# Patient Record
Sex: MALE | Race: WHITE | Employment: FULL TIME | URBAN - METROPOLITAN AREA
[De-identification: names, ages, dates, MRNs, and addresses within clinical notes are randomized per-mention and may not be internally consistent; named-entity substitution may affect disease eponyms.]

---

## 2017-09-20 ENCOUNTER — OFFICE VISIT (OUTPATIENT)
Dept: FAMILY MEDICINE | Facility: CLINIC | Age: 62
End: 2017-09-20
Payer: COMMERCIAL

## 2017-09-20 VITALS
HEIGHT: 70 IN | HEART RATE: 72 BPM | DIASTOLIC BLOOD PRESSURE: 94 MMHG | TEMPERATURE: 98 F | SYSTOLIC BLOOD PRESSURE: 148 MMHG | WEIGHT: 216.8 LBS | OXYGEN SATURATION: 96 % | BODY MASS INDEX: 31.04 KG/M2

## 2017-09-20 DIAGNOSIS — Z13.220 SCREENING FOR LIPID DISORDERS: ICD-10-CM

## 2017-09-20 DIAGNOSIS — I10 BENIGN ESSENTIAL HYPERTENSION: ICD-10-CM

## 2017-09-20 DIAGNOSIS — E78.5 HYPERLIPIDEMIA LDL GOAL <130: ICD-10-CM

## 2017-09-20 DIAGNOSIS — Z12.11 SCREENING FOR COLON CANCER: ICD-10-CM

## 2017-09-20 DIAGNOSIS — G43.009 MIGRAINE WITHOUT AURA AND WITHOUT STATUS MIGRAINOSUS, NOT INTRACTABLE: ICD-10-CM

## 2017-09-20 DIAGNOSIS — Z00.01 ENCOUNTER FOR ROUTINE ADULT MEDICAL EXAM WITH ABNORMAL FINDINGS: Primary | ICD-10-CM

## 2017-09-20 DIAGNOSIS — R36.1 BLOOD IN SEMEN: ICD-10-CM

## 2017-09-20 PROCEDURE — 82043 UR ALBUMIN QUANTITATIVE: CPT | Performed by: NURSE PRACTITIONER

## 2017-09-20 PROCEDURE — 80061 LIPID PANEL: CPT | Performed by: NURSE PRACTITIONER

## 2017-09-20 PROCEDURE — 99386 PREV VISIT NEW AGE 40-64: CPT | Performed by: NURSE PRACTITIONER

## 2017-09-20 PROCEDURE — 36415 COLL VENOUS BLD VENIPUNCTURE: CPT | Performed by: NURSE PRACTITIONER

## 2017-09-20 PROCEDURE — 80048 BASIC METABOLIC PNL TOTAL CA: CPT | Performed by: NURSE PRACTITIONER

## 2017-09-20 PROCEDURE — 99213 OFFICE O/P EST LOW 20 MIN: CPT | Mod: 25 | Performed by: NURSE PRACTITIONER

## 2017-09-20 RX ORDER — LISINOPRIL 10 MG/1
10 TABLET ORAL DAILY
Qty: 30 TABLET | Refills: 1 | Status: SHIPPED | OUTPATIENT
Start: 2017-09-20 | End: 2018-04-27

## 2017-09-20 RX ORDER — ATENOLOL 50 MG/1
50 TABLET ORAL DAILY
Qty: 90 TABLET | Refills: 3 | Status: SHIPPED | OUTPATIENT
Start: 2017-09-20 | End: 2018-09-18

## 2017-09-20 RX ORDER — ATORVASTATIN CALCIUM 10 MG/1
20 TABLET, FILM COATED ORAL DAILY
Qty: 180 TABLET | Refills: 3 | Status: SHIPPED | OUTPATIENT
Start: 2017-09-20 | End: 2017-10-03

## 2017-09-20 RX ORDER — HYDROCHLOROTHIAZIDE 25 MG/1
25 TABLET ORAL DAILY
Qty: 90 TABLET | Refills: 1 | Status: SHIPPED | OUTPATIENT
Start: 2017-09-20 | End: 2018-03-28

## 2017-09-20 RX ORDER — RIZATRIPTAN BENZOATE 5 MG/1
5 TABLET ORAL
Qty: 30 TABLET | Refills: 3 | Status: SHIPPED | OUTPATIENT
Start: 2017-09-20 | End: 2017-09-26

## 2017-09-20 NOTE — LETTER
September 25, 2017      Karan Derrek  [address]   [place] [address]        Dear ,    We are writing to inform you of your test results.    Your microalbumin, which is a measure of kidney health, was elevated.  This suggests that blood pressure control has not been optimal.  The blood kidney function and electrolytes are normal.  The cholesterol looks elevated, but this is because you were not fasting.  The LDL, which is the part we decide on meds for, is actually quite good.  Next year, or sooner if you wish, let's get a fasting cholesterol which will allow us to calculate a risk score and may prompt us to consider medication despite your good LDL. Enclosed is a copy of these results.  If you have any further questions or problems, please contact our office at [phone].    Resulted Orders   LIPID REFLEX TO DIRECT LDL PANEL   Result Value Ref Range    Cholesterol 202 (H) <200 mg/dL      Comment:      Desirable:       <200 mg/dl    Triglycerides 384 (H) <150 mg/dL      Comment:      Borderline high:  150-199 mg/dl  High:             200-499 mg/dl  Very high:       >499 mg/dl      HDL Cholesterol 40 >39 mg/dL    LDL Cholesterol Calculated 85 <100 mg/dL      Comment:      Desirable:       <100 mg/dl    Non HDL Cholesterol 162 (H) <130 mg/dL      Comment:      Above Desirable:  130-159 mg/dl  Borderline high:  160-189 mg/dl  High:             190-219 mg/dl  Very high:       >219 mg/dl     Basic metabolic panel   Result Value Ref Range    Sodium 137 133 - 144 mmol/L    Potassium 4.0 3.4 - 5.3 mmol/L    Chloride 100 94 - 109 mmol/L    Carbon Dioxide 30 20 - 32 mmol/L    Anion Gap 7 3 - 14 mmol/L    Glucose 98 70 - 99 mg/dL    Urea Nitrogen 20 7 - 30 mg/dL    Creatinine 1.21 0.66 - 1.25 mg/dL    GFR Estimate 61 >60 mL/min/1.7m2      Comment:      Non  GFR Calc    GFR Estimate If Black 74 >60 mL/min/1.7m2      Comment:       GFR Calc    Calcium 9.8 8.5 - 10.1  mg/dL   Albumin Random Urine Quantitative with Creat Ratio   Result Value Ref Range    Creatinine Urine 38 mg/dL    Albumin Urine mg/L 12 mg/L    Albumin Urine mg/g Cr 30.50 (H) 0 - 17 mg/g Cr       If you have any questions or concerns, please call the clinic at the number listed above.       Sincerely,        JEFFERSON Cook CNP

## 2017-09-20 NOTE — PATIENT INSTRUCTIONS
Preventive Health Recommendations  Male Ages 50   64    Yearly exam:             See your health care provider every year in order to  o   Review health changes.   o   Discuss preventive care.    o   Review your medicines if your doctor has prescribed any.     Have a cholesterol test every 5 years, or more frequently if you are at risk for high cholesterol/heart disease.     Have a diabetes test (fasting glucose) every three years. If you are at risk for diabetes, you should have this test more often.     Have a colonoscopy at age 50, or have a yearly FIT test (stool test). These exams will check for colon cancer.      Talk with your health care provider about whether or not a prostate cancer screening test (PSA) is right for you.    You should be tested each year for STDs (sexually transmitted diseases), if you re at risk.     Shots: Get a flu shot each year. Get a tetanus shot every 10 years.     Nutrition:    Eat at least 5 servings of fruits and vegetables daily.     Eat whole-grain bread, whole-wheat pasta and brown rice instead of white grains and rice.     Talk to your provider about Calcium and Vitamin D.     Lifestyle    Exercise for at least 150 minutes a week (30 minutes a day, 5 days a week). This will help you control your weight and prevent disease.     Limit alcohol to one drink per day.     No smoking.     Wear sunscreen to prevent skin cancer.     See your dentist every six months for an exam and cleaning.     See your eye doctor every 1 to 2 years.    Preventive Health Recommendations  Male Ages 50   64    Yearly exam:             See your health care provider every year in order to  o   Review health changes.   o   Discuss preventive care.    o   Review your medicines if your doctor has prescribed any.     Have a cholesterol test every 5 years, or more frequently if you are at risk for high cholesterol/heart disease.     Have a diabetes test (fasting glucose) every three years. If you are at  risk for diabetes, you should have this test more often.     Have a colonoscopy at age 50, or have a yearly FIT test (stool test). These exams will check for colon cancer.      Talk with your health care provider about whether or not a prostate cancer screening test (PSA) is right for you.    You should be tested each year for STDs (sexually transmitted diseases), if you re at risk.     Shots: Get a flu shot each year. Get a tetanus shot every 10 years.     Nutrition:    Eat at least 5 servings of fruits and vegetables daily.     Eat whole-grain bread, whole-wheat pasta and brown rice instead of white grains and rice.     Talk to your provider about Calcium and Vitamin D.     Lifestyle    Exercise for at least 150 minutes a week (30 minutes a day, 5 days a week). This will help you control your weight and prevent disease.     Limit alcohol to one drink per day.     No smoking.     Wear sunscreen to prevent skin cancer.     See your dentist every six months for an exam and cleaning.     See your eye doctor every 1 to 2 years.

## 2017-09-20 NOTE — NURSING NOTE
"Chief Complaint   Patient presents with     Physical       Initial BP (!) 148/94  Pulse 72  Temp 98  F (36.7  C) (Oral)  Ht 5' 10.47\" (1.79 m)  Wt 216 lb 12.8 oz (98.3 kg)  SpO2 96%  BMI 30.69 kg/m2 Estimated body mass index is 30.69 kg/(m^2) as calculated from the following:    Height as of this encounter: 5' 10.47\" (1.79 m).    Weight as of this encounter: 216 lb 12.8 oz (98.3 kg).  Medication Reconciliation: complete     Larry Saucedo MA      "

## 2017-09-20 NOTE — PROGRESS NOTES
SUBJECTIVE:   CC: Karan Anglin is an 62 year old male who presents for preventative health visit.     Healthy Habits:    Do you get at least three servings of calcium containing foods daily (dairy, green leafy vegetables, etc.)? yes    Amount of exercise or daily activities, outside of work: 5 day(s) per week    Problems taking medications regularly No    Medication side effects: No    Have you had an eye exam in the past two years? yes    Do you see a dentist twice per year? yes    Do you have sleep apnea, excessive snoring or daytime drowsiness?no    Colonoscopy done on this date: 2006 (approximately), by this group: [facility], results were normal.   Last dental appt: 1 year ago.  Last vision appt: < 1 year ago.    Hyperlipidemia Follow-Up      Rate your low fat/cholesterol diet?: not monitoring fat    Taking statin?  Yes, no muscle aches from statin    Other lipid medications/supplements?:  none    Hypertension Follow-up      Outpatient blood pressures are being checked at home about once a week.  Results are 130s/90s.    Low Salt Diet: no added salt; reading labels    Migraine Follow-Up    Headaches symptoms:  Stable. Can feel migraine coming on, without aura, administers rescue medication, symptoms resolve within a few hours    Frequency: 3-4 x a month     Duration of headaches: with medication, a few hours    Able to do normal daily activities/work with migraines: Yes, after medication sets in    Rescue/Relief medication:Rizatriptan Benzoate 5 mg po              Effectiveness: total relief    Preventative medication: Atenolol 50 mg po daily    Neurologic complications: No new stroke-like symptoms, loss of vision or speech, numbness or weakness    In the past 4 weeks, how often have you gone to Urgent Care or the emergency room because of your headaches?  0      Today's PHQ-2 Score:   PHQ-2 ( 1999 [employer]) 9/20/2017   Q1: Little interest or pleasure in doing things 0   Q2: Feeling down, depressed or hopeless  "0   PHQ-2 Score 0       Abuse: Current or Past(Physical, Sexual or Emotional)- No  Do you feel safe in your environment - Yes  Social History   Substance Use Topics     Smoking status: Never Smoker     Smokeless tobacco: Never Used     Alcohol use Yes      Comment: 5 glasses wine/wk     The patient does not drink >3 drinks per day nor >7 drinks per week.  Consumes approx 5 glasses of wine /wk  Last PSA: No results found for: PSA; per patient report 1 year ago, WNL    Reviewed orders with patient. Reviewed health maintenance and updated orders accordingly - Yes      Reviewed and updated as needed this visit by clinical staff    Reviewed and updated as needed this visit by Provider    Past Medical History:   Diagnosis Date     Hyperlipidemia 2015     Hypertension 2014     Migraine     Nausea and vomiting     Peptic ulcer hemorrhage 2013        ROS:  C: NEGATIVE for fever, chills, change in weight; positive for new generalized achiness  I: NEGATIVE for worrisome rashes, moles or lesions  E: NEGATIVE for vision changes or irritation  ENT: NEGATIVE for ear, mouth and throat problems  R: NEGATIVE for significant cough or SOB  CV: NEGATIVE for chest pain, palpitations or peripheral edema  GI: NEGATIVE for nausea, abdominal pain, heartburn, or change in bowel habits   male: hesitancy, weakness in stream and occasional blood in semen; both new from last few months; father had BPH, believes he had normal PSA a year ago  M: NEGATIVE for significant arthralgias or myalgia  N: NEGATIVE for weakness, dizziness; positive intermittent parasthesias in nose, fingertips, toes  E: NEGATIVE for temperature intolerance, skin/hair changes  P: NEGATIVE for changes in mood or affect    OBJECTIVE:   BP (!) 148/94  Pulse 72  Temp 98  F (36.7  C) (Oral)  Ht 5' 10.47\" (1.79 m)  Wt 216 lb 12.8 oz (98.3 kg)  SpO2 96%  BMI 30.69 kg/m2  EXAM:  GENERAL: healthy, alert and no distress  EYES: Eyes grossly normal to inspection, PERRL and " conjunctivae and sclerae normal  HENT: ear canals and TM's normal, nose and mouth without ulcers or lesions  NECK: no adenopathy, no asymmetry, masses, or scars and thyroid normal to palpation  RESP: lungs clear to auscultation - no rales, rhonchi or wheezes  CV: regular rate and rhythm, normal S1 S2, no S3 or S4, no murmur, click or rub, no peripheral edema and peripheral pulses strong  ABDOMEN: soft, nontender, no hepatosplenomegaly, no masses and bowel sounds normal   (male): normal male genitalia without lesions or urethral discharge, no hernia  MS: no gross musculoskeletal defects noted, no edema  SKIN: no suspicious lesions or rashes  NEURO: Normal strength and tone, mentation intact and speech normal  PSYCH: mentation appears normal, affect normal/bright  LYMPH: no cervical, supraclavicular, axillary, or inguinal adenopathy    ASSESSMENT/PLAN:       2. Encounter for routine adult medical exam with abnormal findings      3. Screening for colon cancer  Plans to complete FIT   - Fecal colorectal cancer screen FIT; Future  - GASTROENTEROLOGY ADULT REF PROCEDURE ONLY    4. Screening for lipid disorders    - LIPID REFLEX TO DIRECT LDL PANEL  - Basic metabolic panel    5. Benign essential hypertension  Requires additional control.  Add lisinopril 10 mg po daily.  - LIPID REFLEX TO DIRECT LDL PANEL  - Basic metabolic panel  - Albumin Random Urine Quantitative with Creat Ratio    6. Hyperlipidemia LDL goal <130    - LIPID REFLEX TO DIRECT LDL PANEL    7. Blood in semen    - UROLOGY ADULT REFERRAL    COUNSELING:  Reviewed preventive health counseling, as reflected in patient instructions       Regular exercise       Healthy diet/nutrition       Consider Hep C screening for patients born between 1945 and 1965       Colon cancer screening     reports that he has never smoked. He has never used smokeless tobacco.    Estimated body mass index is 30.69 kg/(m^2) as calculated from the following:    Height as of this  "encounter: 5' 10.47\" (1.79 m).    Weight as of this encounter: 216 lb 12.8 oz (98.3 kg).   Weight management plan: Discussed healthy diet and exercise guidelines and patient will follow up in 12 months in clinic to re-evaluate.    Counseling Resources:  ATP IV Guidelines  Pooled Cohorts Equation Calculator  FRAX Risk Assessment  ICSI Preventive Guidelines  Dietary Guidelines for Americans, 2010  USDA's MyPlate  ASA Prophylaxis  Lung CA Screening    Caridad Rojas RN DNP/FNP student    Present and preformed physical exam with student nurse-family practice. The patient was evaluated and managed, by Caridad Rojas RN, SNP in collaboration with JEFFERSON Isbell, BABAK supervising clinical preceptor.    Documentation written by JEFFERSON Isbell CNP    [facility]  "

## 2017-09-20 NOTE — MR AVS SNAPSHOT
After Visit Summary   9/20/2017    Karan Anglin    MRN: [id number]           Patient Information     Date Of Birth          1955        Visit Information        Provider Department      9/20/2017 1:00 PM Romelia Judd APRN [facility]        Today's Diagnoses     Encounter for routine adult medical exam with abnormal findings    -  1    Routine general medical examination at a health care facility        Screening for colon cancer        Screening for lipid disorders        Benign essential hypertension        Hyperlipidemia LDL goal <130        Blood in semen          Care Instructions      Preventive Health Recommendations  Male Ages 50 - 64    Yearly exam:             See your health care provider every year in order to  o   Review health changes.   o   Discuss preventive care.    o   Review your medicines if your doctor has prescribed any.     Have a cholesterol test every 5 years, or more frequently if you are at risk for high cholesterol/heart disease.     Have a diabetes test (fasting glucose) every three years. If you are at risk for diabetes, you should have this test more often.     Have a colonoscopy at age 50, or have a yearly FIT test (stool test). These exams will check for colon cancer.      Talk with your health care provider about whether or not a prostate cancer screening test (PSA) is right for you.    You should be tested each year for STDs (sexually transmitted diseases), if you re at risk.     Shots: Get a flu shot each year. Get a tetanus shot every 10 years.     Nutrition:    Eat at least 5 servings of fruits and vegetables daily.     Eat whole-grain bread, whole-wheat pasta and brown rice instead of white grains and rice.     Talk to your provider about Calcium and Vitamin D.     Lifestyle    Exercise for at least 150 minutes a week (30 minutes a day, 5 days a week). This will help you control your weight and prevent disease.     Limit alcohol to one  drink per day.     No smoking.     Wear sunscreen to prevent skin cancer.     See your dentist every six months for an exam and cleaning.     See your eye doctor every 1 to 2 years.    Preventive Health Recommendations  Male Ages 50 - 64    Yearly exam:             See your health care provider every year in order to  o   Review health changes.   o   Discuss preventive care.    o   Review your medicines if your doctor has prescribed any.     Have a cholesterol test every 5 years, or more frequently if you are at risk for high cholesterol/heart disease.     Have a diabetes test (fasting glucose) every three years. If you are at risk for diabetes, you should have this test more often.     Have a colonoscopy at age 50, or have a yearly FIT test (stool test). These exams will check for colon cancer.      Talk with your health care provider about whether or not a prostate cancer screening test (PSA) is right for you.    You should be tested each year for STDs (sexually transmitted diseases), if you re at risk.     Shots: Get a flu shot each year. Get a tetanus shot every 10 years.     Nutrition:    Eat at least 5 servings of fruits and vegetables daily.     Eat whole-grain bread, whole-wheat pasta and brown rice instead of white grains and rice.     Talk to your provider about Calcium and Vitamin D.     Lifestyle    Exercise for at least 150 minutes a week (30 minutes a day, 5 days a week). This will help you control your weight and prevent disease.     Limit alcohol to one drink per day.     No smoking.     Wear sunscreen to prevent skin cancer.     See your dentist every six months for an exam and cleaning.     See your eye doctor every 1 to 2 years.            Follow-ups after your visit        Additional Services     GASTROENTEROLOGY ADULT REF PROCEDURE ONLY       Last Lab Result: No results found for: CR  There is no height or weight on file to calculate BMI.     Needed:  No  Language:  English    Patient  will be contacted to schedule procedure.     Please be aware that coverage of these services is subject to the terms and limitations of your health insurance plan.  Call member services at your health plan with any benefit or coverage questions.  Any procedures must be performed at a [facility] facility OR coordinated by your clinic's referral office.    Please bring the following with you to your appointment:    (1) Any X-Rays, CTs or MRIs which have been performed.  Contact the facility where they were done to arrange for  prior to your scheduled appointment.    (2) List of current medications   (3) This referral request   (4) Any documents/labs given to you for this referral            UROLOGY ADULT REFERRAL       Your provider has referred you to: [facility]: [facility] [place] [phone]   [email or web address]    Please be aware that coverage of these services is subject to the terms and limitations of your health insurance plan.  Call member services at your health plan with any benefit or coverage questions.      Please bring the following with you to your appointment:    (1) Any X-Rays, CTs or MRIs which have been performed.  Contact the facility where they were done to arrange for  prior to your scheduled appointment.    (2) List of current medications  (3) This referral request   (4) Any documents/labs given to you for this referral                  Future tests that were ordered for you today     Open Future Orders        Priority Expected Expires Ordered    Fecal colorectal cancer screen FIT Routine 10/11/2017 12/13/2017 9/20/2017            Who to contact     If you have questions or need follow up information about today's clinic visit or your schedule please contact [facility] directly at [phone].  Normal or non-critical lab and imaging results will be communicated to you  "by tinyclueshart, letter or phone within 4 business days after the clinic has received the results. If you do not hear from us within 7 days, please contact the clinic through [facility] or phone. If you have a critical or abnormal lab result, we will notify you by phone as soon as possible.  Submit refill requests through [facility] or call your pharmacy and they will forward the refill request to us. Please allow 3 business days for your refill to be completed.          Additional Information About Your Visit        [facility] Information     [facility] lets you send messages to your doctor, view your test results, renew your prescriptions, schedule appointments and more. To sign up, go to [email or web address] . Click on \"Log in\" on the left side of the screen, which will take you to the Welcome page. Then click on \"Sign up Now\" on the right side of the page.     You will be asked to enter the access code listed below, as well as some personal information. Please follow the directions to create your username and password.     Your access code is: [email or web address]  Expires: 2017  2:20 PM     Your access code will  in 90 days. If you need help or a new code, please call your [facility] clinic or [phone].        Care EveryWhere ID     This is your Care EveryWhere ID. This could be used by other organizations to access your [facility] medical records  [id number]        Your Vitals Were     Pulse Temperature Height Pulse Oximetry BMI (Body Mass Index)       72 98  F (36.7  C) (Oral) 5' 10.47\" (1.79 m) 96% 30.69 kg/m2        Blood Pressure from Last 3 Encounters:   17 (!) 148/94    Weight from Last 3 Encounters:   17 216 lb 12.8 oz (98.3 kg)              We Performed the Following     Albumin Random Urine Quantitative with Creat Ratio     Basic metabolic panel     GASTROENTEROLOGY ADULT REF PROCEDURE ONLY     LIPID REFLEX TO DIRECT LDL PANEL     UROLOGY ADULT REFERRAL        Primary Care Provider    None " Specified       No primary provider on file.        Equal Access to Services     [facility] JIMMY : Hadii saleem Barroso, graeme alfred, zac whitt. So [facility] [phone].    ATENCIÓN: Si habla español, tiene a grullon disposición servicios gratuitos de asistencia lingüística. Llame al [phone].    We comply with applicable federal civil rights laws and [place] laws. We do not discriminate on the basis of race, color, national origin, age, disability sex, sexual orientation or gender identity.            Thank you!     Thank you for choosing [facility]  for your care. Our goal is always to provide you with excellent care. Hearing back from our patients is one way we can continue to improve our services. Please take a few minutes to complete the written survey that you may receive in the mail after your visit with us. Thank you!             Your Updated Medication List - Protect others around you: Learn how to safely use, store and throw away your medicines at www.disposemymeds.org.      Notice  As of 9/20/2017  2:20 PM    You have not been prescribed any medications.

## 2017-09-21 LAB
ANION GAP SERPL CALCULATED.3IONS-SCNC: 7 MMOL/L (ref 3–14)
BUN SERPL-MCNC: 20 MG/DL (ref 7–30)
CALCIUM SERPL-MCNC: 9.8 MG/DL (ref 8.5–10.1)
CHLORIDE SERPL-SCNC: 100 MMOL/L (ref 94–109)
CHOLEST SERPL-MCNC: 202 MG/DL
CO2 SERPL-SCNC: 30 MMOL/L (ref 20–32)
CREAT SERPL-MCNC: 1.21 MG/DL (ref 0.66–1.25)
CREAT UR-MCNC: 38 MG/DL
GFR SERPL CREATININE-BSD FRML MDRD: 61 ML/MIN/1.7M2
GLUCOSE SERPL-MCNC: 98 MG/DL (ref 70–99)
HDLC SERPL-MCNC: 40 MG/DL
LDLC SERPL CALC-MCNC: 85 MG/DL
MICROALBUMIN UR-MCNC: 12 MG/L
MICROALBUMIN/CREAT UR: 30.5 MG/G CR (ref 0–17)
NONHDLC SERPL-MCNC: 162 MG/DL
POTASSIUM SERPL-SCNC: 4 MMOL/L (ref 3.4–5.3)
SODIUM SERPL-SCNC: 137 MMOL/L (ref 133–144)
TRIGL SERPL-MCNC: 384 MG/DL

## 2017-09-22 NOTE — PROGRESS NOTES
"Please notify patient of normal lab results.  Thank you.    Please add below note.  \"Your microalbumin, which is a measure of kidney health, was elevated.  This suggests that blood pressure control has not been optimal.  The blood kidney function and electrolytes are normal.  The cholesterol looks elevated, but this is because you were not fasting.  The LDL, which is the part we decide on meds for, is actually quite good.  Next year, or sooner if you wish, let's get a fasting cholesterol which will allow us to calculate a risk score and may prompt us to consider medication despite your good LDL. Enclosed is a copy of these results.  If you have any further questions or problems, please contact our office at [phone].\""

## 2017-09-26 ENCOUNTER — TELEPHONE (OUTPATIENT)
Dept: FAMILY MEDICINE | Facility: CLINIC | Age: 62
End: 2017-09-26

## 2017-09-26 DIAGNOSIS — G43.009 MIGRAINE WITHOUT AURA AND WITHOUT STATUS MIGRAINOSUS, NOT INTRACTABLE: ICD-10-CM

## 2017-09-26 NOTE — TELEPHONE ENCOUNTER
,     Fax received from pharmacy regarding:     rizatriptan (MAXALT) 5 MG tablet    Fax states a max dose per day is needed on script. Medication cued for your review/revision.     Thank you,   Diana Monzon RN  [facility]

## 2017-09-27 RX ORDER — RIZATRIPTAN BENZOATE 5 MG/1
5 TABLET ORAL
Qty: 30 TABLET | Refills: 0 | Status: SHIPPED | OUTPATIENT
Start: 2017-09-27 | End: 2018-02-15

## 2017-11-14 PROCEDURE — 82274 ASSAY TEST FOR BLOOD FECAL: CPT | Performed by: NURSE PRACTITIONER

## 2017-11-17 DIAGNOSIS — Z12.11 SCREENING FOR COLON CANCER: ICD-10-CM

## 2017-11-17 LAB — HEMOCCULT STL QL IA: NEGATIVE

## 2017-11-17 NOTE — PROGRESS NOTES
Karan,    Your labs were all normal.  If you have any questions, please feel free to contact the clinic.    MARJ Rashid

## 2018-03-20 ENCOUNTER — TELEPHONE (OUTPATIENT)
Dept: FAMILY MEDICINE | Facility: CLINIC | Age: 63
End: 2018-03-20

## 2018-03-20 NOTE — LETTER
March 20, 2018      Karan Anglin  [address]  [place] [address]        Dear Karan,    In order to ensure we are providing the best quality care, we have reviewed your chart and see that you are due for:    1. Blood pressure check  2. Colonoscopy    Please call the clinic at your earliest convenience to schedule an appointment.  If you have completed these please contact our office via phone or [facility] to update our records.  We would like to know the date (approximately month and year), the result, and ideally where the procedure was performed.    Thank you for trusting us with your health care.      Sincerely,    Care Team for MARJ Rashid

## 2018-03-27 ENCOUNTER — APPOINTMENT (OUTPATIENT)
Dept: GENERAL RADIOLOGY | Facility: CLINIC | Age: 63
End: 2018-03-27
Attending: EMERGENCY MEDICINE
Payer: OTHER MISCELLANEOUS

## 2018-03-27 ENCOUNTER — HOSPITAL ENCOUNTER (EMERGENCY)
Facility: CLINIC | Age: 63
Discharge: HOME | End: 2018-03-27
Attending: EMERGENCY MEDICINE | Admitting: EMERGENCY MEDICINE
Payer: OTHER MISCELLANEOUS

## 2018-03-27 VITALS
HEART RATE: 72 BPM | HEIGHT: 71 IN | RESPIRATION RATE: 16 BRPM | TEMPERATURE: 97.8 F | DIASTOLIC BLOOD PRESSURE: 90 MMHG | SYSTOLIC BLOOD PRESSURE: 135 MMHG | OXYGEN SATURATION: 99 %

## 2018-03-27 DIAGNOSIS — S43.005A DISLOCATION OF LEFT SHOULDER JOINT, INITIAL ENCOUNTER: ICD-10-CM

## 2018-03-27 PROCEDURE — 73030 X-RAY EXAM OF SHOULDER: CPT | Mod: LT

## 2018-03-27 PROCEDURE — 99285 EMERGENCY DEPT VISIT HI MDM: CPT | Mod: 25 | Performed by: EMERGENCY MEDICINE

## 2018-03-27 PROCEDURE — 40000986 XR SHOULDER LT G/E 3 VW

## 2018-03-27 PROCEDURE — 23650 CLTX SHO DSLC W/MNPJ WO ANES: CPT | Performed by: EMERGENCY MEDICINE

## 2018-03-27 PROCEDURE — 25000128 H RX IP 250 OP 636: Performed by: EMERGENCY MEDICINE

## 2018-03-27 PROCEDURE — 96374 THER/PROPH/DIAG INJ IV PUSH: CPT | Performed by: EMERGENCY MEDICINE

## 2018-03-27 PROCEDURE — 23650 CLTX SHO DSLC W/MNPJ WO ANES: CPT | Mod: Z6 | Performed by: EMERGENCY MEDICINE

## 2018-03-27 RX ORDER — FENTANYL CITRATE 50 UG/ML
50 INJECTION, SOLUTION INTRAMUSCULAR; INTRAVENOUS ONCE
Status: COMPLETED | OUTPATIENT
Start: 2018-03-27 | End: 2018-03-27

## 2018-03-27 RX ORDER — HYDROMORPHONE HYDROCHLORIDE 1 MG/ML
0.5 INJECTION, SOLUTION INTRAMUSCULAR; INTRAVENOUS; SUBCUTANEOUS
Status: DISCONTINUED | OUTPATIENT
Start: 2018-03-27 | End: 2018-03-27

## 2018-03-27 RX ADMIN — FENTANYL CITRATE 50 MCG: 50 INJECTION, SOLUTION INTRAMUSCULAR; INTRAVENOUS at 13:21

## 2018-03-27 ASSESSMENT — ENCOUNTER SYMPTOMS
JOINT SWELLING: 1
ABDOMINAL PAIN: 0
NUMBNESS: 0

## 2018-03-27 NOTE — DISCHARGE INSTRUCTIONS
You should receive a call from [facility] to schedule your follow up appointment. If you do not hear from them within 24 business hours, call [phone], option 3 for help in scheduling your follow up.  If you are seen in the Emergency Department over the weekend, you will receive a phone call on the next business day.     Continue to wear shoulder immobilizer until follow-up.  You may remove to take showers and get dressed but keep your arm in the same position as you are at risk for re-dislocation.      You may use acetaminophen or ibuprofen as needed for pain control.        Dislocation: Shoulder (Reduced)    A shoulder is dislocated when a strong force injures, and possibly tears the ligaments that hold the shoulder joint together. The bones that make up the joint then move apart and become stuck out of place. The joint must be put back in place. Then it will take several weeks for the shoulder to heal. This injury may weaken the ligaments. Weakened ligaments put you at risk for another dislocation. Another dislocation can happen even if you are hit with less force.  Shoulder dislocation is treated with a special type of arm sling called a shoulder immobilizer. This keeps your arm close to your body. This stops the shoulder from dislocating again while the ligaments heal. After a few weeks, you may start an exercise program. This will gradually bring back your range-of-motion and shoulder strength. It will also lower your risk for another dislocation.  Home care  Follow these tips for taking care of yourself at home:    Until your next doctor visit, wear your shoulder immobilizer at all times. Don t take it off at night to sleep. This is because it s possible to dislocate your arm again in your sleep. You can take it off to bathe or dress. But don t move your arm away from your body. Keep your arm in the same position that the sling was holding it in until you put the sling back on. During  your next visit, ask your doctor how long you should wear the sling.    Apply an ice pack over the injured area for 20 minutes every 1 to 2 hours the first day. You can make an ice pack by putting ice cubes in a plastic bag. Wrap the bag in a towel before putting it on your shoulder. Continue with ice packs 3 to 4 times a day for the next 2 to 3 days. Then use the ice as needed to relieve pain and swelling.    You may use acetaminophen or ibuprofen to control pain, unless another pain medicine was prescribed. If you have chronic liver or kidney disease or ever had a stomach ulcer or gastrointestinal bleeding, talk with your doctor before using these medicines.    Don t take part in sports or physical education classes until your doctor says it s OK.  Follow-up care  Follow up with your healthcare provider, or as advised. You shouldn t wear your shoulder immobilizer or sling for more than a few weeks without taking it off. Keeping it on for a longer time may limit your range-of-motion at the shoulder joint. If you have had several dislocations of the same shoulder, you may have permanent damage to the ligaments. Ask an orthopedic doctor about surgery to prevent another dislocation.  When to seek medical advice  Call your healthcare provider right away if any of these occur:    Another dislocation of your shoulder    Swelling or pain in the shoulder or arm that gets worse    Your fingers become cold, blue, numb or tingly    Fever or chills  Date Last Reviewed: 8/2/2016 2000-2017 [employer]. [address] [place] [address], [place] [address]. All rights reserved. This information is not intended as a substitute for professional medical care. Always follow your healthcare professional's instructions.

## 2018-03-27 NOTE — LETTER
March 27, 2018      To Whom It May Concern:      Karan Anglin was seen in our Emergency Department today, 03/27/18.  He will be restricted from using his left arm for the next week.       Sincerely,        Inna Jerome MD

## 2018-03-27 NOTE — ED PROVIDER NOTES
History     Chief Complaint   Patient presents with     Dislocation     HPI  Karan Anglin is a 63 year old male who presents to the Emergency Department for evaluation following a shoulder injury. The patient reports that he fell around 11:45 and subsequently landed on his left shoulder. He reports of having associated pain and decreased range of motion and is concerned that he may have dislocated his shoulder. He denies any numbness, tingling, weakness or any other symptoms. He denies any injuries to the head or LOC. He denies any other injuries or trauma. He is left hand dominant. He last ate this morning around 7:30am. He is not anticoagulated.      I have reviewed the Medications, Allergies, Past Medical and Surgical History, and Social History in the [employer] system.  PAST MEDICAL HISTORY:   Past Medical History:   Diagnosis Date     Hyperlipidemia 2015     Hypertension 2014     Migraine     Nausea and vomiting     Peptic ulcer hemorrhage 2013       PAST SURGICAL HISTORY:   Past Surgical History:   Procedure Laterality Date     GI SURGERY  2013    peptic ulcers stapled       FAMILY HISTORY:   Family History   Problem Relation Age of Onset     Colon Cancer Mother      KIDNEY DISEASE Mother      Coronary Artery Disease Father      Enlarged prostate Father      Other Cancer Brother      Down Syndrome Sister      Alzheimer Disease Sister        SOCIAL HISTORY:   Social History   Substance Use Topics     Smoking status: Never Smoker     Smokeless tobacco: Never Used     Alcohol use Yes      Comment: 5 glasses wine/wk     Current Facility-Administered Medications   Medication     fentaNYL (PF) (SUBLIMAZE) injection 50 mcg     Current Outpatient Prescriptions   Medication     rizatriptan (MAXALT) 5 MG tablet     atorvastatin (LIPITOR) 10 MG tablet     atenolol (TENORMIN) 50 MG tablet     hydrochlorothiazide (HYDRODIURIL) 25 MG tablet     lisinopril (PRINIVIL/ZESTRIL) 10 MG tablet      No Known Allergies    Review of  "Systems   Gastrointestinal: Negative for abdominal pain.   Musculoskeletal: Positive for joint swelling (left shoulder pain).   Neurological: Negative for syncope and numbness.   All other systems reviewed and are negative.      Physical Exam   BP: 135/90  Pulse: 72  Temp: 97.8  F (36.6  C)  Resp: 16  Height: 180.3 cm (5' 11\")  SpO2: 95 %      Physical Exam  General: patient is alert and oriented and in no acute distress   Head: atraumatic and normocephalic   EENT: moist mucus membranes, pupils round and reactive   Neck: supple   Cardiovascular: regular rate and rhythm, 2+ radial pulses, extremities warm and well perfused, no lower extremity edema  Pulmonary: lungs clear to auscultation bilaterally   Abdomen: soft, non-tender   Musculoskeletal: Decreased range of motion of the left shoulder and palpable step-off at the glenohumeral joint, no tenderness to palpation of the clavicle or humerus tenderness, otherwise full range of motion at the elbow and wrist at the left upper extremity, no other point bony TTP  Neurological: alert and oriented, 5 out of 5 strength in , finger abduction, wrist extension, elbow flexion and extension in the left upper extremity, sensation to light touch along the radial, median, ulnar and axillary nerves is intact  skin: warm, dry     ED Course     ED Course     Procedures             Critical Care time:  none             Labs Ordered and Resulted from Time of ED Arrival Up to the Time of Departure from the ED - No data to display         Assessments & Plan (with Medical Decision Making)   Mr. Anglin is a 63 year old left hand dominant male who presents to the Emergency Department for evaluation following a shoulder injury following a mechanical fall.  He is neurovascularly intact.  He does not have other evidence of traumatic injury.  He did go for an x-ray which shows a dislocated left shoulder without evidence of fracture.  He was given fentanyl in the emergency department and " performed Taylor maneuver with successful relocation.  Repeat x-ray shows appropriate alignment.  He was placed in a shoulder immobilizer and will follow-up with orthopedic surgery for reevaluation.  He was given a work note as well as return precautions and voiced understanding.    I have reviewed the nursing notes.    I have reviewed the findings, diagnosis, plan and need for follow up with the patient.    Discharge Medication List as of 3/27/2018  3:22 PM          Final diagnoses:   Dislocation of left shoulder joint, initial encounter     I, Bettie Tuttle, am serving as a trained medical scribe to document services personally performed by Inna Bhatti MD, based on the provider's statements to me.   I, Inna Bhatti MD, was physically present and have reviewed and verified the accuracy of this note documented by Bettie Tuttle.    3/27/2018   [facility], [place], EMERGENCY DEPARTMENT     Inna Jerome MD  03/27/18 7058

## 2018-03-27 NOTE — ED NOTES
Bed: ED08  Expected date:   Expected time:   Means of arrival:   Comments:  H439  63 m  Possible dislocated shoulder

## 2018-03-27 NOTE — ED AVS SNAPSHOT
[facility], [place], Emergency Department    [address] [place] [address]    Phone:  [phone]                                       Karan Anglin   MRN: [id number]    Department:  [facility], Emergency Department   Date of Visit:  3/27/2018           After Visit Summary Signature Page     I have received my discharge instructions, and my questions have been answered. I have discussed any challenges I see with this plan with the nurse or doctor.    ..........................................................................................................................................  Patient/Patient Representative Signature      ..........................................................................................................................................  Patient Representative Print Name and Relationship to Patient    ..................................................               ................................................  Date                                            Time    ..........................................................................................................................................  Reviewed by Signature/Title    ...................................................              ..............................................  Date                                                            Time

## 2018-03-27 NOTE — ED AVS SNAPSHOT
[facility], Emergency Department    [address] [facility] [address]    Phone:  [phone]                                       Karan Anglin   MRN: [id number]    Department:  [facility], Emergency Department   Date of Visit:  3/27/2018           Patient Information     Date Of Birth          1955        Your diagnoses for this visit were:     Dislocation of left shoulder joint, initial encounter        You were seen by Inna Jerome MD.        Discharge Instructions       You should receive a call from [facility] to schedule your follow up appointment. If you do not hear from them within 24 business hours, call [phone], option 3 for help in scheduling your follow up.  If you are seen in the Emergency Department over the weekend, you will receive a phone call on the next business day.     Continue to wear shoulder immobilizer until follow-up.  You may remove to take showers and get dressed but keep your arm in the same position as you are at risk for re-dislocation.      You may use acetaminophen or ibuprofen as needed for pain control.        Dislocation: Shoulder (Reduced)    A shoulder is dislocated when a strong force injures, and possibly tears the ligaments that hold the shoulder joint together. The bones that make up the joint then move apart and become stuck out of place. The joint must be put back in place. Then it will take several weeks for the shoulder to heal. This injury may weaken the ligaments. Weakened ligaments put you at risk for another dislocation. Another dislocation can happen even if you are hit with less force.  Shoulder dislocation is treated with a special type of arm sling called a shoulder immobilizer. This keeps your arm close to your body. This stops the shoulder from dislocating again while the ligaments heal. After a few weeks, you may start an exercise program. This will gradually bring back your range-of-motion and shoulder  strength. It will also lower your risk for another dislocation.  Home care  Follow these tips for taking care of yourself at home:    Until your next doctor visit, wear your shoulder immobilizer at all times. Don t take it off at night to sleep. This is because it s possible to dislocate your arm again in your sleep. You can take it off to bathe or dress. But don t move your arm away from your body. Keep your arm in the same position that the sling was holding it in until you put the sling back on. During your next visit, ask your doctor how long you should wear the sling.    Apply an ice pack over the injured area for 20 minutes every 1 to 2 hours the first day. You can make an ice pack by putting ice cubes in a plastic bag. Wrap the bag in a towel before putting it on your shoulder. Continue with ice packs 3 to 4 times a day for the next 2 to 3 days. Then use the ice as needed to relieve pain and swelling.    You may use acetaminophen or ibuprofen to control pain, unless another pain medicine was prescribed. If you have chronic liver or kidney disease or ever had a stomach ulcer or gastrointestinal bleeding, talk with your doctor before using these medicines.    Don t take part in sports or physical education classes until your doctor says it s OK.  Follow-up care  Follow up with your healthcare provider, or as advised. You shouldn t wear your shoulder immobilizer or sling for more than a few weeks without taking it off. Keeping it on for a longer time may limit your range-of-motion at the shoulder joint. If you have had several dislocations of the same shoulder, you may have permanent damage to the ligaments. Ask an orthopedic doctor about surgery to prevent another dislocation.  When to seek medical advice  Call your healthcare provider right away if any of these occur:    Another dislocation of your shoulder    Swelling or pain in the shoulder or arm that gets worse    Your fingers become cold, blue, numb or  tingly    Fever or chills  Date Last Reviewed: 8/2/2016 2000-2017 [employer]. [address] [place] [address], [place] [address]. All rights reserved. This information is not intended as a substitute for professional medical care. Always follow your healthcare professional's instructions.            24 Hour Appointment Hotline       To make an appointment at any [facility], call [phone] ([phone]). If you don't have a family doctor or clinic, we will help you find one. [facility] are conveniently located to serve the needs of you and your family.          ED Discharge Orders     Follow up with [facility]       You should receive a call from [facility] to schedule your follow up appointment. If you do not hear from them within 24 business hours, call [phone], option 3 for help in scheduling your follow up.  If you are seen in the Emergency Department over the weekend, you will receive a phone call on the next business day.                     Review of your medicines      Our records show that you are taking the medicines listed below. If these are incorrect, please call your family doctor or clinic.        Dose / Directions Last dose taken    atenolol 50 MG tablet   Commonly known as:  TENORMIN   Dose:  50 mg   Quantity:  90 tablet        Take 1 tablet (50 mg) by mouth daily   Refills:  3        atorvastatin 10 MG tablet   Commonly known as:  LIPITOR   Dose:  20 mg   Quantity:  90 tablet        Take 2 tablets (20 mg) by mouth daily   Refills:  3        hydrochlorothiazide 25 MG tablet   Commonly known as:  HYDRODIURIL   Dose:  25 mg   Quantity:  90 tablet        Take 1 tablet (25 mg) by mouth daily   Refills:  1        lisinopril 10 MG tablet   Commonly known as:  PRINIVIL/ZESTRIL   Dose:  10 mg   Quantity:  30 tablet        Take 1 tablet (10 mg) by mouth daily   Refills:  1        rizatriptan 5 MG tablet   Commonly known as:  MAXALT   Dose:  5 mg    Quantity:  30 tablet        Take 1 tablet (5 mg) by mouth at onset of headache for migraine repeat after 2 hr if no relief; maximum: 30 mg/24 hours   Refills:  0                Procedures and tests performed during your visit     Procedure/Test Number of Times Performed    XR Shoulder Left G/E 3 Views 2      Orders Needing Specimen Collection     None      Pending Results     No orders found from 3/25/2018 to 3/28/2018.            Pending Culture Results     No orders found from 3/25/2018 to 3/28/2018.            Pending Results Instructions     If you had any lab results that were not finalized at the time of your Discharge, you can call the ED Lab Result RN at [phone]. You will be contacted by this team for any positive Lab results or changes in treatment. The nurses are available 7 days a week from 10A to 6:30P.  You can leave a message 24 hours per day and they will return your call.        Thank you for choosing [facility]       Thank you for choosing [facility] for your care. Our goal is always to provide you with excellent care. Hearing back from our patients is one way we can continue to improve our services. Please take a few minutes to complete the written survey that you may receive in the mail after you visit with us. Thank you!        [employer] Information     [employer] gives you secure access to your electronic health record. If you see a primary care provider, you can also send messages to your care team and make appointments. If you have questions, please call your primary care clinic.  If you do not have a primary care provider, please call [phone] and they will assist you.        Care EveryWhere ID     This is your Care EveryWhere ID. This could be used by other organizations to access your [facility] medical records  [id number]        Equal Access to Services     PATRICIA MARQUEZ AH: Mecca Barroso, graeme alfred, zac whitt  ah. So [facility] [phone].    ATENCIÓN: Si habla español, tiene a grullon disposición servicios gratuitos de asistencia lingüística. Llame al [phone].    We comply with applicable federal civil rights laws and [place] laws. We do not discriminate on the basis of race, color, national origin, age, disability, sex, sexual orientation, or gender identity.            After Visit Summary       This is your record. Keep this with you and show to your community pharmacist(s) and doctor(s) at your next visit.

## 2018-03-27 NOTE — ED NOTES
Pt BIBA after fall on left shoulder, denies LOC. Shoulder appears to be dislocated. Left arm cold, +pulses.

## 2018-03-28 DIAGNOSIS — I10 BENIGN ESSENTIAL HYPERTENSION: ICD-10-CM

## 2018-03-28 NOTE — TELEPHONE ENCOUNTER
"Requested Prescriptions   Pending Prescriptions Disp Refills     hydrochlorothiazide (HYDRODIURIL) 25 MG tablet  Last Written Prescription Date:  9-20-17  Last Fill Quantity: 90,  # refills: 1   Last office visit: 9/20/2017 with prescribing provider:  9-20-17   Future Office Visit:   90 tablet 1     Sig: Take 1 tablet (25 mg) by mouth daily    Diuretics (Including Combos) Protocol Failed    3/28/2018  9:12 AM       Failed - Blood pressure under 140/90 in past 12 months    BP Readings from Last 3 Encounters:   03/27/18 135/90   09/20/17 (!) 148/94                Passed - Recent (12 mo) or future (30 days) visit within the authorizing provider's specialty    Patient had office visit in the last 12 months or has a visit in the next 30 days with authorizing provider or within the authorizing provider's specialty.  See \"Patient Info\" tab in inbasket, or \"Choose Columns\" in Meds & Orders section of the refill encounter.           Passed - Patient is age 18 or older       Passed - Normal serum creatinine on file in past 12 months    Recent Labs   Lab Test  09/20/17   1427   CR  1.21             Passed - Normal serum potassium on file in past 12 months    Recent Labs   Lab Test  09/20/17   1427   POTASSIUM  4.0                   Passed - Normal serum sodium on file in past 12 months    Recent Labs   Lab Test  09/20/17   1427   NA  137              "

## 2018-03-30 RX ORDER — HYDROCHLOROTHIAZIDE 25 MG/1
25 TABLET ORAL DAILY
Qty: 90 TABLET | Refills: 0 | Status: SHIPPED | OUTPATIENT
Start: 2018-03-30 | End: 2018-06-14

## 2018-03-30 NOTE — TELEPHONE ENCOUNTER
Prescription approved per [facility] Refill Protocol.    Peyton Story RN   [facility]

## 2018-04-03 ENCOUNTER — OFFICE VISIT (OUTPATIENT)
Dept: ORTHOPEDICS | Facility: CLINIC | Age: 63
End: 2018-04-03
Payer: COMMERCIAL

## 2018-04-03 VITALS — RESPIRATION RATE: 16 BRPM | WEIGHT: 221 LBS | HEIGHT: 71 IN | BODY MASS INDEX: 30.94 KG/M2

## 2018-04-03 DIAGNOSIS — S43.015A CLOSED ANTERIOR DISLOCATION OF LEFT HUMERUS, INITIAL ENCOUNTER: Primary | ICD-10-CM

## 2018-04-03 NOTE — MR AVS SNAPSHOT
After Visit Summary   4/3/2018    Karan Anglin    MRN: [id number]           Patient Information     Date Of Birth          1955        Visit Information        Provider Department      4/3/2018 7:00 AM Chris Arroyo MD [facility] Sports Medicine        Today's Diagnoses     Closed anterior dislocation of left humerus, initial encounter    -  1       Follow-ups after your visit        Additional Services     MATT PT, HAND, AND CHIROPRACTIC REFERRAL       **This order will print in the [facility] Scheduling Office**    Physical Therapy, Hand Therapy and Chiropractic Care are available through:    *[facility]  *[facility]  *[facility]    Call one number to schedule at any of the above locations: [phone].    Your provider has referred you to: Physical Therapy at [facility] or [facility]    Indication/Reason for Referral: Shoulder Pain  Onset of Illness: acute primary left shoulder dislocation 1 week ago  Therapy Orders: Evaluate and Treat  Special Programs: None  Special Request: None    Kelli Pereira      Additional Comments for the Therapist or Chiropractor: shoulder stabilization protocol  4-6 visits    Please be aware that coverage of these services is subject to the terms and limitations of your health insurance plan.  Call member services at your health plan with any benefit or coverage questions.      Please bring the following to your appointment:    *Your personal calendar for scheduling future appointments  *Comfortable clothing                  Who to contact     Please call your clinic at [phone] to:    Ask questions about your health    Make or cancel appointments    Discuss your medicines    Learn about your test results    Speak to your doctor            Additional Information About Your Visit        MyChart Information     [employer] gives you secure access to your electronic health record. If you see a primary care provider, you can  "also send messages to your care team and make appointments. If you have questions, please call your primary care clinic.  If you do not have a primary care provider, please call [phone] and they will assist you.      [employer] is an electronic gateway that provides easy, online access to your medical records. With [employer], you can request a clinic appointment, read your test results, renew a prescription or communicate with your care team.     To access your existing account, please contact your [facility] or call [phone] for assistance.        Care EveryWhere ID     This is your Care EveryWhere ID. This could be used by other organizations to access your [facility] medical records  [id number]        Your Vitals Were     Respirations Height BMI (Body Mass Index)             16 5' 11\" (1.803 m) 30.82 kg/m2          Blood Pressure from Last 3 Encounters:   04/04/18 (!) 160/104   03/27/18 135/90   09/20/17 (!) 148/94    Weight from Last 3 Encounters:   04/03/18 221 lb (100.2 kg)   09/20/17 216 lb 12.8 oz (98.3 kg)              We Performed the Following     MATT PT, HAND, AND CHIROPRACTIC REFERRAL        Primary Care Provider Fax #    Physician No Ref-Primary [phone]       No address on file        Equal Access to Services     PATRICIA MARQUEZ : Hadii saleem ku hadasho Soomaali, waaxda luqadaha, qaybta kaalmada adeegyada, zac riddle . So [facility] [phone].    ATENCIÓN: Si habla español, tiene a grullon disposición servicios gratuitos de asistencia lingüística. Llame al [phone].    We comply with applicable federal civil rights laws and [place] laws. We do not discriminate on the basis of race, color, national origin, age, disability, sex, sexual orientation, or gender identity.            Thank you!     Thank you for choosing [facility]  for your care. Our goal is always to provide you with excellent care. Hearing back from our patients " is one way we can continue to improve our services. Please take a few minutes to complete the written survey that you may receive in the mail after your visit with us. Thank you!             Your Updated Medication List - Protect others around you: Learn how to safely use, store and throw away your medicines at www.disposemymeds.org.          This list is accurate as of 4/3/18 11:59 PM.  Always use your most recent med list.                   Brand Name Dispense Instructions for use Diagnosis    atenolol 50 MG tablet    TENORMIN    90 tablet    Take 1 tablet (50 mg) by mouth daily    Benign essential hypertension, Migraine without aura and without status migrainosus, not intractable       atorvastatin 10 MG tablet    LIPITOR    90 tablet    Take 2 tablets (20 mg) by mouth daily    Hyperlipidemia LDL goal <130       hydrochlorothiazide 25 MG tablet    HYDRODIURIL    90 tablet    Take 1 tablet (25 mg) by mouth daily    Benign essential hypertension       lisinopril 10 MG tablet    PRINIVIL/ZESTRIL    30 tablet    Take 1 tablet (10 mg) by mouth daily    Benign essential hypertension       rizatriptan 5 MG tablet    MAXALT    30 tablet    Take 1 tablet (5 mg) by mouth at onset of headache for migraine repeat after 2 hr if no relief; maximum: 30 mg/24 hours    Migraine without aura and without status migrainosus, not intractable

## 2018-04-03 NOTE — PROGRESS NOTES
"Sports Medicine Clinic Visit    PCP: No Ref-Primary, Physician    Karan Anglin is a 63 year old male who is seen  in consultation at the request of Dr. Jerome presenting with left shoulder pain. The pt reports falling at work an landing on to left shoulder. Denies prior hx of shoulder injuries. The pt states that there is still pain and weakness with ROM. Denies numbness or tingling.     Injury: 3/27/18    Location of Pain: left shoulder  Duration of Pain: 1 week  Rating of Pain: 2/10  Pain is better with: Sling, ibuprofen, tylenol, heat  Pain is worse with: Reaching ROM  Additional Features: None  Treatment so far consists of: Sling, ibuprofen, tylenol, heat  Prior History of related problems: None    Resp 16  Ht 5' 11\" (1.803 m)  Wt 221 lb (100.2 kg)  BMI 30.82 kg/m2         PMH:  Past Medical History:   Diagnosis Date     Hyperlipidemia 2015     Hypertension 2014     Migraine     Nausea and vomiting     Peptic ulcer hemorrhage 2013       Active problem list:  Patient Active Problem List   Diagnosis     Benign essential hypertension     Hyperlipidemia LDL goal <130     Migraine without aura and without status migrainosus, not intractable     Blood in semen       FH:  Family History   Problem Relation Age of Onset     Colon Cancer Mother      KIDNEY DISEASE Mother      Coronary Artery Disease Father      Enlarged prostate Father      Other Cancer Brother      Down Syndrome Sister      Alzheimer Disease Sister        SH:  Social History     Social History     Marital status: Single     Spouse name: N/A     Number of children: N/A     Years of education: N/A     Occupational History     Not on file.     Social History Main Topics     Smoking status: Never Smoker     Smokeless tobacco: Never Used     Alcohol use Yes      Comment: 5 glasses wine/wk     Drug use: No     Sexual activity: Not Currently     Partners: Male     Other Topics Concern     Parent/Sibling W/ Cabg, Mi Or Angioplasty Before 65f 55m? No     Social " History Narrative       MEDS:  See EMR, reviewed  ALL:  See EMR, reviewed    REVIEW OF SYSTEMS:  CONSTITUTIONAL:NEGATIVE for fever, chills, change in weight  INTEGUMENTARY/SKIN: NEGATIVE for worrisome rashes, moles or lesions  EYES: NEGATIVE for vision changes or irritation  ENT/MOUTH: NEGATIVE for ear, mouth and throat problems  RESP:NEGATIVE for significant cough or SOB  BREAST: NEGATIVE for masses, tenderness or discharge  CV: NEGATIVE for chest pain, palpitations or peripheral edema  GI: NEGATIVE for nausea, abdominal pain, heartburn, or change in bowel habits  :NEGATIVE for frequency, dysuria, or hematuria  :NEGATIVE for frequency, dysuria, or hematuria  NEURO: NEGATIVE for weakness, dizziness or paresthesias  ENDOCRINE: NEGATIVE for temperature intolerance, skin/hair changes  HEME/ALLERGY/IMMUNE: NEGATIVE for bleeding problems  PSYCHIATRIC: NEGATIVE for changes in mood or affect      SUBJECTIVE:  This 63-year-old male is 1 week away from a closed anterior left shoulder primary dislocation that was reduced in the emergency room.  It occurred after a fall at his workplace.  He has a seated job at a computer.  He denies any previous shoulder dislocations or shoulder issues.  He is right-handed.  He is asking to return to work without restriction.  He has been out of his sling, and he feels his shoulder is improving.  He denies any activities outside of work that are aggressive for his shoulder.      OBJECTIVE:  The skin is intact.  No signs of cellulitis.  He is able to forward flex 150 degrees and abduct 90 degrees.  Axillary nerve function is intact.  In addition, he tolerates deltoid, supraspinatus, infraspinatus and subscapularis strength testing with 5/5 strength bilaterally.  Distal pulses and sensation are intact.  There is no scapular winging.  Appropriate in conversation and affect.      IMAGING:  X-rays taken of the shoulder post reduction show an intact glenoid.  He has two rounded, well-  circumscribed calcifications that are inferior and are more 1 cm away from the glenoid, and they  appear to be present on the pre-reduction films as well.  The glenoid appears to be otherwise normal without signs of fracture.  There are no signs of deformity at the humeral head.      ASSESSMENT:  Acute primary left shoulder dislocation.      PLAN:  He will avoid the semaphore position over the next 3 weeks.  He was given some Codman exercises to avoid shoulder stiffness.  He is asking to return to his work without restriction, as it is not intense for his shoulder.  I think he can safely do his work without a sling.  He was given a simple sling to use for comfort.  He was given a Physical Therapy referral that he could initiate 3-4 weeks from now if he feels that the shoulder is regaining its range of motion and is ready for a strengthening program.  If he is having any trouble meeting these progressive goals over the next 3 weeks, he will return for reevaluation in clinic, and if necessary, advanced imaging could be done.  He understands this and will return if not improving.

## 2018-04-03 NOTE — LETTER
"  4/3/2018      RE: Karan Anglin  [address]   [place] [address]       Sports Medicine Clinic Visit    PCP: No Ref-Primary, Physician    Karan Anglin is a 63 year old male who is seen  in consultation at the request of Dr. Jerome presenting with left shoulder pain. The pt reports falling at work an landing on to left shoulder. Denies prior hx of shoulder injuries. The pt states that there is still pain and weakness with ROM. Denies numbness or tingling.     Injury: 3/27/18    Location of Pain: left shoulder  Duration of Pain: 1 week  Rating of Pain: 2/10  Pain is better with: Sling, ibuprofen, tylenol, heat  Pain is worse with: Reaching ROM  Additional Features: None  Treatment so far consists of: Sling, ibuprofen, tylenol, heat  Prior History of related problems: None    Resp 16  Ht 5' 11\" (1.803 m)  Wt 221 lb (100.2 kg)  BMI 30.82 kg/m2         PMH:  Past Medical History:   Diagnosis Date     Hyperlipidemia 2015     Hypertension 2014     Migraine     Nausea and vomiting     Peptic ulcer hemorrhage 2013       Active problem list:  Patient Active Problem List   Diagnosis     Benign essential hypertension     Hyperlipidemia LDL goal <130     Migraine without aura and without status migrainosus, not intractable     Blood in semen       FH:  Family History   Problem Relation Age of Onset     Colon Cancer Mother      KIDNEY DISEASE Mother      Coronary Artery Disease Father      Enlarged prostate Father      Other Cancer Brother      Down Syndrome Sister      Alzheimer Disease Sister        SH:  Social History     Social History     Marital status: Single     Spouse name: N/A     Number of children: N/A     Years of education: N/A     Occupational History     Not on file.     Social History Main Topics     Smoking status: Never Smoker     Smokeless tobacco: Never Used     Alcohol use Yes      Comment: 5 glasses wine/wk     Drug use: No     Sexual activity: Not Currently     Partners: Male     Other " Topics Concern     Parent/Sibling W/ Cabg, Mi Or Angioplasty Before 65f 55m? No     Social History Narrative       MEDS:  See EMR, reviewed  ALL:  See EMR, reviewed    REVIEW OF SYSTEMS:  CONSTITUTIONAL:NEGATIVE for fever, chills, change in weight  INTEGUMENTARY/SKIN: NEGATIVE for worrisome rashes, moles or lesions  EYES: NEGATIVE for vision changes or irritation  ENT/MOUTH: NEGATIVE for ear, mouth and throat problems  RESP:NEGATIVE for significant cough or SOB  BREAST: NEGATIVE for masses, tenderness or discharge  CV: NEGATIVE for chest pain, palpitations or peripheral edema  GI: NEGATIVE for nausea, abdominal pain, heartburn, or change in bowel habits  :NEGATIVE for frequency, dysuria, or hematuria  :NEGATIVE for frequency, dysuria, or hematuria  NEURO: NEGATIVE for weakness, dizziness or paresthesias  ENDOCRINE: NEGATIVE for temperature intolerance, skin/hair changes  HEME/ALLERGY/IMMUNE: NEGATIVE for bleeding problems  PSYCHIATRIC: NEGATIVE for changes in mood or affect      SUBJECTIVE:  This 63-year-old male is 1 week away from a closed anterior left shoulder primary dislocation that was reduced in the emergency room.  It occurred after a fall at his workplace.  He has a seated job at a computer.  He denies any previous shoulder dislocations or shoulder issues.  He is right-handed.  He is asking to return to work without restriction.  He has been out of his sling, and he feels his shoulder is improving.  He denies any activities outside of work that are aggressive for his shoulder.      OBJECTIVE:  The skin is intact.  No signs of cellulitis.  He is able to forward flex 150 degrees and abduct 90 degrees.  Axillary nerve function is intact.  In addition, he tolerates deltoid, supraspinatus, infraspinatus and subscapularis strength testing with 5/5 strength bilaterally.  Distal pulses and sensation are intact.  There is no scapular winging.  Appropriate in conversation and affect.      IMAGING:  X-rays taken  of the shoulder post reduction show an intact glenoid.  He has two rounded, well- circumscribed calcifications that are inferior and are more 1 cm away from the glenoid, and they  appear to be present on the pre-reduction films as well.  The glenoid appears to be otherwise normal without signs of fracture.  There are no signs of deformity at the humeral head.      ASSESSMENT:  Acute primary left shoulder dislocation.      PLAN:  He will avoid the semaphore position over the next 3 weeks.  He was given some Codman exercises to avoid shoulder stiffness.  He is asking to return to his work without restriction, as it is not intense for his shoulder.  I think he can safely do his work without a sling.  He was given a simple sling to use for comfort.  He was given a Physical Therapy referral that he could initiate 3-4 weeks from now if he feels that the shoulder is regaining its range of motion and is ready for a strengthening program.  If he is having any trouble meeting these progressive goals over the next 3 weeks, he will return for reevaluation in clinic, and if necessary, advanced imaging could be done.  He understands this and will return if not improving.         Chris Arroyo MD

## 2018-04-03 NOTE — LETTER
[facility] SPORTS MEDICINE  [address]  [address]  [place] [address]  Phone: [phone]  Fax: [phone]         4/3/2018    Karan Anglin  [address]   [place] [address]  [phone] (home)     :     1955      To Whom it May Concern:    This patient was seen today in clinic for his left shoulder injury.  He may return to work at this time without restrictions.      Sincerely,        Chris Arroyo MD

## 2018-04-04 ENCOUNTER — HOSPITAL ENCOUNTER (EMERGENCY)
Facility: CLINIC | Age: 63
Discharge: HOME | End: 2018-04-04
Attending: EMERGENCY MEDICINE | Admitting: EMERGENCY MEDICINE
Payer: COMMERCIAL

## 2018-04-04 ENCOUNTER — APPOINTMENT (OUTPATIENT)
Dept: GENERAL RADIOLOGY | Facility: CLINIC | Age: 63
End: 2018-04-04
Attending: EMERGENCY MEDICINE
Payer: COMMERCIAL

## 2018-04-04 VITALS
RESPIRATION RATE: 18 BRPM | DIASTOLIC BLOOD PRESSURE: 96 MMHG | SYSTOLIC BLOOD PRESSURE: 146 MMHG | OXYGEN SATURATION: 98 % | TEMPERATURE: 97.4 F

## 2018-04-04 DIAGNOSIS — S43.005A SHOULDER DISLOCATION, LEFT, INITIAL ENCOUNTER: ICD-10-CM

## 2018-04-04 PROCEDURE — 96375 TX/PRO/DX INJ NEW DRUG ADDON: CPT | Performed by: EMERGENCY MEDICINE

## 2018-04-04 PROCEDURE — 99285 EMERGENCY DEPT VISIT HI MDM: CPT | Mod: 25 | Performed by: EMERGENCY MEDICINE

## 2018-04-04 PROCEDURE — 73030 X-RAY EXAM OF SHOULDER: CPT | Mod: LT

## 2018-04-04 PROCEDURE — 96374 THER/PROPH/DIAG INJ IV PUSH: CPT | Performed by: EMERGENCY MEDICINE

## 2018-04-04 PROCEDURE — 23650 CLTX SHO DSLC W/MNPJ WO ANES: CPT | Mod: LT | Performed by: EMERGENCY MEDICINE

## 2018-04-04 PROCEDURE — 25000128 H RX IP 250 OP 636: Performed by: EMERGENCY MEDICINE

## 2018-04-04 PROCEDURE — 40000986 XR SHOULDER 2 VIEW LEFT: Mod: 59,LT

## 2018-04-04 RX ORDER — HYDROCODONE BITARTRATE AND ACETAMINOPHEN 5; 325 MG/1; MG/1
1-2 TABLET ORAL EVERY 4 HOURS PRN
Qty: 6 TABLET | Refills: 0 | Status: SHIPPED | OUTPATIENT
Start: 2018-04-04 | End: 2018-05-15

## 2018-04-04 RX ORDER — FENTANYL CITRATE 50 UG/ML
100 INJECTION, SOLUTION INTRAMUSCULAR; INTRAVENOUS ONCE
Status: COMPLETED | OUTPATIENT
Start: 2018-04-04 | End: 2018-04-04

## 2018-04-04 RX ORDER — IBUPROFEN 600 MG/1
600 TABLET, FILM COATED ORAL EVERY 8 HOURS PRN
Qty: 30 TABLET | Refills: 0 | Status: SHIPPED | OUTPATIENT
Start: 2018-04-04 | End: 2022-04-26

## 2018-04-04 RX ORDER — ONDANSETRON 2 MG/ML
4 INJECTION INTRAMUSCULAR; INTRAVENOUS ONCE
Status: COMPLETED | OUTPATIENT
Start: 2018-04-04 | End: 2018-04-04

## 2018-04-04 RX ADMIN — FENTANYL CITRATE 100 MCG: 50 INJECTION, SOLUTION INTRAMUSCULAR; INTRAVENOUS at 07:45

## 2018-04-04 RX ADMIN — ONDANSETRON 4 MG: 2 INJECTION INTRAMUSCULAR; INTRAVENOUS at 07:52

## 2018-04-04 ASSESSMENT — ENCOUNTER SYMPTOMS
ARTHRALGIAS: 1
NUMBNESS: 0
FEVER: 0
BACK PAIN: 0
CHILLS: 0
NECK PAIN: 0
ABDOMINAL PAIN: 0

## 2018-04-04 NOTE — ED AVS SNAPSHOT
[facility] [place], Emergency Department    [address] [place] [address]    Phone:  [phone]                                       Karan Anglin   MRN: [id number]    Department:  [facility], Emergency Department   Date of Visit:  4/4/2018           After Visit Summary Signature Page     I have received my discharge instructions, and my questions have been answered. I have discussed any challenges I see with this plan with the nurse or doctor.    ..........................................................................................................................................  Patient/Patient Representative Signature      ..........................................................................................................................................  Patient Representative Print Name and Relationship to Patient    ..................................................               ................................................  Date                                            Time    ..........................................................................................................................................  Reviewed by Signature/Title    ...................................................              ..............................................  Date                                                            Time

## 2018-04-04 NOTE — LETTER
April 4, 2018      To Whom It May Concern:      Karan Anglin was seen in our Emergency Department today, 04/04/18.  I expect his condition to improve over the next 4 days.  He may return to work/school but should have no movement of his left arm.     Sincerely,        Radha Araujo MD

## 2018-04-04 NOTE — DISCHARGE INSTRUCTIONS
Please make an appointment to follow up with Orthopedics (phone: [phone]) in 2-4 days even if entirely better.  Keep your left shoulder in the sling.       Dislocation: Shoulder (Reduced)    A shoulder is dislocated when a strong force injures, and possibly tears the ligaments that hold the shoulder joint together. The bones that make up the joint then move apart and become stuck out of place. The joint must be put back in place. Then it will take several weeks for the shoulder to heal. This injury may weaken the ligaments. Weakened ligaments put you at risk for another dislocation. Another dislocation can happen even if you are hit with less force.  Shoulder dislocation is treated with a special type of arm sling called a shoulder immobilizer. This keeps your arm close to your body. This stops the shoulder from dislocating again while the ligaments heal. After a few weeks, you may start an exercise program. This will gradually bring back your range-of-motion and shoulder strength. It will also lower your risk for another dislocation.  Home care  Follow these tips for taking care of yourself at home:    Until your next doctor visit, wear your shoulder immobilizer at all times. Don t take it off at night to sleep. This is because it s possible to dislocate your arm again in your sleep. You can take it off to bathe or dress. But don t move your arm away from your body. Keep your arm in the same position that the sling was holding it in until you put the sling back on. During your next visit, ask your doctor how long you should wear the sling.    Apply an ice pack over the injured area for 20 minutes every 1 to 2 hours the first day. You can make an ice pack by putting ice cubes in a plastic bag. Wrap the bag in a towel before putting it on your shoulder. Continue with ice packs 3 to 4 times a day for the next 2 to 3 days. Then use the ice as needed to relieve pain and swelling.    You may use acetaminophen or  ibuprofen to control pain, unless another pain medicine was prescribed. If you have chronic liver or kidney disease or ever had a stomach ulcer or gastrointestinal bleeding, talk with your doctor before using these medicines.    Don t take part in sports or physical education classes until your doctor says it s OK.  Follow-up care  Follow up with your healthcare provider, or as advised. You shouldn t wear your shoulder immobilizer or sling for more than a few weeks without taking it off. Keeping it on for a longer time may limit your range-of-motion at the shoulder joint. If you have had several dislocations of the same shoulder, you may have permanent damage to the ligaments. Ask an orthopedic doctor about surgery to prevent another dislocation.  When to seek medical advice  Call your healthcare provider right away if any of these occur:    Another dislocation of your shoulder    Swelling or pain in the shoulder or arm that gets worse    Your fingers become cold, blue, numb or tingly    Fever or chills  Date Last Reviewed: 8/2/2016 2000-2017 [employer]. [address] [place] [address], [place] [address]. All rights reserved. This information is not intended as a substitute for professional medical care. Always follow your healthcare professional's instructions.

## 2018-04-04 NOTE — ED NOTES
Pt arrives from home complaining that his shoulder is dislocated. PT fell at work last week and dislocated it and had gotten the ok to go back to work today and was getting ready for work today and it went out again.

## 2018-04-04 NOTE — ED AVS SNAPSHOT
[facility], Emergency Department    [address] [address]    Phone:  [phone]                                       Karan Anglin   MRN: [id number]    Department:  [facility], Emergency Department   Date of Visit:  4/4/2018           Patient Information     Date Of Birth          1955        Your diagnoses for this visit were:     Shoulder dislocation, left, initial encounter        You were seen by Radha Araujo MD.        Discharge Instructions         Please make an appointment to follow up with Orthopedics (phone: [phone]) in 2-4 days even if entirely better.  Keep your left shoulder in the sling.       Dislocation: Shoulder (Reduced)    A shoulder is dislocated when a strong force injures, and possibly tears the ligaments that hold the shoulder joint together. The bones that make up the joint then move apart and become stuck out of place. The joint must be put back in place. Then it will take several weeks for the shoulder to heal. This injury may weaken the ligaments. Weakened ligaments put you at risk for another dislocation. Another dislocation can happen even if you are hit with less force.  Shoulder dislocation is treated with a special type of arm sling called a shoulder immobilizer. This keeps your arm close to your body. This stops the shoulder from dislocating again while the ligaments heal. After a few weeks, you may start an exercise program. This will gradually bring back your range-of-motion and shoulder strength. It will also lower your risk for another dislocation.  Home care  Follow these tips for taking care of yourself at home:    Until your next doctor visit, wear your shoulder immobilizer at all times. Don t take it off at night to sleep. This is because it s possible to dislocate your arm again in your sleep. You can take it off to bathe or dress. But don t move your arm away from your body. Keep your arm in the same position that the sling was  holding it in until you put the sling back on. During your next visit, ask your doctor how long you should wear the sling.    Apply an ice pack over the injured area for 20 minutes every 1 to 2 hours the first day. You can make an ice pack by putting ice cubes in a plastic bag. Wrap the bag in a towel before putting it on your shoulder. Continue with ice packs 3 to 4 times a day for the next 2 to 3 days. Then use the ice as needed to relieve pain and swelling.    You may use acetaminophen or ibuprofen to control pain, unless another pain medicine was prescribed. If you have chronic liver or kidney disease or ever had a stomach ulcer or gastrointestinal bleeding, talk with your doctor before using these medicines.    Don t take part in sports or physical education classes until your doctor says it s OK.  Follow-up care  Follow up with your healthcare provider, or as advised. You shouldn t wear your shoulder immobilizer or sling for more than a few weeks without taking it off. Keeping it on for a longer time may limit your range-of-motion at the shoulder joint. If you have had several dislocations of the same shoulder, you may have permanent damage to the ligaments. Ask an orthopedic doctor about surgery to prevent another dislocation.  When to seek medical advice  Call your healthcare provider right away if any of these occur:    Another dislocation of your shoulder    Swelling or pain in the shoulder or arm that gets worse    Your fingers become cold, blue, numb or tingly    Fever or chills  Date Last Reviewed: 8/2/2016 2000-2017 [employer]. [address] [place] [address]. All rights reserved. This information is not intended as a substitute for professional medical care. Always follow your healthcare professional's instructions.          24 Hour Appointment Hotline       To make an appointment at any [facility], call [phone] ([phone]). If you don't have a family  doctor or clinic, we will help you find one. [facility] clinics are conveniently located to serve the needs of you and your family.             Review of your medicines      START taking        Dose / Directions Last dose taken    HYDROcodone-acetaminophen 5-325 MG per tablet   Commonly known as:  NORCO   Dose:  1-2 tablet   Quantity:  6 tablet        Take 1-2 tablets by mouth every 4 hours as needed for moderate to severe pain   Refills:  0        ibuprofen 600 MG tablet   Commonly known as:  ADVIL/MOTRIN   Dose:  600 mg   Quantity:  30 tablet        Take 1 tablet (600 mg) by mouth every 8 hours as needed for moderate pain   Refills:  0          Our records show that you are taking the medicines listed below. If these are incorrect, please call your family doctor or clinic.        Dose / Directions Last dose taken    atenolol 50 MG tablet   Commonly known as:  TENORMIN   Dose:  50 mg   Quantity:  90 tablet        Take 1 tablet (50 mg) by mouth daily   Refills:  3        atorvastatin 10 MG tablet   Commonly known as:  LIPITOR   Dose:  20 mg   Quantity:  90 tablet        Take 2 tablets (20 mg) by mouth daily   Refills:  3        hydrochlorothiazide 25 MG tablet   Commonly known as:  HYDRODIURIL   Dose:  25 mg   Quantity:  90 tablet        Take 1 tablet (25 mg) by mouth daily   Refills:  0        lisinopril 10 MG tablet   Commonly known as:  PRINIVIL/ZESTRIL   Dose:  10 mg   Quantity:  30 tablet        Take 1 tablet (10 mg) by mouth daily   Refills:  1        rizatriptan 5 MG tablet   Commonly known as:  MAXALT   Dose:  5 mg   Quantity:  30 tablet        Take 1 tablet (5 mg) by mouth at onset of headache for migraine repeat after 2 hr if no relief; maximum: 30 mg/24 hours   Refills:  0                Prescriptions were sent or printed at these locations (2 Prescriptions)                   Other Prescriptions                Printed at Department/Unit printer (2 of 2)         ibuprofen (ADVIL/MOTRIN) 600 MG tablet                HYDROcodone-acetaminophen (NORCO) 5-325 MG per tablet                Procedures and tests performed during your visit     XR Shoulder Left 2 Views    XR Shoulder Left Port G/E 2 Views      Orders Needing Specimen Collection     None      Pending Results     Date and Time Order Name Status Description    4/4/2018 0812 XR Shoulder Left 2 Views In process             Pending Culture Results     No orders found from 4/2/2018 to 4/5/2018.            Pending Results Instructions     If you had any lab results that were not finalized at the time of your Discharge, you can call the ED Lab Result RN at [phone]. You will be contacted by this team for any positive Lab results or changes in treatment. The nurses are available 7 days a week from 10A to 6:30P.  You can leave a message 24 hours per day and they will return your call.        Thank you for choosing [facility]       Thank you for choosing [facility] for your care. Our goal is always to provide you with excellent care. Hearing back from our patients is one way we can continue to improve our services. Please take a few minutes to complete the written survey that you may receive in the mail after you visit with us. Thank you!        [employer] Information     [employer] gives you secure access to your electronic health record. If you see a primary care provider, you can also send messages to your care team and make appointments. If you have questions, please call your primary care clinic.  If you do not have a primary care provider, please call [phone] and they will assist you.        Care EveryWhere ID     This is your Care EveryWhere ID. This could be used by other organizations to access your [facility] medical records  [id number]        Equal Access to Services     PATRICIA MARQUEZ : Hadii saleem abreuo Soneftali, waaxda luqadaha, qaybta kaalmazac patel. So [facility] [phone].    ATENCIÓN: leeanne Marr  disposición servicios gratuitos de asistencia lingüística. Jovana al [phone].    We comply with applicable federal civil rights laws and [place] laws. We do not discriminate on the basis of race, color, national origin, age, disability, sex, sexual orientation, or gender identity.            After Visit Summary       This is your record. Keep this with you and show to your community pharmacist(s) and doctor(s) at your next visit.

## 2018-04-04 NOTE — ED PROVIDER NOTES
[facility] EMERGENCY DEPARTMENT ([facility])  4/04/18   History     Chief Complaint   Patient presents with     Shoulder Pain     HPI  Karan Anglin is a 63 year old male who presents to the ER due to left shoulder dislocation.  Patient says that he fell on his shoulder last week and had dislocated shoulder.  Patient says that he followed up with orthopedics and was told that he could start moving his arm once again.  He says that this morning he was putting on his coat and he felt that his shoulder popped out again.  Patient says that he was in severe pain and needed to call the ambulance to help get him in.  Patient currently is complaining of left shoulder pain.  Denies any pain in any other regions.  Denies any other injuries.  ROS as per HPI.  No fever or chills.  No numbness or tingling in his hand.    I have reviewed the Medications, Allergies, Past Medical and Surgical History, and Social History in the [employer] system.    Past Medical History:   Diagnosis Date     Hyperlipidemia 2015     Hypertension 2014     Migraine     Nausea and vomiting     Peptic ulcer hemorrhage 2013       Past Surgical History:   Procedure Laterality Date     GI SURGERY  2013    peptic ulcers stapled       Family History   Problem Relation Age of Onset     Colon Cancer Mother      KIDNEY DISEASE Mother      Coronary Artery Disease Father      Enlarged prostate Father      Other Cancer Brother      Down Syndrome Sister      Alzheimer Disease Sister        Social History   Substance Use Topics     Smoking status: Never Smoker     Smokeless tobacco: Never Used     Alcohol use Yes      Comment: 5 glasses wine/wk       No current facility-administered medications for this encounter.      Current Outpatient Prescriptions   Medication     hydrochlorothiazide (HYDRODIURIL) 25 MG tablet     rizatriptan (MAXALT) 5 MG tablet     atorvastatin (LIPITOR) 10 MG tablet     atenolol (TENORMIN) 50 MG tablet     lisinopril (PRINIVIL/ZESTRIL) 10 MG  tablet      No Known Allergies      Review of Systems   Constitutional: Negative for chills and fever.   Cardiovascular: Negative for chest pain.   Gastrointestinal: Negative for abdominal pain.   Musculoskeletal: Positive for arthralgias (left shoulder pain). Negative for back pain and neck pain.   Neurological: Negative for numbness.   All other systems reviewed and are negative.      Physical Exam   BP: (!) 160/104  Heart Rate: 69  Temp: 97.4  F (36.3  C)  Resp: 18  SpO2: 98 %      Physical Exam   Constitutional: He is oriented to person, place, and time. He appears well-developed and well-nourished.   In pain   HENT:   Head: Normocephalic and atraumatic.   Neck: Normal range of motion. Neck supple.   Cardiovascular: Normal rate, regular rhythm and normal heart sounds.    Pulmonary/Chest: Effort normal. No respiratory distress. He has no wheezes.   Abdominal: Soft. He exhibits no distension. There is no tenderness. There is no rebound.   Musculoskeletal:   Visible left shoulder deformity with step off; no tenderness along clavicle or humerus; normal sensation in deltoid and biceps muscle; normal radial and ulnar pulse   Neurological: He is alert and oriented to person, place, and time.   Skin: Skin is warm.   Psychiatric: He has a normal mood and affect. His behavior is normal. Thought content normal.       ED Course     ED Course     Orthopedic injury tx  Date/Time: 4/4/2018 11:44 AM  Performed by: DOE VALLECILLO  Authorized by: DOE VALLECILLO   Consent: Verbal consent obtained. Written consent obtained.  Risks and benefits: risks, benefits and alternatives were discussed  Consent given by: patient  Patient understanding: patient states understanding of the procedure being performed  Patient identity confirmed: verbally with patient  Injury location: shoulder  Location details: left shoulder  Injury type: dislocation  Dislocation type: anterior  Hill-Sachs deformity: yes  Chronicity:  recurrent  Pre-procedure neurovascular assessment: neurovascularly intact  Pre-procedure distal perfusion: normal  Pre-procedure neurological function: normal  Pre-procedure range of motion: normal    Anesthesia:  Local anesthesia used: no    Sedation:  Patient sedated: no  Manipulation performed: yes  Reduction method: Cunningham procedure; mild downward traction.  Reduction successful: yes  Immobilization: sling  Post-procedure neurovascular assessment: post-procedure neurovascularly intact  Post-procedure distal perfusion: normal  Post-procedure neurological function: normal  Post-procedure range of motion: normal  Patient tolerance: Patient tolerated the procedure well with no immediate complications                   Critical Care time:  none         Results for orders placed or performed during the hospital encounter of 04/04/18   XR Shoulder Left Port G/E 2 Views    Narrative    EXAMINATION: XR SHOULDER LT PORT G/E 2 VW  4/4/2018 8:00 AM     CLINICAL HISTORY:  shoulder dislocation; shoulder pain;      COMPARISON: 3/27/2018    FINDINGS: AP and Y view of the left shoulder were obtained. There is  anterior and inferior subluxation of the humeral head. There is a  small Hill-Sachs lesion. No definite evidence of acute osseous  abnormalities. The acromioclavicular joint appears normal.      Impression    IMPRESSION:     Anterior inferior shoulder dislocation with a subtle Hill-Sachs  lesion. The inferior glenoid is not well profiled.    JUTTA ELLERMANN, MD   XR Shoulder Left 2 Views    Narrative    EXAMINATION: XR SHOULDER 2 VIEW LEFT  4/4/2018 9:16 AM     CLINICAL HISTORY:  left shoulder reduction;      COMPARISON: 4/4/2018.    FINDINGS: AP and lateral views of the left shoulder were obtained. The  glenohumeral joint is normally aligned. The inferior glenoid reveals  cortical irregularity that could represent an osseous Bankart lesion.     The previously anteriorly dislocated humeral head now articulates  well  with the glenoid.      Impression    IMPRESSION:   1. Glenohumeral joint is normally aligned after reduction of  previously anterior shoulder dislocation.  2. Cortical irregularity about the inferior glenoid suggest subtle  osseous Bankart lesion.    JUTTA ELLERMANN, MD     Medications   fentaNYL (PF) (SUBLIMAZE) injection 100 mcg (100 mcg Intravenous Given 4/4/18 5084)   ondansetron (ZOFRAN) injection 4 mg (4 mg Intravenous Given 4/4/18 9354)            Labs Ordered and Resulted from Time of ED Arrival Up to the Time of Departure from the ED - No data to display     No results found for this or any previous visit (from the past 24 hour(s)).           Assessments & Plan (with Medical Decision Making)   63-year-old male who presented to the ER due to recurrence of shoulder dislocation.  X-ray confirmed that he had an anterior shoulder dislocation.  We gave the patient some IV fentanyl and had this patient sit up straight.  We use some gentle massage and the Cunningham maneuver to reduce the shoulder.  Patient tolerated the procedure well.  We did not require any generalized sedation.  Patient will be discharged once again and his shoulder immobilized.  Patient will follow up again with orthopedics to discuss further treatment options.  Patient is stable for discharge.    This part of the medical record was transcribed by Kt Gomez, Medical Scribe, from a dictation done by Radha Araujo MD.       I have reviewed the nursing notes.    I have reviewed the findings, diagnosis, plan and need for follow up with the patient.    New Prescriptions    No medications on file       Final diagnoses:   Shoulder dislocation, left, initial encounter       4/4/2018   [facility], [place], EMERGENCY DEPARTMENT     Radha Araujo MD  04/04/18 7446

## 2018-04-06 ENCOUNTER — OFFICE VISIT (OUTPATIENT)
Dept: ORTHOPEDICS | Facility: CLINIC | Age: 63
End: 2018-04-06
Payer: COMMERCIAL

## 2018-04-06 VITALS — RESPIRATION RATE: 16 BRPM | WEIGHT: 221 LBS | BODY MASS INDEX: 30.94 KG/M2 | HEIGHT: 71 IN

## 2018-04-06 DIAGNOSIS — S43.015A CLOSED ANTERIOR DISLOCATION OF LEFT HUMERUS, INITIAL ENCOUNTER: Primary | ICD-10-CM

## 2018-04-06 NOTE — LETTER
4/6/2018      RE: Kaarn Anglin  [address]   [place] [address]        Subjective:   Karan Anglin is a 63 year old male who saw Dr. Arroyo post 1st left shoulder dislocation due to a fall that occurred while at work on 3/27/2018. The second dislocation was on 4/42018 where he was putting on a coat and his arm to caught and the sleeve causing him to dislocate. He was seen in the ED and relocated with repeat radiographs. He was placed in a sling and swathe. He reports that his left shoulder pain is mild and tolerable in the sling and swathe. He denies LUE radicular symptoms and no motor weakness. His dislocation on 3/27/18 was his first ever shoulder dislocation.    Background:   Date of injury: 3/27/2018 and 4/4/2018   Duration of symptoms: 2 weeks  Mechanism of Injury: Acute; Activity Related fall and putting on a coat  Intensity: 2/10 at rest, 6/10 with activity   Aggravating factors: the sling causes him some pain and staying in pne position for too long  Relieving Factors: ice, heat and NSAIDs  Prior Evaluation: Went to the ER on 4/4/2018    PAST MEDICAL, SOCIAL, SURGICAL AND FAMILY HISTORY: He  has a past medical history of Hyperlipidemia (2015); Hypertension (2014); Migraine; and Peptic ulcer hemorrhage (2013).  He  has a past surgical history that includes GI surgery (2013).  His family history includes Alzheimer Disease in his sister; Colon Cancer in his mother; Coronary Artery Disease in his father; Down Syndrome in his sister; Enlarged prostate in his father; KIDNEY DISEASE in his mother; Other Cancer in his brother.  He reports that he has never smoked. He has never used smokeless tobacco. He reports that he drinks alcohol. He reports that he does not use illicit drugs.    ALLERGIES: He has No Known Allergies.    CURRENT MEDICATIONS: He has a current medication list which includes the following prescription(s): ibuprofen, hydrocodone-acetaminophen, hydrochlorothiazide, rizatriptan,  "atorvastatin, atenolol, and lisinopril.     REVIEW OF SYSTEMS: 10 point review of systems is negative except as noted above.     Exam:   Resp 16  Ht 5' 10.98\" (1.803 m)  Wt 221 lb (100.2 kg)  BMI 30.84 kg/m2      CONSTITUTIONAL: healthy, alert and no distress  HEAD: Normocephalic. No masses, lesions, tenderness or abnormalities  GAIT: normal  NEUROLOGIC: Non-focal  PSYCHIATRIC: mentation appears normal.    MUSCULOSKELETAL:   LEFT SHOULDER:  There is no gross deformity.  He is nontender over the clavicle or the AC joint or the proximal humerus.  He does have diffuse tenderness along the anterior shoulder joint line.  The sensation is intact at the regimental patch bilaterally.  Distal color, motor and sensory is intact.  Specifically, radial, median, and ulnar nerve function is tested and intact.  He is able to pronate and supinate the forearm without difficulty or pain.      IMAGING:  Radiographs are noted and reviewed.      ASSESSMENT AND PLAN:  Karan is a 63-year-old male who had his first left anterior shoulder dislocation which occurred at work on 03/26/2018.  Because of that initial shoulder dislocation he is now at increased risk for recurrent shoulder dislocations.  This culminated yesterday when he simply put on a coat and had a recurrent shoulder dislocation.  Subsequently, he is now a sling and swath and I am going to keep him in that for the next week.  I am going to give him a letter for work as I do not think he will be able to return to work for the next 2 weeks.  He will return and see Dr. Cassia Arroyo in approximately 1 week.  All other questions were answered.             Tc Hester MD    "

## 2018-04-06 NOTE — LETTER
Return to Work  2018     Seen today: yes    Patient:  Karan Anglin  :   1955  MRN:     [id number]  Physician: KARLA MCKENZIE    Karan Anglin may not return to work until Date: 2018.                  Please call the clinic with any questions.      Electronically signed by Karla Mckenzie MD

## 2018-04-06 NOTE — PROGRESS NOTES
Subjective:   Karan Anglin is a 63 year old male who saw Dr. Arroyo post 1st left shoulder dislocation due to a fall that occurred while at work on 3/27/2018. The second dislocation was on 4/42018 where he was putting on a coat and his arm to caught and the sleeve causing him to dislocate. He was seen in the ED and relocated with repeat radiographs. He was placed in a sling and swathe. He reports that his left shoulder pain is mild and tolerable in the sling and swathe. He denies LUE radicular symptoms and no motor weakness. His dislocation on 3/27/18 was his first ever shoulder dislocation.    Background:   Date of injury: 3/27/2018 and 4/4/2018   Duration of symptoms: 2 weeks  Mechanism of Injury: Acute; Activity Related fall and putting on a coat  Intensity: 2/10 at rest, 6/10 with activity   Aggravating factors: the sling causes him some pain and staying in pne position for too long  Relieving Factors: ice, heat and NSAIDs  Prior Evaluation: Went to the ER on 4/4/2018    PAST MEDICAL, SOCIAL, SURGICAL AND FAMILY HISTORY: He  has a past medical history of Hyperlipidemia (2015); Hypertension (2014); Migraine; and Peptic ulcer hemorrhage (2013).  He  has a past surgical history that includes GI surgery (2013).  His family history includes Alzheimer Disease in his sister; Colon Cancer in his mother; Coronary Artery Disease in his father; Down Syndrome in his sister; Enlarged prostate in his father; KIDNEY DISEASE in his mother; Other Cancer in his brother.  He reports that he has never smoked. He has never used smokeless tobacco. He reports that he drinks alcohol. He reports that he does not use illicit drugs.    ALLERGIES: He has No Known Allergies.    CURRENT MEDICATIONS: He has a current medication list which includes the following prescription(s): ibuprofen, hydrocodone-acetaminophen, hydrochlorothiazide, rizatriptan, atorvastatin, atenolol, and lisinopril.     REVIEW OF SYSTEMS: 10 point review of systems is  "negative except as noted above.     Exam:   Resp 16  Ht 5' 10.98\" (1.803 m)  Wt 221 lb (100.2 kg)  BMI 30.84 kg/m2      CONSTITUTIONAL: healthy, alert and no distress  HEAD: Normocephalic. No masses, lesions, tenderness or abnormalities  GAIT: normal  NEUROLOGIC: Non-focal  PSYCHIATRIC: mentation appears normal.    MUSCULOSKELETAL:   LEFT SHOULDER:  There is no gross deformity.  He is nontender over the clavicle or the AC joint or the proximal humerus.  He does have diffuse tenderness along the anterior shoulder joint line.  The sensation is intact at the regimental patch bilaterally.  Distal color, motor and sensory is intact.  Specifically, radial, median, and ulnar nerve function is tested and intact.  He is able to pronate and supinate the forearm without difficulty or pain.      IMAGING:  Radiographs are noted and reviewed.      ASSESSMENT AND PLAN:  Karan is a 63-year-old male who had his first left anterior shoulder dislocation which occurred at work on 03/26/2018.  Because of that initial shoulder dislocation he is now at increased risk for recurrent shoulder dislocations.  This culminated yesterday when he simply put on a coat and had a recurrent shoulder dislocation.  Subsequently, he is now a sling and swath and I am going to keep him in that for the next week.  I am going to give him a letter for work as I do not think he will be able to return to work for the next 2 weeks.  He will return and see Dr. Cassia Arroyo in approximately 1 week.  All other questions were answered.           "

## 2018-04-06 NOTE — MR AVS SNAPSHOT
"              After Visit Summary   4/6/2018    Karan Anglin    MRN: [id number]           Patient Information     Date Of Birth          1955        Visit Information        Provider Department      4/6/2018 2:15 PM Tc Hester MD [facility]        Today's Diagnoses     Closed anterior dislocation of left humerus, initial encounter    -  1       Follow-ups after your visit        Your next 10 appointments already scheduled     Apr 16, 2018  2:15 PM CDT   (Arrive by 2:00 PM)   Return Visit with Chris Arroyo MD   [facility] Medicine ([facility] [place])    [address] [place], [place] [id number] [phone]              Who to contact     Please call your clinic at [phone] to:    Ask questions about your health    Make or cancel appointments    Discuss your medicines    Learn about your test results    Speak to your doctor            Additional Information About Your Visit        MyChart Information     [employer] gives you secure access to your electronic health record. If you see a primary care provider, you can also send messages to your care team and make appointments. If you have questions, please call your primary care clinic.  If you do not have a primary care provider, please call [phone] and they will assist you.      [employer] is an electronic gateway that provides easy, online access to your medical records. With [employer], you can request a clinic appointment, read your test results, renew a prescription or communicate with your care team.     To access your existing account, please contact your [facility] or call [phone] for assistance.        Care EveryWhere ID     This is your Care EveryWhere ID. This could be used by other organizations to access your [place] medical records  [id number]        Your Vitals Were     Respirations Height BMI (Body Mass Index)             16 5' 10.98\" " (1.803 m) 30.84 kg/m2          Blood Pressure from Last 3 Encounters:   04/04/18 (!) 146/96   03/27/18 135/90   09/20/17 (!) 148/94    Weight from Last 3 Encounters:   04/06/18 221 lb (100.2 kg)   04/03/18 221 lb (100.2 kg)   09/20/17 216 lb 12.8 oz (98.3 kg)              Today, you had the following     No orders found for display       Primary Care Provider Fax #    Physician No Ref-Primary [phone]       No address on file        Equal Access to Services     [facility]: Hadii saleem Barroso, washarad alfred, tomeka kaalsam vivas, zac riddle . So [facility] [phone].    ATENCIÓN: Si habla español, tiene a grullon disposición servicios gratuitos de asistencia lingüística. Llame al [phone].    We comply with applicable federal civil rights laws and [place] laws. We do not discriminate on the basis of race, color, national origin, age, disability, sex, sexual orientation, or gender identity.            Thank you!     Thank you for choosing [facility]  for your care. Our goal is always to provide you with excellent care. Hearing back from our patients is one way we can continue to improve our services. Please take a few minutes to complete the written survey that you may receive in the mail after your visit with us. Thank you!             Your Updated Medication List - Protect others around you: Learn how to safely use, store and throw away your medicines at www.disposemymeds.org.          This list is accurate as of 4/6/18  2:55 PM.  Always use your most recent med list.                   Brand Name Dispense Instructions for use Diagnosis    atenolol 50 MG tablet    TENORMIN    90 tablet    Take 1 tablet (50 mg) by mouth daily    Benign essential hypertension, Migraine without aura and without status migrainosus, not intractable       atorvastatin 10 MG tablet    LIPITOR    90 tablet    Take 2 tablets (20 mg) by mouth daily    Hyperlipidemia LDL goal  <130       hydrochlorothiazide 25 MG tablet    HYDRODIURIL    90 tablet    Take 1 tablet (25 mg) by mouth daily    Benign essential hypertension       HYDROcodone-acetaminophen 5-325 MG per tablet    NORCO    6 tablet    Take 1-2 tablets by mouth every 4 hours as needed for moderate to severe pain        ibuprofen 600 MG tablet    ADVIL/MOTRIN    30 tablet    Take 1 tablet (600 mg) by mouth every 8 hours as needed for moderate pain        lisinopril 10 MG tablet    PRINIVIL/ZESTRIL    30 tablet    Take 1 tablet (10 mg) by mouth daily    Benign essential hypertension       rizatriptan 5 MG tablet    MAXALT    30 tablet    Take 1 tablet (5 mg) by mouth at onset of headache for migraine repeat after 2 hr if no relief; maximum: 30 mg/24 hours    Migraine without aura and without status migrainosus, not intractable

## 2018-04-06 NOTE — LETTER
Date:April 13, 2018      Patient was self referred, no letter generated. Do not send.        [facility] Information

## 2018-04-16 ENCOUNTER — OFFICE VISIT (OUTPATIENT)
Dept: ORTHOPEDICS | Facility: CLINIC | Age: 63
End: 2018-04-16
Payer: COMMERCIAL

## 2018-04-16 VITALS — BODY MASS INDEX: 30.94 KG/M2 | WEIGHT: 221 LBS | HEIGHT: 71 IN | RESPIRATION RATE: 16 BRPM

## 2018-04-16 DIAGNOSIS — S43.015D CLOSED ANTERIOR DISLOCATION OF LEFT HUMERUS, SUBSEQUENT ENCOUNTER: Primary | ICD-10-CM

## 2018-04-16 NOTE — MR AVS SNAPSHOT
After Visit Summary   4/16/2018    Karan Anglin    MRN: [id number]           Patient Information     Date Of Birth          1955        Visit Information        Provider Department      4/16/2018 2:15 PM Chris Arroyo MD [facility]        Today's Diagnoses     Closed anterior dislocation of left humerus, subsequent encounter    -  1       Follow-ups after your visit        Your next 10 appointments already scheduled     Apr 23, 2018  8:30 AM CDT   (Arrive by 8:15 AM)   MR SHOULDER LEFT W/O CONTRAST with [id number]   [facility] MRI ([facility])    [address] [place] [address] [phone]           Take your medicines as usual, unless your doctor tells you not to. Bring a list of your current medicines to your exam (including vitamins, minerals and over-the-counter drugs). Also bring the results of similar scans you may have had.  Please remove any body piercings and hair extensions before you arrive.  Follow your doctor s orders. If you do not, we may have to postpone your exam.  You may or may not receive IV contrast for this exam pending the discretion of the Radiologist.  You do not need to do anything special to prepare.  The MRI machine uses a strong magnet. Please wear clothes without metal (snaps, zippers). A sweatsuit works well, or we may give you a hospital gown.   **IMPORTANT** THE INSTRUCTIONS BELOW ARE ONLY FOR THOSE PATIENTS WHO HAVE BEEN PRESCRIBED SEDATION OR GENERAL ANESTHESIA DURING THEIR MRI PROCEDURE:  IF YOUR DOCTOR PRESCRIBED ORAL SEDATION (take medicine to help you relax during your exam):   You must get the medicine from your doctor (oral medication) before you arrive. Bring the medicine to the exam. Do not take it at home. You ll be told when to take it upon arriving for your exam.   Arrive one hour early. Bring someone who can take you home after the test. Your medicine will make you  sleepy. After the exam, you may not drive, take a bus or take a taxi by yourself.  IF YOUR DOCTOR PRESCRIBED IV SEDATION:   Arrive one hour early. Bring someone who can take you home after the test. Your medicine will make you sleepy. After the exam, you may not drive, take a bus or take a taxi by yourself.   No eating 6 hours before your exam. You may have clear liquids up until 4 hours before your exam. (Clear liquids include water, clear tea, black coffee and fruit juice without pulp.)  IF YOUR DOCTOR PRESCRIBED ANESTHESIA (be asleep for your exam):   Arrive 1 1/2 hours early. Bring someone who can take you home after the test. You may not drive, take a bus or take a taxi by yourself.   No eating 8 hours before your exam. You may have clear liquids up until 4 hours before your exam. (Clear liquids include water, clear tea, black coffee and fruit juice without pulp.)   You will spend four to five hours in the recovery room.  Please call the Imaging Department at your exam site with any questions.            Apr 24, 2018  9:00 AM CDT   (Arrive by 8:45 AM)   Return Visit with Chris Arroyo MD   [facility] ([facility])    [address] [place], [place] [id number] [phone]              Future tests that were ordered for you today     Open Future Orders        Priority Expected Expires Ordered    MR Shoulder Left w/o Contrast Routine  4/16/2019 4/16/2018            Who to contact     Please call your clinic at [phone] to:    Ask questions about your health    Make or cancel appointments    Discuss your medicines    Learn about your test results    Speak to your doctor            Additional Information About Your Visit        MyChart Information     [employer] gives you secure access to your electronic health record. If you see a primary care provider, you can also send messages to your care team and make appointments. If you have questions, please  "call your primary care clinic.  If you do not have a primary care provider, please call [phone] and they will assist you.      [employer] is an electronic gateway that provides easy, online access to your medical records. With [employer], you can request a clinic appointment, read your test results, renew a prescription or communicate with your care team.     To access your existing account, please contact your [facility] or call [phone] for assistance.        Care EveryWhere ID     This is your Care EveryWhere ID. This could be used by other organizations to access your [place] medical records  [id number]        Your Vitals Were     Respirations Height BMI (Body Mass Index)             16 5' 10.98\" (1.803 m) 30.84 kg/m2          Blood Pressure from Last 3 Encounters:   04/04/18 (!) 146/96   03/27/18 135/90   09/20/17 (!) 148/94    Weight from Last 3 Encounters:   04/16/18 221 lb (100.2 kg)   04/06/18 221 lb (100.2 kg)   04/03/18 221 lb (100.2 kg)               Primary Care Provider Fax #    Physician No Ref-Primary [phone]       No address on file        Equal Access to Services     PATRICIA MARQUEZ : Hadsydnie abreuo Soneftali, waaxda luqadaha, qaybta kaalmada adeegyada, zac gilbert. So [facility] [phone].    ATENCIÓN: Si habla español, tiene a grullon disposición servicios gratuitos de asistencia lingüística. Llame al [phone].    We comply with applicable federal civil rights laws and [place] laws. We do not discriminate on the basis of race, color, national origin, age, disability, sex, sexual orientation, or gender identity.            Thank you!     Thank you for choosing [facility]  for your care. Our goal is always to provide you with excellent care. Hearing back from our patients is one way we can continue to improve our services. Please take a few minutes to complete the written survey that you may receive in the mail " after your visit with us. Thank you!             Your Updated Medication List - Protect others around you: Learn how to safely use, store and throw away your medicines at www.disposemymeds.org.          This list is accurate as of 4/16/18  2:40 PM.  Always use your most recent med list.                   Brand Name Dispense Instructions for use Diagnosis    atenolol 50 MG tablet    TENORMIN    90 tablet    Take 1 tablet (50 mg) by mouth daily    Benign essential hypertension, Migraine without aura and without status migrainosus, not intractable       atorvastatin 10 MG tablet    LIPITOR    90 tablet    Take 2 tablets (20 mg) by mouth daily    Hyperlipidemia LDL goal <130       hydrochlorothiazide 25 MG tablet    HYDRODIURIL    90 tablet    Take 1 tablet (25 mg) by mouth daily    Benign essential hypertension       HYDROcodone-acetaminophen 5-325 MG per tablet    NORCO    6 tablet    Take 1-2 tablets by mouth every 4 hours as needed for moderate to severe pain        ibuprofen 600 MG tablet    ADVIL/MOTRIN    30 tablet    Take 1 tablet (600 mg) by mouth every 8 hours as needed for moderate pain        lisinopril 10 MG tablet    PRINIVIL/ZESTRIL    30 tablet    Take 1 tablet (10 mg) by mouth daily    Benign essential hypertension       rizatriptan 5 MG tablet    MAXALT    30 tablet    Take 1 tablet (5 mg) by mouth at onset of headache for migraine repeat after 2 hr if no relief; maximum: 30 mg/24 hours    Migraine without aura and without status migrainosus, not intractable

## 2018-04-16 NOTE — LETTER
[facility]  [address]  [address]  [facility] [address]  Phone: [phone]  Fax: [phone]         2018    Karan Anglin  [address]   [place] [address]  [phone] (home)     :     1955      To Whom it May Concern:    This patient was seen today in clinic with his recurrent shoulder dislocation.  An MRI of the shoulder is pending.  He is in a sling and is limited in his reaching.  He will not be at work from 18 to 18.    Please contact me for questions or concerns.    Sincerely,        Chris Arroyo MD

## 2018-04-16 NOTE — PROGRESS NOTES
April 16, 2018: Karan Anglin is a 63 year old male who is seen in f/u up for left shoulder. The pt had another dislocation 4/4/18 while he was putting on a coat. He saw Dr. Hester on 4/6/18. He has been using a sling a swathe. He is reporting intermittent dull ache. Denies numbness or tingling.       PMH:  Past Medical History:   Diagnosis Date     Hyperlipidemia 2015     Hypertension 2014     Migraine     Nausea and vomiting     Peptic ulcer hemorrhage 2013       Active problem list:  Patient Active Problem List   Diagnosis     Benign essential hypertension     Hyperlipidemia LDL goal <130     Migraine without aura and without status migrainosus, not intractable     Blood in semen       FH:  Family History   Problem Relation Age of Onset     Colon Cancer Mother      KIDNEY DISEASE Mother      Coronary Artery Disease Father      Enlarged prostate Father      Other Cancer Brother      Down Syndrome Sister      Alzheimer Disease Sister        SH:  Social History     Social History     Marital status: Single     Spouse name: N/A     Number of children: N/A     Years of education: N/A     Occupational History     Not on file.     Social History Main Topics     Smoking status: Never Smoker     Smokeless tobacco: Never Used     Alcohol use Yes      Comment: 5 glasses wine/wk     Drug use: No     Sexual activity: Not Currently     Partners: Male     Other Topics Concern     Parent/Sibling W/ Cabg, Mi Or Angioplasty Before 65f 55m? No     Social History Narrative       MEDS:  See EMR, reviewed  ALL:  See EMR, reviewed    REVIEW OF SYSTEMS:  CONSTITUTIONAL:NEGATIVE for fever, chills, change in weight  INTEGUMENTARY/SKIN: NEGATIVE for worrisome rashes, moles or lesions  EYES: NEGATIVE for vision changes or irritation  ENT/MOUTH: NEGATIVE for ear, mouth and throat problems  RESP:NEGATIVE for significant cough or SOB  BREAST: NEGATIVE for masses, tenderness or discharge  CV: NEGATIVE for chest pain, palpitations or peripheral  edema  GI: NEGATIVE for nausea, abdominal pain, heartburn, or change in bowel habits  :NEGATIVE for frequency, dysuria, or hematuria  :NEGATIVE for frequency, dysuria, or hematuria  NEURO: NEGATIVE for weakness, dizziness or paresthesias  ENDOCRINE: NEGATIVE for temperature intolerance, skin/hair changes  HEME/ALLERGY/IMMUNE: NEGATIVE for bleeding problems  PSYCHIATRIC: NEGATIVE for changes in mood or affect      OBJECTIVE:  He will forward flex 90 degrees and abduct 90 degrees.  Strength is intact bilaterally to deltoid, supraspinatus, infraspinatus and subscapularis.  Axillary nerve function is intact.  No numbness or tingling in the extremity.  The overlying skin is normal.  Distal pulses and sensation are intact.      ASSESSMENT:  Recurrent shoulder dislocation, left.      PLAN:  An MRI of the shoulder is pending to evaluate the bony labrum and cartilaginous labrum.  He will transfer from the sling and swath to a regular sling.  He can remove it each day for gentle range of motion at the shoulder.  He will avoid the semaphore position on the left.  He will follow up after the MRI.  He was given a work excuse.  He is left-handed.  His job does involve some reaching.

## 2018-04-16 NOTE — LETTER
4/16/2018      RE: Karan Anglin  [address]   [place] [address]       April 16, 2018: Karan Anglin is a 63 year old male who is seen in f/u up for left shoulder. The pt had another dislocation 4/4/18 while he was putting on a coat. He saw Dr. Hester on 4/6/18. He has been using a sling a swathe. He is reporting intermittent dull ache. Denies numbness or tingling.       PMH:  Past Medical History:   Diagnosis Date     Hyperlipidemia 2015     Hypertension 2014     Migraine     Nausea and vomiting     Peptic ulcer hemorrhage 2013       Active problem list:  Patient Active Problem List   Diagnosis     Benign essential hypertension     Hyperlipidemia LDL goal <130     Migraine without aura and without status migrainosus, not intractable     Blood in semen       FH:  Family History   Problem Relation Age of Onset     Colon Cancer Mother      KIDNEY DISEASE Mother      Coronary Artery Disease Father      Enlarged prostate Father      Other Cancer Brother      Down Syndrome Sister      Alzheimer Disease Sister        SH:  Social History     Social History     Marital status: Single     Spouse name: N/A     Number of children: N/A     Years of education: N/A     Occupational History     Not on file.     Social History Main Topics     Smoking status: Never Smoker     Smokeless tobacco: Never Used     Alcohol use Yes      Comment: 5 glasses wine/wk     Drug use: No     Sexual activity: Not Currently     Partners: Male     Other Topics Concern     Parent/Sibling W/ Cabg, Mi Or Angioplasty Before 65f 55m? No     Social History Narrative       MEDS:  See EMR, reviewed  ALL:  See EMR, reviewed    REVIEW OF SYSTEMS:  CONSTITUTIONAL:NEGATIVE for fever, chills, change in weight  INTEGUMENTARY/SKIN: NEGATIVE for worrisome rashes, moles or lesions  EYES: NEGATIVE for vision changes or irritation  ENT/MOUTH: NEGATIVE for ear, mouth and throat problems  RESP:NEGATIVE for significant cough or SOB  BREAST: NEGATIVE for  masses, tenderness or discharge  CV: NEGATIVE for chest pain, palpitations or peripheral edema  GI: NEGATIVE for nausea, abdominal pain, heartburn, or change in bowel habits  :NEGATIVE for frequency, dysuria, or hematuria  :NEGATIVE for frequency, dysuria, or hematuria  NEURO: NEGATIVE for weakness, dizziness or paresthesias  ENDOCRINE: NEGATIVE for temperature intolerance, skin/hair changes  HEME/ALLERGY/IMMUNE: NEGATIVE for bleeding problems  PSYCHIATRIC: NEGATIVE for changes in mood or affect      OBJECTIVE:  He will forward flex 90 degrees and abduct 90 degrees.  Strength is intact bilaterally to deltoid, supraspinatus, infraspinatus and subscapularis.  Axillary nerve function is intact.  No numbness or tingling in the extremity.  The overlying skin is normal.  Distal pulses and sensation are intact.      ASSESSMENT:  Recurrent shoulder dislocation, left.      PLAN:  An MRI of the shoulder is pending to evaluate the bony labrum and cartilaginous labrum.  He will transfer from the sling and swath to a regular sling.  He can remove it each day for gentle range of motion at the shoulder.  He will avoid the semaphore position on the left.  He will follow up after the MRI.  He was given a work excuse.  He is left-handed.  His job does involve some reaching.       Chris Arroyo MD

## 2018-04-23 ENCOUNTER — RADIANT APPOINTMENT (OUTPATIENT)
Dept: MRI IMAGING | Facility: CLINIC | Age: 63
End: 2018-04-23
Attending: FAMILY MEDICINE
Payer: COMMERCIAL

## 2018-04-23 DIAGNOSIS — S43.015D CLOSED ANTERIOR DISLOCATION OF LEFT HUMERUS, SUBSEQUENT ENCOUNTER: ICD-10-CM

## 2018-04-24 ENCOUNTER — OFFICE VISIT (OUTPATIENT)
Dept: ORTHOPEDICS | Facility: CLINIC | Age: 63
End: 2018-04-24
Payer: OTHER MISCELLANEOUS

## 2018-04-24 ENCOUNTER — PRE VISIT (OUTPATIENT)
Dept: ORTHOPEDICS | Facility: CLINIC | Age: 63
End: 2018-04-24

## 2018-04-24 VITALS — BODY MASS INDEX: 30.94 KG/M2 | HEIGHT: 71 IN | RESPIRATION RATE: 16 BRPM | WEIGHT: 221 LBS

## 2018-04-24 DIAGNOSIS — S43.432D BANKART LESION OF LEFT SHOULDER, SUBSEQUENT ENCOUNTER: Primary | ICD-10-CM

## 2018-04-24 DIAGNOSIS — S43.015D CLOSED ANTERIOR DISLOCATION OF LEFT HUMERUS, SUBSEQUENT ENCOUNTER: ICD-10-CM

## 2018-04-24 NOTE — MR AVS SNAPSHOT
After Visit Summary   4/24/2018    Karan Anglin    MRN: [id number]           Patient Information     Date Of Birth          1955        Visit Information        Provider Department      4/24/2018 9:00 AM Chris Arroyo MD [facility]        Today's Diagnoses     Bankart lesion of left shoulder, subsequent encounter    -  1    Closed anterior dislocation of left humerus, subsequent encounter           Follow-ups after your visit        Your next 10 appointments already scheduled     Apr 27, 2018  2:30 PM CDT   (Arrive by 2:15 PM)   NEW SHOULDER with Rosa Joe MD   [facility] ([facility])    [address] [address] [phone]              Who to contact     Please call your clinic at [phone] to:    Ask questions about your health    Make or cancel appointments    Discuss your medicines    Learn about your test results    Speak to your doctor            Additional Information About Your Visit        MyCharSubtext Information     [employer] gives you secure access to your electronic health record. If you see a primary care provider, you can also send messages to your care team and make appointments. If you have questions, please call your primary care clinic.  If you do not have a primary care provider, please call [phone] and they will assist you.      [employer] is an electronic gateway that provides easy, online access to your medical records. With [employer], you can request a clinic appointment, read your test results, renew a prescription or communicate with your care team.     To access your existing account, please contact your [facility] or call [phone] for assistance.        Care EveryWhere ID     This is your Care EveryWhere ID. This could be used by other organizations to access your [place] medical records  [id number]        Your Vitals Were      "Respirations Height BMI (Body Mass Index)             16 5' 10.98\" (1.803 m) 30.84 kg/m2          Blood Pressure from Last 3 Encounters:   04/04/18 (!) 146/96   03/27/18 135/90   09/20/17 (!) 148/94    Weight from Last 3 Encounters:   04/24/18 221 lb (100.2 kg)   04/16/18 221 lb (100.2 kg)   04/06/18 221 lb (100.2 kg)              Today, you had the following     No orders found for display       Primary Care Provider Fax #    Physician No Ref-Primary [phone]       No address on file        Equal Access to Services     [facility] : Mecca Barroso, graeme alfred, tomeka vivas, zac riddle . So [facility] [phone].    ATENCIÓN: Si habla español, tiene a grullon disposición servicios gratuitos de asistencia lingüística. [facility] [phone].    We comply with applicable federal civil rights laws and [place] laws. We do not discriminate on the basis of race, color, national origin, age, disability, sex, sexual orientation, or gender identity.            Thank you!     Thank you for choosing [facility]  for your care. Our goal is always to provide you with excellent care. Hearing back from our patients is one way we can continue to improve our services. Please take a few minutes to complete the written survey that you may receive in the mail after your visit with us. Thank you!             Your Updated Medication List - Protect others around you: Learn how to safely use, store and throw away your medicines at www.disposemymeds.org.          This list is accurate as of 4/24/18  9:27 AM.  Always use your most recent med list.                   Brand Name Dispense Instructions for use Diagnosis    atenolol 50 MG tablet    TENORMIN    90 tablet    Take 1 tablet (50 mg) by mouth daily    Benign essential hypertension, Migraine without aura and without status migrainosus, not intractable       atorvastatin 10 MG tablet    LIPITOR    90 tablet    Take 2 " tablets (20 mg) by mouth daily    Hyperlipidemia LDL goal <130       hydrochlorothiazide 25 MG tablet    HYDRODIURIL    90 tablet    Take 1 tablet (25 mg) by mouth daily    Benign essential hypertension       HYDROcodone-acetaminophen 5-325 MG per tablet    NORCO    6 tablet    Take 1-2 tablets by mouth every 4 hours as needed for moderate to severe pain        ibuprofen 600 MG tablet    ADVIL/MOTRIN    30 tablet    Take 1 tablet (600 mg) by mouth every 8 hours as needed for moderate pain        lisinopril 10 MG tablet    PRINIVIL/ZESTRIL    30 tablet    Take 1 tablet (10 mg) by mouth daily    Benign essential hypertension       rizatriptan 5 MG tablet    MAXALT    30 tablet    Take 1 tablet (5 mg) by mouth at onset of headache for migraine repeat after 2 hr if no relief; maximum: 30 mg/24 hours    Migraine without aura and without status migrainosus, not intractable

## 2018-04-24 NOTE — PROGRESS NOTES
April 24, 2018: Karan Anglin is a 63 year old male who is seen in f/u up for left shoulder MRI. He has d/c sling.       PMH:  Past Medical History:   Diagnosis Date     Hyperlipidemia 2015     Hypertension 2014     Migraine     Nausea and vomiting     Peptic ulcer hemorrhage 2013       Active problem list:  Patient Active Problem List   Diagnosis     Benign essential hypertension     Hyperlipidemia LDL goal <130     Migraine without aura and without status migrainosus, not intractable     Blood in semen       FH:  Family History   Problem Relation Age of Onset     Colon Cancer Mother      KIDNEY DISEASE Mother      Coronary Artery Disease Father      Enlarged prostate Father      Other Cancer Brother      Down Syndrome Sister      Alzheimer Disease Sister        SH:  Social History     Social History     Marital status: Single     Spouse name: N/A     Number of children: N/A     Years of education: N/A     Occupational History     Not on file.     Social History Main Topics     Smoking status: Never Smoker     Smokeless tobacco: Never Used     Alcohol use Yes      Comment: 5 glasses wine/wk     Drug use: No     Sexual activity: Not Currently     Partners: Male     Other Topics Concern     Parent/Sibling W/ Cabg, Mi Or Angioplasty Before 65f 55m? No     Social History Narrative       MEDS:  See EMR, reviewed  ALL:  See EMR, reviewed    REVIEW OF SYSTEMS:  CONSTITUTIONAL:NEGATIVE for fever, chills, change in weight  INTEGUMENTARY/SKIN: NEGATIVE for worrisome rashes, moles or lesions  EYES: NEGATIVE for vision changes or irritation  ENT/MOUTH: NEGATIVE for ear, mouth and throat problems  RESP:NEGATIVE for significant cough or SOB  BREAST: NEGATIVE for masses, tenderness or discharge  CV: NEGATIVE for chest pain, palpitations or peripheral edema  GI: NEGATIVE for nausea, abdominal pain, heartburn, or change in bowel habits  :NEGATIVE for frequency, dysuria, or hematuria  :NEGATIVE for frequency, dysuria, or  hematuria  NEURO: NEGATIVE for weakness, dizziness or paresthesias  ENDOCRINE: NEGATIVE for temperature intolerance, skin/hair changes  HEME/ALLERGY/IMMUNE: NEGATIVE for bleeding problems  PSYCHIATRIC: NEGATIVE for changes in mood or affect      EXAM: MR Right  Shoulder without  contrast 4/23/2018 9:22 AM     TECHNIQUE: Multiplanar, multisequence imaging of the right  shoulder  were obtained without administration of intravenous or intra-articular  gadolinium contrast using routine protocol.     History: Closed anterior dislocation of left humerus, subsequent  encounter,  Recurrent acute left shoulder dislocations reduced x 2 in  ER in past month, most recent 10 days ago, r/o labral injury with  instablity     Comparison: Radiographs of the left shoulder dated 4/4/2018 and  3/27/2018     Findings:     ROTATOR CUFF and ASSOCIATED STRUCTURES  Rotator cuff:   Mildly increased T2 signal is present at the undersurface of the  superior fibers of the subscapularis tendon. There is no  full-thickness tear.  Degenerative changes in the anterior supraspinatus tendon with  increased signal through approximately 50% of the thickness of the  articular side of the distal tendon representing a moderate grade  undersurface tear.  On coronal image 26 there is increased signal in the anterior most  fibers of the infraspinatus tendon at its intersection with the  posterior fibers of the supraspinatus tendon. This is thought to  represent intrasubstance and superficial tearing over an area less  than 1 cm.     Bursa: No subacromial or subdeltoid bursal fluid.     Musculature: Muscle bulk of rotator cuff is preserved.  Deltoid muscle  bulk is also preserved.  No muscle edema. No fatty atrophy.     Acromioclavicular joint  There are are to marked degenerative changes of the acromioclavicular  joint. Acromion is type 1 in sagittal morphology.  Coracoacromial  ligament is not thickened.     OSSEOUS STRUCTURES  Best seen on axial image 19  there is a bony Hill-Sachs deformity with  cortical depression of approximately 3 to 4 mm over an area of  approximately 1.5 cm at the posterolateral aspect of the humeral head  near the anatomic neck. There is a large bony Bankart lesion involving  approximately 30% by AP dimension of the anterior glenoid in the mid  and inferior portions. This is best seen on sagittal image 24 and  axial image 23. There is disruption of the periosteal sleeve with  extensive tearing of essentially the entire the anterior labrum from  its 12 o'clock to 6 o'clock positions (this descriptive convention  assumes 3 o'clock anterior). Displaced bony fragments are noted  anteriorly. The largest of these measures approximately 6 x 3 mm in  its maximum dimensions. CT would better delineate bony anatomy.     LONG BICIPITAL TENDON  There is increased signal in the long head of the biceps tendon at the  level of the pulley thought to represent tendinosis. The long head of  the biceps tendon is normally situated within the bicipital groove. No  complete tear is present.     GLENOHUMERAL JOINT  Joint fluid: There is a small to moderate joint effusion.     Cartilage and subarticular bone: The cartilage overlying the Bankart  lesion described above is completely disrupted, there is otherwise no  significant cartilage loss of the glenoid humeral joint.     ANCILLARY FINDINGS:  Axillary lymph nodes within normal limits by the short axis criterion.         Impression:  1. Large bony Bankart lesion as described above with disruption of the  periosteal sleeve and extensive tearing of the anterior labrum.  2. Bony Hill-Sachs deformity with cortical depression of approximately  3 to 4 mm over an area of approximately 1.5 cm.  3. Degenerative changes in the rotator cuff as described above without  full-thickness tear.  4. Increased signal in the biceps tendon at the level of the pulley  reflecting a degree of tendinosis without full-thickness tear.  5.  Moderate to marked degenerative changes in the acromioclavicular  joint.     I have personally reviewed the examination and initial interpretation  and I agree with the findings.     JOVANNI KEN MD          OBJECTIVE:  His exam is unchanged.  He is comfortable outside the sling.  He has strength to the rotator cuff to deltoid, supraspinatus, infraspinatus and subscapularis, and his axillary nerve is intact.  Overlying skin is normal.  Distal pulses and sensation are intact.  Appropriate in conversation and affect.      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      PLAN:  We discussed his MRI in detail with the help of a model.  He had his questions answered.  He needs to see a surgeon in consultation about the bony Bankart lesion and large labral tear in the setting of recurrent dislocation.  He will see Dr. Joe this Friday.  A work restriction note was provided.  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

## 2018-04-24 NOTE — TELEPHONE ENCOUNTER
FUTURE VISIT INFORMATION      FUTURE VISIT INFORMATION:    Date: 4/27    Time: 2:30    Location: [facility]  REFERRAL INFORMATION:    Referring provider:  Chris Arroyo    Referring providers clinic:  [facility]    Reason for visit/diagnosis  Left shoulder    RECORDS REQUESTED FROM:       Clinic name Comments Records Status Imaging Status   [facility] Saw Dr Hester on 4/6 and Dr Arroyo 4/24 Internal internal                                   RECORDS STATUS      RECORDS RECEIVED FROM: [facility]   DATE RECEIVED: 4/24   NOTES STATUS DETAILS   OFFICE NOTE from referring provider Internal    LABS     IMAGING  (NEED IMAGES & REPORTS) Internal    INJECTIONS DONE IN RADIOLOGY N/A    MRI Internal    CT SCAN N/A    XRAYS (IMAGES & REPORTS) Internal

## 2018-04-24 NOTE — LETTER
4/24/2018      RE: Karan Anglin  [address]   [place] [address]       April 24, 2018: Karan Anglin is a 63 year old male who is seen in f/u up for left shoulder MRI. He has d/c sling.       PMH:  Past Medical History:   Diagnosis Date     Hyperlipidemia 2015     Hypertension 2014     Migraine     Nausea and vomiting     Peptic ulcer hemorrhage 2013       Active problem list:  Patient Active Problem List   Diagnosis     Benign essential hypertension     Hyperlipidemia LDL goal <130     Migraine without aura and without status migrainosus, not intractable     Blood in semen       FH:  Family History   Problem Relation Age of Onset     Colon Cancer Mother      KIDNEY DISEASE Mother      Coronary Artery Disease Father      Enlarged prostate Father      Other Cancer Brother      Down Syndrome Sister      Alzheimer Disease Sister        SH:  Social History     Social History     Marital status: Single     Spouse name: N/A     Number of children: N/A     Years of education: N/A     Occupational History     Not on file.     Social History Main Topics     Smoking status: Never Smoker     Smokeless tobacco: Never Used     Alcohol use Yes      Comment: 5 glasses wine/wk     Drug use: No     Sexual activity: Not Currently     Partners: Male     Other Topics Concern     Parent/Sibling W/ Cabg, Mi Or Angioplasty Before 65f 55m? No     Social History Narrative       MEDS:  See EMR, reviewed  ALL:  See EMR, reviewed    REVIEW OF SYSTEMS:  CONSTITUTIONAL:NEGATIVE for fever, chills, change in weight  INTEGUMENTARY/SKIN: NEGATIVE for worrisome rashes, moles or lesions  EYES: NEGATIVE for vision changes or irritation  ENT/MOUTH: NEGATIVE for ear, mouth and throat problems  RESP:NEGATIVE for significant cough or SOB  BREAST: NEGATIVE for masses, tenderness or discharge  CV: NEGATIVE for chest pain, palpitations or peripheral edema  GI: NEGATIVE for nausea, abdominal pain, heartburn, or change in bowel  habits  :NEGATIVE for frequency, dysuria, or hematuria  :NEGATIVE for frequency, dysuria, or hematuria  NEURO: NEGATIVE for weakness, dizziness or paresthesias  ENDOCRINE: NEGATIVE for temperature intolerance, skin/hair changes  HEME/ALLERGY/IMMUNE: NEGATIVE for bleeding problems  PSYCHIATRIC: NEGATIVE for changes in mood or affect      EXAM: MR Right  Shoulder without  contrast 4/23/2018 9:22 AM     TECHNIQUE: Multiplanar, multisequence imaging of the right  shoulder  were obtained without administration of intravenous or intra-articular  gadolinium contrast using routine protocol.     History: Closed anterior dislocation of left humerus, subsequent  encounter,  Recurrent acute left shoulder dislocations reduced x 2 in  ER in past month, most recent 10 days ago, r/o labral injury with  instablity     Comparison: Radiographs of the left shoulder dated 4/4/2018 and  3/27/2018     Findings:     ROTATOR CUFF and ASSOCIATED STRUCTURES  Rotator cuff:   Mildly increased T2 signal is present at the undersurface of the  superior fibers of the subscapularis tendon. There is no  full-thickness tear.  Degenerative changes in the anterior supraspinatus tendon with  increased signal through approximately 50% of the thickness of the  articular side of the distal tendon representing a moderate grade  undersurface tear.  On coronal image 26 there is increased signal in the anterior most  fibers of the infraspinatus tendon at its intersection with the  posterior fibers of the supraspinatus tendon. This is thought to  represent intrasubstance and superficial tearing over an area less  than 1 cm.     Bursa: No subacromial or subdeltoid bursal fluid.     Musculature: Muscle bulk of rotator cuff is preserved.  Deltoid muscle  bulk is also preserved.  No muscle edema. No fatty atrophy.     Acromioclavicular joint  There are are to marked degenerative changes of the acromioclavicular  joint. Acromion is type 1 in sagittal morphology.   Coracoacromial  ligament is not thickened.     OSSEOUS STRUCTURES  Best seen on axial image 19 there is a bony Hill-Sachs deformity with  cortical depression of approximately 3 to 4 mm over an area of  approximately 1.5 cm at the posterolateral aspect of the humeral head  near the anatomic neck. There is a large bony Bankart lesion involving  approximately 30% by AP dimension of the anterior glenoid in the mid  and inferior portions. This is best seen on sagittal image 24 and  axial image 23. There is disruption of the periosteal sleeve with  extensive tearing of essentially the entire the anterior labrum from  its 12 o'clock to 6 o'clock positions (this descriptive convention  assumes 3 o'clock anterior). Displaced bony fragments are noted  anteriorly. The largest of these measures approximately 6 x 3 mm in  its maximum dimensions. CT would better delineate bony anatomy.     LONG BICIPITAL TENDON  There is increased signal in the long head of the biceps tendon at the  level of the pulley thought to represent tendinosis. The long head of  the biceps tendon is normally situated within the bicipital groove. No  complete tear is present.     GLENOHUMERAL JOINT  Joint fluid: There is a small to moderate joint effusion.     Cartilage and subarticular bone: The cartilage overlying the Bankart  lesion described above is completely disrupted, there is otherwise no  significant cartilage loss of the glenoid humeral joint.     ANCILLARY FINDINGS:  Axillary lymph nodes within normal limits by the short axis criterion.         Impression:  1. Large bony Bankart lesion as described above with disruption of the  periosteal sleeve and extensive tearing of the anterior labrum.  2. Bony Hill-Sachs deformity with cortical depression of approximately  3 to 4 mm over an area of approximately 1.5 cm.  3. Degenerative changes in the rotator cuff as described above without  full-thickness tear.  4. Increased signal in the biceps tendon at  the level of the pulley  reflecting a degree of tendinosis without full-thickness tear.  5. Moderate to marked degenerative changes in the acromioclavicular  joint.     I have personally reviewed the examination and initial interpretation  and I agree with the findings.     JOVANNI KEN MD          OBJECTIVE:  His exam is unchanged.  He is comfortable outside the sling.  He has strength to the rotator cuff to deltoid, supraspinatus, infraspinatus and subscapularis, and his axillary nerve is intact.  Overlying skin is normal.  Distal pulses and sensation are intact.  Appropriate in conversation and affect.      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      PLAN:  We discussed his MRI in detail with the help of a model.  He had his questions answered.  He needs to see a surgeon in consultation about the bony Bankart lesion and large labral tear in the setting of recurrent dislocation.  He will see Dr. Joe this Friday.  A work restriction note was provided.  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                   Chris Arroyo MD

## 2018-04-24 NOTE — LETTER
[facility]  [address]  [address]  [place] [address]  Phone: [phone]  Fax: [phone]       2018    Karan Anglin  [address]   [place] [address]  [phone] (home)     :     1955      To Whom it May Concern:    This patient was seen today in clinic with his recurrent shoulder dislocation.  He has an upcoming appointment with a shoulder surgeon.  He can be out of a sling at this time, but must be limited in his reaching and lifting.  He should avoid reaching or overhead movements with his left arm, and should avoid lifting greater than 20 pounds.    Sincerely,        Chris Arroyo MD

## 2018-04-27 ENCOUNTER — OFFICE VISIT (OUTPATIENT)
Dept: ORTHOPEDICS | Facility: CLINIC | Age: 63
End: 2018-04-27
Payer: OTHER MISCELLANEOUS

## 2018-04-27 VITALS — HEIGHT: 71 IN | BODY MASS INDEX: 30.93 KG/M2 | WEIGHT: 220.9 LBS

## 2018-04-27 DIAGNOSIS — S42.152A CLOSED FRACTURE OF GLENOID CAVITY AND NECK OF LEFT SCAPULA, INITIAL ENCOUNTER: Primary | ICD-10-CM

## 2018-04-27 DIAGNOSIS — S42.142A CLOSED FRACTURE OF GLENOID CAVITY AND NECK OF LEFT SCAPULA, INITIAL ENCOUNTER: Primary | ICD-10-CM

## 2018-04-27 NOTE — LETTER
4/27/2018     RE: Karan Anglin  [address]   [place] [address]     Dear Colleague,    Thank you for referring your patient, Karan Anglin, to the [facility] at [facility]. Please see a copy of my visit note below.    CHIEF CONCERN:  Left shoulder instability    HISTORY OF PRESENT ILLNESS: Mr. Anglin is a 63-year-old left-hand dominant gentleman who sustained a fall on the ice on 3/27/2018.  He was seen in the emergency department where they relocated his shoulder.  He was given a sling.  He had a slight amount of motion of the arm when he was trying to put on his shirt on 4/4/2018 and he redislocated.  Since that time he has been seen by the primary care sports medicine physicians.  An MRI has been obtained.  Patient states that the shoulder continues to feel unstable and restricts his even limited activity.  He notes no numbness or tingling in the hand.  He had no other injuries.    Past Medical History:   Diagnosis Date     Hyperlipidemia 2015     Hypertension 2014     Migraine     Nausea and vomiting     Peptic ulcer hemorrhage 2013       Past Surgical History:   Procedure Laterality Date     GI SURGERY  2013    peptic ulcers stapled     OPEN REDUCTION INTERNAL FIXATION HUMERUS PROXIMAL Left 5/15/2018    Procedure: OPEN REDUCTION INTERNAL FIXATION HUMERUS PROXIMAL;  Left Glenoid Open Reduction Internal Fixation;  Surgeon: Rosa Joe MD;  Location:  OR       Current Outpatient Prescriptions   Medication Sig Dispense Refill     atenolol (TENORMIN) 50 MG tablet Take 1 tablet (50 mg) by mouth daily 90 tablet 3     atorvastatin (LIPITOR) 10 MG tablet Take 2 tablets (20 mg) by mouth daily 90 tablet 3     hydrochlorothiazide (HYDRODIURIL) 25 MG tablet Take 1 tablet (25 mg) by mouth daily 90 tablet 0     ibuprofen (ADVIL/MOTRIN) 600 MG tablet Take 1 tablet (600 mg) by mouth every 8 hours as needed for moderate pain 30 tablet 0      Esomeprazole Magnesium (NEXIUM PO) Take by mouth daily       losartan (COZAAR) 25 MG tablet Take 1 tablet (25 mg) by mouth daily 30 tablet 0     oxyCODONE IR (ROXICODONE) 5 MG tablet Take 1-2 tablets (5-10 mg) by mouth every 3 hours as needed for pain or other (Moderate to Severe) 40 tablet 0     rizatriptan (MAXALT) 5 MG tablet Take 1 tablet (5 mg) by mouth at onset of headache for migraine repeat after 2 hr if no relief; maximum: 30 mg/24 hours 4 tablet 0     senna-docusate (SENOKOT-S;PERICOLACE) 8.6-50 MG per tablet Take 1-2 tablets by mouth 2 times daily Take while on oral narcotics to prevent or treat constipation. 30 tablet 0        No Known Allergies    SOCIAL HISTORY:    Social History     Social History     Marital status: Single     Spouse name: N/A     Number of children: N/A     Years of education: N/A     Occupational History     Not on file.     Social History Main Topics     Smoking status: Never Smoker     Smokeless tobacco: Never Used     Alcohol use Yes      Comment: 5 glasses wine/wk     Drug use: No     Sexual activity: Not Currently     Partners: Male     Other Topics Concern     Parent/Sibling W/ Cabg, Mi Or Angioplasty Before 65f 55m? No     Social History Narrative       FAMILY HISTORY: Reviewed in EMR      REVIEW OF SYSTEMS: Positive for that noted in past medical history and history of present illness and otherwise reviewed in EMR     PHYSICAL EXAM:    Adult male in no acute distress  Respirations even and unlabored  Focused upper extremity exam: Skin intact. No erythema. Sensation intact all dermatomes into the hand to light touch. EPL, FPL, and Intrinsics intact. Right shoulder active motion is FE to 180, ER at side to 75, and IR to T12. Left shoulder active motion is FE to 100 with limitation due to patient's sense of instability, ER at the side to 50, and IR to Gt limited by pain.      IMAGING:  Left shoulder injury radiographs demonstrated an anterior humeral dislocation.   Postreduction radiographs demonstrate a concentric reduction.  Left shoulder MRI dated is reviewed and I agree with the radiology read below    ASSESSMENT:    1.  Left shoulder recurrent instability  2.  Left large bony Bankart lesion  3.  Left small Hill-Sachs    PLAN:  I reviewed the imaging and history with the patient.  I discussed that for many patients you are a large bony Bankart lesion with recurrent instability is an indication for surgical fixation.  I would give some pause as the patient is of an older age than most patients with bony instability of the shoulder.  As he has continued to feel unstable even despite the passage of 1 month I think he would be best served by fixation of this bony fragment.  We discussed the risks of surgery including that he could be no better even worse if he became stiff, infected, or head injury to nerves or arteries which power the arm or hand.  He had the opportunity for his questions to be answered.  He  father you would he want to would like to proceed with surgical intervention at this time and we will assist him in that.    Rosa Joe MD    Answers for HPI/ROS submitted by the patient on 4/27/2018   General Symptoms: No  Skin Symptoms: No  HENT Symptoms: No  EYE SYMPTOMS: No  HEART SYMPTOMS: No  LUNG SYMPTOMS: No  INTESTINAL SYMPTOMS: No  URINARY SYMPTOMS: No  REPRODUCTIVE SYMPTOMS: No  SKELETAL SYMPTOMS: No  BLOOD SYMPTOMS: No  NERVOUS SYSTEM SYMPTOMS: No  MENTAL HEALTH SYMPTOMS: No    Again, thank you for allowing me to participate in the care of your patient.      Sincerely,    Rosa Joe MD

## 2018-04-27 NOTE — MR AVS SNAPSHOT
After Visit Summary   4/27/2018    Karan Anglin    MRN: [id number]           Patient Information     Date Of Birth          1955        Visit Information        Provider Department      4/27/2018 2:30 PM Rosa Joe MD [facility]        Today's Diagnoses     Closed fracture of glenoid cavity and neck of left scapula, initial encounter    -  1       Follow-ups after your visit        Your next 10 appointments already scheduled     May 30, 2018  9:00 AM CDT   (Arrive by 8:45 AM)   Return Visit with Rosa Joe MD   [facility] ([facility])    [address] [place] [id number] [phone]            Jun 04, 2018  5:20 PM CDT   (Arrive by 5:05 PM)   MATT Extremity with Mitali Lockwood, PT   [facility] ([facility]  )    [address] [place] [id number] [phone]            Jun 12, 2018  5:20 PM CDT   MATT Extremity with Mitali Lockwood, PT   [facility] ([facility]  )    [address] [place] [id number] [phone]            Jun 27, 2018  9:00 AM CDT   (Arrive by 8:45 AM)   Return Visit with Rosa Joe MD   [facility] ([facility])    [address] [place] [id number] [phone]              Who to contact     Please call your clinic at [phone] to:    Ask questions about your health    Make or cancel appointments    Discuss your medicines    Learn about your test results    Speak to your doctor            Additional Information About Your Visit        MyChart Information     [address] [employer] gives you secure access to your electronic health record. If you see a primary care provider, you can also send messages to your care team and make appointments. If you have questions, please  "call your primary care clinic.  If you do not have a primary care provider, please call [phone] and they will assist you.      [employer] is an electronic gateway that provides easy, online access to your medical records. With [employer], you can request a clinic appointment, read your test results, renew a prescription or communicate with your care team.     To access your existing account, please contact your [facility] or call [phone] for assistance.        Care EveryWhere ID     This is your Care EveryWhere ID. This could be used by other organizations to access your [facility] medical records  [id number]        Your Vitals Were     Height BMI (Body Mass Index)                1.803 m (5' 10.98\") 30.82 kg/m2           Blood Pressure from Last 3 Encounters:   05/15/18 106/80   04/04/18 (!) 146/96   03/27/18 135/90    Weight from Last 3 Encounters:   05/15/18 99.8 kg (220 lb)   04/27/18 100.2 kg (220 lb 14.4 oz)   04/24/18 100.2 kg (221 lb)              We Performed the Following     Carrie-Operative Worksheet        Primary Care Provider Fax #    Physician No Ref-Primary [phone]       No address on file        Equal Access to Services     PATRICIA MARQUEZ : Hadii saleem abreuo Sohyunali, waaxda luqadaha, qaybta kaalmada adeegyada, zac gilbert. So [facility] [phone].    ATENCIÓN: Si habla español, tiene a grullon disposición servicios gratuitos de asistencia lingüística. Llame al [phone].    We comply with applicable federal civil rights laws and [place] laws. We do not discriminate on the basis of race, color, national origin, age, disability, sex, sexual orientation, or gender identity.            Thank you!     Thank you for choosing [facility]  for your care. Our goal is always to provide you with excellent care. Hearing back from our patients is one way we can continue to improve our services. Please take a few minutes to " complete the written survey that you may receive in the mail after your visit with us. Thank you!             Your Updated Medication List - Protect others around you: Learn how to safely use, store and throw away your medicines at www.disposemymeds.org.          This list is accurate as of 4/27/18 11:59 PM.  Always use your most recent med list.                   Brand Name Dispense Instructions for use Diagnosis    atenolol 50 MG tablet    TENORMIN    90 tablet    Take 1 tablet (50 mg) by mouth daily    Benign essential hypertension, Migraine without aura and without status migrainosus, not intractable       atorvastatin 10 MG tablet    LIPITOR    90 tablet    Take 2 tablets (20 mg) by mouth daily    Hyperlipidemia LDL goal <130       hydrochlorothiazide 25 MG tablet    HYDRODIURIL    90 tablet    Take 1 tablet (25 mg) by mouth daily    Benign essential hypertension       HYDROcodone-acetaminophen 5-325 MG per tablet    NORCO    6 tablet    Take 1-2 tablets by mouth every 4 hours as needed for moderate to severe pain        ibuprofen 600 MG tablet    ADVIL/MOTRIN    30 tablet    Take 1 tablet (600 mg) by mouth every 8 hours as needed for moderate pain

## 2018-04-27 NOTE — NURSING NOTE
"Reason For Visit:   Chief Complaint   Patient presents with     Consult     New left shoulder. Possible Bankark leasion and labral tear.       PCP: No Ref-Primary, Physician  Ref: Dr. Arroyo    ?  No  Occupation  and .  Currently working? Yes.  Work status?  Full time.  Date of injury: 3/27/2018  Type of injury: fell outside on the ice and dislocated his shoulder.    4/4/2018  Second dislocation happened when he was putting on a shirt.  Date of surgery: NA  Type of surgery: NA.  Smoker: No  Request smoking cessation information: No    LEft hand dominant    SANE score  Affected shoulder: Left  Right shoulder SANE: 100  Left shoulder SANE: 70-75    Ht 1.803 m (5' 10.98\")  Wt 100.2 kg (220 lb 14.4 oz)  BMI 30.82 kg/m2      Pain Assessment  Patient Currently in Pain: Sammie Newell, ATC        "

## 2018-04-27 NOTE — NURSING NOTE
Teaching Flowsheet   Relevant Diagnosis: Left shoulder large bony bankart  Teaching Topic: Pre-op for ORIF left glenoid - needs work comp approval before scheduling     Person(s) involved in teaching:   Patient     Motivation Level:  Asks Questions: Yes  Cooperative: Yes  Receptive (willing/able to accept information): Yes  Any cultural factors/Latter-day beliefs that may influence understanding or compliance? No     Patient demonstrates understanding of the following:  Reason for the appointment, diagnosis and treatment plan: Yes  Knowledge of proper use of medications and conditions for which they are ordered (with special attention to potential side effects or drug interactions): Yes  Which situations necessitate calling provider and whom to contact: Yes     Teaching Concerns Addressed:   H&P done in the ER  States his sister will provide transportation and assist with cares after surgery  Aware of post-op expectations     Proper use and care of sling x 6 weeks (medical equip, care aids, etc.): Yes  Nutritional needs and diet plan: Yes  Pain management techniques: Yes  Wound Care: Yes  How and/when to access community resources: Yes     Instructional Materials Used/Given: Pre-op packet, surgical soap     Time spent with patient: 12.

## 2018-05-03 DIAGNOSIS — G43.009 MIGRAINE WITHOUT AURA AND WITHOUT STATUS MIGRAINOSUS, NOT INTRACTABLE: ICD-10-CM

## 2018-05-03 RX ORDER — RIZATRIPTAN BENZOATE 5 MG/1
TABLET ORAL
Qty: 12 TABLET | Refills: 0 | OUTPATIENT
Start: 2018-05-03

## 2018-05-03 NOTE — TELEPHONE ENCOUNTER
"Requested Prescriptions   Pending Prescriptions Disp Refills     rizatriptan (MAXALT) 5 MG tablet [Pharmacy Med Name: Rizatriptan Benzoate Oral Tablet 5 MG] 12 tablet 0    Last Written Prescription Date:  02/16/2018  Last Fill Quantity: 30 tablet,  # refills: 0   Last office visit: 9/20/2017 with prescribing provider:  Romelia Judd   Future Office Visit:     Sig: Take 1 tablet (5 mg) by mouth at onset of headache for migraine repeat after 2 hr if no relief; maximum: 30 mg/24 hours.    Serotonin Agonists Failed    5/3/2018  4:58 PM       Failed - Blood pressure under 140/90 in past 12 months    BP Readings from Last 3 Encounters:   04/04/18 (!) 146/96   03/27/18 135/90   09/20/17 (!) 148/94                Failed - Serotonin Agonist request needs review.    Please review patient's record. If patient has had 8 or more treatments in the past month, please forward to provider.         Passed - Recent (12 mo) or future (30 days) visit within the authorizing provider's specialty    Patient had office visit in the last 12 months or has a visit in the next 30 days with authorizing provider or within the authorizing provider's specialty.  See \"Patient Info\" tab in inbasket, or \"Choose Columns\" in Meds & Orders section of the refill encounter.           Passed - Patient is age 18 or older          "

## 2018-05-04 ENCOUNTER — TELEPHONE (OUTPATIENT)
Dept: ORTHOPEDICS | Facility: CLINIC | Age: 63
End: 2018-05-04

## 2018-05-04 NOTE — TELEPHONE ENCOUNTER
Called patient to let him know his surgery on 5/15 with Dr Joe was approved by his work comp, patient stated that Dr Joe was fine with his emergency dept trip was good for his history and physical.

## 2018-05-08 DIAGNOSIS — G43.009 MIGRAINE WITHOUT AURA AND WITHOUT STATUS MIGRAINOSUS, NOT INTRACTABLE: ICD-10-CM

## 2018-05-09 DIAGNOSIS — G43.009 MIGRAINE WITHOUT AURA AND WITHOUT STATUS MIGRAINOSUS, NOT INTRACTABLE: Primary | ICD-10-CM

## 2018-05-09 DIAGNOSIS — I10 BENIGN ESSENTIAL HYPERTENSION: ICD-10-CM

## 2018-05-09 RX ORDER — RIZATRIPTAN BENZOATE 5 MG/1
5 TABLET ORAL
Qty: 30 TABLET | Refills: 0 | Status: CANCELLED | OUTPATIENT
Start: 2018-05-09

## 2018-05-09 RX ORDER — RIZATRIPTAN BENZOATE 5 MG/1
TABLET ORAL
Qty: 12 TABLET | Refills: 0 | OUTPATIENT
Start: 2018-05-09

## 2018-05-09 NOTE — TELEPHONE ENCOUNTER
Patient is overdue for blood pressure visit and blood pressure is out of range.  Additionally it sounds as if we need a different plan for migraines.  Patient should schedule an OV.  It also looks like he is not taking the lisinopril that was prescribed in September.  He should be on atenolol, HCTZ and lisinopril.  Please schedule his appointment for a time two weeks after he is taking all three of the medications.  MARJ Rashid

## 2018-05-09 NOTE — TELEPHONE ENCOUNTER
Routing refill request to provider for review/approval because:  Last BP not in range    Per pharmacy the insurance will only cover 12 tabs a month and pt filled this in March    He is out of med    Peyton Story, TALIA   [facility]

## 2018-05-09 NOTE — TELEPHONE ENCOUNTER
"Requested Prescriptions   Pending Prescriptions Disp Refills     rizatriptan (MAXALT) 5 MG tablet [Pharmacy Med Name: Rizatriptan Benzoate Oral Tablet 5 MG]  Last Written Prescription Date:  2/16/18  Last Fill Quantity: 30,  # refills: 0   Last office visit: 9/20/2017 with prescribing provider:  Binta   Future Office Visit:     12 tablet 0     Sig: Take 1 tablet (5 mg) by mouth at onset of headache for migraine repeat after 2 hr if no relief; maximum: 30 mg/24 hours.    Serotonin Agonists Failed    5/8/2018  3:52 PM       Failed - Blood pressure under 140/90 in past 12 months    BP Readings from Last 3 Encounters:   04/04/18 (!) 146/96   03/27/18 135/90   09/20/17 (!) 148/94                Failed - Serotonin Agonist request needs review.    Please review patient's record. If patient has had 8 or more treatments in the past month, please forward to provider.         Passed - Recent (12 mo) or future (30 days) visit within the authorizing provider's specialty    Patient had office visit in the last 12 months or has a visit in the next 30 days with authorizing provider or within the authorizing provider's specialty.  See \"Patient Info\" tab in inbasket, or \"Choose Columns\" in Meds & Orders section of the refill encounter.           Passed - Patient is age 18 or older          "

## 2018-05-09 NOTE — TELEPHONE ENCOUNTER
"Requested Prescriptions   Pending Prescriptions Disp Refills     rizatriptan (MAXALT) 5 MG tablet 30 tablet 0    Last Written Prescription Date:  2/16/18  Last Fill Quantity: 30,  # refills: 0   Last office visit: 9/20/2017 with prescribing provider:  9/20/17   Future Office Visit:     Sig: Take 1 tablet (5 mg) by mouth at onset of headache for migraine repeat after 2 hr if no relief; maximum: 30 mg/24 hours    Serotonin Agonists Failed    5/9/2018 12:00 PM       Failed - Blood pressure under 140/90 in past 12 months    BP Readings from Last 3 Encounters:   04/04/18 (!) 146/96   03/27/18 135/90   09/20/17 (!) 148/94                Failed - Serotonin Agonist request needs review.    Please review patient's record. If patient has had 8 or more treatments in the past month, please forward to provider.         Passed - Recent (12 mo) or future (30 days) visit within the authorizing provider's specialty    Patient had office visit in the last 12 months or has a visit in the next 30 days with authorizing provider or within the authorizing provider's specialty.  See \"Patient Info\" tab in inbasket, or \"Choose Columns\" in Meds & Orders section of the refill encounter.           Passed - Patient is age 18 or older        "

## 2018-05-10 RX ORDER — RIZATRIPTAN BENZOATE 5 MG/1
5 TABLET ORAL
Qty: 4 TABLET | Refills: 0 | Status: SHIPPED | OUTPATIENT
Start: 2018-05-10 | End: 2018-06-14

## 2018-05-10 RX ORDER — LOSARTAN POTASSIUM 25 MG/1
25 TABLET ORAL DAILY
Qty: 30 TABLET | Refills: 0 | Status: SHIPPED | OUTPATIENT
Start: 2018-05-10 | End: 2018-06-14

## 2018-05-10 NOTE — TELEPHONE ENCOUNTER
I sent a small refill of the maxalt, but this may be an insurance barrier, not a prescription barrier as it sounds like his insurance has a limit of 12 per month and he was prescribed #30 on 2/16.      Also, I added losartan for his blood pressure which works similarly to lisinopril but does not cause the cough.  I want him to be on losartan for two weeks in addition to his other medications before he has an OV with me.  I also want him to check his blood pressure outside of the clinic and bring in the records.  GUILLERMO Judd, MARJ

## 2018-05-10 NOTE — TELEPHONE ENCOUNTER
Spoke with patient and gave message from provider. Notified of rx filled. Patient verbalized understanding to be on mediations for two weeks prior to scheduling appt with her and that he will take BP every day and bring in reading at appt.    Teressa Lee RN  [facility]

## 2018-05-10 NOTE — TELEPHONE ENCOUNTER
,     Patient returned call. He reports he is currently taking atenolol, HCTZ and is not taking lisinopril because he got a bad cough.    He states that he will call to schedule an appointment, he is having orthopedic surgery on Tuesday and is wondering if it is okay to come in after his surgery?     He requested a refill of Maxalt, he reports he is out of that medication.    Diana Monzon RN  [facility]

## 2018-05-14 ENCOUNTER — ANESTHESIA EVENT (OUTPATIENT)
Dept: SURGERY | Facility: AMBULATORY SURGERY CENTER | Age: 63
End: 2018-05-14

## 2018-05-15 ENCOUNTER — HOSPITAL ENCOUNTER (OUTPATIENT)
Facility: AMBULATORY SURGERY CENTER | Age: 63
End: 2018-05-15
Attending: ORTHOPAEDIC SURGERY
Payer: OTHER MISCELLANEOUS

## 2018-05-15 ENCOUNTER — ANESTHESIA (OUTPATIENT)
Dept: SURGERY | Facility: AMBULATORY SURGERY CENTER | Age: 63
End: 2018-05-15

## 2018-05-15 ENCOUNTER — SURGERY (OUTPATIENT)
Age: 63
End: 2018-05-15

## 2018-05-15 ENCOUNTER — DOCUMENTATION ONLY (OUTPATIENT)
Dept: ORTHOPEDICS | Facility: CLINIC | Age: 63
End: 2018-05-15

## 2018-05-15 VITALS
TEMPERATURE: 97.1 F | BODY MASS INDEX: 30.8 KG/M2 | SYSTOLIC BLOOD PRESSURE: 106 MMHG | RESPIRATION RATE: 16 BRPM | OXYGEN SATURATION: 93 % | WEIGHT: 220 LBS | DIASTOLIC BLOOD PRESSURE: 80 MMHG | HEIGHT: 71 IN

## 2018-05-15 DIAGNOSIS — M25.519 SHOULDER PAIN, UNSPECIFIED CHRONICITY, UNSPECIFIED LATERALITY: Primary | ICD-10-CM

## 2018-05-15 DEVICE — IMPLANTABLE DEVICE: Type: IMPLANTABLE DEVICE | Site: SHOULDER | Status: FUNCTIONAL

## 2018-05-15 DEVICE — IMP ANCHOR ARTHREX BIOCOMP PUSHLOCK 2.9X12.5MM AR-2923BC: Type: IMPLANTABLE DEVICE | Site: SHOULDER | Status: FUNCTIONAL

## 2018-05-15 RX ORDER — ONDANSETRON 2 MG/ML
INJECTION INTRAMUSCULAR; INTRAVENOUS PRN
Status: DISCONTINUED | OUTPATIENT
Start: 2018-05-15 | End: 2018-05-15

## 2018-05-15 RX ORDER — FENTANYL CITRATE 50 UG/ML
INJECTION, SOLUTION INTRAMUSCULAR; INTRAVENOUS PRN
Status: DISCONTINUED | OUTPATIENT
Start: 2018-05-15 | End: 2018-05-15

## 2018-05-15 RX ORDER — ONDANSETRON 4 MG/1
4 TABLET, ORALLY DISINTEGRATING ORAL EVERY 30 MIN PRN
Status: DISCONTINUED | OUTPATIENT
Start: 2018-05-15 | End: 2018-05-16 | Stop reason: HOSPADM

## 2018-05-15 RX ORDER — PROPOFOL 10 MG/ML
INJECTION, EMULSION INTRAVENOUS CONTINUOUS PRN
Status: DISCONTINUED | OUTPATIENT
Start: 2018-05-15 | End: 2018-05-15

## 2018-05-15 RX ORDER — LIDOCAINE HYDROCHLORIDE 20 MG/ML
INJECTION, SOLUTION INFILTRATION; PERINEURAL PRN
Status: DISCONTINUED | OUTPATIENT
Start: 2018-05-15 | End: 2018-05-15

## 2018-05-15 RX ORDER — SODIUM CHLORIDE, SODIUM LACTATE, POTASSIUM CHLORIDE, CALCIUM CHLORIDE 600; 310; 30; 20 MG/100ML; MG/100ML; MG/100ML; MG/100ML
INJECTION, SOLUTION INTRAVENOUS CONTINUOUS
Status: DISCONTINUED | OUTPATIENT
Start: 2018-05-15 | End: 2018-05-16 | Stop reason: HOSPADM

## 2018-05-15 RX ORDER — KETAMINE HYDROCHLORIDE 10 MG/ML
INJECTION INTRAMUSCULAR; INTRAVENOUS PRN
Status: DISCONTINUED | OUTPATIENT
Start: 2018-05-15 | End: 2018-05-15

## 2018-05-15 RX ORDER — ONDANSETRON 2 MG/ML
4 INJECTION INTRAMUSCULAR; INTRAVENOUS EVERY 30 MIN PRN
Status: DISCONTINUED | OUTPATIENT
Start: 2018-05-15 | End: 2018-05-16 | Stop reason: HOSPADM

## 2018-05-15 RX ORDER — NALOXONE HYDROCHLORIDE 0.4 MG/ML
.1-.4 INJECTION, SOLUTION INTRAMUSCULAR; INTRAVENOUS; SUBCUTANEOUS
Status: DISCONTINUED | OUTPATIENT
Start: 2018-05-15 | End: 2018-05-15 | Stop reason: HOSPADM

## 2018-05-15 RX ORDER — EPHEDRINE SULFATE 50 MG/ML
INJECTION, SOLUTION INTRAMUSCULAR; INTRAVENOUS; SUBCUTANEOUS PRN
Status: DISCONTINUED | OUTPATIENT
Start: 2018-05-15 | End: 2018-05-15

## 2018-05-15 RX ORDER — FENTANYL CITRATE 50 UG/ML
25-50 INJECTION, SOLUTION INTRAMUSCULAR; INTRAVENOUS
Status: DISCONTINUED | OUTPATIENT
Start: 2018-05-15 | End: 2018-05-15 | Stop reason: HOSPADM

## 2018-05-15 RX ORDER — GABAPENTIN 300 MG/1
300 CAPSULE ORAL ONCE
Status: COMPLETED | OUTPATIENT
Start: 2018-05-15 | End: 2018-05-15

## 2018-05-15 RX ORDER — DEXAMETHASONE SODIUM PHOSPHATE 4 MG/ML
INJECTION, SOLUTION INTRA-ARTICULAR; INTRALESIONAL; INTRAMUSCULAR; INTRAVENOUS; SOFT TISSUE PRN
Status: DISCONTINUED | OUTPATIENT
Start: 2018-05-15 | End: 2018-05-15

## 2018-05-15 RX ORDER — SODIUM CHLORIDE, SODIUM LACTATE, POTASSIUM CHLORIDE, CALCIUM CHLORIDE 600; 310; 30; 20 MG/100ML; MG/100ML; MG/100ML; MG/100ML
INJECTION, SOLUTION INTRAVENOUS CONTINUOUS PRN
Status: DISCONTINUED | OUTPATIENT
Start: 2018-05-15 | End: 2018-05-15

## 2018-05-15 RX ORDER — FLUMAZENIL 0.1 MG/ML
0.2 INJECTION, SOLUTION INTRAVENOUS
Status: DISCONTINUED | OUTPATIENT
Start: 2018-05-15 | End: 2018-05-15 | Stop reason: HOSPADM

## 2018-05-15 RX ORDER — ACETAMINOPHEN 325 MG/1
975 TABLET ORAL ONCE
Status: COMPLETED | OUTPATIENT
Start: 2018-05-15 | End: 2018-05-15

## 2018-05-15 RX ORDER — NALOXONE HYDROCHLORIDE 0.4 MG/ML
.1-.4 INJECTION, SOLUTION INTRAMUSCULAR; INTRAVENOUS; SUBCUTANEOUS
Status: DISCONTINUED | OUTPATIENT
Start: 2018-05-15 | End: 2018-05-16 | Stop reason: HOSPADM

## 2018-05-15 RX ORDER — BUPIVACAINE HYDROCHLORIDE 5 MG/ML
INJECTION, SOLUTION EPIDURAL; INTRACAUDAL PRN
Status: DISCONTINUED | OUTPATIENT
Start: 2018-05-15 | End: 2018-05-15

## 2018-05-15 RX ORDER — OXYCODONE HYDROCHLORIDE 5 MG/1
5-10 TABLET ORAL
Qty: 40 TABLET | Refills: 0 | Status: SHIPPED | OUTPATIENT
Start: 2018-05-15 | End: 2018-10-25

## 2018-05-15 RX ORDER — AMOXICILLIN 250 MG
1-2 CAPSULE ORAL 2 TIMES DAILY
Qty: 30 TABLET | Refills: 0 | Status: SHIPPED | OUTPATIENT
Start: 2018-05-15 | End: 2018-10-25

## 2018-05-15 RX ORDER — BUPIVACAINE HYDROCHLORIDE 2.5 MG/ML
INJECTION, SOLUTION INFILTRATION; PERINEURAL PRN
Status: DISCONTINUED | OUTPATIENT
Start: 2018-05-15 | End: 2018-05-15 | Stop reason: HOSPADM

## 2018-05-15 RX ADMIN — EPHEDRINE SULFATE 10 MG: 50 INJECTION, SOLUTION INTRAMUSCULAR; INTRAVENOUS; SUBCUTANEOUS at 12:46

## 2018-05-15 RX ADMIN — ONDANSETRON 4 MG: 2 INJECTION INTRAMUSCULAR; INTRAVENOUS at 12:38

## 2018-05-15 RX ADMIN — BUPIVACAINE HYDROCHLORIDE 10 ML: 5 INJECTION, SOLUTION EPIDURAL; INTRACAUDAL at 11:40

## 2018-05-15 RX ADMIN — Medication: at 12:53

## 2018-05-15 RX ADMIN — EPHEDRINE SULFATE 15 MG: 50 INJECTION, SOLUTION INTRAMUSCULAR; INTRAVENOUS; SUBCUTANEOUS at 12:51

## 2018-05-15 RX ADMIN — LIDOCAINE HYDROCHLORIDE 60 MG: 20 INJECTION, SOLUTION INFILTRATION; PERINEURAL at 11:58

## 2018-05-15 RX ADMIN — Medication 100 MCG: at 13:41

## 2018-05-15 RX ADMIN — ACETAMINOPHEN 975 MG: 325 TABLET ORAL at 11:25

## 2018-05-15 RX ADMIN — PROPOFOL: 10 INJECTION, EMULSION INTRAVENOUS at 12:58

## 2018-05-15 RX ADMIN — Medication 100 MCG: at 12:24

## 2018-05-15 RX ADMIN — FENTANYL CITRATE 50 MCG: 50 INJECTION, SOLUTION INTRAMUSCULAR; INTRAVENOUS at 11:35

## 2018-05-15 RX ADMIN — Medication 200 MCG: at 12:36

## 2018-05-15 RX ADMIN — GABAPENTIN 300 MG: 300 CAPSULE ORAL at 11:25

## 2018-05-15 RX ADMIN — SODIUM CHLORIDE, SODIUM LACTATE, POTASSIUM CHLORIDE, CALCIUM CHLORIDE: 600; 310; 30; 20 INJECTION, SOLUTION INTRAVENOUS at 11:35

## 2018-05-15 RX ADMIN — PROPOFOL 50 MCG/KG/MIN: 10 INJECTION, EMULSION INTRAVENOUS at 12:02

## 2018-05-15 RX ADMIN — DEXAMETHASONE SODIUM PHOSPHATE 8 MG: 4 INJECTION, SOLUTION INTRA-ARTICULAR; INTRALESIONAL; INTRAMUSCULAR; INTRAVENOUS; SOFT TISSUE at 12:38

## 2018-05-15 RX ADMIN — Medication 0.7 MCG/KG/HR: at 12:22

## 2018-05-15 RX ADMIN — Medication 100 MCG: at 12:26

## 2018-05-15 RX ADMIN — LIDOCAINE HYDROCHLORIDE 40 MG: 20 INJECTION, SOLUTION INFILTRATION; PERINEURAL at 12:00

## 2018-05-15 RX ADMIN — KETAMINE HYDROCHLORIDE 40 MG: 10 INJECTION INTRAMUSCULAR; INTRAVENOUS at 12:22

## 2018-05-15 RX ADMIN — BUPIVACAINE HYDROCHLORIDE 10 ML: 2.5 INJECTION, SOLUTION INFILTRATION; PERINEURAL at 13:44

## 2018-05-15 RX ADMIN — FENTANYL CITRATE 50 MCG: 50 INJECTION, SOLUTION INTRAMUSCULAR; INTRAVENOUS at 11:52

## 2018-05-15 RX ADMIN — Medication 40 MCG: at 12:22

## 2018-05-15 RX ADMIN — Medication 100 MCG: at 13:26

## 2018-05-15 RX ADMIN — FENTANYL CITRATE 50 MCG: 50 INJECTION, SOLUTION INTRAMUSCULAR; INTRAVENOUS at 12:11

## 2018-05-15 RX ADMIN — Medication 200 MCG: at 13:11

## 2018-05-15 NOTE — IP AVS SNAPSHOT
[facility]    [address] [place] [id number]    Phone:  [phone]    Fax:  [phone]                                       After Visit Summary   5/15/2018    Karan Anglin    MRN: [id number]           After Visit Summary Signature Page     I have received my discharge instructions, and my questions have been answered. I have discussed any challenges I see with this plan with the nurse or doctor.    ..........................................................................................................................................  Patient/Patient Representative Signature      ..........................................................................................................................................  Patient Representative Print Name and Relationship to Patient    ..................................................               ................................................  Date                                            Time    ..........................................................................................................................................  Reviewed by Signature/Title    ...................................................              ..............................................  Date                                                            Time

## 2018-05-15 NOTE — ANESTHESIA POSTPROCEDURE EVALUATION
Patient: Karan Anglin    Procedure(s):  Left Glenoid Open Reduction Internal Fixation - Wound Class: I-Clean    Diagnosis:Left Shoulder Large Bony Bankart  Diagnosis Additional Information: No value filed.    Anesthesia Type:  MAC, Periph. Nerve Block for postop pain, Peripheral Nerve Block    Note:  Anesthesia Post Evaluation    Patient location during evaluation: Phase 2  Patient participation: Able to fully participate in evaluation  Level of consciousness: awake  Pain management: adequate  Airway patency: patent  Cardiovascular status: acceptable  Respiratory status: acceptable  Hydration status: acceptable  PONV: none     Anesthetic complications: None          Last vitals:  Vitals:    05/15/18 1440 05/15/18 1500 05/15/18 1546   BP: 108/69 98/65 106/80   Resp: 14 14 16   Temp: 36.6  C (97.8  F) 36.2  C (97.1  F) 36.2  C (97.1  F)   SpO2: 93% 94% 93%         Electronically Signed By: Mustapha Gillespie MD  May 15, 2018  3:58 PM

## 2018-05-15 NOTE — ANESTHESIA PROCEDURE NOTES
Peripheral Nerve Block Procedure Note    Staff:     Anesthesiologist:  AARTI SPRINGER    Resident/CRNA:  SVEN LONGO    Block performed by resident/CRNA in the presence of a teaching physician    Location: Pre-op  Procedure Start/Stop TImes:      5/15/2018 11:30 AM     5/15/2018 11:40 AM    patient identified, IV checked, site marked, risks and benefits discussed, informed consent, monitors and equipment checked, pre-op evaluation, at physician/surgeon's request and post-op pain management      Correct Patient: Yes      Correct Position: Yes      Correct Site: Yes      Correct Procedure: Yes      Correct Laterality:  Yes    Site Marked:  Yes  Procedure details:     Procedure:  Interscalene    Laterality:  Left    Position:  Supine    Sterile Prep: chloraprep, mask and sterile gloves      Local skin infiltration:  None    Needle:  Short bevel and insulated    Needle gauge:  21    Needle length (mm):  100    Ultrasound: Yes      Ultrasound used to identify targeted nerve, plexus, or vascular structure and placed a needle adjacent to it      Permanent Image entered into patiient's record      Abnormal pain on injection: No      Blood Aspirated: No      Paresthesias:  No    Bleeding at site: No      Bolus via:  Needle    Infusion Method:  Single Shot    Complications:  None  Assessment/Narrative:     Injection made incrementally with aspirations every (mL):  5

## 2018-05-15 NOTE — H&P
UPDATE to H&P done on Emergency Dept visit 4/4/18.    Patient has had no new symptoms (no CP, SOB, fever, chills, URI or illness) since 4/4/18. He has been in his regular state of health except for his left shoulder instability symptoms.    EXAM:  Awake, alert and in NAD  Respirations even and unlabored: clear, without wheezing or rales  Heart: Reg rate and rhythm.     Planning to proceed with left shoulder surgery as planned under anesthesia (IS block + MAC or General - determined by Anesthesiology)

## 2018-05-15 NOTE — OR NURSING
Peripheral nerve block administered with Exparel. Pt. Noelle. Well, VSS throughout, see flowsheet.

## 2018-05-15 NOTE — IP AVS SNAPSHOT
MRN:[id number]                      After Visit Summary   5/15/2018    Karan Anglin    MRN: [id number]           Thank you!     Thank you for choosing [facility] for your care. Our goal is always to provide you with excellent care. Hearing back from our patients is one way we can continue to improve our services. Please take a few minutes to complete the written survey that you may receive in the mail after you visit with us. Thank you!        Patient Information     Date Of Birth          1955        About your hospital stay     You were admitted on:  May 15, 2018 [facility]    You were discharged on:  May 15, 2018       Who to Call     For medical emergencies, please call 911.  For non-urgent questions about your medical care, please call your primary care provider or clinic, None  For questions related to your surgery, please call your surgery clinic        Attending Provider     Provider Rosa Hall MD Orthopedics       Primary Care Provider Fax #    Physician No Ref-Primary [phone]      After Care Instructions      Diet as Tolerated       Return to diet before surgery, unless instructed otherwise.            Discharge Instructions       Review outpatient procedure discharge instructions with patient as directed by Provider            Dressing Change       Change dressing on fifth day after surgery.            Ice to affected area       Ice pack to surgical site every 15 minutes per hour for 24 hours            No driving or operating machinery        No driving or alcohol while taking narcotics            No weight bearing       Operative extremity in sling at all times except PT and hygiene            Return to clinic       Return to clinic in 2 weeks.  Call to confirm date and time.            Shower        POD#3 Cover dressing if dressing is not going to be changed today            Wound care       Do  "not immerse wound in water until sutures removed                  Your next 10 appointments already scheduled     May 30, 2018  9:00 AM CDT   (Arrive by 8:45 AM)   Return Visit with Rosa Joe MD   [facility] ([facility])    [address] [place] [id number]   [phone]            Jun 27, 2018  9:00 AM CDT   (Arrive by 8:45 AM)   Return Visit with Rosa Joe MD   [facility] ([facility])    [address] [place] [id number]   [phone]              Further instructions from your care team       [facility]  Home Care Following Anesthesia  For 24 hours after surgery:  1. Get plenty of rest.  A responsible adult must stay with you for at least 24 hours after you leave the surgery center.  2. Do not drive or use heavy equipment.  If you have weakness or tingling, don't drive or use heavy equipment until this feeling goes away.   3. Do not drink alcohol.   4. Avoid strenuous or risky activities.  Ask for help when climbing stairs.  5. You may feel lightheaded.  IF so, sit for a few minutes before standing.  Have someone help you get up.   6. If you have nausea (feel sick to your stomach): Drink only clear liquids such as apple juice, ginger ale, broth or 7-Up.  Rest may also help.  Be sure to drink enough fluids.  Move to a regular diet as you feel able.   7. You may have a slight fever.  Call the doctor if your fever is over 100 F (37.7 C) (taken under the tongue) or lasts longer than 24 hours.  8. You may have a dry mouth, a sore throat, muscle aches or trouble sleeping. These should go away after 24 hours.  9. Do not make important or legal decisions.        Today you received an Exparel block to numb the nerves near your surgery site.  This is a block using local anesthetic or \"numbing\" medication injected around the " "nerves to anesthetize or \"numb\" the area supplied by those nerves.  This block is injected into the muscle layer near your surgical site.  This medication may numb the location where you had surgery up to 72 hours.  If your surgical site is an arm or leg you should be careful with your affected limb, since it is possible to injure your limb without being aware of it due to the numbing.  Until full feeling returns, you should guard against bumping or hitting your limb, and avoid extreme hot or cold temperatures on the skin.  As the block wears off, the feeling will return as a tingling or prickly sensation near your surgical site.  You will experince more discomfort from your incision as the feeling returns.  You may want to take a pain pill (a narcotic or Tylenol if this was prescribed by your surgeon) when you start to experience mild pain before the pain beomes more severe.  If your pain medications do not control your pain, you should notify your surgeon.    Tips for taking pain medications  To get the best pain relief possible, remember these points:    Take pain medications as directed, before pain becomes severe.    Pain medication can upset your stomach: taking it with food may help.    Constipation is a common side effect of pain medication. Drink plenty of  fluids.    Eat foods high in fiber. Take a stool softener if recommended by your doctor or pharmacist.    Do not drink alcohol, drive or operate machinery while taking pain medications.    Ask about other ways to control pain, such as with heat, ice or relaxation.    Tylenol/Acetaminophen Consumption  To help encourage the safe use of acetaminophen, the makers of TYLENOL  have lowered the maximum daily dose for single-ingredient Extra Strength TYLENOL  (acetaminophen) products sold in the [place] from 8 pills per day (4,000 mg) to 6 pills per day (3,000 mg). The dosing interval has also changed from 2 pills every 4-6 hours to 2 pills every 6 hours.    If " "you feel your pain relief is insufficient, you may take Tylenol/Acetaminophen in addition to your narcotic pain medication.     Be careful not to exceed 3,000 mg of Tylenol/Acetaminophen in a 24 hour period from all sources.    If you are taking extra strength Tylenol/acetaminophen (500 mg), the maximum dose is 6 tablets in 24 hours.    If you are taking regular strength acetaminophen (325 mg), the maximum dose is 9 tablets in 24 hours.    Call a doctor for any of the followin. Signs of infection (fever, growing tenderness at the surgery site, a large amount of drainage or bleeding, severe pain, foul-smelling drainage, redness, swelling).  2. It has been over 8 to 10 hours since surgery and you are still not able to urinate (pass water).  3. Headache for over 24 hours.  4. Numbness, tingling or weakness the day after surgery (if you had spinal anesthesia).  Your doctor is:  Dr. Rosa Joe, Orthopaedics: [phone]                    Or dial [phone] and ask for the resident on call for:  Orthopaedics  For emergency care, call the:  [facility] Emergency Department: [phone] (TTY for hearing impaired: [phone])          Information about liposomal bupivacaine (Exparel)    What is Liposomal Bupivacaine?    Liposomal Bupivacaine is a numbing medication that can help you manage your pain after surgery.  This medication is similar to \"novacaine,\" which is often used by the dentist.  Liposomal bupivacaine is released slowly and can help control pain for up to 72 hours.    What is the purpose of Liposomal Bupivacaine?    To manage your pain after surgery    To help you sleep better, take deep breaths, walk more comfortable, and feel up to visiting with others    How is the procedure done?    Liposomal bupivacaine is a medication given by an injection.    It is usually given right before your surgery.  If this is the case, you will be awake or sedated, but you should experience minimal pain during the " procedure.    For some people, the injection may be given at the very end of your surgery.  It all depends on the type of surgery and your situation.    The procedure usually takes about 5-15 minutes.  An ultrasound machine will help the anesthesiologist insert it in the right place or the surgeon will inject it under direct vision.     A needle is used to place the numbing medication under your skin.  It provides pain relief by numbing the tissue in the area where your surgeon will make the incision.    What can I expect?    You may experience numbness, tingling, or a feeling of heaviness around the area that was injected.    If you experience any of the follow symptoms IMMEDIATELY CALL THE REGIONAL ANESTHESIA PAIN SERVICE:    Numbness or tingling occurs in areas other than around the injection site    Blurry vision    Ringing in your ears    A metallic taste in your mouth    PAGE: Dial [phone].  When prompted, enter the following 4-digit ID number:  [id number].  You will be prompted to enter your phone number; and then enter the # sign.  The clinician on call will call you back.    OR    CALL: Dial [phone].  Let the hospital  know that you are having a problem with a nerve block and that you would like to speak to the regional anesthesia pain service right away.    You should not receive any other type of numbing medication within 4 days after receiving liposomal bupivacaine unless your anesthesiologist approves.      Post Operative Instructions: Regional Anesthetic for Upper Extremity with Liposomal Bupivacaine  General Information:   Regional anesthesia is when local anesthetic or  numbing  medication is injected around the nerves to anesthetize or  numb  the area supplied by that set of nerves. It is a type of analgesia used to control pain and decreases the need for narcotics following surgery.    Types of Regional Blocks:  Interscalene: A block injected into the neck on the operative shoulder/arm  of a patient having shoulder surgery  Supraclavicular: A block injected near the clavicle on the operative shoulder of a patient having elbow, forearm, or hand surgery    Procedure:  The type of anesthesia your doctor used to numb your shoulder or arm will usually not start to wear off for 24-48 hours, but may last as long as 72 hours. You should be careful during that period, since it is possible to injure your arm without being aware of the injury. While your arm is numb, you should:    Avoid striking or bumping your arm    Avoid extreme hot or cold    Diet:  There are no restrictions on your diet. You should drink plenty of fluids.     Discomfort:  You will have a tingling and prickly sensation in your arm as the feeling begins to return. You can also expect some discomfort. The amount of discomfort is unpredictable, but if you have more pain than can be controlled with pain medication you should notify your physician.     Pain Medications:  Begin taking your oral pain pills before bedtime and during the night to avoid a sudden onset of pain as part of the block wears off.  Do not engage in drinking, driving, or hazardous occupations while taking pain medication.     Stitches:   You may have stitches or special skin closures. You doctor will inform you when to return to the office to have them removed.     Activity:  On the day of surgery you should try to stay in bed with your hand elevated on pillows. You may resume your normal activity after that, wearing a sling for comfort. Contact your physician if you have any of the problems:     Continued numbness or tingling in the arm or hand after 72 hours    Swelling of the fingers or fingers that are cold to the touch    Excessive bleeding or drainage    Severe pain             Pending Results     No orders found from 5/13/2018 to 5/16/2018.            Admission Information     Date & Time Provider Department Dept. Phone    5/15/2018 Rosa Joe MD M  "[facility] [phone]      Your Vitals Were     Blood Pressure Temperature Respirations Height Weight Pulse Oximetry    120/77 98.1  F (36.7  C) (Oral) 14 1.803 m (5' 10.98\") 99.8 kg (220 lb) 96%    BMI (Body Mass Index)                   30.7 kg/m2           [facility] Information     [employer] gives you secure access to your electronic health record. If you see a primary care provider, you can also send messages to your care team and make appointments. If you have questions, please call your primary care clinic.  If you do not have a primary care provider, please call [phone] and they will assist you.      [employer] is an electronic gateway that provides easy, online access to your medical records. With [employer], you can request a clinic appointment, read your test results, renew a prescription or communicate with your care team.     To access your existing account, please contact your [facility] or call [phone] for assistance.        Care EveryWhere ID     This is your Care EveryWhere ID. This could be used by other organizations to access your [place] medical records  [id number]        Equal Access to Services     PATRICIA MARQUEZ : Hadii saleem Barroso, graeme alfred, tomeka vivas, zac gilbert. So [facility] [phone].    ATENCIÓN: Si habla español, tiene a grullon disposición servicios gratuitos de asistencia lingüística. Llame al [phone].    We comply with applicable federal civil rights laws and [place] laws. We do not discriminate on the basis of race, color, national origin, age, disability, sex, sexual orientation, or gender identity.               Review of your medicines      START taking        Dose / Directions    oxyCODONE IR 5 MG tablet   Commonly known as:  ROXICODONE   Used for:  Shoulder pain, unspecified chronicity, unspecified laterality        Dose:  5-10 mg   Take 1-2 tablets (5-10 " mg) by mouth every 3 hours as needed for pain or other (Moderate to Severe)   Quantity:  40 tablet   Refills:  0       senna-docusate 8.6-50 MG per tablet   Commonly known as:  SENOKOT-S;PERICOLACE   Used for:  Shoulder pain, unspecified chronicity, unspecified laterality        Dose:  1-2 tablet   Take 1-2 tablets by mouth 2 times daily Take while on oral narcotics to prevent or treat constipation.   Quantity:  30 tablet   Refills:  0         CONTINUE these medicines which have NOT CHANGED        Dose / Directions    atenolol 50 MG tablet   Commonly known as:  TENORMIN   Used for:  Benign essential hypertension, Migraine without aura and without status migrainosus, not intractable        Dose:  50 mg   Take 1 tablet (50 mg) by mouth daily   Quantity:  90 tablet   Refills:  3       atorvastatin 10 MG tablet   Commonly known as:  LIPITOR   Used for:  Hyperlipidemia LDL goal <130        Dose:  20 mg   Take 2 tablets (20 mg) by mouth daily   Quantity:  90 tablet   Refills:  3       hydrochlorothiazide 25 MG tablet   Commonly known as:  HYDRODIURIL   Used for:  Benign essential hypertension        Dose:  25 mg   Take 1 tablet (25 mg) by mouth daily   Quantity:  90 tablet   Refills:  0       ibuprofen 600 MG tablet   Commonly known as:  ADVIL/MOTRIN        Dose:  600 mg   Take 1 tablet (600 mg) by mouth every 8 hours as needed for moderate pain   Quantity:  30 tablet   Refills:  0       losartan 25 MG tablet   Commonly known as:  COZAAR   Used for:  Benign essential hypertension        Dose:  25 mg   Take 1 tablet (25 mg) by mouth daily   Quantity:  30 tablet   Refills:  0       NEXIUM PO        Take by mouth daily   Refills:  0       rizatriptan 5 MG tablet   Commonly known as:  MAXALT   Used for:  Migraine without aura and without status migrainosus, not intractable        Dose:  5 mg   Take 1 tablet (5 mg) by mouth at onset of headache for migraine repeat after 2 hr if no relief; maximum: 30 mg/24 hours   Quantity:  4  "tablet   Refills:  0         STOP taking     HYDROcodone-acetaminophen 5-325 MG per tablet   Commonly known as:  NORCO                Where to get your medicines      Some of these will need a paper prescription and others can be bought over the counter. Ask your nurse if you have questions.     Bring a paper prescription for each of these medications     oxyCODONE IR 5 MG tablet    senna-docusate 8.6-50 MG per tablet                Protect others around you: Learn how to safely use, store and throw away your medicines at www.disposemymeds.org.        Information about your nerve block     Today you received a block to numb the nerves near your surgery site.    This is a block using local anesthetic or \"numbing\" medication injected around the nerves to anesthetize or \"numb\" the area supplied by those nerves. This block is injected into the muscle layer near your surgical site. The type of anesthesia (Exparel) your anesthesia team used to numb your abdomen may give you relief for up to 72 hours.     Diet: There are no diet restrictions, but you should drink plenty of fluids, unless you are on a fluid-restricted diet.     Activity: If your surgical site is an arm or leg you should be careful with your affected limb, since it is possible to injure your limb without being aware of it due to the numbing. Until full feeling returns, you should guard against bumping or hitting your limb, and avoid extreme hot or cold temperatures on the skin.    Pain Medication: As the block wears off, the feeling will return as a tingling or prickly sensation near your surgical site. You will experience more discomfort from your incisions as the feeling returns. You may want to take a pain pill (a narcotic or Tylenol if this was prescribed by your surgeon) when you start to experience mild pain, before the pain becomes more severe. If your pain medications do not control your pain, you should notify your surgeon. If you are taking " narcotics for pain management, do not drink alcohol, drive a car, or perform hazardous activities.  If you have questions or concerns you may call your surgeon at the number provided with your discharge instructions.     Call your surgeon if you experience blurry vision, ringing in the ears or metallic taste in your mouth.         Information about OPIOIDS     PRESCRIPTION OPIOIDS: WHAT YOU NEED TO KNOW   You have a prescription for an opioid (narcotic) pain medicine. Opioids can cause addiction. If you have a history of chemical dependency of any type, you are at a higher risk of becoming addicted to opioids. Only take this medicine after all other options have been tried. Take it for as short a time and as few doses as possible.     Do not:    Drive. If you drive while taking these medicines, you could be arrested for driving under the influence (DUI).    Operate heavy machinery    Do any other dangerous activities while taking these medicines.     Drink any alcohol while taking these medicines.      Take with any other medicines that contain acetaminophen. Read all labels carefully. Look for the word  acetaminophen  or  Tylenol.  Ask your pharmacist if you have questions or are unsure.    Store your pills in a secure place, locked if possible. We will not replace any lost or stolen medicine. If you don t finish your medicine, please throw away (dispose) as directed by your pharmacist. The [place] [employer]ution [employer] has more information about safe disposal: https://www.pca.[facility].[place].us/living-green/managing-unwanted-medications    All opioids tend to cause constipation. Drink plenty of water and eat foods that have a lot of fiber, such as fruits, vegetables, prune juice, apple juice and high-fiber cereal. Take a laxative (Miralax, milk of magnesia, Colace, Senna) if you don t move your bowels at least every other day.              Medication List: This is a list of all your medications and when to take  them. Check marks below indicate your daily home schedule. Keep this list as a reference.      Medications           Morning Afternoon Evening Bedtime As Needed    atenolol 50 MG tablet   Commonly known as:  TENORMIN   Take 1 tablet (50 mg) by mouth daily                                atorvastatin 10 MG tablet   Commonly known as:  LIPITOR   Take 2 tablets (20 mg) by mouth daily                                hydrochlorothiazide 25 MG tablet   Commonly known as:  HYDRODIURIL   Take 1 tablet (25 mg) by mouth daily                                ibuprofen 600 MG tablet   Commonly known as:  ADVIL/MOTRIN   Take 1 tablet (600 mg) by mouth every 8 hours as needed for moderate pain                                losartan 25 MG tablet   Commonly known as:  COZAAR   Take 1 tablet (25 mg) by mouth daily                                NEXIUM PO   Take by mouth daily                                oxyCODONE IR 5 MG tablet   Commonly known as:  ROXICODONE   Take 1-2 tablets (5-10 mg) by mouth every 3 hours as needed for pain or other (Moderate to Severe)                                rizatriptan 5 MG tablet   Commonly known as:  MAXALT   Take 1 tablet (5 mg) by mouth at onset of headache for migraine repeat after 2 hr if no relief; maximum: 30 mg/24 hours                                senna-docusate 8.6-50 MG per tablet   Commonly known as:  SENOKOT-S;PERICOLACE   Take 1-2 tablets by mouth 2 times daily Take while on oral narcotics to prevent or treat constipation.

## 2018-05-15 NOTE — OP NOTE
DATE OF PROCEDURE: 05/15/2018    PREOPERATIVE DIAGNOSES:   1. Left recurrent shoulder instability.   2. Left shoulder large bony Bankart lesion    POSTOPERATIVE DIAGNOSES:   1. Left recurrent shoulder instability.   2. Left shoulder large bony Bankart lesion    PROCEDURES:   1. Left open bony bankart repair/glenoid open reduction internal fixation.       STAFF SURGEON: Rosa Joe MD   ASSISTANTS: Juliocesar Sullivan PA-C; Ian Shen MD, PGY1  The assistance of Juliocesar Sullivan was needed for assistance with positioning and retractor management due to the patient's body habitus as well as the absence of a suitably qualified available orthopaedic resident to assist.    ANESTHESIA: General anesthesia with supplementary interscalene block.   IMPLANTS: Arthrex 3.0 SutureTakx2 and 2.9 Pushlock x2  ESTIMATED BLOOD LOSS: 25 mL.     BRIEF PATIENT HISTORY: Mr. Anglin is a 63-year-old sustained his first dislocation about 6 weeks ago. His shoulder was reduced but unfortunately he had a recurrent dislocation and had a large bony bankart lesion. He has continued to feel unstable. Given the patient's ongoing instability and large bony bankart, we discussed the option of an open bankart repair. We discussed the risk of the loss of external rotation. We discussed the risks of ongoing instability as well as the risks of anesthesia. We discussed expected postoperative restrictions. The patient had the opportunity for his questions to be answered and he wished to proceed to surgery as outlined above.     PROCEDURE: The patient was identified in the preoperative area and the correct left shoulder was marked for surgery. The patient was provided an interscalene block by our anesthesia colleagues and was taken to the operative room where he was surrendered to general endotracheal anesthesia and positioned in beach chair with all bony prominences well padded. His head was placed in a head saxena with the neck in neutral position. The  left upper extremity was prepped and draped in the usual sterile fashion. A timeout was held in accordance with hospital policy, confirming correct patient, side, site, procedure and administration of IV antibiotics prior to incision. I made an incision in line with the axillary crease extending up towards the coracoid. I opened the deltopectoral interval, taking the cephalic vein laterally. I palpated the axillary nerve medially and laterally performing a tug test confirming location and continuity of the axillary nerve. This was performed multiple times throughout the procedure including once prior to closure. I released the subscapularis transtendinously and then released the capsule off of the subscapularis. I made a T incision in the capsule. I placed a Fukuda within the joint and then visualized the glenoid and the labral tear. The large displaced bony bankart lesion was apparent. This was displaced medially and inferior. I then fully mobilized the bony fragment, freeing it from the scapular neck. I then placed 2 3.0 SutureTacks in the fracture bed. I passed the sutures in mattress fashion through the bone fragment. I tied the sutures down and then brought them up to 2.9 Pushlocks at the articular surface along the fracture line. This adequately reduced the fracture fragment. I then repaired the capsule laterally performing a capsulorrhaphy with #2 FiberWire. I then repaired the subscapularis using #2 FiberWire. The wound was copiously irrigated and closed in layered fashion with Monocryl. Steri-Strips and soft dressings were applied. The arm was placed into an abduction sling. The patient was extubated and transported to recovery in stable condition. There were no apparent intraoperative complications.     POSTOPERATIVE PLAN:   1. The patient will be discharged to home when he meets same day discharge criteria.   2. The patient will have active hand, wrist and elbow range of motion only.   3. The patient will  start gentle pendulums and PT for passive FE to 90 and ER to neutral at 2 weeks, progressing to full passive range of motion and beginning to wean out of the sling at 6 weeks. He will progress to active range of motion thereafter.

## 2018-05-15 NOTE — PROGRESS NOTES
[facility]  [facility]  [address]  5th Floor  [place] [address]  [phone]  [phone]    5/15/2018    Karan Anglin  [address]   [place] [address]  [phone] (home)     :  1955    To Whom it May Concern:    Karan Anglin must be excused from work 5/15/2018 through 2018 as he needs this time to recover from surgery.    Please contact me for any questions or concerns.    Sincerely,      TALIA Barr Dr.  [phone]

## 2018-05-15 NOTE — BRIEF OP NOTE
[facility]    Brief Operative Note    Pre-operative diagnosis: Left Shoulder Large Bony Bankart  Post-operative diagnosis Left Shoulder Large Bony Bankart  Procedure: Procedure(s):  Left Glenoid Open Reduction Internal Fixation - Wound Class: I-Clean  Surgeon: Surgeon(s) and Role:     * Rosa Joe MD - Primary     * Juliocesar Sullivan PA-C - Assisting  Anesthesia: Combined General with Interscalene Block   Estimated blood loss: 30cc  Drains: None  Specimens: * No specimens in log *  Findings:   None.  Complications: None.    PLAN:  - Discharge home in stable condition  - Oxycodone for pain  - Shower POD3, Dressing change POD5  - NWB with LUE in sling at all times  - Follow up within 2 weeks

## 2018-05-15 NOTE — DISCHARGE INSTRUCTIONS
"[facility]  Home Care Following Anesthesia  For 24 hours after surgery:  1. Get plenty of rest.  A responsible adult must stay with you for at least 24 hours after you leave the surgery center.  2. Do not drive or use heavy equipment.  If you have weakness or tingling, don't drive or use heavy equipment until this feeling goes away.   3. Do not drink alcohol.   4. Avoid strenuous or risky activities.  Ask for help when climbing stairs.  5. You may feel lightheaded.  IF so, sit for a few minutes before standing.  Have someone help you get up.   6. If you have nausea (feel sick to your stomach): Drink only clear liquids such as apple juice, ginger ale, broth or 7-Up.  Rest may also help.  Be sure to drink enough fluids.  Move to a regular diet as you feel able.   7. You may have a slight fever.  Call the doctor if your fever is over 100 F (37.7 C) (taken under the tongue) or lasts longer than 24 hours.  8. You may have a dry mouth, a sore throat, muscle aches or trouble sleeping. These should go away after 24 hours.  9. Do not make important or legal decisions.        Today you received an Exparel block to numb the nerves near your surgery site.  This is a block using local anesthetic or \"numbing\" medication injected around the nerves to anesthetize or \"numb\" the area supplied by those nerves.  This block is injected into the muscle layer near your surgical site.  This medication may numb the location where you had surgery up to 72 hours.  If your surgical site is an arm or leg you should be careful with your affected limb, since it is possible to injure your limb without being aware of it due to the numbing.  Until full feeling returns, you should guard against bumping or hitting your limb, and avoid extreme hot or cold temperatures on the skin.  As the block wears off, the feeling will return as a tingling or prickly sensation near your surgical site.  You will experince more " discomfort from your incision as the feeling returns.  You may want to take a pain pill (a narcotic or Tylenol if this was prescribed by your surgeon) when you start to experience mild pain before the pain beomes more severe.  If your pain medications do not control your pain, you should notify your surgeon.    Tips for taking pain medications  To get the best pain relief possible, remember these points:    Take pain medications as directed, before pain becomes severe.    Pain medication can upset your stomach: taking it with food may help.    Constipation is a common side effect of pain medication. Drink plenty of  fluids.    Eat foods high in fiber. Take a stool softener if recommended by your doctor or pharmacist.    Do not drink alcohol, drive or operate machinery while taking pain medications.    Ask about other ways to control pain, such as with heat, ice or relaxation.    Tylenol/Acetaminophen Consumption  To help encourage the safe use of acetaminophen, the makers of TYLENOL  have lowered the maximum daily dose for single-ingredient Extra Strength TYLENOL  (acetaminophen) products sold in the [place] from 8 pills per day (4,000 mg) to 6 pills per day (3,000 mg). The dosing interval has also changed from 2 pills every 4-6 hours to 2 pills every 6 hours.    If you feel your pain relief is insufficient, you may take Tylenol/Acetaminophen in addition to your narcotic pain medication.     Be careful not to exceed 3,000 mg of Tylenol/Acetaminophen in a 24 hour period from all sources.    If you are taking extra strength Tylenol/acetaminophen (500 mg), the maximum dose is 6 tablets in 24 hours.    If you are taking regular strength acetaminophen (325 mg), the maximum dose is 9 tablets in 24 hours.    Call a doctor for any of the followin. Signs of infection (fever, growing tenderness at the surgery site, a large amount of drainage or bleeding, severe pain, foul-smelling drainage, redness, swelling).  2. It has  "been over 8 to 10 hours since surgery and you are still not able to urinate (pass water).  3. Headache for over 24 hours.  4. Numbness, tingling or weakness the day after surgery (if you had spinal anesthesia).  Your doctor is:  Dr. Rosa Joe, Orthopaedics: [phone]                    Or dial [phone] and ask for the resident on call for:  Orthopaedics  For emergency care, call the:  [facility] [phone] (TTY for hearing impaired: [phone])          Information about liposomal bupivacaine (Exparel)    What is Liposomal Bupivacaine?    Liposomal Bupivacaine is a numbing medication that can help you manage your pain after surgery.  This medication is similar to \"novacaine,\" which is often used by the dentist.  Liposomal bupivacaine is released slowly and can help control pain for up to 72 hours.    What is the purpose of Liposomal Bupivacaine?    To manage your pain after surgery    To help you sleep better, take deep breaths, walk more comfortable, and feel up to visiting with others    How is the procedure done?    Liposomal bupivacaine is a medication given by an injection.    It is usually given right before your surgery.  If this is the case, you will be awake or sedated, but you should experience minimal pain during the procedure.    For some people, the injection may be given at the very end of your surgery.  It all depends on the type of surgery and your situation.    The procedure usually takes about 5-15 minutes.  An ultrasound machine will help the anesthesiologist insert it in the right place or the surgeon will inject it under direct vision.     A needle is used to place the numbing medication under your skin.  It provides pain relief by numbing the tissue in the area where your surgeon will make the incision.    What can I expect?    You may experience numbness, tingling, or a feeling of heaviness around the area that was injected.    If you experience any of the " follow symptoms IMMEDIATELY CALL THE REGIONAL ANESTHESIA PAIN SERVICE:    Numbness or tingling occurs in areas other than around the injection site    Blurry vision    Ringing in your ears    A metallic taste in your mouth    PAGE: Dial [phone].  When prompted, enter the following 4-digit ID number:  [id number].  You will be prompted to enter your phone number; and then enter the # sign.  The clinician on call will call you back.    OR    CALL: Dial [phone].  Let the hospital  know that you are having a problem with a nerve block and that you would like to speak to the regional anesthesia pain service right away.    You should not receive any other type of numbing medication within 4 days after receiving liposomal bupivacaine unless your anesthesiologist approves.      Post Operative Instructions: Regional Anesthetic for Upper Extremity with Liposomal Bupivacaine  General Information:   Regional anesthesia is when local anesthetic or  numbing  medication is injected around the nerves to anesthetize or  numb  the area supplied by that set of nerves. It is a type of analgesia used to control pain and decreases the need for narcotics following surgery.    Types of Regional Blocks:  Interscalene: A block injected into the neck on the operative shoulder/arm of a patient having shoulder surgery  Supraclavicular: A block injected near the clavicle on the operative shoulder of a patient having elbow, forearm, or hand surgery    Procedure:  The type of anesthesia your doctor used to numb your shoulder or arm will usually not start to wear off for 24-48 hours, but may last as long as 72 hours. You should be careful during that period, since it is possible to injure your arm without being aware of the injury. While your arm is numb, you should:    Avoid striking or bumping your arm    Avoid extreme hot or cold    Diet:  There are no restrictions on your diet. You should drink plenty of fluids.     Discomfort:  You  will have a tingling and prickly sensation in your arm as the feeling begins to return. You can also expect some discomfort. The amount of discomfort is unpredictable, but if you have more pain than can be controlled with pain medication you should notify your physician.     Pain Medications:  Begin taking your oral pain pills before bedtime and during the night to avoid a sudden onset of pain as part of the block wears off.  Do not engage in drinking, driving, or hazardous occupations while taking pain medication.     Stitches:   You may have stitches or special skin closures. You doctor will inform you when to return to the office to have them removed.     Activity:  On the day of surgery you should try to stay in bed with your hand elevated on pillows. You may resume your normal activity after that, wearing a sling for comfort. Contact your physician if you have any of the problems:     Continued numbness or tingling in the arm or hand after 72 hours    Swelling of the fingers or fingers that are cold to the touch    Excessive bleeding or drainage    Severe pain

## 2018-05-15 NOTE — ANESTHESIA PREPROCEDURE EVALUATION
Anesthesia Evaluation     . Pt has not had prior anesthetic            ROS/MED HX    ENT/Pulmonary:  - neg pulmonary ROS     Neurologic:  - neg neurologic ROS     Cardiovascular:     (+) hypertension----. : . . . :. .       METS/Exercise Tolerance:  >4 METS   Hematologic:  - neg hematologic  ROS       Musculoskeletal:   (+) , , other musculoskeletal- left shoulder pain      GI/Hepatic:  - neg GI/hepatic ROS       Renal/Genitourinary:  - ROS Renal section negative       Endo:  - neg endo ROS       Psychiatric:  - neg psychiatric ROS       Infectious Disease:  - neg infectious disease ROS       Malignancy:      - no malignancy   Other:    - neg other ROS                 Physical Exam  Normal systems: dental    Airway   Mallampati: I  TM distance: >3 FB  Neck ROM: full    Dental     Cardiovascular   Rhythm and rate: regular and normal      Pulmonary    breath sounds clear to auscultation        Procedure: Procedure(s):  Left Glenoid Open Reduction Internal Fixation  ([facility]est[facility]) - Wound Class: I-Clean    HPI: Karan Anglin is a 63 year old male who is presenting for above stated procedure.    PMHx/PSHx/ROS:  Past Medical History:   Diagnosis Date     Hyperlipidemia 2015     Hypertension 2014     Migraine     Nausea and vomiting     Peptic ulcer hemorrhage 2013       Past Surgical History:   Procedure Laterality Date     GI SURGERY  2013    peptic ulcers stapled       ROS as stated above    Soc Hx:   Social History   Substance Use Topics     Smoking status: Never Smoker     Smokeless tobacco: Never Used     Alcohol use Yes      Comment: 5 glasses wine/wk       Allergies: No Known Allergies    Meds:     (Not in a hospital admission)    Current Outpatient Prescriptions   Medication Sig Dispense Refill     atenolol (TENORMIN) 50 MG tablet Take 1 tablet (50 mg) by mouth daily 90 tablet 3     atorvastatin (LIPITOR) 10 MG tablet Take 2 tablets (20 mg) by mouth daily 90 tablet 3     Esomeprazole Magnesium (NEXIUM PO)  Take by mouth daily       hydrochlorothiazide (HYDRODIURIL) 25 MG tablet Take 1 tablet (25 mg) by mouth daily 90 tablet 0     ibuprofen (ADVIL/MOTRIN) 600 MG tablet Take 1 tablet (600 mg) by mouth every 8 hours as needed for moderate pain 30 tablet 0     losartan (COZAAR) 25 MG tablet Take 1 tablet (25 mg) by mouth daily 30 tablet 0     oxyCODONE IR (ROXICODONE) 5 MG tablet Take 1-2 tablets (5-10 mg) by mouth every 3 hours as needed for pain or other (Moderate to Severe) 40 tablet 0     rizatriptan (MAXALT) 5 MG tablet Take 1 tablet (5 mg) by mouth at onset of headache for migraine repeat after 2 hr if no relief; maximum: 30 mg/24 hours 4 tablet 0     senna-docusate (SENOKOT-S;PERICOLACE) 8.6-50 MG per tablet Take 1-2 tablets by mouth 2 times daily Take while on oral narcotics to prevent or treat constipation. 30 tablet 0       Physical Exam:  VS: Temp:  [36.7  C (98.1  F)] 36.7  C (98.1  F)  Resp:  [14-16] 14  BP: (120-141)/(77-90) 120/77  SpO2:  [96 %] 96 %   96%, Weight   Wt Readings from Last 2 Encounters:   05/15/18 99.8 kg (220 lb)   04/27/18 100.2 kg (220 lb 14.4 oz)       Labs:    BMP:  Recent Labs   Lab Test  09/20/17   1427   NA  137   POTASSIUM  4.0   CHLORIDE  100   CO2  30   BUN  20   CR  1.21   GLC  98   CHAR  9.8     LFTs:   No results for input(s): PROTTOTAL, ALBUMIN, BILITOTAL, ALKPHOS, AST, ALT, BILIDIRECT in the last 09411 hours.  CBC:   No results for input(s): WBC, RBC, HGB, HCT, MCV, MCH, MCHC, RDW, PLT in the last 54521 hours.  Coags:  No results for input(s): INR, PTT, FIBR in the last 53639 hours.                Anesthesia Plan      History & Physical Review  History and physical reviewed and following examination; no interval change.    ASA Status:  2 .    NPO Status:  > 6 hours    Plan for MAC, Periph. Nerve Block for postop pain and Peripheral Nerve Block with Intravenous and Propofol induction. Maintenance will be TIVA.    PONV prophylaxis:  Ondansetron (or other 5HT-3) and Dexamethasone  or Solumedrol       Postoperative Care  Postoperative pain management:  Oral pain medications, Peripheral nerve block (Single Shot) and Multi-modal analgesia.      Consents  Anesthetic plan, risks, benefits and alternatives discussed with:  Patient..        Van Anderson MD                  .

## 2018-05-15 NOTE — PROGRESS NOTES
S: Pt seen at [facility] pre op. O: I see the EPIC order for Ultra sling 4. A: I fit the Ultra sling 4 for his LT arm. Instructions were given and seemed to be understood. P: VIKRAM PRN. G: The goal is to support his LT UE.

## 2018-05-15 NOTE — ANESTHESIA CARE TRANSFER NOTE
Patient: Karan Anglin    Procedure(s):  Left Glenoid Open Reduction Internal Fixation - Wound Class: I-Clean    Diagnosis: Left Shoulder Large Bony Bankart  Diagnosis Additional Information: No value filed.    Anesthesia Type:   MAC, Periph. Nerve Block for postop pain, Peripheral Nerve Block     Note:      Comments: Arrive PACU, Stable, Airway Intact  92/68, 71,14,94,97.7  All questions answered.      Vitals: (Last set prior to Anesthesia Care Transfer)    CRNA VITALS  5/15/2018 1343 - 5/15/2018 1422      5/15/2018             EKG: NSR                Electronically Signed By: JEFFERSON Mckenzie CRNA  May 15, 2018  2:22 PM

## 2018-05-16 ENCOUNTER — TELEPHONE (OUTPATIENT)
Dept: ORTHOPEDICS | Facility: CLINIC | Age: 63
End: 2018-05-16

## 2018-05-16 NOTE — TELEPHONE ENCOUNTER
Karan Anglin comes into clinic today for Post-operative instructions/education:  Ultrasling IV.    Patient and his sister came to clinic for evaluation of sling placement.  Patient's arm was loosely supported by the sling, but in a lower placement than is typical post-operatively.  Patient's sling was adjusted to be well-supporting and to allow arm to be in proper position.  Patient and his sister were provided with written sling instructions and reminded to use clips only when sling needs to be removed for hygiene.  Due to the the need for multiple adjustments, EBER Tao was brought into the room for review of placement.      This service provided today was under the supervising provider Dr. Joe, who was available if needed.    Deonna Moya LPN    Hand, wrist, and elbow exercises were reviewed with the patient and his sister.  Patient's shirt was changed and his sister was given guidance in reapplying the sling.  Bother verbalized understanding of the above.  They have the clinic phone number and were encouraged to call for any further concerns.

## 2018-05-16 NOTE — TELEPHONE ENCOUNTER
Patient's sister was contacted regarding the concern with the Ultrasling.  One of the velcro straps came apart and will not stick back to the sling.  Patient's sister requested time in clinic for the sling to be reviewed, and was advised to come in this morning to the clinic and ask for this LPN who will look at the sling and contact orthotics if needed.    Deonna Moya, KRYSTIAN

## 2018-05-16 NOTE — TELEPHONE ENCOUNTER
Health Call Center    Phone Message    May a detailed message be left on voicemail: no    Reason for Call: Other: Pt's sister Marisa requesting call back. She stated that the pt had surgery with Dr. Joe yesterday and his sling broke     Action Taken: Message routed to:  [facility] ([facility] ortho clinic

## 2018-05-17 ENCOUNTER — TELEPHONE (OUTPATIENT)
Dept: ORTHOPEDICS | Facility: CLINIC | Age: 63
End: 2018-05-17

## 2018-05-17 NOTE — TELEPHONE ENCOUNTER
Health Call Center    Phone Message    May a detailed message be left on voicemail: no    Reason for Call: Other: Pt requesting call back from Fatuma TILLMAN Pt is not sure if he is supposed to start PT on 5/30 or not. He does not have any orders for that as of right now     Action Taken: Message routed to:  [facility] ortho clinic   Am  Voicemail left for patient :      5/17/18  8:40am  Voicemail left for patient to do Hand, wrist and elbow ROM only until after clinic visit 5/30/18.

## 2018-05-17 NOTE — PROGRESS NOTES
CHIEF CONCERN:  Left shoulder instability    HISTORY OF PRESENT ILLNESS: Mr. Anglin is a 63-year-old left-hand dominant gentleman who sustained a fall on the ice on 3/27/2018.  He was seen in the emergency department where they relocated his shoulder.  He was given a sling.  He had a slight amount of motion of the arm when he was trying to put on his shirt on 4/4/2018 and he redislocated.  Since that time he has been seen by the primary care sports medicine physicians.  An MRI has been obtained.  Patient states that the shoulder continues to feel unstable and restricts his even limited activity.  He notes no numbness or tingling in the hand.  He had no other injuries.    Past Medical History:   Diagnosis Date     Hyperlipidemia 2015     Hypertension 2014     Migraine     Nausea and vomiting     Peptic ulcer hemorrhage 2013       Past Surgical History:   Procedure Laterality Date     GI SURGERY  2013    peptic ulcers stapled     OPEN REDUCTION INTERNAL FIXATION HUMERUS PROXIMAL Left 5/15/2018    Procedure: OPEN REDUCTION INTERNAL FIXATION HUMERUS PROXIMAL;  Left Glenoid Open Reduction Internal Fixation;  Surgeon: Rosa Joe MD;  Location:  OR       Current Outpatient Prescriptions   Medication Sig Dispense Refill     atenolol (TENORMIN) 50 MG tablet Take 1 tablet (50 mg) by mouth daily 90 tablet 3     atorvastatin (LIPITOR) 10 MG tablet Take 2 tablets (20 mg) by mouth daily 90 tablet 3     hydrochlorothiazide (HYDRODIURIL) 25 MG tablet Take 1 tablet (25 mg) by mouth daily 90 tablet 0     ibuprofen (ADVIL/MOTRIN) 600 MG tablet Take 1 tablet (600 mg) by mouth every 8 hours as needed for moderate pain 30 tablet 0     Esomeprazole Magnesium (NEXIUM PO) Take by mouth daily       losartan (COZAAR) 25 MG tablet Take 1 tablet (25 mg) by mouth daily 30 tablet 0     oxyCODONE IR (ROXICODONE) 5 MG tablet Take 1-2 tablets (5-10 mg) by mouth every 3 hours as needed for pain or other (Moderate to Severe) 40 tablet 0      rizatriptan (MAXALT) 5 MG tablet Take 1 tablet (5 mg) by mouth at onset of headache for migraine repeat after 2 hr if no relief; maximum: 30 mg/24 hours 4 tablet 0     senna-docusate (SENOKOT-S;PERICOLACE) 8.6-50 MG per tablet Take 1-2 tablets by mouth 2 times daily Take while on oral narcotics to prevent or treat constipation. 30 tablet 0        No Known Allergies    SOCIAL HISTORY:    Social History     Social History     Marital status: Single     Spouse name: N/A     Number of children: N/A     Years of education: N/A     Occupational History     Not on file.     Social History Main Topics     Smoking status: Never Smoker     Smokeless tobacco: Never Used     Alcohol use Yes      Comment: 5 glasses wine/wk     Drug use: No     Sexual activity: Not Currently     Partners: Male     Other Topics Concern     Parent/Sibling W/ Cabg, Mi Or Angioplasty Before 65f 55m? No     Social History Narrative       FAMILY HISTORY: Reviewed in EMR      REVIEW OF SYSTEMS: Positive for that noted in past medical history and history of present illness and otherwise reviewed in EMR     PHYSICAL EXAM:    Adult male in no acute distress  Respirations even and unlabored  Focused upper extremity exam: Skin intact. No erythema. Sensation intact all dermatomes into the hand to light touch. EPL, FPL, and Intrinsics intact. Right shoulder active motion is FE to 180, ER at side to 75, and IR to T12. Left shoulder active motion is FE to 100 with limitation due to patient's sense of instability, ER at the side to 50, and IR to Gt limited by pain.      IMAGING:  Left shoulder injury radiographs demonstrated an anterior humeral dislocation.  Postreduction radiographs demonstrate a concentric reduction.  Left shoulder MRI dated is reviewed and I agree with the radiology read below    ASSESSMENT:    1.  Left shoulder recurrent instability  2.  Left large bony Bankart lesion  3.  Left small Hill-Sachs    PLAN:  I reviewed the imaging and history  with the patient.  I discussed that for many patients you are a large bony Bankart lesion with recurrent instability is an indication for surgical fixation.  I would give some pause as the patient is of an older age than most patients with bony instability of the shoulder.  As he has continued to feel unstable even despite the passage of 1 month I think he would be best served by fixation of this bony fragment.  We discussed the risks of surgery including that he could be no better even worse if he became stiff, infected, or head injury to nerves or arteries which power the arm or hand.  He had the opportunity for his questions to be answered.  He  father you would he want to would like to proceed with surgical intervention at this time and we will assist him in that.    Rosa Joe MD    Answers for HPI/ROS submitted by the patient on 4/27/2018   General Symptoms: No  Skin Symptoms: No  HENT Symptoms: No  EYE SYMPTOMS: No  HEART SYMPTOMS: No  LUNG SYMPTOMS: No  INTESTINAL SYMPTOMS: No  URINARY SYMPTOMS: No  REPRODUCTIVE SYMPTOMS: No  SKELETAL SYMPTOMS: No  BLOOD SYMPTOMS: No  NERVOUS SYSTEM SYMPTOMS: No  MENTAL HEALTH SYMPTOMS: No

## 2018-05-23 ENCOUNTER — PRE VISIT (OUTPATIENT)
Dept: ORTHOPEDICS | Facility: CLINIC | Age: 63
End: 2018-05-23

## 2018-05-23 NOTE — TELEPHONE ENCOUNTER
Patient is s/p left open bony bankart repair, glenoid ORIF on 5/15/2018.    Patient has not been seen postoperatively.    Patient is coming to clinic for 2 week post-op visit.      No additional orders are needed this visit.     Patient visit type and questionnaires have been reviewed and are correct for this appointment? Yes    Ernestine Newell ATC

## 2018-05-26 ENCOUNTER — MEDICAL CORRESPONDENCE (OUTPATIENT)
Dept: HEALTH INFORMATION MANAGEMENT | Facility: CLINIC | Age: 63
End: 2018-05-26

## 2018-05-30 ENCOUNTER — OFFICE VISIT (OUTPATIENT)
Dept: ORTHOPEDICS | Facility: CLINIC | Age: 63
End: 2018-05-30
Payer: OTHER MISCELLANEOUS

## 2018-05-30 DIAGNOSIS — S42.152D CLOSED FRACTURE OF GLENOID CAVITY AND NECK OF LEFT SCAPULA WITH ROUTINE HEALING, SUBSEQUENT ENCOUNTER: Primary | ICD-10-CM

## 2018-05-30 DIAGNOSIS — S42.142D CLOSED FRACTURE OF GLENOID CAVITY AND NECK OF LEFT SCAPULA WITH ROUTINE HEALING, SUBSEQUENT ENCOUNTER: Primary | ICD-10-CM

## 2018-05-30 ASSESSMENT — ENCOUNTER SYMPTOMS
MUSCLE CRAMPS: 0
ARTHRALGIAS: 0
BACK PAIN: 0
NECK PAIN: 0
JOINT SWELLING: 0
MUSCLE WEAKNESS: 0
STIFFNESS: 1
MYALGIAS: 0

## 2018-05-30 NOTE — LETTER
Karan Anglin     1955  Encounter date/time:  05/30/2018  [id number]    Report of Workability    Physician:  Rosa Joe MD    Diagnosis:  Status post recent left shoulder surgery    Limitations:  May write or type with workspace adjusted to necessary height/position  No push, pull, lift, carry with left arm.  OK to wear pants with elastic waist (sweatpants) due to limited shoulder motion and need for ease of dressing.    Return to work status: Return to work WITH limitations on 5/31/18.    Follow-Up:   Return to clinic in 4 weeks.

## 2018-05-30 NOTE — NURSING NOTE
Reason For Visit:   Chief Complaint   Patient presents with     Surgical Followup     2 week pop left glenoid ORIF.  DOS: 5/15/2018       PCP: No Ref-Primary, Physician  Ref: Dr. Arroyo     ?  No  Occupation  and .  Currently working? Yes.  Work status?  Full time.  Date of injury: 3/27/2018  Type of injury: fell outside on the ice and dislocated his shoulder.    4/4/2018  Second dislocation happened when he was putting on a shirt.  Date of surgery: 5/15/2018  Type of surgery: Left open bony bankart repair/glenoid open reduction internal fixation  Smoker: No  Request smoking cessation information: No     Left hand dominant    SANE score  Affected shoulder: Left  Right shoulder SANE: 100  Left shoulder SANE: 0    There were no vitals taken for this visit.      Pain Assessment  Patient Currently in Pain: Yes  0-10 Pain Scale: 1  Primary Pain Location: Shoulder  Pain Orientation: Left  Pain Descriptors: Aching, Dull  Alleviating Factors: Pain medication, NSAIDS  Aggravating Factors: Movement    Ernestine Newell, ATC

## 2018-05-30 NOTE — LETTER
5/30/2018       RE: Karan Anglin  [address]  [place] [address]     Dear Colleague,    Thank you for referring your patient, Karan Anglin, to the [facility]. Please see a copy of my visit note below.    CHIEF COMPLAINT: Status post left glenoid ORIF (large bony bankart)  DATE OF SURGERY: 5/15/18    HISTORY OF PRESENT ILLNESS: Mr. Anglin is an extremely pleasant 63 year-old gentleman who is 2 weeks status post the above procedure. He reports his pain is controlled. Notes no new concerns.    EXAM:  Pleasant adult male in NAD  Respirations even and unlabored.  Left upper extremity: Incisions clean, dry, and intact. Distally neurovascularly intact without deficits. Shoulder range of motion not tested due to postop restrictions.    ASSESSMENT:  1. Two weeks status post above procedure    PLAN:    Intra-operative findings reviewed    Range of Motion:     Hand, wrist and elbow ROM to continue    Per op note: gentle pendulums and PT for passive FE to 90 and ER to neutral at 2 weeks  (now), progressing to full passive range of motion and beginning to wean out of the sling at 6 weeks. He will progress to active range of motion thereafter.    Sling: On at all times except for bathing or dressing or PT exercises    Pain medication: Reviewed. Discussed plan. NSAIDs OK.     Follow up: 4 weeks with no new radiographs needed.             Again, thank you for allowing me to participate in the care of your patient.      Sincerely,    Rosa Joe MD

## 2018-05-30 NOTE — MR AVS SNAPSHOT
After Visit Summary   5/30/2018    Karan Anglin    MRN: [id number]           Patient Information     Date Of Birth          1955        Visit Information        Provider Department      5/30/2018 9:00 AM Rosa Joe MD [facility]        Today's Diagnoses     Closed fracture of glenoid cavity and neck of left scapula with routine healing, subsequent encounter    -  1       Follow-ups after your visit        Your next 10 appointments already scheduled     Jun 22, 2018  4:40 PM CDT   MATT Extremity with Mitali Lockwood PT   [facility] ([facility]  )    [address] [place] [id number] [phone]            Jun 27, 2018  9:00 AM CDT   (Arrive by 8:45 AM)   Return Visit with Rosa Joe MD   [facility] ([facility])    [address]  [address] [place] [facility] [address] [phone]              Who to contact     Please call your clinic at [phone] to:    Ask questions about your health    Make or cancel appointments    Discuss your medicines    Learn about your test results    Speak to your doctor            Additional Information About Your Visit        [employer] Information     [employer] gives you secure access to your electronic health record. If you see a primary care provider, you can also send messages to your care team and make appointments. If you have questions, please call your primary care clinic.  If you do not have a primary care provider, please call [phone] and they will assist you.      T[employer] is an electronic gateway that provides easy, online access to your medical records. With [employer], you can request a clinic appointment, read your test results, renew a prescription or communicate with your care team.     To access your existing account, please contact your [facility] or call  [phone] for assistance.        Care EveryWhere ID     This is your Care EveryWhere ID. This could be used by other organizations to access your [place] medical records  [id number]         Blood Pressure from Last 3 Encounters:   06/14/18 130/82   05/15/18 106/80   04/04/18 (!) 146/96    Weight from Last 3 Encounters:   06/14/18 98.3 kg (216 lb 11.2 oz)   05/15/18 99.8 kg (220 lb)   04/27/18 100.2 kg (220 lb 14.4 oz)              Today, you had the following     No orders found for display       Primary Care Provider Fax #    Physician No Ref-Primary [phone]       No address on file        Equal Access to Services     PATRICIA MARQUEZ : Mecca Barroso, waaxda luqadaha, qaybta kaalmada adesabineyada, zac riddle . So [facility] [phone].    ATENCIÓN: Si habla español, tiene a grullon disposición servicios gratuitos de asistencia lingüística. Llame al [phone].    We comply with applicable federal civil rights laws and [place] laws. We do not discriminate on the basis of race, color, national origin, age, disability, sex, sexual orientation, or gender identity.            Thank you!     Thank you for choosing [facility]  for your care. Our goal is always to provide you with excellent care. Hearing back from our patients is one way we can continue to improve our services. Please take a few minutes to complete the written survey that you may receive in the mail after your visit with us. Thank you!             Your Updated Medication List - Protect others around you: Learn how to safely use, store and throw away your medicines at www.disposemymeds.org.          This list is accurate as of 5/30/18 11:59 PM.  Always use your most recent med list.                   Brand Name Dispense Instructions for use Diagnosis    atenolol 50 MG tablet    TENORMIN    90 tablet    Take 1 tablet (50 mg) by mouth daily    Benign essential hypertension, Migraine without aura  and without status migrainosus, not intractable       atorvastatin 10 MG tablet    LIPITOR    90 tablet    Take 2 tablets (20 mg) by mouth daily    Hyperlipidemia LDL goal <130       ibuprofen 600 MG tablet    ADVIL/MOTRIN    30 tablet    Take 1 tablet (600 mg) by mouth every 8 hours as needed for moderate pain        NEXIUM PO      Take by mouth daily        oxyCODONE IR 5 MG tablet    ROXICODONE    40 tablet    Take 1-2 tablets (5-10 mg) by mouth every 3 hours as needed for pain or other (Moderate to Severe)    Shoulder pain, unspecified chronicity, unspecified laterality       senna-docusate 8.6-50 MG per tablet    SENOKOT-S;PERICOLACE    30 tablet    Take 1-2 tablets by mouth 2 times daily Take while on oral narcotics to prevent or treat constipation.    Shoulder pain, unspecified chronicity, unspecified laterality

## 2018-06-04 ENCOUNTER — THERAPY VISIT (OUTPATIENT)
Dept: PHYSICAL THERAPY | Facility: CLINIC | Age: 63
End: 2018-06-04
Payer: OTHER MISCELLANEOUS

## 2018-06-04 DIAGNOSIS — Z47.89 AFTERCARE FOLLOWING SURGERY OF THE MUSCULOSKELETAL SYSTEM: ICD-10-CM

## 2018-06-04 DIAGNOSIS — S43.005A SHOULDER DISLOCATION, LEFT, INITIAL ENCOUNTER: Primary | ICD-10-CM

## 2018-06-04 PROCEDURE — 97161 PT EVAL LOW COMPLEX 20 MIN: CPT | Mod: GP | Performed by: PHYSICAL THERAPIST

## 2018-06-04 PROCEDURE — 97140 MANUAL THERAPY 1/> REGIONS: CPT | Mod: GP | Performed by: PHYSICAL THERAPIST

## 2018-06-04 PROCEDURE — 97110 THERAPEUTIC EXERCISES: CPT | Mod: GP | Performed by: PHYSICAL THERAPIST

## 2018-06-04 NOTE — MR AVS SNAPSHOT
After Visit Summary   6/4/2018    Karan Anglin    MRN: [id number]           Patient Information     Date Of Birth          1955        Visit Information        Provider Department      6/4/2018 5:20 PM Mitali Lockwood PT [facility]        Today's Diagnoses     Shoulder dislocation, left, initial encounter    -  1    Aftercare following surgery of the musculoskeletal system           Follow-ups after your visit        Your next 10 appointments already scheduled     Jun 12, 2018  5:20 PM CDT   MATT Extremity with Mitali Lockwood PT   [facility] ([facility]  )    [address] [place] [id number]   [phone]            Jun 14, 2018  8:00 AM CDT   Murtaza Baker with JEFFERSON [facility]ues [facility] ([facility])    [address] [place] [id number] [phone]            Jun 27, 2018  9:00 AM CDT   (Arrive by 8:45 AM)   Return Visit with Rosa Joe MD   [facility] ([facility])    [address]  [address] [facility] [id number] [phone]              Who to contact     If you have questions or need follow up information about today's clinic visit or your schedule please contact [facility] directly at [phone].  Normal or non-critical lab and imaging results will be communicated to you by MyChart, letter or phone within 4 business days after the clinic has received the results. If you do not hear from us within 7 days, please contact the clinic through MyChart or phone. If you have a critical or abnormal lab result, we will notify you by phone as soon as possible.  Submit refill requests through [employer] or call your pharmacy and they will forward the refill request to us. Please allow 3 business  days for your refill to be completed.          Additional Information About Your Visit        MyChart Information     [employer] gives you secure access to your electronic health record. If you see a primary care provider, you can also send messages to your care team and make appointments. If you have questions, please call your primary care clinic.  If you do not have a primary care provider, please call [phone] and they will assist you.        Care EveryWhere ID     This is your Care EveryWhere ID. This could be used by other organizations to access your [place] medical records  [id number]         Blood Pressure from Last 3 Encounters:   05/15/18 106/80   04/04/18 (!) 146/96   03/27/18 135/90    Weight from Last 3 Encounters:   05/15/18 99.8 kg (220 lb)   04/27/18 100.2 kg (220 lb 14.4 oz)   04/24/18 100.2 kg (221 lb)              We Performed the Following     MATT Inital Eval Report     Manual Ther Tech, 1+Regions, EA 15 min     PT Eval, Low Complexity (80103)     Therapeutic Exercises        Primary Care Provider Fax #    Physician No Ref-Primary [phone]       No address on file        Equal Access to Services     SHAHID [facility] Medical CenterALISTAIR : Hadii aad ku hossein Barroso, waaxda luqadaha, qaybta kaalmada adesabineyada, zac riddle . So [facility] [phone].    ATENCIÓN: Si habla español, tiene a grullon disposición servicios gratuitos de asistencia lingüística. Llame al [phone].    We comply with applicable federal civil rights laws and [place] laws. We do not discriminate on the basis of race, color, national origin, age, disability, sex, sexual orientation, or gender identity.            Thank you!     Thank you for choosing [facility]  for your care. Our goal is always to provide you with excellent care. Hearing back from our patients is one way we can continue to improve our services. Please take a few minutes to complete the written  survey that you may receive in the mail after your visit with us. Thank you!             Your Updated Medication List - Protect others around you: Learn how to safely use, store and throw away your medicines at www.disposemymeds.org.          This list is accurate as of 6/4/18 11:59 PM.  Always use your most recent med list.                   Brand Name Dispense Instructions for use Diagnosis    atenolol 50 MG tablet    TENORMIN    90 tablet    Take 1 tablet (50 mg) by mouth daily    Benign essential hypertension, Migraine without aura and without status migrainosus, not intractable       atorvastatin 10 MG tablet    LIPITOR    90 tablet    Take 2 tablets (20 mg) by mouth daily    Hyperlipidemia LDL goal <130       hydrochlorothiazide 25 MG tablet    HYDRODIURIL    90 tablet    Take 1 tablet (25 mg) by mouth daily    Benign essential hypertension       ibuprofen 600 MG tablet    ADVIL/MOTRIN    30 tablet    Take 1 tablet (600 mg) by mouth every 8 hours as needed for moderate pain        losartan 25 MG tablet    COZAAR    30 tablet    Take 1 tablet (25 mg) by mouth daily    Benign essential hypertension       NEXIUM PO      Take by mouth daily        oxyCODONE IR 5 MG tablet    ROXICODONE    40 tablet    Take 1-2 tablets (5-10 mg) by mouth every 3 hours as needed for pain or other (Moderate to Severe)    Shoulder pain, unspecified chronicity, unspecified laterality       rizatriptan 5 MG tablet    MAXALT    4 tablet    Take 1 tablet (5 mg) by mouth at onset of headache for migraine repeat after 2 hr if no relief; maximum: 30 mg/24 hours    Migraine without aura and without status migrainosus, not intractable       senna-docusate 8.6-50 MG per tablet    SENOKOT-S;PERICOLACE    30 tablet    Take 1-2 tablets by mouth 2 times daily Take while on oral narcotics to prevent or treat constipation.    Shoulder pain, unspecified chronicity, unspecified laterality

## 2018-06-08 PROBLEM — Z47.89 AFTERCARE FOLLOWING SURGERY OF THE MUSCULOSKELETAL SYSTEM: Status: ACTIVE | Noted: 2018-06-08

## 2018-06-08 PROBLEM — S43.005A SHOULDER DISLOCATION, LEFT, INITIAL ENCOUNTER: Status: ACTIVE | Noted: 2018-06-08

## 2018-06-08 NOTE — PROGRESS NOTES
[facility] Initial Evaluation        Subjective:  Patient is a 63 year old male presenting with rehab left shoulder hpi.   Karan Anglin is a 63 year old male with a left shoulder condition.  Condition occurred with:  A fall (Pt. is s/p L Bankhart repair 5-15 due to a L sh dislocation at work 3-27. He had a 2nd dislocation episode which led to the decision of surgery. Pt. reports doing well so far since surgery. No hx of L sh injury. ).  Condition occurred: at work.  This is a new condition  DOS 5-15-18.    Patient reports pain:  In the joint.  Radiates to:  Upper arm.  Pain is described as sharp  and reported as 4/10.  Associated symptoms:  Loss of motion/stiffness and loss of strength. Pain is the same all the time.  Symptoms are exacerbated by lying on extremity (all use/ movement of L arm) and relieved by ice and rest.  Since onset symptoms are gradually improving.    Previous treatment: none.    General health as reported by patient is good.                                              Objective:  Standing Alignment:      Shoulder/UE:  Protracted scapula L                  Flexibility/Screens:   Positive screens:  Shoulder                             Shoulder Evaluation:  ROM:  AROM:    Flexion:  Right:  160    Abduction:  Right:  173                      PROM:    Flexion:  Left:  80          Abduction:  Left:  70          External Rotation:  Left:  0                          Stability Testing:  not assessed        Palpation:    Left shoulder tenderness present at:  Supraspinatus and Upper Trap    Mobility Tests:    Glenohumeral anterior left:  Hypomobile    Glenohumeral posterior left:  Hypomobile    Glenohumeral inferior left:  Hypomobile                                             General     ROS    Assessment/Plan:    Patient is a 63 year old male with left side shoulder complaints.    Patient has the following significant findings with corresponding treatment plan.                 Diagnosis 1:  S/p L Bankhart repair  Pain -  hot/cold therapy, self management and education  Decreased ROM/flexibility - manual therapy and therapeutic exercise  Decreased strength - therapeutic exercise and therapeutic activities  Impaired muscle performance - neuro re-education  Decreased function - therapeutic activities    Therapy Evaluation Codes:   1) History comprised of:   Personal factors that impact the plan of care:      None.    Comorbidity factors that impact the plan of care are:      None.     Medications impacting care: None.  2) Examination of Body Systems comprised of:   Body structures and functions that impact the plan of care:      Shoulder.   Activity limitations that impact the plan of care are:      Dressing and Lifting.  3) Clinical presentation characteristics are:   Stable/Uncomplicated.  4) Decision-Making    Low complexity using standardized patient assessment instrument and/or measureable assessment of functional outcome.  Cumulative Therapy Evaluation is: Low complexity.    Previous and current functional limitations:  (See Goal Flow Sheet for this information)    Short term and Long term goals: (See Goal Flow Sheet for this information)     Communication ability:  Patient appears to be able to clearly communicate and understand verbal and written communication and follow directions correctly.  Treatment Explanation - The following has been discussed with the patient:   RX ordered/plan of care  Anticipated outcomes  Possible risks and side effects  This patient would benefit from PT intervention to resume normal activities.   Rehab potential is good.    Frequency:  1 X week, once daily  Duration:  for 8 weeks  Discharge Plan:  Achieve all LTG.  Independent in home treatment program.  Reach maximal therapeutic benefit.    Please refer to the daily flowsheet for treatment today, total treatment time and time spent performing 1:1 timed codes.

## 2018-06-12 ENCOUNTER — THERAPY VISIT (OUTPATIENT)
Dept: PHYSICAL THERAPY | Facility: CLINIC | Age: 63
End: 2018-06-12
Payer: OTHER MISCELLANEOUS

## 2018-06-12 DIAGNOSIS — Z47.89 AFTERCARE FOLLOWING SURGERY OF THE MUSCULOSKELETAL SYSTEM: ICD-10-CM

## 2018-06-12 DIAGNOSIS — S43.005A SHOULDER DISLOCATION, LEFT, INITIAL ENCOUNTER: ICD-10-CM

## 2018-06-12 PROCEDURE — 97110 THERAPEUTIC EXERCISES: CPT | Mod: GP | Performed by: PHYSICAL THERAPIST

## 2018-06-12 PROCEDURE — 97140 MANUAL THERAPY 1/> REGIONS: CPT | Mod: GP | Performed by: PHYSICAL THERAPIST

## 2018-06-12 NOTE — PROGRESS NOTES
Subjective:  HPI                    Objective:  System    Physical Exam    General     ROS    Assessment/Plan:    SUBJECTIVE  Subjective changes as noted by pt:   Pt. reports decreased L sh pain and increasing ROM this week.   Current pain level:  3/10   Changes in function:  Yes (See Goal flowsheet attached for changes in current functional level)     Adverse reaction to treatment or activity:  None    OBJECTIVE  Changes in objective findings:  PROM L sh flex 110; abd 90; IR 40; ER 20.     ASSESSMENT  Karan continues to require intervention to meet STG and LTG's: PT  Patient is progressing as expected.  Response to therapy has shown an improvement in  pain level and ROM   Progress made towards STG/LTG?  Yes (See Goal flowsheet attached for updates on achievement of STG and LTG)    PLAN  Continue current treatment until MD allows progression of the treatment plan.    PTA/ATC plan:  N/A    Please refer to the daily flowsheet for treatment today, total treatment time and time spent performing 1:1 timed codes.

## 2018-06-12 NOTE — MR AVS SNAPSHOT
After Visit Summary   6/12/2018    Karan Anglin    MRN: [id number]           Patient Information     Date Of Birth          1955        Visit Information        Provider Department      6/12/2018 5:20 PM Mitali Lockwood PT [facility]        Today's Diagnoses     Shoulder dislocation, left, initial encounter        Aftercare following surgery of the musculoskeletal system           Follow-ups after your visit        Your next 10 appointments already scheduled     Jun 14, 2018  8:00 AM CDT   [facility] with JEFFERSON Rodrigues CNP   [facility] ([facility])    [address] [place] [id number] [phone]            Jun 22, 2018  4:40 PM CDT   MATT Extremity with Mitali Lockwood PT   [facility] ([facility]  )    [address] [place] [id number] [phone]            Jun 27, 2018  9:00 AM CDT   (Arrive by 8:45 AM)   Return Visit with Rosa Joe MD   [facility] ([facility])    [address] [id number] [phone]              Who to contact     If you have questions or need follow up information about today's clinic visit or your schedule please contact [facility] directly at [phone].  Normal or non-critical lab and imaging results will be communicated to you by MyChart, letter or phone within 4 business days after the clinic has received the results. If you do not hear from us within 7 days, please contact the clinic through MyChart or phone. If you have a critical or abnormal lab result, we will notify you by phone as soon as possible.  Submit refill requests through [employer] or call your pharmacy and they will forward the refill request to us. Please allow 3 business days  for your refill to be completed.          Additional Information About Your Visit        MyChart Information     [employer] gives you secure access to your electronic health record. If you see a primary care provider, you can also send messages to your care team and make appointments. If you have questions, please call your primary care clinic.  If you do not have a primary care provider, please call [phone] and they will assist you.        Care EveryWhere ID     This is your Care EveryWhere ID. This could be used by other organizations to access your [facility] medical records  [id number]         Blood Pressure from Last 3 Encounters:   05/15/18 106/80   04/04/18 (!) 146/96   03/27/18 135/90    Weight from Last 3 Encounters:   05/15/18 99.8 kg (220 lb)   04/27/18 100.2 kg (220 lb 14.4 oz)   04/24/18 100.2 kg (221 lb)              We Performed the Following     Manual Ther Tech, 1+Regions, EA 15 min     Therapeutic Exercises        Primary Care Provider Fax #    Physician No Ref-Primary [phone]       No address on file        Equal Access to Services     [facility]: Hadii saleem catalan Soneftali, waaxda luqadaha, qaybta kaalmaanne vivas, zac riddle . So [facility] [phone].    ATENCIÓN: Si habla español, tiene a grullon disposición servicios gratuitos de asistencia lingüística. Llame al [phone].    We comply with applicable federal civil rights laws and [place] laws. We do not discriminate on the basis of race, color, national origin, age, disability, sex, sexual orientation, or gender identity.            Thank you!     Thank you for choosing [facility]  for your care. Our goal is always to provide you with excellent care. Hearing back from our patients is one way we can continue to improve our services. Please take a few minutes to complete the written survey that you may receive in the mail after your visit with us. Thank  you!             Your Updated Medication List - Protect others around you: Learn how to safely use, store and throw away your medicines at www.disposemymeds.org.          This list is accurate as of 6/12/18  6:10 PM.  Always use your most recent med list.                   Brand Name Dispense Instructions for use Diagnosis    atenolol 50 MG tablet    TENORMIN    90 tablet    Take 1 tablet (50 mg) by mouth daily    Benign essential hypertension, Migraine without aura and without status migrainosus, not intractable       atorvastatin 10 MG tablet    LIPITOR    90 tablet    Take 2 tablets (20 mg) by mouth daily    Hyperlipidemia LDL goal <130       hydrochlorothiazide 25 MG tablet    HYDRODIURIL    90 tablet    Take 1 tablet (25 mg) by mouth daily    Benign essential hypertension       ibuprofen 600 MG tablet    ADVIL/MOTRIN    30 tablet    Take 1 tablet (600 mg) by mouth every 8 hours as needed for moderate pain        losartan 25 MG tablet    COZAAR    30 tablet    Take 1 tablet (25 mg) by mouth daily    Benign essential hypertension       NEXIUM PO      Take by mouth daily        oxyCODONE IR 5 MG tablet    ROXICODONE    40 tablet    Take 1-2 tablets (5-10 mg) by mouth every 3 hours as needed for pain or other (Moderate to Severe)    Shoulder pain, unspecified chronicity, unspecified laterality       rizatriptan 5 MG tablet    MAXALT    4 tablet    Take 1 tablet (5 mg) by mouth at onset of headache for migraine repeat after 2 hr if no relief; maximum: 30 mg/24 hours    Migraine without aura and without status migrainosus, not intractable       senna-docusate 8.6-50 MG per tablet    SENOKOT-S;PERICOLACE    30 tablet    Take 1-2 tablets by mouth 2 times daily Take while on oral narcotics to prevent or treat constipation.    Shoulder pain, unspecified chronicity, unspecified laterality

## 2018-06-13 NOTE — PROGRESS NOTES
SUBJECTIVE:   Karan Anglin is a 63 year old male who presents to clinic today for the following health issues:    Migraine Follow-Up    Headaches symptoms:  Stable but was bad over the winter with the weather changes    Frequency: 2-3 times a month average; at worst twice a week     Duration of headaches: about 3-4 hours     Able to do normal daily activities/work with migraines: No - only when medication is taken     Rescue/Relief medication:Maxalt              Effectiveness: total relief    Preventative medication: None    Neurologic complications: No new stroke-like symptoms, loss of vision or speech, numbness or weakness    In the past 4 weeks, how often have you gone to Urgent Care or the emergency room because of your headaches?  0    Amount of exercise or physical activity: 4-5 days/week for an average of 30-45 minutes    Problems taking medications regularly: No    Medication side effects: none    Diet: regular (no restrictions)    Original neurologist prescribed fiorinal   Had also prescribed topamax, but patient doesn't recall how useful this was because he stopped with insurance    Hypertension Follow-up      Outpatient blood pressures are being checked at home.  Results are usually around 130/80.    Current medications:  Cozaar    Low Salt Diet: not monitoring salt    In March fell at work.  May 15th surgery     Problem list and histories reviewed & adjusted, as indicated.  Additional history: as documented    Reviewed and updated as needed this visit by clinical staff       Reviewed and updated as needed this visit by Provider         ROS:  Constitutional, HEENT, cardiovascular, pulmonary, gi and gu systems are negative, except as otherwise noted.    OBJECTIVE:     /82  Pulse 77  Temp 98.2  F (36.8  C) (Oral)  Wt 216 lb 11.2 oz (98.3 kg)  SpO2 96%  BMI 30.24 kg/m2  Body mass index is 30.24 kg/(m^2).  GENERAL: healthy, alert and no distress  EYES: Eyes grossly normal to inspection, PERRL and  conjunctivae and sclerae normal  HENT: ear canals and TM's normal, nose and mouth without ulcers or lesions  NECK: no adenopathy, no asymmetry, masses, or scars and thyroid normal to palpation  RESP: lungs clear to auscultation - no rales, rhonchi or wheezes  CV: regular rate and rhythm, normal S1 S2, no S3 or S4, no murmur, click or rub, no peripheral edema and peripheral pulses strong  ABDOMEN: soft, nontender, no hepatosplenomegaly, no masses and bowel sounds normal  MS: no gross musculoskeletal defects noted, no edema  SKIN: no suspicious lesions or rashes  NEURO: Normal strength and tone, mentation intact and speech normal  PSYCH: mentation appears normal, affect normal/bright  LYMPH: no cervical, supraclavicular, axillary adenopathy    Diagnostic Test Results:  Results for orders placed or performed in visit on 06/14/18   Comprehensive metabolic panel   Result Value Ref Range    Sodium 137 133 - 144 mmol/L    Potassium 3.6 3.4 - 5.3 mmol/L    Chloride 101 94 - 109 mmol/L    Carbon Dioxide 24 20 - 32 mmol/L    Anion Gap 12 3 - 14 mmol/L    Glucose 100 (H) 70 - 99 mg/dL    Urea Nitrogen 29 7 - 30 mg/dL    Creatinine 1.30 (H) 0.66 - 1.25 mg/dL    GFR Estimate 56 (L) >60 mL/min/1.7m2    GFR Estimate If Black 68 >60 mL/min/1.7m2    Calcium 10.0 8.5 - 10.1 mg/dL    Bilirubin Total 0.4 0.2 - 1.3 mg/dL    Albumin 4.3 3.4 - 5.0 g/dL    Protein Total 8.0 6.8 - 8.8 g/dL    Alkaline Phosphatase 113 40 - 150 U/L    ALT 42 0 - 70 U/L    AST 22 0 - 45 U/L   Albumin Random Urine Quantitative with Creat Ratio   Result Value Ref Range    Creatinine Urine 197 mg/dL    Albumin Urine mg/L 35 mg/L    Albumin Urine mg/g Cr 17.92 (H) 0 - 17 mg/g Cr       ASSESSMENT/PLAN:     Hypertension; controlled   Associated with the following complications:    None   Plan:  No changes in the patient's current treatment plan  Labs:   See orders    (G43.009) Migraine without aura and without status migrainosus, not intractable  (primary encounter  diagnosis)  Comment:   Plan: rizatriptan (MAXALT) 5 MG tablet        Tends to have migraines only with times of the year when weather is changing, but then does have significant number of migraines.  Discussed options for prevention including both non-pharmacologic and pharmacologic.  Currently as weather has been unchanging, he does not have a need for prophylaxis    (I10) Benign essential hypertension  Comment:   Plan: Comprehensive metabolic panel,         hydrochlorothiazide (HYDRODIURIL) 25 MG tablet,        losartan (COZAAR) 25 MG tablet            (R80.9) Microalbuminuria  Comment:   Plan: Albumin Random Urine Quantitative with Creat         Ratio  microabluminuria improved, but GFR indicates stage 3 CKD.  Emphasize blood pressure control and recheck GFR and creatinine in 2 months - lab-only.            Patient Instructions   Magnesium glycinate 400-600 mg nightly to prevent headache  Butterbur 50 mg daily to prevent headache (can provoke allergies if you have allergies)  Vitamin D 2000 IU daily    During the more volatile weather times, think about adding topamax for a couple months    Continue medications for hypertension - they are working well  Atenolol (if you need to make changes in the future, this is the place I would start because atenolol is not used much and can be changed to metoprolol)  HCTZ  Cozaar (losartan)    Routine hypertension visit due in November  FIT test due in November      JEFFERSON Cook [facility]

## 2018-06-14 ENCOUNTER — OFFICE VISIT (OUTPATIENT)
Dept: FAMILY MEDICINE | Facility: CLINIC | Age: 63
End: 2018-06-14
Payer: COMMERCIAL

## 2018-06-14 VITALS
OXYGEN SATURATION: 96 % | DIASTOLIC BLOOD PRESSURE: 82 MMHG | HEART RATE: 77 BPM | BODY MASS INDEX: 30.24 KG/M2 | WEIGHT: 216.7 LBS | SYSTOLIC BLOOD PRESSURE: 130 MMHG | TEMPERATURE: 98.2 F

## 2018-06-14 DIAGNOSIS — G43.009 MIGRAINE WITHOUT AURA AND WITHOUT STATUS MIGRAINOSUS, NOT INTRACTABLE: Primary | ICD-10-CM

## 2018-06-14 DIAGNOSIS — I10 BENIGN ESSENTIAL HYPERTENSION: ICD-10-CM

## 2018-06-14 DIAGNOSIS — R80.9 MICROALBUMINURIA: ICD-10-CM

## 2018-06-14 LAB
ALBUMIN SERPL-MCNC: 4.3 G/DL (ref 3.4–5)
ALP SERPL-CCNC: 113 U/L (ref 40–150)
ALT SERPL W P-5'-P-CCNC: 42 U/L (ref 0–70)
ANION GAP SERPL CALCULATED.3IONS-SCNC: 12 MMOL/L (ref 3–14)
AST SERPL W P-5'-P-CCNC: 22 U/L (ref 0–45)
BILIRUB SERPL-MCNC: 0.4 MG/DL (ref 0.2–1.3)
BUN SERPL-MCNC: 29 MG/DL (ref 7–30)
CALCIUM SERPL-MCNC: 10 MG/DL (ref 8.5–10.1)
CHLORIDE SERPL-SCNC: 101 MMOL/L (ref 94–109)
CO2 SERPL-SCNC: 24 MMOL/L (ref 20–32)
CREAT SERPL-MCNC: 1.3 MG/DL (ref 0.66–1.25)
GFR SERPL CREATININE-BSD FRML MDRD: 56 ML/MIN/1.7M2
GLUCOSE SERPL-MCNC: 100 MG/DL (ref 70–99)
POTASSIUM SERPL-SCNC: 3.6 MMOL/L (ref 3.4–5.3)
PROT SERPL-MCNC: 8 G/DL (ref 6.8–8.8)
SODIUM SERPL-SCNC: 137 MMOL/L (ref 133–144)

## 2018-06-14 PROCEDURE — 36415 COLL VENOUS BLD VENIPUNCTURE: CPT | Performed by: NURSE PRACTITIONER

## 2018-06-14 PROCEDURE — 80053 COMPREHEN METABOLIC PANEL: CPT | Performed by: NURSE PRACTITIONER

## 2018-06-14 PROCEDURE — 82043 UR ALBUMIN QUANTITATIVE: CPT | Performed by: NURSE PRACTITIONER

## 2018-06-14 PROCEDURE — 99214 OFFICE O/P EST MOD 30 MIN: CPT | Performed by: NURSE PRACTITIONER

## 2018-06-14 RX ORDER — LOSARTAN POTASSIUM 25 MG/1
25 TABLET ORAL DAILY
Qty: 90 TABLET | Refills: 1 | Status: SHIPPED | OUTPATIENT
Start: 2018-06-14 | End: 2018-12-06

## 2018-06-14 RX ORDER — HYDROCHLOROTHIAZIDE 25 MG/1
25 TABLET ORAL DAILY
Qty: 90 TABLET | Refills: 1 | Status: SHIPPED | OUTPATIENT
Start: 2018-06-14 | End: 2018-12-06

## 2018-06-14 RX ORDER — RIZATRIPTAN BENZOATE 5 MG/1
5 TABLET ORAL
Qty: 18 TABLET | Refills: 1 | Status: SHIPPED | OUTPATIENT
Start: 2018-06-14 | End: 2018-10-25

## 2018-06-14 NOTE — PATIENT INSTRUCTIONS
Magnesium glycinate 400-600 mg nightly to prevent headache  Butterbur 50 mg daily to prevent headache (can provoke allergies if you have allergies)  Vitamin D 2000 IU daily    During the more volatile weather times, think about adding topamax for a couple months    Continue medications for hypertension - they are working well  Atenolol (if you need to make changes in the future, this is the place I would start because atenolol is not used much and can be changed to metoprolol)  HCTZ  Cozaar (losartan)    Routine hypertension visit due in November  FIT test due in November   General

## 2018-06-14 NOTE — MR AVS SNAPSHOT
After Visit Summary   6/14/2018    Karan Anglin    MRN: [id number]           Patient Information     Date Of Birth          1955        Visit Information        Provider Department      6/14/2018 8:00 AM Romelia Judd APRN CNP [facility]        Today's Diagnoses     Migraine without aura and without status migrainosus, not intractable    -  1    Benign essential hypertension        Microalbuminuria          Care Instructions    Magnesium glycinate 400-600 mg nightly to prevent headache  Butterbur 50 mg daily to prevent headache (can provoke allergies if you have allergies)  Vitamin D 2000 IU daily    During the more volatile weather times, think about adding topamax for a couple months    Continue medications for hypertension - they are working well  Atenolol (if you need to make changes in the future, this is the place I would start because atenolol is not used much and can be changed to metoprolol)  HCTZ  Cozaar (losartan)    Routine hypertension visit due in November  FIT test due in November          Follow-ups after your visit        Your next 10 appointments already scheduled     Jun 22, 2018  4:40 PM CDT   MATT Extremity with Mitali Lockwood PT   [facility] ([facility]  )    [address] [place] [id number] [phone]            Jun 27, 2018  9:00 AM CDT   (Arrive by 8:45 AM)   Return Visit with Rosa Joe MD   [facility] ([facility])    [address] [facility]  [address] [facility] [address] [phone]              Who to contact     If you have questions or need follow up information about today's clinic visit or your schedule please contact [facility] directly at [phone].  Normal or non-critical lab and imaging results will be communicated to you by MyChart, letter or phone within 4 business days after the clinic has  received the results. If you do not hear from us within 7 days, please contact the clinic through [employer] or phone. If you have a critical or abnormal lab result, we will notify you by phone as soon as possible.  Submit refill requests through [employer] or call your pharmacy and they will forward the refill request to us. Please allow 3 business days for your refill to be completed.          Additional Information About Your Visit        [employer] Information     [employer] gives you secure access to your electronic health record. If you see a primary care provider, you can also send messages to your care team and make appointments. If you have questions, please call your primary care clinic.  If you do not have a primary care provider, please call [phone] and they will assist you.        Care EveryWhere ID     This is your Care EveryWhere ID. This could be used by other organizations to access your [place] medical records  [id number]        Your Vitals Were     Pulse Temperature Pulse Oximetry BMI (Body Mass Index)          77 98.2  F (36.8  C) (Oral) 96% 30.24 kg/m2         Blood Pressure from Last 3 Encounters:   06/14/18 130/82   05/15/18 106/80   04/04/18 (!) 146/96    Weight from Last 3 Encounters:   06/14/18 216 lb 11.2 oz (98.3 kg)   05/15/18 220 lb (99.8 kg)   04/27/18 220 lb 14.4 oz (100.2 kg)              We Performed the Following     Albumin Random Urine Quantitative with Creat Ratio     Comprehensive metabolic panel          Where to get your medicines      These medications were sent to [facility] [address] [place] [address] [address]   [address] [facility] [address]     Phone:  [phone]     hydrochlorothiazide 25 MG tablet    losartan 25 MG tablet    rizatriptan 5 MG tablet          Primary Care Provider Fax #    Physician No Ref-Primary [phone]       No address on file        Equal Access to Services     PATRICIA MARQUEZ AH: graeme Nunn,  tomeka huffman jordanzac garcia quinin hayaan adeeg kharash la'aan ah. So [facility] [phone].    ATENCIÓN: Si ancelmo reese, tiene a grullon disposición servicios gratuitos de asistencia lingüística. Jovana al [phone].    We comply with applicable federal civil rights laws and [place] laws. We do not discriminate on the basis of race, color, national origin, age, disability, sex, sexual orientation, or gender identity.            Thank you!     Thank you for choosing [facility]  for your care. Our goal is always to provide you with excellent care. Hearing back from our patients is one way we can continue to improve our services. Please take a few minutes to complete the written survey that you may receive in the mail after your visit with us. Thank you!             Your Updated Medication List - Protect others around you: Learn how to safely use, store and throw away your medicines at www.disposemymeds.org.          This list is accurate as of 6/14/18  8:36 AM.  Always use your most recent med list.                   Brand Name Dispense Instructions for use Diagnosis    atenolol 50 MG tablet    TENORMIN    90 tablet    Take 1 tablet (50 mg) by mouth daily    Benign essential hypertension, Migraine without aura and without status migrainosus, not intractable       atorvastatin 10 MG tablet    LIPITOR    90 tablet    Take 2 tablets (20 mg) by mouth daily    Hyperlipidemia LDL goal <130       hydrochlorothiazide 25 MG tablet    HYDRODIURIL    90 tablet    Take 1 tablet (25 mg) by mouth daily    Benign essential hypertension       ibuprofen 600 MG tablet    ADVIL/MOTRIN    30 tablet    Take 1 tablet (600 mg) by mouth every 8 hours as needed for moderate pain        losartan 25 MG tablet    COZAAR    90 tablet    Take 1 tablet (25 mg) by mouth daily    Benign essential hypertension       NEXIUM PO      Take by mouth daily        oxyCODONE IR 5 MG tablet    ROXICODONE    40 tablet    Take 1-2 tablets (5-10  mg) by mouth every 3 hours as needed for pain or other (Moderate to Severe)    Shoulder pain, unspecified chronicity, unspecified laterality       rizatriptan 5 MG tablet    MAXALT    18 tablet    Take 1 tablet (5 mg) by mouth at onset of headache for migraine repeat after 2 hr if no relief; maximum: 30 mg/24 hours    Migraine without aura and without status migrainosus, not intractable       senna-docusate 8.6-50 MG per tablet    SENOKOT-S;PERICOLACE    30 tablet    Take 1-2 tablets by mouth 2 times daily Take while on oral narcotics to prevent or treat constipation.    Shoulder pain, unspecified chronicity, unspecified laterality

## 2018-06-15 LAB
CREAT UR-MCNC: 197 MG/DL
MICROALBUMIN UR-MCNC: 35 MG/L
MICROALBUMIN/CREAT UR: 17.92 MG/G CR (ref 0–17)

## 2018-06-15 NOTE — PROGRESS NOTES
CHIEF COMPLAINT: Status post left glenoid ORIF (large bony bankart)  DATE OF SURGERY: 5/15/18    HISTORY OF PRESENT ILLNESS: Mr. Anglin is an extremely pleasant 63 year-old gentleman who is 2 weeks status post the above procedure. He reports his pain is controlled. Notes no new concerns.    EXAM:  Pleasant adult male in NAD  Respirations even and unlabored.  Left upper extremity: Incisions clean, dry, and intact. Distally neurovascularly intact without deficits. Shoulder range of motion not tested due to postop restrictions.    ASSESSMENT:  1. Two weeks status post above procedure    PLAN:    Intra-operative findings reviewed    Range of Motion:     Hand, wrist and elbow ROM to continue    Per op note: gentle pendulums and PT for passive FE to 90 and ER to neutral at 2 weeks (now), progressing to full passive range of motion and beginning to wean out of the sling at 6 weeks. He will progress to active range of motion thereafter.    Sling: On at all times except for bathing or dressing or PT exercises    Pain medication: Reviewed. Discussed plan. NSAIDs OK.     Follow up: 4 weeks with no new radiographs needed.

## 2018-06-22 ENCOUNTER — THERAPY VISIT (OUTPATIENT)
Dept: PHYSICAL THERAPY | Facility: CLINIC | Age: 63
End: 2018-06-22
Payer: OTHER MISCELLANEOUS

## 2018-06-22 DIAGNOSIS — Z47.89 AFTERCARE FOLLOWING SURGERY OF THE MUSCULOSKELETAL SYSTEM: ICD-10-CM

## 2018-06-22 DIAGNOSIS — S43.005A SHOULDER DISLOCATION, LEFT, INITIAL ENCOUNTER: ICD-10-CM

## 2018-06-22 PROCEDURE — 97140 MANUAL THERAPY 1/> REGIONS: CPT | Mod: GP | Performed by: PHYSICAL THERAPIST

## 2018-06-22 PROCEDURE — 97110 THERAPEUTIC EXERCISES: CPT | Mod: GP | Performed by: PHYSICAL THERAPIST

## 2018-06-22 NOTE — MR AVS SNAPSHOT
After Visit Summary   6/22/2018    Karan Anglin    MRN: [id number]           Patient Information     Date Of Birth          1955        Visit Information        Provider Department      6/22/2018 4:40 PM Mitali Lockwood PT [facility]        Today's Diagnoses     Shoulder dislocation, left, initial encounter        Aftercare following surgery of the musculoskeletal system           Follow-ups after your visit        Your next 10 appointments already scheduled     Jun 27, 2018  9:00 AM CDT   (Arrive by 8:45 AM)   Return Visit with Rosa Joe MD   [facility] ([facility] [place])    [address]  4th Floor  [facility] [address]   [phone]            Jun 29, 2018  4:40 PM CDT   MATT Extremity with Mitali Lockwood PT   [facility] ([facility]  )    [address] [place] [id number] [phone]            Jul 05, 2018  5:20 PM CDT   MATT Extremity with Mitali Lockwood PT   [facility] ([facility]  )    [address] [place] [address] [id number] [phone]              Who to contact     If you have questions or need follow up information about today's clinic visit or your schedule please contact [facility] directly at [phone].  Normal or non-critical lab and imaging results will be communicated to you by MyChart, letter or phone within 4 business days after the clinic has received the results. If you do not hear from us within 7 days, please contact the clinic through MyChart or phone. If you have a critical or abnormal lab result, we will notify you by phone as soon as possible.  Submit refill requests through [employer] or call your pharmacy and they will forward the refill request to us. Please allow 3  business days for your refill to be completed.          Additional Information About Your Visit        MyChart Information     [facility] gives you secure access to your electronic health record. If you see a primary care provider, you can also send messages to your care team and make appointments. If you have questions, please call your primary care clinic.  If you do not have a primary care provider, please call [phone] and they will assist you.        Care EveryWhere ID     This is your Care EveryWhere ID. This could be used by other organizations to access your [place] medical records  [id number]         Blood Pressure from Last 3 Encounters:   06/14/18 130/82   05/15/18 106/80   04/04/18 (!) 146/96    Weight from Last 3 Encounters:   06/14/18 98.3 kg (216 lb 11.2 oz)   05/15/18 99.8 kg (220 lb)   04/27/18 100.2 kg (220 lb 14.4 oz)              We Performed the Following     Manual Ther Tech, 1+Regions, EA 15 min     Therapeutic Exercises        Primary Care Provider Fax #    Physician No Ref-Primary [phone]       No address on file        Equal Access to Services     [facility] : Hadii saleem Barroso, waaxda aubrie, qaybta kaalsam vivas, zac riddle . So [facility] [phone].    ATENCIÓN: Si habla español, tiene a grullon disposición servicios gratuitos de asistencia lingüística. Jovana al [phone].    We comply with applicable federal civil rights laws and [place] laws. We do not discriminate on the basis of race, color, national origin, age, disability, sex, sexual orientation, or gender identity.            Thank you!     Thank you for choosing [facility]  for your care. Our goal is always to provide you with excellent care. Hearing back from our patients is one way we can continue to improve our services. Please take a few minutes to complete the written survey that you may receive in the mail after your  visit with us. Thank you!             Your Updated Medication List - Protect others around you: Learn how to safely use, store and throw away your medicines at www.disposemymeds.org.          This list is accurate as of 6/22/18  5:55 PM.  Always use your most recent med list.                   Brand Name Dispense Instructions for use Diagnosis    atenolol 50 MG tablet    TENORMIN    90 tablet    Take 1 tablet (50 mg) by mouth daily    Benign essential hypertension, Migraine without aura and without status migrainosus, not intractable       atorvastatin 10 MG tablet    LIPITOR    90 tablet    Take 2 tablets (20 mg) by mouth daily    Hyperlipidemia LDL goal <130       hydrochlorothiazide 25 MG tablet    HYDRODIURIL    90 tablet    Take 1 tablet (25 mg) by mouth daily    Benign essential hypertension       ibuprofen 600 MG tablet    ADVIL/MOTRIN    30 tablet    Take 1 tablet (600 mg) by mouth every 8 hours as needed for moderate pain        losartan 25 MG tablet    COZAAR    90 tablet    Take 1 tablet (25 mg) by mouth daily    Benign essential hypertension       NEXIUM PO      Take by mouth daily        oxyCODONE IR 5 MG tablet    ROXICODONE    40 tablet    Take 1-2 tablets (5-10 mg) by mouth every 3 hours as needed for pain or other (Moderate to Severe)    Shoulder pain, unspecified chronicity, unspecified laterality       rizatriptan 5 MG tablet    MAXALT    18 tablet    Take 1 tablet (5 mg) by mouth at onset of headache for migraine repeat after 2 hr if no relief; maximum: 30 mg/24 hours    Migraine without aura and without status migrainosus, not intractable       senna-docusate 8.6-50 MG per tablet    SENOKOT-S;PERICOLACE    30 tablet    Take 1-2 tablets by mouth 2 times daily Take while on oral narcotics to prevent or treat constipation.    Shoulder pain, unspecified chronicity, unspecified laterality

## 2018-06-22 NOTE — PROGRESS NOTES
Subjective:  HPI                    Objective:  System    Physical Exam    General     ROS    Assessment/Plan:    SUBJECTIVE  Subjective changes as noted by pt:   Pt. continues to improve with ROM and decreased pain.   Current pain level:  2/10   Changes in function:  Yes (See Goal flowsheet attached for changes in current functional level)     Adverse reaction to treatment or activity:  None    OBJECTIVE  Changes in objective findings:  PROM L sh flex 126; abd 108; ER 40.     ASSESSMENT  Karan continues to require intervention to meet STG and LTG's: PT  Patient is progressing as expected.  Response to therapy has shown an improvement in  pain level and ROM   Progress made towards STG/LTG?  Yes (See Goal flowsheet attached for updates on achievement of STG and LTG)    PLAN  Continue current treatment until MD allows progression of the treatment plan.    PTA/ATC plan:  N/A    Please refer to the daily flowsheet for treatment today, total treatment time and time spent performing 1:1 timed codes.

## 2018-06-27 ENCOUNTER — TELEPHONE (OUTPATIENT)
Dept: FAMILY MEDICINE | Facility: CLINIC | Age: 63
End: 2018-06-27

## 2018-06-27 ENCOUNTER — OFFICE VISIT (OUTPATIENT)
Dept: ORTHOPEDICS | Facility: CLINIC | Age: 63
End: 2018-06-27
Payer: OTHER MISCELLANEOUS

## 2018-06-27 DIAGNOSIS — R80.9 MICROALBUMINURIA: Primary | ICD-10-CM

## 2018-06-27 DIAGNOSIS — I10 BENIGN ESSENTIAL HYPERTENSION: ICD-10-CM

## 2018-06-27 DIAGNOSIS — S42.152D CLOSED FRACTURE OF GLENOID CAVITY AND NECK OF LEFT SCAPULA WITH ROUTINE HEALING, SUBSEQUENT ENCOUNTER: Primary | ICD-10-CM

## 2018-06-27 DIAGNOSIS — S42.142D CLOSED FRACTURE OF GLENOID CAVITY AND NECK OF LEFT SCAPULA WITH ROUTINE HEALING, SUBSEQUENT ENCOUNTER: Primary | ICD-10-CM

## 2018-06-27 NOTE — LETTER
Karan Anglin     1955  Encounter date/time:  06/27/2018  [id number]    Report of Workability    Physician:  Rosa Joe MD     Diagnosis:  Status post recent left shoulder surgery     Limitations:  No push, pull, lift, carry with left arm more than 5 pounds.  No repetitive shoulder height or overhead work with left arm.     Return to work status: Allowed to work WITH limitations above.     Follow-Up:   Return to clinic in 6 weeks.

## 2018-06-27 NOTE — MR AVS SNAPSHOT
After Visit Summary   6/27/2018    Karan Anglin    MRN: [id number]           Patient Information     Date Of Birth          1955        Visit Information        Provider Department      6/27/2018 9:00 AM Rosa Joe MD [facility]        Today's Diagnoses     Closed fracture of glenoid cavity and neck of left scapula with routine healing, subsequent encounter    -  1       Follow-ups after your visit        Follow-up notes from your care team     Return in about 6 weeks (around 8/8/2018).      Your next 10 appointments already scheduled     Jul 12, 2018  4:40 PM CDT   MATT Extremity with Mitali Lockwood PT   [facility]    [address] [place] [id number] [phone]            Aug 15, 2018  8:00 AM CDT   (Arrive by 7:45 AM)   RETURN SHOULDER with Rosa Joe MD   [facility] ([facility] [place])    [address]  [address]th [place] [facility] [address] [phone]              Who to contact     Please call your clinic at [phone] to:    Ask questions about your health    Make or cancel appointments    Discuss your medicines    Learn about your test results    Speak to your doctor            Additional Information About Your Visit        MyChart Information     [employer] gives you secure access to your electronic health record. If you see a primary care provider, you can also send messages to your care team and make appointments. If you have questions, please call your primary care clinic.  If you do not have a primary care provider, please call [phone] and they will assist you.      [facility] is an electronic gateway that provides easy, online access to your medical records. With Risk Ident, you can request a clinic appointment, read your test results, renew a prescription or communicate with your care team.     To access your existing  account, please contact your [facility] or call [phone] for assistance.        Care EveryWhere ID     This is your Care EveryWhere ID. This could be used by other organizations to access your [place] medical records  [id number]         Blood Pressure from Last 3 Encounters:   06/14/18 130/82   05/15/18 106/80   04/04/18 (!) 146/96    Weight from Last 3 Encounters:   06/14/18 98.3 kg (216 lb 11.2 oz)   05/15/18 99.8 kg (220 lb)   04/27/18 100.2 kg (220 lb 14.4 oz)              Today, you had the following     No orders found for display       Primary Care Provider Fax #    Physician No Ref-Primary [phone]       No address on file        Equal Access to Services     PATRICIA MARQUEZ : [place] catal[place] Soneftali, waaxda luqadaha, qaybta kaalmada adesabineyaanne, zac riddle . So [facility] [phone].    ATENCIÓN: Si habla español, tiene a grullon disposición servicios gratuitos de asistencia lingüística. Llame al [phone].    We comply with applicable federal civil rights laws and [place] laws. We do not discriminate on the basis of race, color, national origin, age, disability, sex, sexual orientation, or gender identity.            Thank you!     Thank you for choosing [facility]  for your care. Our goal is always to provide you with excellent care. Hearing back from our patients is one way we can continue to improve our services. Please take a few minutes to complete the written survey that you may receive in the mail after your visit with us. Thank you!             Your Updated Medication List - Protect others around you: Learn how to safely use, store and throw away your medicines at www.disposemymeds.org.          This list is accurate as of 6/27/18 11:59 PM.  Always use your most recent med list.                   Brand Name Dispense Instructions for use Diagnosis    atenolol 50 MG tablet    TENORMIN    90 tablet    Take 1 tablet (50  mg) by mouth daily    Benign essential hypertension, Migraine without aura and without status migrainosus, not intractable       atorvastatin 10 MG tablet    LIPITOR    90 tablet    Take 2 tablets (20 mg) by mouth daily    Hyperlipidemia LDL goal <130       hydrochlorothiazide 25 MG tablet    HYDRODIURIL    90 tablet    Take 1 tablet (25 mg) by mouth daily    Benign essential hypertension       ibuprofen 600 MG tablet    ADVIL/MOTRIN    30 tablet    Take 1 tablet (600 mg) by mouth every 8 hours as needed for moderate pain        losartan 25 MG tablet    COZAAR    90 tablet    Take 1 tablet (25 mg) by mouth daily    Benign essential hypertension       NEXIUM PO      Take by mouth daily        oxyCODONE IR 5 MG tablet    ROXICODONE    40 tablet    Take 1-2 tablets (5-10 mg) by mouth every 3 hours as needed for pain or other (Moderate to Severe)    Shoulder pain, unspecified chronicity, unspecified laterality       rizatriptan 5 MG tablet    MAXALT    18 tablet    Take 1 tablet (5 mg) by mouth at onset of headache for migraine repeat after 2 hr if no relief; maximum: 30 mg/24 hours    Migraine without aura and without status migrainosus, not intractable       senna-docusate 8.6-50 MG per tablet    SENOKOT-S;PERICOLACE    30 tablet    Take 1-2 tablets by mouth 2 times daily Take while on oral narcotics to prevent or treat constipation.    Shoulder pain, unspecified chronicity, unspecified laterality

## 2018-06-27 NOTE — TELEPHONE ENCOUNTER
Patient called clinic, provider result message given. Per pt, insurance is changing in beginning of July-- patient is going to wait to make sure that he doesn't have any changes in coverage. Then will call to schedule lab recheck.    Teressa Lee RN  [facility]

## 2018-06-27 NOTE — LETTER
6/27/2018       RE: Karan Anglin  [address]  [place] [address]     Dear Colleague,    Thank you for referring your patient, Karan Anglin, to the [facility]. Please see a copy of my visit note below.    CHIEF CONCERN: Status post left glenoid ORIF (large bony bankart)  DATE OF SURGERY: 5/15/18    HISTORY OF PRESENT ILLNESS: Mr. Anglin is an extremely pleasant 63 year-old gentleman who is 6 weeks status post the above procedure.  He reports he has been using his left arm mostly for  work he has been practicing his range of motion with assistance as well as actively he reports no numbness or tingling at this time he had he reports he has been keeping his arm in the sling. he denies any pain.  He reports he has no new concerns with his L shoulder.  He reports a swelling at his left olecranon.    EXAM:  Pleasant adult male in NAD.  Respirations even and unlabored.  Left upper extremity: Incision clean, dry, and intact.  ROM Left shoulder flexion 130 , extension 40 , external rotation at 35  , Internal rotation not tested due to recent surgery. distally neurovascularly intact without deficits.  Left elbow shows no skin changes, mildly swollen, range of motion flexion extension intact.    Right upper extremity: No obvious skin changes or physical deformities. ROM right shoulder flexion 180 , extension 40 , external rotation 90  .  Otherwise neurovascularly intact    ASSESSMENT:  1. Six weeks status post above procedure    PLAN:  Discussed with patient that he can now wean off the sling, he can begin active range of motion and will be working on scapular muscles as well, no internal rotation, no strengthening exercises until 3 months post op.  Pain is well controlled.  Discussed with patient to avoid direct pressure on the olecranon when sitting down.  A work note was provided with certain physical work limitations.  Follow-up in  6 weeks.  Return to clinic if symptoms change.      Assessment and plan formulated and discussed with Dr. Rosa Joe.    Khang Araujo MD  Orthopaedic Surgery Resident, PGY1     I have personally examined this patient and have reviewed the clinical presentation and progress note with the resident.  I agree with the treatment plan as outlined.  The plan was formulated with the resident on the day of the resident's note.       Again, thank you for allowing me to participate in the care of your patient.      Sincerely,    Rosa Joe MD

## 2018-06-27 NOTE — NURSING NOTE
Reason For Visit:   Chief Complaint   Patient presents with     Surgical Followup     6 week pop Left glenoid ORIF       PCP: No Ref-Primary, Physician  Ref: Dr. Arroyo      ?  No  Occupation  and .  Currently working? Yes.  Work status?  Full time.  Date of injury: 3/27/2018  Type of injury: fell outside on the ice and dislocated his shoulder.    4/4/2018  Second dislocation happened when he was putting on a shirt.  Date of surgery: 5/15/2018  Type of surgery: Left open bony bankart repair/glenoid open reduction internal fixation  Smoker: No  Request smoking cessation information: No      Left hand dominant    SANE score  Affected shoulder: Left  Right shoulder SANE: 100  Left shoulder SANE: 65    There were no vitals taken for this visit.      Pain Assessment  0-10 Pain Scale: 2  Primary Pain Location: Shoulder  Pain Orientation: Left  Pain Descriptors: Aching, Other (comment) ([place])  Alleviating Factors: NSAIDS  Aggravating Factors: Movement    Ernestine Newell, ATC

## 2018-06-27 NOTE — TELEPHONE ENCOUNTER
"Please call patient to let him know his lab results.  His electrolytes and liver chemistries were normal.  He had an improvement in microalbumin, but GRF and creatinine were off and indicate possible stage 3 chronic kidney disease.  We cannot diagnose this is a single irregular labs so I would like him to return for a lab-only visit in a month to recheck.  Labs are ordered.  The best \"treatment\" for chronic kidney disease is to control his blood pressure and with his current meds we have been doing this well.  MARJ Rashid   "

## 2018-06-27 NOTE — TELEPHONE ENCOUNTER
Left vm for pt to return call to clinic    Peyton Story RN   [facility]

## 2018-06-27 NOTE — PROGRESS NOTES
CHIEF CONCERN: Status post left glenoid ORIF (large bony bankart)  DATE OF SURGERY: 5/15/18    HISTORY OF PRESENT ILLNESS: Mr. Anglin is an extremely pleasant 63 year-old gentleman who is 6 weeks status post the above procedure.  He reports he has been using his left arm mostly for  work he has been practicing his range of motion with assistance as well as actively he reports no numbness or tingling at this time he had he reports he has been keeping his arm in the sling. he denies any pain.  He reports he has no new concerns with his L shoulder.  He reports a swelling at his left olecranon.    EXAM:  Pleasant adult male in NAD.  Respirations even and unlabored.  Left upper extremity: Incision clean, dry, and intact.  ROM Left shoulder flexion 130 , extension 40 , external rotation at 35 , Internal rotation not tested due to recent surgery. distally neurovascularly intact without deficits.  Left elbow shows no skin changes, mildly swollen, range of motion flexion extension intact.    Right upper extremity: No obvious skin changes or physical deformities. ROM right shoulder flexion 180 , extension 40 , external rotation 90 .  Otherwise neurovascularly intact    ASSESSMENT:  1. Six weeks status post above procedure    PLAN:  Discussed with patient that he can now wean off the sling, he can begin active range of motion and will be working on scapular muscles as well, no internal rotation, no strengthening exercises until 3 months post op.  Pain is well controlled.  Discussed with patient to avoid direct pressure on the olecranon when sitting down.  A work note was provided with certain physical work limitations.  Follow-up in 6 weeks.  Return to clinic if symptoms change.      Assessment and plan formulated and discussed with Dr. Rosa Joe.    Khang Araujo MD  Orthopaedic Surgery Resident, PGY1     I have personally examined this patient and have reviewed the clinical presentation and progress note with  the resident.  I agree with the treatment plan as outlined.  The plan was formulated with the resident on the day of the resident's note.

## 2018-06-29 ENCOUNTER — THERAPY VISIT (OUTPATIENT)
Dept: PHYSICAL THERAPY | Facility: CLINIC | Age: 63
End: 2018-06-29
Payer: OTHER MISCELLANEOUS

## 2018-06-29 DIAGNOSIS — Z47.89 AFTERCARE FOLLOWING SURGERY OF THE MUSCULOSKELETAL SYSTEM: ICD-10-CM

## 2018-06-29 DIAGNOSIS — S43.005A SHOULDER DISLOCATION, LEFT, INITIAL ENCOUNTER: ICD-10-CM

## 2018-06-29 PROCEDURE — 97140 MANUAL THERAPY 1/> REGIONS: CPT | Mod: GP | Performed by: PHYSICAL THERAPIST

## 2018-06-29 PROCEDURE — 97110 THERAPEUTIC EXERCISES: CPT | Mod: GP | Performed by: PHYSICAL THERAPIST

## 2018-06-29 NOTE — MR AVS SNAPSHOT
After Visit Summary   6/29/2018    Karan Anglin    MRN: [id number]           Patient Information     Date Of Birth          1955        Visit Information        Provider Department      6/29/2018 4:40 PM Mitali Lockwood PT [facility] Physical Therapy        Today's Diagnoses     Shoulder dislocation, left, initial encounter        Aftercare following surgery of the musculoskeletal system           Follow-ups after your visit        Your next 10 appointments already scheduled     Jul 12, 2018  4:40 PM CDT   MATT Extremity with Mitali Lockwood PT   [facility] ([facility]  [facility]    [address] [place] [id number] [phone]            Aug 15, 2018  8:00 AM CDT   (Arrive by 7:45 AM)   RETURN SHOULDER with Rosa Joe MD   [facility] ([facility] and [facility] [place])    [address] [place] [address]  4th [employer] [address] [phone]              Who to contact     If you have questions or need follow up information about today's clinic visit or your schedule please contact [facility] PHYSICAL THERAPY directly at [phone].  Normal or non-critical lab and imaging results will be communicated to you by MyChart, letter or phone within 4 business days after the clinic has received the results. If you do not hear from us within 7 days, please contact the clinic through MyChart or phone. If you have a critical or abnormal lab result, we will notify you by phone as soon as possible.  Submit refill requests through "[employer] or call your pharmacy and they will forward the refill request to us. Please allow 3 business days for your refill to be completed.          Additional Information About Your Visit        MyChart Information     "[employer] gives you secure access to your electronic health record. If you see a primary care provider,  you can also send messages to your care team and make appointments. If you have questions, please call your primary care clinic.  If you do not have a primary care provider, please call [phone] and they will assist you.        Care EveryWhere ID     This is your Care EveryWhere ID. This could be used by other organizations to access your [place] medical records  [id number]         Blood Pressure from Last 3 Encounters:   06/14/18 130/82   05/15/18 106/80   04/04/18 (!) 146/96    Weight from Last 3 Encounters:   06/14/18 98.3 kg (216 lb 11.2 oz)   05/15/18 99.8 kg (220 lb)   04/27/18 100.2 kg (220 lb 14.4 oz)              We Performed the Following     Manual Ther Tech, 1+Regions, EA 15 min     Therapeutic Exercises        Primary Care Provider Fax #    Physician No Ref-Primary [phone]       No address on file        Equal Access to Services     PATRICIA MARQUEZ : Hadii saleem abreuo Soneftali, waaxda luqadaha, qaybta kaalmada adesabineyaanne, zac riddle . So [facility] [phone].    ATENCIÓN: Si habla español, tiene a grullon disposición servicios gratuitos de asistencia lingüística. Jovana al [phone].    We comply with applicable federal civil rights laws and [place] laws. We do not discriminate on the basis of race, color, national origin, age, disability, sex, sexual orientation, or gender identity.            Thank you!     Thank you for choosing [facility]  for your care. Our goal is always to provide you with excellent care. Hearing back from our patients is one way we can continue to improve our services. Please take a few minutes to complete the written survey that you may receive in the mail after your visit with us. Thank you!             Your Updated Medication List - Protect others around you: Learn how to safely use, store and throw away your medicines at www.disposemymeds.org.          This list is accurate as of 6/29/18  11:59 PM.  Always use your most recent med list.                   Brand Name Dispense Instructions for use Diagnosis    atenolol 50 MG tablet    TENORMIN    90 tablet    Take 1 tablet (50 mg) by mouth daily    Benign essential hypertension, Migraine without aura and without status migrainosus, not intractable       atorvastatin 10 MG tablet    LIPITOR    90 tablet    Take 2 tablets (20 mg) by mouth daily    Hyperlipidemia LDL goal <130       hydrochlorothiazide 25 MG tablet    HYDRODIURIL    90 tablet    Take 1 tablet (25 mg) by mouth daily    Benign essential hypertension       ibuprofen 600 MG tablet    ADVIL/MOTRIN    30 tablet    Take 1 tablet (600 mg) by mouth every 8 hours as needed for moderate pain        losartan 25 MG tablet    COZAAR    90 tablet    Take 1 tablet (25 mg) by mouth daily    Benign essential hypertension       NEXIUM PO      Take by mouth daily        oxyCODONE IR 5 MG tablet    ROXICODONE    40 tablet    Take 1-2 tablets (5-10 mg) by mouth every 3 hours as needed for pain or other (Moderate to Severe)    Shoulder pain, unspecified chronicity, unspecified laterality       rizatriptan 5 MG tablet    MAXALT    18 tablet    Take 1 tablet (5 mg) by mouth at onset of headache for migraine repeat after 2 hr if no relief; maximum: 30 mg/24 hours    Migraine without aura and without status migrainosus, not intractable       senna-docusate 8.6-50 MG per tablet    SENOKOT-S;PERICOLACE    30 tablet    Take 1-2 tablets by mouth 2 times daily Take while on oral narcotics to prevent or treat constipation.    Shoulder pain, unspecified chronicity, unspecified laterality

## 2018-06-30 NOTE — PROGRESS NOTES
Subjective:  HPI                    Objective:  System    Physical Exam    General     ROS    Assessment/Plan:    SUBJECTIVE  Subjective changes as noted by pt:  Pt. dean to make good progress with his L shoulder following surgery. Pt. dean to have increased ROM and less pain. He saw his surgeon this week and can no longer needs to use his sling and he can move into AROM.   Current pain level: 2/10     Changes in function:  Yes (See Goal flowsheet attached for changes in current functional level)     Adverse reaction to treatment or activity:  None    OBJECTIVE  Changes in objective findings:  PROM L sh flex 150; abd 115; ER 50. AROM L sh flex 130; abd 110; ER 40.     ASSESSMENT  Karan continues to require intervention to meet STG and LTG's: PT  Patient's symptoms are resolving.  Response to therapy has shown an improvement in  pain level and ROM   Progress made towards STG/LTG?  Yes (See Goal flowsheet attached for updates on achievement of STG and LTG)    PLAN  Continue current treatment until MD allows progression of the treatment plan.    PTA/ATC plan:  N/A    Please refer to the daily flowsheet for treatment today, total treatment time and time spent performing 1:1 timed codes.

## 2018-07-12 ENCOUNTER — THERAPY VISIT (OUTPATIENT)
Dept: PHYSICAL THERAPY | Facility: CLINIC | Age: 63
End: 2018-07-12
Payer: OTHER MISCELLANEOUS

## 2018-07-12 DIAGNOSIS — S43.005A SHOULDER DISLOCATION, LEFT, INITIAL ENCOUNTER: ICD-10-CM

## 2018-07-12 DIAGNOSIS — Z47.89 AFTERCARE FOLLOWING SURGERY OF THE MUSCULOSKELETAL SYSTEM: ICD-10-CM

## 2018-07-12 PROCEDURE — 97140 MANUAL THERAPY 1/> REGIONS: CPT | Mod: GP | Performed by: PHYSICAL THERAPIST

## 2018-07-12 PROCEDURE — 97110 THERAPEUTIC EXERCISES: CPT | Mod: GP | Performed by: PHYSICAL THERAPIST

## 2018-07-12 NOTE — PROGRESS NOTES
Subjective:  HPI                    Objective:  System    Physical Exam    General     ROS    Assessment/Plan:    SUBJECTIVE  Subjective changes as noted by pt:   Pt. cont to make steady progress with increasing L sh ROM and strength. He is able to use the arm more with ADL's.   Current pain level:  2/10   Changes in function:  Yes (See Goal flowsheet attached for changes in current functional level)     Adverse reaction to treatment or activity:  None    OBJECTIVE  Changes in objective findings: AROM L sh flex 140; abd 130; ER 50. PROM L sh flex 156; abd 140; ER 55.     ASSESSMENT  Karan continues to require intervention to meet STG and LTG's: PT  Patient is progressing as expected.  Response to therapy has shown an improvement in  pain level and ROM   Progress made towards STG/LTG?  Yes (See Goal flowsheet attached for updates on achievement of STG and LTG)    PLAN  Continue current treatment until MD allows progression of the treatment plan.    PTA/ATC plan:  N/A    Please refer to the daily flowsheet for treatment today, total treatment time and time spent performing 1:1 timed codes.

## 2018-07-12 NOTE — MR AVS SNAPSHOT
After Visit Summary   7/12/2018    Karan Anglin    MRN: [id number]           Patient Information     Date Of Birth          1955        Visit Information        Provider Department      7/12/2018 4:40 PM Mitali Lockwood PT [facility] Physical [facility]        Today's Diagnoses     Shoulder dislocation, left, initial encounter        Aftercare following surgery of the musculoskeletal system           Follow-ups after your visit        Your next 10 appointments already scheduled     Aug 02, 2018  4:40 PM CDT   MATT Extremity with Mitali Lockwood PT   [facility]    [address] [place] [id number] [phone]            Aug 15, 2018  8:00 AM CDT   (Arrive by 7:45 AM)   RETURN SHOULDER with Rosa Joe MD   [facility] ([facility] [place])    [address] [place] [address]  [address]th [employer] [address] [phone]              Who to contact     If you have questions or need follow up information about today's clinic visit or your schedule please contact [facility] PHYSICAL THERAPY directly at [phone].  Normal or non-critical lab and imaging results will be communicated to you by MyChart, letter or phone within 4 business days after the clinic has received the results. If you do not hear from us within 7 days, please contact the clinic through MyChart or phone. If you have a critical or abnormal lab result, we will notify you by phone as soon as possible.  Submit refill requests through [employer] or call your pharmacy and they will forward the refill request to us. Please allow 3 business days for your refill to be completed.          Additional Information About Your Visit        MyChart Information     [employer] gives you secure access to your electronic health record. If you see a primary care provider,  you can also send messages to your care team and make appointments. If you have questions, please call your primary care clinic.  If you do not have a primary care provider, please call [phone] and they will assist you.        Care EveryWhere ID     This is your Care EveryWhere ID. This could be used by other organizations to access your [place] medical records  [id number]         Blood Pressure from Last 3 Encounters:   06/14/18 130/82   05/15/18 106/80   04/04/18 (!) 146/96    Weight from Last 3 Encounters:   06/14/18 98.3 kg (216 lb 11.2 oz)   05/15/18 99.8 kg (220 lb)   04/27/18 100.2 kg (220 lb 14.4 oz)              We Performed the Following     Manual Ther Tech, 1+Regions, EA 15 min     Therapeutic Exercises        Primary Care Provider Fax #    Physician No Ref-Primary [phone]       No address on file        Equal Access to Services     PATRICIA MARQUEZ : Hadii saleem abreuo Soneftali, waaxda luqadaha, qaybta kaalmada adesabineyaanne, zac riddle . So [facility] [phone].    ATENCIÓN: Si habla español, tiene a grullon disposición servicios gratuitos de asistencia lingüística. Jovana al [phone].    We comply with applicable federal civil rights laws and [place] laws. We do not discriminate on the basis of race, color, national origin, age, disability, sex, sexual orientation, or gender identity.            Thank you!     Thank you for choosing [facility]ICAL THERAP[facility]  for your care. Our goal is always to provide you with excellent care. Hearing back from our patients is one way we can continue to improve our services. Please take a few minutes to complete the written survey that you may receive in the mail after your visit with us. Thank you!             Your Updated Medication List - Protect others around you: Learn how to safely use, store and throw away your medicines at www.disposemymeds.org.          This list is accurate as of 7/12/18   6:26 PM.  Always use your most recent med list.                   Brand Name Dispense Instructions for use Diagnosis    atenolol 50 MG tablet    TENORMIN    90 tablet    Take 1 tablet (50 mg) by mouth daily    Benign essential hypertension, Migraine without aura and without status migrainosus, not intractable       atorvastatin 10 MG tablet    LIPITOR    90 tablet    Take 2 tablets (20 mg) by mouth daily    Hyperlipidemia LDL goal <130       hydrochlorothiazide 25 MG tablet    HYDRODIURIL    90 tablet    Take 1 tablet (25 mg) by mouth daily    Benign essential hypertension       ibuprofen 600 MG tablet    ADVIL/MOTRIN    30 tablet    Take 1 tablet (600 mg) by mouth every 8 hours as needed for moderate pain        losartan 25 MG tablet    COZAAR    90 tablet    Take 1 tablet (25 mg) by mouth daily    Benign essential hypertension       NEXIUM PO      Take by mouth daily        oxyCODONE IR 5 MG tablet    ROXICODONE    40 tablet    Take 1-2 tablets (5-10 mg) by mouth every 3 hours as needed for pain or other (Moderate to Severe)    Shoulder pain, unspecified chronicity, unspecified laterality       rizatriptan 5 MG tablet    MAXALT    18 tablet    Take 1 tablet (5 mg) by mouth at onset of headache for migraine repeat after 2 hr if no relief; maximum: 30 mg/24 hours    Migraine without aura and without status migrainosus, not intractable       senna-docusate 8.6-50 MG per tablet    SENOKOT-S;PERICOLACE    30 tablet    Take 1-2 tablets by mouth 2 times daily Take while on oral narcotics to prevent or treat constipation.    Shoulder pain, unspecified chronicity, unspecified laterality

## 2018-08-02 ENCOUNTER — THERAPY VISIT (OUTPATIENT)
Dept: PHYSICAL THERAPY | Facility: CLINIC | Age: 63
End: 2018-08-02
Payer: OTHER MISCELLANEOUS

## 2018-08-02 DIAGNOSIS — Z47.89 AFTERCARE FOLLOWING SURGERY OF THE MUSCULOSKELETAL SYSTEM: ICD-10-CM

## 2018-08-02 DIAGNOSIS — S43.005A SHOULDER DISLOCATION, LEFT, INITIAL ENCOUNTER: ICD-10-CM

## 2018-08-02 PROCEDURE — 97140 MANUAL THERAPY 1/> REGIONS: CPT | Mod: GP | Performed by: PHYSICAL THERAPIST

## 2018-08-02 PROCEDURE — 97110 THERAPEUTIC EXERCISES: CPT | Mod: GP | Performed by: PHYSICAL THERAPIST

## 2018-08-02 NOTE — PROGRESS NOTES
Subjective:  HPI                    Objective:  System    Physical Exam    General     ROS    Assessment/Plan:    PROGRESS  REPORT    Progress reporting period is from 6-4-18 to 8-2-18.       SUBJECTIVE  Subjective changes noted by patient:   Pt. has made great progress thus far in PT with near normal ROM and improving strength. Pt. is now using the L arm normally with his ADL's. He is able to perform his daily functions without difficulty. Pt. could continue his HEP with no further PT visits needed unless increased problems arise.      Current pain level is  1/10.     Previous pain level was  4/10.   Changes in function:  Yes (See Goal flowsheet attached for changes in current functional level)  Adverse reaction to treatment or activity: None    OBJECTIVE  Changes noted in objective findings:  AROM L sh flex 159; abd 157; ER 65. PROM L sh flex 167; abd 160; ER 70. Strength L sh flex 4/5; abd 4-/5; ER 4/5     ASSESSMENT/PLAN  Updated problem list and treatment plan: Diagnosis 1:  S/p L bankhart repair  Pain -  self management and education  Decreased ROM/flexibility - manual therapy and therapeutic exercise  Decreased strength - therapeutic exercise and therapeutic activities  Impaired muscle performance - neuro re-education  Decreased function - therapeutic activities  STG/LTGs have been met or progress has been made towards goals:  Yes (See Goal flow sheet completed today.)  Assessment of Progress: Patient is meeting short term goals and is progressing towards long term goals.  Self Management Plans:  Patient is independent in a home treatment program.  Patient is independent in self management of symptoms.  I have re-evaluated this patient and find that the nature, scope, duration and intensity of the therapy is appropriate for the medical condition of the patient.  Karan continues to require the following intervention to meet STG and LTG's:  PT intervention is no longer required to meet  STG/LTG.    Recommendations:  This patient is ready to be discharged from therapy and continue their home treatment program.    Please refer to the daily flowsheet for treatment today, total treatment time and time spent performing 1:1 timed codes.

## 2018-08-02 NOTE — MR AVS SNAPSHOT
After Visit Summary   8/2/2018    Karan Anglin    MRN: [id number]           Patient Information     Date Of Birth          1955        Visit Information        Provider Department      8/2/2018 4:40 PM Mitali Lockwood PT [facility] Physical Therapy        Today's Diagnoses     Shoulder dislocation, left, initial encounter        Aftercare following surgery of the musculoskeletal system           Follow-ups after your visit        Your next 10 appointments already scheduled     Aug 15, 2018  8:00 AM CDT   (Arrive by 7:45 AM)   RETURN SHOULDER with Rosa Joe MD   [facility] ([facility] [place])    [address] [place] [address]  4th [facility] [address] [phone]              Who to contact     If you have questions or need follow up information about today's clinic visit or your schedule please contact [facility] PHYSICAL THERAPY directly at [phone].  Normal or non-critical lab and imaging results will be communicated to you by MyChart, letter or phone within 4 business days after the clinic has received the results. If you do not hear from us within 7 days, please contact the clinic through [employer] or phone. If you have a critical or abnormal lab result, we will notify you by phone as soon as possible.  Submit refill requests through [employer] or call your pharmacy and they will forward the refill request to us. Please allow 3 business days for your refill to be completed.          Additional Information About Your Visit        [employer] Information     [employer] gives you secure access to your electronic health record. If you see a primary care provider, you can also send messages to your care team and make appointments. If you have questions, please call your primary care clinic.  If you do not have a primary care provider, please call [phone] and they will assist you.         Care EveryWhere ID     This is your Care EveryWhere ID. This could be used by other organizations to access your [place] medical records  [id number]         Blood Pressure from Last 3 Encounters:   06/14/18 130/82   05/15/18 106/80   04/04/18 (!) 146/96    Weight from Last 3 Encounters:   06/14/18 98.3 kg (216 lb 11.2 oz)   05/15/18 99.8 kg (220 lb)   04/27/18 100.2 kg (220 lb 14.4 oz)              We Performed the Following     MATT Progress Notes Report     Manual Ther Tech, 1+Regions, EA 15 min     Therapeutic Exercises        Primary Care Provider Fax #    Physician No Ref-Primary [phone]       No address on file        Equal Access to Services     PATRICIA MARQUEZ : Hadsydnie abreuo [place], waaxda luqadaha, qaybta kaalmada adeegyada, zac riddle . So [facility] [phone].    ATENCIÓN: Si habla español, tiene a grullon disposición servicios gratuitos de asistencia lingüística. Ll[facility] [phone].    We comply with applicable federal civil rights laws and [place] laws. We do not discriminate on the basis of race, color, national origin, age, disability, sex, sexual orientation, or gender identity.            Thank you!     Thank you for choosing [facility] THERAP[facility]  for your care. Our goal is always to provide you with excellent care. Hearing back from our patients is one way we can continue to improve our services. Please take a few minutes to complete the written survey that you may receive in the mail after your visit with us. Thank you!             Your Updated Medication List - Protect others around you: Learn how to safely use, store and throw away your medicines at www.disposemymeds.org.          This list is accurate as of 8/2/18  6:14 PM.  Always use your most recent med list.                   Brand Name Dispense Instructions for use Diagnosis    atenolol 50 MG tablet    TENORMIN    90 tablet    Take 1 tablet (50 mg) by mouth  daily    Benign essential hypertension, Migraine without aura and without status migrainosus, not intractable       atorvastatin 10 MG tablet    LIPITOR    90 tablet    Take 2 tablets (20 mg) by mouth daily    Hyperlipidemia LDL goal <130       hydrochlorothiazide 25 MG tablet    HYDRODIURIL    90 tablet    Take 1 tablet (25 mg) by mouth daily    Benign essential hypertension       ibuprofen 600 MG tablet    ADVIL/MOTRIN    30 tablet    Take 1 tablet (600 mg) by mouth every 8 hours as needed for moderate pain        losartan 25 MG tablet    COZAAR    90 tablet    Take 1 tablet (25 mg) by mouth daily    Benign essential hypertension       NEXIUM PO      Take by mouth daily        oxyCODONE IR 5 MG tablet    ROXICODONE    40 tablet    Take 1-2 tablets (5-10 mg) by mouth every 3 hours as needed for pain or other (Moderate to Severe)    Shoulder pain, unspecified chronicity, unspecified laterality       rizatriptan 5 MG tablet    MAXALT    18 tablet    Take 1 tablet (5 mg) by mouth at onset of headache for migraine repeat after 2 hr if no relief; maximum: 30 mg/24 hours    Migraine without aura and without status migrainosus, not intractable       senna-docusate 8.6-50 MG per tablet    SENOKOT-S;PERICOLACE    30 tablet    Take 1-2 tablets by mouth 2 times daily Take while on oral narcotics to prevent or treat constipation.    Shoulder pain, unspecified chronicity, unspecified laterality

## 2018-08-15 ENCOUNTER — OFFICE VISIT (OUTPATIENT)
Dept: ORTHOPEDICS | Facility: CLINIC | Age: 63
End: 2018-08-15
Payer: OTHER MISCELLANEOUS

## 2018-08-15 ENCOUNTER — RADIANT APPOINTMENT (OUTPATIENT)
Dept: GENERAL RADIOLOGY | Facility: CLINIC | Age: 63
End: 2018-08-15
Attending: ORTHOPAEDIC SURGERY
Payer: OTHER MISCELLANEOUS

## 2018-08-15 DIAGNOSIS — Z09 FOLLOW-UP EXAMINATION FOLLOWING SURGERY: ICD-10-CM

## 2018-08-15 DIAGNOSIS — Z09 FOLLOW-UP EXAMINATION FOLLOWING SURGERY: Primary | ICD-10-CM

## 2018-08-15 DIAGNOSIS — M25.522 LEFT ELBOW PAIN: ICD-10-CM

## 2018-08-15 NOTE — LETTER
8/15/2018       RE: Karan Anglin  [address]  [place] [address]     Dear Colleague,    Thank you for referring your patient, Karan Anglin, to the [facility]. Please see a copy of my visit note below.    CHIEF CONCERN: Status post left glenoid ORIF (large bony bankart)  DATE OF SURGERY: 5/15/18    HISTORY OF PRESENT ILLNESS: Mr. Anglin is an extremely pleasant 63 year-old gentleman who is now 3 months status post the above procedure.  He notes his shoulder has improved quite a bit - his motion is near normal with good progress in PT. He notes that his left elbow is more uncomfortable than his shoulder. His swelling there comes and goes. His pain is more deep than superficial. He denies numbness or tingling.    EXAM:  Pleasant adult male in NAD.  Respirations even and unlabored.  Left upper extremity: Incision well healed.  ROM Left shoulder flexion 160 , external rotation to 75 , Internal rotation to L5. Distally neurovascularly intact without deficits.  Left elbow demonstrates full ROM in flex/ext/pronation and supination. No significant bursal swelling today. No tenderness over medial or lateral condyle.    IMAGING:  Left shoulder xrays demonstrate post-surgical changes with improved alignment of the bony bankart fragment. Shoulder concentrically reduced.  Left elbow xray demonstrates mild spurring at the coronoid. Small enthesophyte at the lateral condyle.     ASSESSMENT:  1. Three months status post above procedure    PLAN:  Encouraged patient to continue activities as tolerated for stretching and strengthening. Discussed OTC pain management options for left elbow. Encourage ROM. If not improving patient to call or return.  Otherwise return 3 months or prn      Again, thank you for allowing me to participate in the care of your patient.      Sincerely,    Rosa Joe MD

## 2018-08-15 NOTE — NURSING NOTE
Reason For Visit:   Chief Complaint   Patient presents with     Surgical Followup     3 month pop left glenoid ORIF.  DOS: 5/15/2018       PCP: No Ref-Primary, Physician  Ref: Dr. Arroyo      ?  No  Occupation  and .  Currently working? Yes.  Work status?  Full time.  Date of injury: 3/27/2018  Type of injury: fell outside on the ice and dislocated his shoulder.    4/4/2018  Second dislocation happened when he was putting on a shirt.  Date of surgery: 5/15/2018  Type of surgery: Left open bony bankart repair/glenoid open reduction internal fixation  Smoker: No  Request smoking cessation information: No      Left hand dominant    SANE score  Affected shoulder: Left  Right shoulder SANE: 100  Left shoulder SANE: 90    There were no vitals taken for this visit.      Pain Assessment  Patient Currently in Pain: Yes  0-10 Pain Scale: 1  Primary Pain Location: Shoulder  Pain Orientation: Left  Pain Descriptors: Other (comment) (twinge)  Aggravating Factors: Other (comment), Lying (overuse)    Ernestine Newell, ATC

## 2018-08-15 NOTE — LETTER
Karan Anglin     1955  Encounter date/time:  08/15/2018  [id number]    Report of Workability    Physician:  Rosa Joe MD      Diagnosis:  Status post recent left shoulder surgery      Limitations:  No push, pull, lift, carry with left arm more than 15 pounds.  No repetitive shoulder height or overhead work with left arm.      Return to work status: Allowed to work WITH limitations above.      Follow-Up:   Return to clinic in 2-3 months.

## 2018-08-15 NOTE — MR AVS SNAPSHOT
After Visit Summary   8/15/2018    Karan Anglin    MRN: [id number]           Patient Information     Date Of Birth          1955        Visit Information        Provider Department      8/15/2018 8:00 AM Rosa Joe MD [facility]        Today's Diagnoses     Follow-up examination following surgery    -  1    Left elbow pain           Follow-ups after your visit        Follow-up notes from your care team     Return if symptoms worsen or fail to improve.      Who to contact     Please call your clinic at [phone] to:    Ask questions about your health    Make or cancel appointments    Discuss your medicines    Learn about your test results    Speak to your doctor            Additional Information About Your Visit        Neoc[employer] Information     [employer] gives you secure access to your electronic health record. If you see a primary care provider, you can also send messages to your care team and make appointments. If you have questions, please call your primary care clinic.  If you do not have a primary care provider, please call [phone] and they will assist you.      [employer] is an electronic gateway that provides easy, online access to your medical records. With [employer], you can request a clinic appointment, read your test results, renew a prescription or communicate with your care team.     To access your existing account, please contact your [facility] or call [phone] for assistance.        Care EveryWhere ID     This is your Care EveryWhere ID. This could be used by other organizations to access your [place] medical records  [id number]         Blood Pressure from Last 3 Encounters:   06/14/18 130/82   05/15/18 106/80   04/04/18 (!) 146/96    Weight from Last 3 Encounters:   06/14/18 98.3 kg (216 lb 11.2 oz)   05/15/18 99.8 kg (220 lb)   04/27/18 100.2 kg (220 lb 14.4 oz)                 Today's Medication Changes          These  changes are accurate as of 8/15/18 11:59 PM.  If you have any questions, ask your nurse or doctor.               Start taking these medicines.        Dose/Directions    diclofenac 1 % Gel topical gel   Commonly known as:  VOLTAREN   Used for:  Left elbow pain   Started by:  Rosa Joe MD        Apply 2 grams to elbow as needed up to four times daily using enclosed dosing card.   Quantity:  100 g   Refills:  1            Where to get your medicines      These medications were sent to [facility] [address] [facility] [address]  [address], [facility] [address]     Phone:  [phone]     diclofenac 1 % Gel topical gel                Primary Care Provider Fax #    Physician No Ref-Primary [phone]       No address on file        Equal Access to Services     PATRICIA MARQUEZ : [place] catalan Soneftali, waaxda luqadaha, qaybta kaalmada adeegyada, zac riddle . So [facility] [phone].    ATENCIÓN: Si habla español, tiene a grullon disposición servicios gratuitos de asistencia lingüística. Llame al [phone].    We comply with applicable federal civil rights laws and [place] laws. We do not discriminate on the basis of race, color, national origin, age, disability, sex, sexual orientation, or gender identity.            Thank you!     Thank you for choosing [facility]  for your care. Our goal is always to provide you with excellent care. Hearing back from our patients is one way we can continue to improve our services. Please take a few minutes to complete the written survey that you may receive in the mail after your visit with us. Thank you!             Your Updated Medication List - Protect others around you: Learn how to safely use, store and throw away your medicines at www.disposemymeds.org.          This list is accurate as of 8/15/18 11:59 PM.  Always use your most recent med list.                   Brand Name Dispense  Instructions for use Diagnosis    atenolol 50 MG tablet    TENORMIN    90 tablet    Take 1 tablet (50 mg) by mouth daily    Benign essential hypertension, Migraine without aura and without status migrainosus, not intractable       atorvastatin 10 MG tablet    LIPITOR    90 tablet    Take 2 tablets (20 mg) by mouth daily    Hyperlipidemia LDL goal <130       diclofenac 1 % Gel topical gel    VOLTAREN    100 g    Apply 2 grams to elbow as needed up to four times daily using enclosed dosing card.    Left elbow pain       hydrochlorothiazide 25 MG tablet    HYDRODIURIL    90 tablet    Take 1 tablet (25 mg) by mouth daily    Benign essential hypertension       ibuprofen 600 MG tablet    ADVIL/MOTRIN    30 tablet    Take 1 tablet (600 mg) by mouth every 8 hours as needed for moderate pain        losartan 25 MG tablet    COZAAR    90 tablet    Take 1 tablet (25 mg) by mouth daily    Benign essential hypertension       NEXIUM PO      Take by mouth daily        oxyCODONE IR 5 MG tablet    ROXICODONE    40 tablet    Take 1-2 tablets (5-10 mg) by mouth every 3 hours as needed for pain or other (Moderate to Severe)    Shoulder pain, unspecified chronicity, unspecified laterality       rizatriptan 5 MG tablet    MAXALT    18 tablet    Take 1 tablet (5 mg) by mouth at onset of headache for migraine repeat after 2 hr if no relief; maximum: 30 mg/24 hours    Migraine without aura and without status migrainosus, not intractable       senna-docusate 8.6-50 MG per tablet    SENOKOT-S;PERICOLACE    30 tablet    Take 1-2 tablets by mouth 2 times daily Take while on oral narcotics to prevent or treat constipation.    Shoulder pain, unspecified chronicity, unspecified laterality

## 2018-09-18 DIAGNOSIS — G43.009 MIGRAINE WITHOUT AURA AND WITHOUT STATUS MIGRAINOSUS, NOT INTRACTABLE: ICD-10-CM

## 2018-09-18 DIAGNOSIS — I10 BENIGN ESSENTIAL HYPERTENSION: ICD-10-CM

## 2018-09-18 RX ORDER — ATENOLOL 50 MG/1
50 TABLET ORAL DAILY
Qty: 90 TABLET | Refills: 0 | Status: SHIPPED | OUTPATIENT
Start: 2018-09-18 | End: 2018-10-25

## 2018-09-18 NOTE — TELEPHONE ENCOUNTER
"Requested Prescriptions   Pending Prescriptions Disp Refills     atenolol (TENORMIN) 50 MG tablet    Last Written Prescription Date:  9/20/17  Last Fill Quantity: 90,  # refills: 3   Last office visit: 6/14/2018 with prescribing provider:  6/14/18   Future Office Visit:     90 tablet 3     Sig: Take 1 tablet (50 mg) by mouth daily    Beta-Blockers Protocol Passed    9/18/2018 10:46 AM       Passed - Blood pressure under 140/90 in past 12 months    BP Readings from Last 3 Encounters:   06/14/18 130/82   05/15/18 106/80   04/04/18 (!) 146/96                Passed - Patient is age 6 or older       Passed - Recent (12 mo) or future (30 days) visit within the authorizing provider's specialty    Patient had office visit in the last 12 months or has a visit in the next 30 days with authorizing provider or within the authorizing provider's specialty.  See \"Patient Info\" tab in inbasket, or \"Choose Columns\" in Meds & Orders section of the refill encounter.              "

## 2018-09-18 NOTE — TELEPHONE ENCOUNTER
"Requested Prescriptions   Pending Prescriptions Disp Refills     atenolol (TENORMIN) 50 MG tablet 90 tablet 3    Last Written Prescription Date:  09/20/2017  Last Fill Quantity: 90,  # refills: 3   Last office visit: 6/14/2018 with prescribing provider:  06/14/2018   Future Office Visit:     Sig: Take 1 tablet (50 mg) by mouth daily    Beta-Blockers Protocol Passed    9/18/2018 10:46 AM       Passed - Blood pressure under 140/90 in past 12 months    BP Readings from Last 3 Encounters:   06/14/18 130/82   05/15/18 106/80   04/04/18 (!) 146/96                Passed - Patient is age 6 or older       Passed - Recent (12 mo) or future (30 days) visit within the authorizing provider's specialty    Patient had office visit in the last 12 months or has a visit in the next 30 days with authorizing provider or within the authorizing provider's specialty.  See \"Patient Info\" tab in inbasket, or \"Choose Columns\" in Meds & Orders section of the refill encounter.              "

## 2018-09-18 NOTE — TELEPHONE ENCOUNTER
Due for appt in Nov per last clinic note.  Refilled one time and this noted on refill for reminder    Mychart message sent to pt as well    Bree Angelo, RN, BSN

## 2018-10-24 NOTE — PROGRESS NOTES
"  SUBJECTIVE:   Karan Anglin is a 63 year old male who presents to clinic today for the following health issues:    Hypertension Follow-up      Outpatient blood pressures are being checked at home.  Results are around 130/80.    Low Salt Diet: no added salt    Amount of exercise or physical activity: 4-5 days/week for an average of 30-45 minutes    Problems taking medications regularly: No    Medication side effects: none    Diet: regular (no restrictions)    Migraine Follow-Up    Headaches symptoms:  Stable     Frequency: once every other week      Duration of headaches: about 45 minuets with medication     Able to do normal daily activities/work with migraines: No     Rescue/Relief medication:Maxalt              Effectiveness: moderate relief    Preventative medication: None    Neurologic complications: No new stroke-like symptoms, loss of vision or speech, numbness or weakness    In the past 4 weeks, how often have you gone to Urgent Care or the emergency room because of your headaches?  0    Questions/Concerns: refill on maxalt.        Interval History: Patient was followed up for BP and migraine on 6/14/2018.      Update today: Patient stated  that he is feeling much better and he would like a refill of this medications. His migraine usually gets worse in the winter.  He also stated that the left side of his  great toes hurts. \" I changed my shoe and have a bigger size and it still hurts, I thinks it is a bunion.\". Denies any trauma or injury and  fever.     ROS:  Constitutional, HEENT, cardiovascular, pulmonary, gi and gu systems are negative, except as otherwise noted.    OBJECTIVE:     /86  Pulse 73  Temp 98  F (36.7  C) (Oral)  Wt 225 lb 3.2 oz (102.2 kg)  SpO2 96%  BMI 31.43 kg/m2  Body mass index is 31.43 kg/(m^2).  GENERAL: healthy, alert and no distress  EYES: Eyes grossly normal to inspection, PERRL and conjunctivae and sclerae normal  HENT: ear canals and TM's normal, nose and mouth without " ulcers or lesions  NECK: no adenopathy, no asymmetry, masses, or scars and thyroid normal to palpation  RESP: lungs clear to auscultation - no rales, rhonchi or wheezes  CV: regular rate and rhythm, normal S1 S2, no S3 or S4, no murmur, click or rub, no peripheral edema and peripheral pulses strong  ABDOMEN: soft, nontender, no hepatosplenomegaly, no masses and bowel sounds normal  MS: no gross musculoskeletal defects noted, no edema and lateral deviation of left hallux   SKIN: no suspicious lesions or rashes  NEURO: Normal strength and tone, mentation intact and speech normal  PSYCH: mentation appears normal, affect normal/bright    ASSESSMENT/PLAN:     (I10) Benign essential hypertension  (primary encounter diagnosis)  Comment:  controlled  Plan: atenolol (TENORMIN) 50 MG tablet, OPHTHALMOLOGY        ADULT REFERRAL          (G43.009) Migraine without aura and without status migrainosus, not intractable  Comment: controlled  Plan: rizatriptan (MAXALT) 5 MG tablet, atenolol         (TENORMIN) 50 MG tablet          M20.12) Hallux valgus, acquired, left  Comment:  Gout Vs Hallux valgus no red flag symptoms concerning for gout; that affected area has  lateral deviation of left hallux no fever, severe pain, redness, warmth, swelling.    Plan: PODIATRY/FOOT & ANKLE SURGERY REFERRAL    (Z23) Need for shingles vaccine  Plan: ZOSTER VACCINE RECOMBINANT ADJUVANTED IM NJX         (R80.9) Microalbuminuria      Patient Instructions   Every Woman's Guide to Foot Pain Relief by Rupa Oconnell    Schedule the ophthalmology    Come fasting to your next visit    Follow-up in six months    Present and preformed physical exam with student nurse-family practice. The patient was evaluated and managed, by Ayanna Philip RN, student NP RN, SNP in collaboration with JEFFERSON Isbell, BABAK supervising clinical preceptor.    Documentation written by JEFFERSON Isbell CNP    [facility]

## 2018-10-25 ENCOUNTER — OFFICE VISIT (OUTPATIENT)
Dept: FAMILY MEDICINE | Facility: CLINIC | Age: 63
End: 2018-10-25
Payer: COMMERCIAL

## 2018-10-25 VITALS
WEIGHT: 225.2 LBS | BODY MASS INDEX: 31.43 KG/M2 | TEMPERATURE: 98 F | DIASTOLIC BLOOD PRESSURE: 86 MMHG | OXYGEN SATURATION: 96 % | SYSTOLIC BLOOD PRESSURE: 132 MMHG | HEART RATE: 73 BPM

## 2018-10-25 DIAGNOSIS — I10 BENIGN ESSENTIAL HYPERTENSION: Primary | ICD-10-CM

## 2018-10-25 DIAGNOSIS — G43.009 MIGRAINE WITHOUT AURA AND WITHOUT STATUS MIGRAINOSUS, NOT INTRACTABLE: ICD-10-CM

## 2018-10-25 DIAGNOSIS — R80.9 MICROALBUMINURIA: ICD-10-CM

## 2018-10-25 DIAGNOSIS — M20.12 HALLUX VALGUS, ACQUIRED, LEFT: ICD-10-CM

## 2018-10-25 DIAGNOSIS — Z23 NEED FOR SHINGLES VACCINE: ICD-10-CM

## 2018-10-25 PROCEDURE — 82043 UR ALBUMIN QUANTITATIVE: CPT | Performed by: NURSE PRACTITIONER

## 2018-10-25 PROCEDURE — 80048 BASIC METABOLIC PNL TOTAL CA: CPT | Performed by: NURSE PRACTITIONER

## 2018-10-25 PROCEDURE — 99214 OFFICE O/P EST MOD 30 MIN: CPT | Mod: 25 | Performed by: NURSE PRACTITIONER

## 2018-10-25 PROCEDURE — 90471 IMMUNIZATION ADMIN: CPT | Performed by: NURSE PRACTITIONER

## 2018-10-25 PROCEDURE — 36415 COLL VENOUS BLD VENIPUNCTURE: CPT | Performed by: NURSE PRACTITIONER

## 2018-10-25 PROCEDURE — 90750 HZV VACC RECOMBINANT IM: CPT | Performed by: NURSE PRACTITIONER

## 2018-10-25 RX ORDER — ATENOLOL 50 MG/1
50 TABLET ORAL DAILY
Qty: 90 TABLET | Refills: 1 | Status: SHIPPED | OUTPATIENT
Start: 2018-10-25 | End: 2019-06-03

## 2018-10-25 RX ORDER — RIZATRIPTAN BENZOATE 5 MG/1
5 TABLET ORAL
Qty: 18 TABLET | Refills: 1 | Status: SHIPPED | OUTPATIENT
Start: 2018-10-25 | End: 2019-01-25

## 2018-10-25 NOTE — PATIENT INSTRUCTIONS
Every Woman's Guide to Foot Pain Relief by Rupa Oconnell    Schedule the ophthalmology    Come fasting to your next visit    Follow-up in six months

## 2018-10-25 NOTE — MR AVS SNAPSHOT
After Visit Summary   10/25/2018    Karan Anglin    MRN: [id number]           Patient Information     Date Of Birth          1955        Visit Information        Provider Department      10/25/2018 3:00 PM Romelia Judd APRN [facility]        Today's Diagnoses     Benign essential hypertension    -  1    Migraine without aura and without status migrainosus, not intractable        Hallux valgus, acquired, left        Need for shingles vaccine        Microalbuminuria          Care Instructions    Every Woman's Guide to Foot Pain Relief by Rupa Oconnell    Schedule the ophthalmology    Come fasting to your next visit    Follow-up in six months          Follow-ups after your visit        Additional Services     OPHTHALMOLOGY ADULT REFERRAL       Your provider has referred you to: [facility]: [facility] [phone]   [email or web address]    Please be aware that coverage of these services is subject to the terms and limitations of your health insurance plan.  Call member services at your health plan with any benefit or coverage questions.      Please bring the following with you to your appointment:    (1) Any X-Rays, CTs or MRIs which have been performed.  Contact the facility where they were done to arrange for  prior to your scheduled appointment.    (2) List of current medications  (3) This referral request   (4) Any documents/labs given to you for this referral            PODIATRY/FOOT & ANKLE SURGERY REFERRAL       Your provider [facility] you [facility] [phone]   [email or web address]    Please be aware that coverage of these services is subject to the terms and limitations of your health insurance plan.  Call member services at your health plan with any benefit or coverage questions.      Please bring the following to your appointment:  >>   Any x-rays, CTs or MRIs  which have been performed.  Contact the facility where they were done to arrange for  prior to your scheduled appointment.    >>   List of current medications   >>   This referral request   >>   Any documents/labs given to you for this referral                  Follow-up notes from your care team     Return in about 6 months (around 4/25/2019) for BP Recheck.      Who to contact     If you have questions or need follow up information about today's clinic visit or your schedule please contact [facility] directly at [phone].  Normal or non-critical lab and imaging results will be communicated to you by [employer], letter or phone within 4 business days after the clinic has received the results. If you do not hear from us within 7 days, please contact the clinic through [employer] or phone. If you have a critical or abnormal lab result, we will notify you by phone as soon as possible.  Submit refill requests through [employer] or call your pharmacy and they will forward the refill request to us. Please allow 3 business days for your refill to be completed.          Additional Information About Your Visit        [employer] Information     [employer] gives you secure access to your electronic health record. If you see a primary care provider, you can also send messages to your care team and make appointments. If you have questions, please call your primary care clinic.  If you do not have a primary care provider, please call [phone] and they will assist you.        Care EveryWhere ID     This is your Care EveryWhere ID. This could be used by other organizations to access your [place] medical records  [id number]        Your Vitals Were     Pulse Temperature Pulse Oximetry BMI (Body Mass Index)          73 98  F (36.7  C) (Oral) 96% 31.43 kg/m2         Blood Pressure from Last 3 Encounters:   10/25/18 132/86   06/14/18 130/82   05/15/18 106/80    Weight from Last 3 Encounters:   10/25/18 225 lb 3.2 oz  (102.2 kg)   06/14/18 216 lb 11.2 oz (98.3 kg)   05/15/18 220 lb (99.8 kg)              We Performed the Following     Albumin Random Urine Quantitative with Creat Ratio     Basic metabolic panel     OPHTHALMOLOGY ADULT REFERRAL     PODIATRY/FOOT & ANKLE SURGERY REFERRAL     ZOSTER VACCINE RECOMBINANT ADJUVANTED IM NJX          Today's Medication Changes          These changes are accurate as of 10/25/18  3:39 PM.  If you have any questions, ask your nurse or doctor.               These medicines have changed or have updated prescriptions.        Dose/Directions    atenolol 50 MG tablet   Commonly known as:  TENORMIN   This may have changed:  additional instructions   Used for:  Benign essential hypertension, Migraine without aura and without status migrainosus, not intractable   Changed by:  Romelia Judd, JEFFERSON HILLIARD        Dose:  50 mg   Take 1 tablet (50 mg) by mouth daily   Quantity:  90 tablet   Refills:  1            Where to get your medicines      These medications were sent to [facility] [address] [place] [address] [address] [address] [facility] [facility] [address]     Phone:  [phone]     atenolol 50 MG tablet    rizatriptan 5 MG tablet                Primary Care Provider Fax #    Physician No Ref-Primary [phone]       No address on file        Equal Access to Services     PATRICIA MARQUEZ : Hadii saleem ku hadasho Soomaali, waaxda luqadaha, qaybta kaalmada adeegyada, waxay jeffy haylefty gilbert. So [facility] [phone].    ATENCIÓN: Si habla español, tiene a grullon disposición servicios gratuitos de asistencia lingüística. Llame al [phone].    We comply with applicable federal civil rights laws and [place] laws. We do not discriminate on the basis of race, color, national origin, age, disability, sex, sexual orientation, or gender identity.            Thank you!     Thank you for choosing [facility]  for your care. Our goal is always to provide you with  excellent care. Hearing back from our patients is one way we can continue to improve our services. Please take a few minutes to complete the written survey that you may receive in the mail after your visit with us. Thank you!             Your Updated Medication List - Protect others around you: Learn how to safely use, store and throw away your medicines at www.disposemymeds.org.          This list is accurate as of 10/25/18  3:39 PM.  Always use your most recent med list.                   Brand Name Dispense Instructions for use Diagnosis    atenolol 50 MG tablet    TENORMIN    90 tablet    Take 1 tablet (50 mg) by mouth daily    Benign essential hypertension, Migraine without aura and without status migrainosus, not intractable       atorvastatin 10 MG tablet    LIPITOR    90 tablet    Take 2 tablets (20 mg) by mouth daily    Hyperlipidemia LDL goal <130       diclofenac 1 % Gel topical gel    VOLTAREN    100 g    Apply 2 grams to elbow as needed up to four times daily using enclosed dosing card.    Left elbow pain       hydrochlorothiazide 25 MG tablet    HYDRODIURIL    90 tablet    Take 1 tablet (25 mg) by mouth daily    Benign essential hypertension       ibuprofen 600 MG tablet    ADVIL/MOTRIN    30 tablet    Take 1 tablet (600 mg) by mouth every 8 hours as needed for moderate pain        losartan 25 MG tablet    COZAAR    90 tablet    Take 1 tablet (25 mg) by mouth daily    Benign essential hypertension       NEXIUM PO      Take by mouth daily        rizatriptan 5 MG tablet    MAXALT    18 tablet    Take 1 tablet (5 mg) by mouth at onset of headache for migraine repeat after 2 hr if no relief; maximum: 30 mg/24 hours    Migraine without aura and without status migrainosus, not intractable

## 2018-10-26 LAB
ANION GAP SERPL CALCULATED.3IONS-SCNC: 6 MMOL/L (ref 3–14)
BUN SERPL-MCNC: 21 MG/DL (ref 7–30)
CALCIUM SERPL-MCNC: 9.7 MG/DL (ref 8.5–10.1)
CHLORIDE SERPL-SCNC: 99 MMOL/L (ref 94–109)
CO2 SERPL-SCNC: 29 MMOL/L (ref 20–32)
CREAT SERPL-MCNC: 1.26 MG/DL (ref 0.66–1.25)
GFR SERPL CREATININE-BSD FRML MDRD: 58 ML/MIN/1.7M2
GLUCOSE SERPL-MCNC: 92 MG/DL (ref 70–99)
POTASSIUM SERPL-SCNC: 3.5 MMOL/L (ref 3.4–5.3)
SODIUM SERPL-SCNC: 134 MMOL/L (ref 133–144)

## 2018-10-27 LAB
CREAT UR-MCNC: 66 MG/DL
MICROALBUMIN UR-MCNC: 63 MG/L
MICROALBUMIN/CREAT UR: 94.86 MG/G CR (ref 0–17)

## 2018-11-15 DIAGNOSIS — E78.5 HYPERLIPIDEMIA LDL GOAL <130: ICD-10-CM

## 2018-11-15 NOTE — TELEPHONE ENCOUNTER
,    Please review/sign or advise for refill request of atorvastatin (LIPITOR) 10 MG tablet.    Patient last seen 10/25/2018     Would you like patient to come in for lab only visit to update lipid panel? Last drawn 09/20/2017    Lab cued. Please advise    Thank You!  Abiola Cano, RN  Triage Nurse

## 2018-11-15 NOTE — TELEPHONE ENCOUNTER
"Requested Prescriptions   Pending Prescriptions Disp Refills     atorvastatin (LIPITOR) 10 MG tablet    Last Written Prescription Date:  10/4/17  Last Fill Quantity: 90,  # refills: 3   Last office visit: 10/25/2018 with prescribing provider:  10/25/18   Future Office Visit:     90 tablet 3     Sig: Take 2 tablets (20 mg) by mouth daily    Statins Protocol Failed    11/15/2018  9:07 AM       Failed - LDL on file in past 12 months    Recent Labs   Lab Test  09/20/17   1427   LDL  85            Passed - No abnormal creatine kinase in past 12 months    No lab results found.            Passed - Recent (12 mo) or future (30 days) visit within the authorizing provider's specialty    Patient had office visit in the last 12 months or has a visit in the next 30 days with authorizing provider or within the authorizing provider's specialty.  See \"Patient Info\" tab in inbasket, or \"Choose Columns\" in Meds & Orders section of the refill encounter.             Passed - Patient is age 18 or older          "

## 2018-11-16 RX ORDER — ATORVASTATIN CALCIUM 10 MG/1
20 TABLET, FILM COATED ORAL DAILY
Qty: 30 TABLET | Refills: 0 | Status: SHIPPED | OUTPATIENT
Start: 2018-11-16 | End: 2018-12-03

## 2018-12-03 DIAGNOSIS — E78.5 HYPERLIPIDEMIA LDL GOAL <130: ICD-10-CM

## 2018-12-03 RX ORDER — ATORVASTATIN CALCIUM 10 MG/1
20 TABLET, FILM COATED ORAL DAILY
Qty: 30 TABLET | Refills: 0 | Status: SHIPPED | OUTPATIENT
Start: 2018-12-03 | End: 2018-12-13

## 2018-12-03 NOTE — TELEPHONE ENCOUNTER
"Requested Prescriptions   Pending Prescriptions Disp Refills     atorvastatin (LIPITOR) 10 MG tablet 30 tablet 0    Last Written Prescription Date:  11/16/18  Last Fill Quantity: 30,  # refills: 0   Last office visit: 10/25/2018 with prescribing provider:  10/25/18   Future Office Visit:     Sig: Take 2 tablets (20 mg) by mouth daily    Statins Protocol Failed    12/3/2018 12:13 PM       Failed - LDL on file in past 12 months    Recent Labs   Lab Test  09/20/17   1427   LDL  85            Passed - No abnormal creatine kinase in past 12 months    No lab results found.            Passed - Recent (12 mo) or future (30 days) visit within the authorizing provider's specialty    Patient had office visit in the last 12 months or has a visit in the next 30 days with authorizing provider or within the authorizing provider's specialty.  See \"Patient Info\" tab in inbasket, or \"Choose Columns\" in Meds & Orders section of the refill encounter.             Passed - Patient is age 18 or older          "

## 2018-12-03 NOTE — TELEPHONE ENCOUNTER
Routing refill request to provider for review/approval because:  Nazanin given x1 and patient did not follow up, please advise    Thank you,  Denise Humphrey RN

## 2018-12-06 DIAGNOSIS — I10 BENIGN ESSENTIAL HYPERTENSION: ICD-10-CM

## 2018-12-06 RX ORDER — HYDROCHLOROTHIAZIDE 25 MG/1
25 TABLET ORAL DAILY
Qty: 90 TABLET | Refills: 1 | Status: SHIPPED | OUTPATIENT
Start: 2018-12-06 | End: 2019-06-03

## 2018-12-06 RX ORDER — LOSARTAN POTASSIUM 25 MG/1
25 TABLET ORAL DAILY
Qty: 90 TABLET | Refills: 1 | Status: SHIPPED | OUTPATIENT
Start: 2018-12-06 | End: 2019-06-03

## 2018-12-06 NOTE — TELEPHONE ENCOUNTER
,    Please review/sign or advise for refill request losartan (COZAAR) 25 MG tablet    Routing refill request to provider for review/approval because:  Labs out of range:  Creat - 1.26 (10/25/2018)    LOV: 10/25/2018 HTN noted as controlled, f/u in 6months.    Thank You!  Abiola Cano, RN  Triage Nurse

## 2018-12-06 NOTE — TELEPHONE ENCOUNTER
"Requested Prescriptions   Pending Prescriptions Disp Refills     losartan (COZAAR) 25 MG tablet 90 tablet 1    Last Written Prescription Date:  6/14/18  Last Fill Quantity: 90,  # refills: 1   Last office visit: 10/25/2018 with prescribing provider:  10/25/18   Future Office Visit Sig: Take 1 tablet (25 mg) by mouth daily    Angiotensin-II Receptors Failed    12/6/2018  1:08 PM       Failed - Normal serum creatinine on file in past 12 months    Recent Labs   Lab Test  10/25/18   1546   CR  1.26*            Passed - Blood pressure under 140/90 in past 12 months    BP Readings from Last 3 Encounters:   10/25/18 132/86   06/14/18 130/82   05/15/18 106/80                Passed - Recent (12 mo) or future (30 days) visit within the authorizing provider's specialty    Patient had office visit in the last 12 months or has a visit in the next 30 days with authorizing provider or within the authorizing provider's specialty.  See \"Patient Info\" tab in inbasket, or \"Choose Columns\" in Meds & Orders section of the refill encounter.             Passed - Patient is age 18 or older       Passed - Normal serum potassium on file in past 12 months    Recent Labs   Lab Test  10/25/18   1546   POTASSIUM  3.5                    hydrochlorothiazide (HYDRODIURIL) 25 MG tablet 90 tablet 1    Last Written Prescription Date:  6/14/18  Last Fill Quantity: 90,  # refills: 1   Last office visit: 10/25/2018 with prescribing provider:  10/25/18   Future Office Visit:     Sig: Take 1 tablet (25 mg) by mouth daily    Diuretics (Including Combos) Protocol Failed    12/6/2018  1:08 PM       Failed - Normal serum creatinine on file in past 12 months    Recent Labs   Lab Test  10/25/18   1546   CR  1.26*             Passed - Blood pressure under 140/90 in past 12 months    BP Readings from Last 3 Encounters:   10/25/18 132/86   06/14/18 130/82   05/15/18 106/80                Passed - Recent (12 mo) or future (30 days) visit within the authorizing " "provider's specialty    Patient had office visit in the last 12 months or has a visit in the next 30 days with authorizing provider or within the authorizing provider's specialty.  See \"Patient Info\" tab in inbasket, or \"Choose Columns\" in Meds & Orders section of the refill encounter.             Passed - Patient is age 18 or older       Passed - Normal serum potassium on file in past 12 months    Recent Labs   Lab Test  10/25/18   1546   POTASSIUM  3.5                   Passed - Normal serum sodium on file in past 12 months    Recent Labs   Lab Test  10/25/18   1546   NA  134                "

## 2018-12-07 NOTE — PROGRESS NOTES
Karan,    At your last appointment I had asked you to follow-up in six months.  There was one lab that was much different with the last check and I would like you to return in three months instead of six so that we can check blood pressure and recheck your microalbumin.  This is a marker of kidney function.  Three month follow-up would be in January.  Let me know if you want someone to help you schedule.  If you have any questions, please feel free to contact the clinic.    MARJ Rashid    TRIAGE - not sure if patient checks his MyChart.  If it doesn't look like he has seen this within a week can someone call him with this message?  Thanks!

## 2018-12-13 ENCOUNTER — TELEPHONE (OUTPATIENT)
Dept: FAMILY MEDICINE | Facility: CLINIC | Age: 63
End: 2018-12-13

## 2018-12-13 DIAGNOSIS — E78.5 HYPERLIPIDEMIA LDL GOAL <130: ICD-10-CM

## 2018-12-13 RX ORDER — ATORVASTATIN CALCIUM 10 MG/1
20 TABLET, FILM COATED ORAL DAILY
Qty: 60 TABLET | Refills: 3 | Status: SHIPPED | OUTPATIENT
Start: 2018-12-13 | End: 2019-06-28

## 2018-12-13 NOTE — TELEPHONE ENCOUNTER
LOV: 10/25/2018           Patient needs updated FLP--patient was told to return in 6 months from last office visit on 10/25/2018  Thanks! Heather Walker RN

## 2018-12-13 NOTE — TELEPHONE ENCOUNTER
Reason for call:  Other   Patient called regarding (reason for call): call back  Additional comments: Nazanin with [facility] pharmacy called to get clarification on if the patients lipitor could be refilled. She would like a call back to discuss this.    Phone number to reach patient:  Other phone number: [phone]    Best Time:  Any    Can we leave a detailed message on this number?  Not Applicable

## 2018-12-17 ENCOUNTER — OFFICE VISIT (OUTPATIENT)
Dept: FAMILY MEDICINE | Facility: CLINIC | Age: 63
End: 2018-12-17
Payer: COMMERCIAL

## 2018-12-17 VITALS
HEART RATE: 77 BPM | RESPIRATION RATE: 16 BRPM | WEIGHT: 226.4 LBS | DIASTOLIC BLOOD PRESSURE: 88 MMHG | BODY MASS INDEX: 31.69 KG/M2 | HEIGHT: 71 IN | TEMPERATURE: 98.3 F | OXYGEN SATURATION: 96 % | SYSTOLIC BLOOD PRESSURE: 138 MMHG

## 2018-12-17 DIAGNOSIS — R30.0 DYSURIA: Primary | ICD-10-CM

## 2018-12-17 DIAGNOSIS — Z84.1 FAMILY HISTORY OF KIDNEY STONE: ICD-10-CM

## 2018-12-17 DIAGNOSIS — R31.9 HEMATURIA, UNSPECIFIED TYPE: ICD-10-CM

## 2018-12-17 LAB
ALBUMIN UR-MCNC: NEGATIVE MG/DL
APPEARANCE UR: CLEAR
BILIRUB UR QL STRIP: NEGATIVE
COLOR UR AUTO: YELLOW
GLUCOSE UR STRIP-MCNC: NEGATIVE MG/DL
HGB UR QL STRIP: ABNORMAL
KETONES UR STRIP-MCNC: NEGATIVE MG/DL
LEUKOCYTE ESTERASE UR QL STRIP: NEGATIVE
NITRATE UR QL: NEGATIVE
PH UR STRIP: 6 PH (ref 5–7)
RBC #/AREA URNS AUTO: NORMAL /HPF
SOURCE: ABNORMAL
SP GR UR STRIP: 1.01 (ref 1–1.03)
UROBILINOGEN UR STRIP-ACNC: 0.2 EU/DL (ref 0.2–1)
WBC #/AREA URNS AUTO: NORMAL /HPF

## 2018-12-17 PROCEDURE — 81001 URINALYSIS AUTO W/SCOPE: CPT | Performed by: NURSE PRACTITIONER

## 2018-12-17 PROCEDURE — 99214 OFFICE O/P EST MOD 30 MIN: CPT | Performed by: NURSE PRACTITIONER

## 2018-12-17 ASSESSMENT — PAIN SCALES - GENERAL: PAINLEVEL: MODERATE PAIN (4)

## 2018-12-17 ASSESSMENT — MIFFLIN-ST. JEOR: SCORE: 1850.1

## 2018-12-17 NOTE — PROGRESS NOTES
SUBJECTIVE:   Karan Anglin is a 63 year old male who presents to clinic today for the following health issues:      Genitourinary - Male  Onset: few days     Description:   Dysuria (painful urination): YES  Hematuria (blood in urine): no   Frequency: YES  Are you urinating at night : no   Hesitancy (delay in urine): YES  Retention (unable to empty): YES  Decrease in urinary flow: YES  Incontinence: no     Progression of Symptoms:  same and constant    Accompanying Signs & Symptoms:  Fever: no   Back/Flank pain: no   Urethral discharge: no   Testicle lumps/masses/pain: no   Nausea and/or vomiting: no   Abdominal pain: YES- lower left side     History:   History of frequent UTI's: no   History of kidney stones: YES  History of hernias: no   Personal or Family history of Prostate problems: no  Sexually active: no     Precipitating factors:   strainig to urinate   Alleviating factors:  nothing     Has noticed mild pain in right lower side of abdomen. NO blood in the urine. No discharge from the penis. No low back pain. NO history of urinating at night/symptoms of enlarged prostate.   -------------------------------------    Problem list and histories reviewed & adjusted, as indicated.  Additional history: as documented    Patient Active Problem List   Diagnosis     Benign essential hypertension     Hyperlipidemia LDL goal <130     Migraine without aura and without status migrainosus, not intractable     Blood in semen     Shoulder dislocation, left, initial encounter     Aftercare following surgery of the musculoskeletal system     Microalbuminuria     Past Surgical History:   Procedure Laterality Date     GI SURGERY  2013    peptic ulcers stapled     OPEN REDUCTION INTERNAL FIXATION HUMERUS PROXIMAL Left 5/15/2018    Procedure: OPEN REDUCTION INTERNAL FIXATION HUMERUS PROXIMAL;  Left Glenoid Open Reduction Internal Fixation;  Surgeon: Rosa Joe MD;  Location:  OR       Social History     Tobacco Use      "Smoking status: Never Smoker     Smokeless tobacco: Never Used   Substance Use Topics     Alcohol use: Yes     Comment: 5 glasses wine/wk     Family History   Problem Relation Age of Onset     Colon Cancer Mother      Kidney Disease Mother      Coronary Artery Disease Father      Enlarged prostate Father      Other Cancer Brother      Down Syndrome Sister      Alzheimer Disease Sister            Reviewed and updated as needed this visit by clinical staff       Reviewed and updated as needed this visit by Provider         ROS:  Constitutional, HEENT, cardiovascular, pulmonary, gi and gu systems are negative, except as otherwise noted.    OBJECTIVE:     /88 (BP Location: Left arm, Patient Position: Chair, Cuff Size: Adult Large)   Pulse 77   Temp 98.3  F (36.8  C) (Oral)   Resp 16   Ht 1.797 m (5' 10.75\")   Wt 102.7 kg (226 lb 6.4 oz)   SpO2 96%   BMI 31.80 kg/m    Body mass index is 31.8 kg/m .   GENERAL: healthy, alert and no distress  NECK: no adenopathy, no asymmetry, masses, or scars and thyroid normal to palpation  RESP: lungs clear to auscultation - no rales, rhonchi or wheezes  CV: regular rate and rhythm, normal S1 S2, no S3 or S4, no murmur, click or rub, no peripheral edema and peripheral pulses strong  ABDOMEN: soft, nontender, no hepatosplenomegaly, no masses and bowel sounds normal  MS: no gross musculoskeletal defects noted, no edema    Diagnostic Test Results:  Results for orders placed or performed in visit on 12/17/18 (from the past 24 hour(s))   *UA reflex to Microscopic and Culture ([facility] (except [place] and [place])   Result Value Ref Range    Color Urine Yellow     Appearance Urine Clear     Glucose Urine Negative NEG^Negative mg/dL    Bilirubin Urine Negative NEG^Negative    Ketones Urine Negative NEG^Negative mg/dL    Specific Gravity Urine 1.015 1.003 - 1.035    Blood Urine Small (A) NEG^Negative    pH Urine 6.0 5.0 - 7.0 pH    Protein Albumin Urine " Negative NEG^Negative mg/dL    Urobilinogen Urine 0.2 0.2 - 1.0 EU/dL    Nitrite Urine Negative NEG^Negative    Leukocyte Esterase Urine Negative NEG^Negative    Source Midstream Urine    Urine Microscopic   Result Value Ref Range    WBC Urine 0 - 5 OTO5^0 - 5 /HPF    RBC Urine O - 2 OTO2^O - 2 /HPF       ASSESSMENT/PLAN:     Problem List Items Addressed This Visit     None      Visit Diagnoses     Dysuria    -  Primary    Relevant Orders    *UA reflex to Microscopic and Culture ([facility] (except [place] and [place]) (Completed)    Urine Microscopic (Completed)    Hematuria, unspecified type        Relevant Orders    US Renal Complete    Family history of kidney stone        Relevant Orders    US Renal Complete       rule out kidney stone  JEFFERSON Dick [facility]  Please note greater than 50% of this 30 minute appointment were spent in face to face counseling with the patient of the issues described above in the history of present illness and in the plan, including ordering imaging

## 2018-12-18 ENCOUNTER — HOSPITAL ENCOUNTER (OUTPATIENT)
Dept: ULTRASOUND IMAGING | Facility: CLINIC | Age: 63
Discharge: HOME | End: 2018-12-18
Attending: NURSE PRACTITIONER | Admitting: NURSE PRACTITIONER
Payer: COMMERCIAL

## 2018-12-18 DIAGNOSIS — R31.9 HEMATURIA, UNSPECIFIED TYPE: ICD-10-CM

## 2018-12-18 DIAGNOSIS — Z84.1 FAMILY HISTORY OF KIDNEY STONE: ICD-10-CM

## 2018-12-18 PROCEDURE — 76770 US EXAM ABDO BACK WALL COMP: CPT

## 2018-12-19 DIAGNOSIS — Z87.442 PERSONAL HISTORY OF KIDNEY STONES: ICD-10-CM

## 2018-12-19 DIAGNOSIS — R31.9 HEMATURIA, UNSPECIFIED TYPE: Primary | ICD-10-CM

## 2019-01-04 ENCOUNTER — PRE VISIT (OUTPATIENT)
Dept: UROLOGY | Facility: CLINIC | Age: 64
End: 2019-01-04

## 2019-01-04 NOTE — TELEPHONE ENCOUNTER
MEDICAL RECORDS REQUEST   [facility]  Urology Clinic  [address] [place] [address]  PHONE: [phone]  Fax: [phone]        FUTURE VISIT INFORMATION                                                   Karan Anglin : 1955 scheduled for future visit at [facility]    APPOINTMENT INFORMATION:    Date: 2019    Provider:  LEE DUKE    Reason for Visit/Diagnosis: HEMATURIA    REFERRAL INFORMATION:    Referring provider:  AMANDA WATKINS    Specialty: NP    Referring providers clinic:  [facility] contact number:  [phone]    RECORDS REQUESTED FOR VISIT                                                     NOTES  STATUS/DETAILS   OFFICE NOTE from referring provider  yes   OFFICE NOTE from other specialist  no   DISCHARGE SUMMARY from hospital  no   DISCHARGE REPORT from the ER  no   OPERATIVE REPORT  no   MEDICATION LIST  yes       PRE-VISIT CHECKLIST      Record collection complete Yes   Appointment appropriately scheduled           (right time/right provider) Yes   MyChart activation Yes   Questionnaire complete If no, please explain IN PROCESS     Completed by: Anni Almendarez

## 2019-01-15 ENCOUNTER — PRE VISIT (OUTPATIENT)
Dept: UROLOGY | Facility: CLINIC | Age: 64
End: 2019-01-15

## 2019-01-24 NOTE — PROGRESS NOTES
"  SUBJECTIVE:   Karan Anglin is a 63 year old male who presents to clinic today for the following health issues:    Hypertension Follow-up      Outpatient blood pressures are not being checked.    Low Salt Diet: not monitoring salt    Amount of exercise or physical activity: 4-5 days/week for an average of 45-60 minutes    Problems taking medications regularly: No    Medication side effects: none    Diet: regular (no restrictions)    Blood in urine mid-December - kidney US normal, advised to see urology  Urology appt scheduled 1/29/19    BP Readings from Last 6 Encounters:   01/25/19 136/88   12/17/18 138/88   10/25/18 132/86   06/14/18 130/82   05/15/18 106/80   04/04/18 (!) 146/96     Increase in migraines with the winter.  Having migraines up to four per week and average of two per week.  Believes that the blood pressure medication is for migraine prevention.  Did also take topiramate in the past and stopped when insurance didn't cover, but it was helpful.  No other neurological changes.  This is typical for him to have more migraines in the winter      Problem list and histories reviewed & adjusted, as indicated.  Additional history: as documented      Reviewed and updated as needed this visit by clinical staff  Tobacco  Allergies  Meds  Med Hx  Surg Hx  Fam Hx  Soc Hx      Reviewed and updated as needed this visit by Provider         ROS:  Constitutional, HEENT, cardiovascular, pulmonary, gi and gu systems are negative, except as otherwise noted.    OBJECTIVE:     /88   Pulse 72   Temp 97.9  F (36.6  C) (Oral)   Ht 1.805 m (5' 11.06\")   Wt 100.5 kg (221 lb 8 oz)   SpO2 96%   BMI 30.84 kg/m    Body mass index is 30.84 kg/m .  GENERAL: healthy, alert and no distress  EYES: Eyes grossly normal to inspection, PERRL and conjunctivae and sclerae normal  HENT: ear canals and TM's normal, nose and mouth without ulcers or lesions  NECK: no adenopathy, no asymmetry, masses, or scars and thyroid normal to " palpation  RESP: lungs clear to auscultation - no rales, rhonchi or wheezes  CV: regular rate and rhythm, normal S1 S2, no S3 or S4, no murmur, click or rub, no peripheral edema and peripheral pulses strong  ABDOMEN: soft, nontender, no hepatosplenomegaly, no masses and bowel sounds normal  MS: no gross musculoskeletal defects noted, no edema  SKIN: no suspicious lesions or rashes  NEURO: Normal strength and tone, mentation intact and speech normal  PSYCH: mentation appears normal, affect normal/bright    Diagnostic Test Results:  pending    ASSESSMENT/PLAN:     Hypertension; under 140/90, but could be improved particularly with microalbuminuria   Associated with the following complications:    CKD   Plan:  Labs:   BMP and Microalbumin  Medications:     ACE/ARB - increase losartan  Beta-blocker - likely will change atenolol to metoprolol  Referrals/Other:    Nephrology appointment dependent on microabluminuira    Migraine: much worse   Plan:  Medications:  Preventative Medication - topiramate        Declined immunizations: Influenza due to Concerns about side effects/safety    (I10) Benign essential hypertension  (primary encounter diagnosis)  Comment:   Plan: Albumin Random Urine Quantitative with Creat         Ratio, Basic metabolic panel    I would like to change the atenolol to metoprolol, but as it has been used for migraine prevention I want to wait until topiramate get started.  Increasing losartan today which should also provide some kidney protection.  Dependent on microablumin result, may refer to nephrology.  Follow-up 6 weeks.    Fasting and lipid lab orders have been released    (R80.9) Microalbuminuria  Comment:   Plan: Albumin Random Urine Quantitative with Creat         Ratio, Basic metabolic panel            (G43.009) Migraine without aura and without status migrainosus, not intractable  Comment:   Plan: rizatriptan (MAXALT) 5 MG tablet, topiramate         (TOPAMAX) 100 MG tablet            (Z12.11)  Special screening for malignant neoplasms, colon  Comment:   Plan: Fecal colorectal cancer screen (FIT)        Continues to prefer FIT to colonoscopy    (Z23) Encounter for immunization  Comment:   Plan: 1st  Administration  [[id number]]              Patient Instructions   Restart the topiramate for migraine prevention - start with 50 mg (1/2 tab) for two weeks then increase to 100 mg (1 tab) after two weeks.  Take at bedtime.    We can plan to discontinue in the summer and restart again in the late fall.    Likely I would like to change the atenolol to metoprolol    Double the losartan to two tablets (50 mg) - can take at the same time      JEFFERSON Cook [facility]

## 2019-01-25 ENCOUNTER — TELEPHONE (OUTPATIENT)
Dept: FAMILY MEDICINE | Facility: CLINIC | Age: 64
End: 2019-01-25

## 2019-01-25 ENCOUNTER — OFFICE VISIT (OUTPATIENT)
Dept: FAMILY MEDICINE | Facility: CLINIC | Age: 64
End: 2019-01-25
Payer: COMMERCIAL

## 2019-01-25 VITALS
DIASTOLIC BLOOD PRESSURE: 88 MMHG | HEIGHT: 71 IN | WEIGHT: 221.5 LBS | OXYGEN SATURATION: 96 % | HEART RATE: 72 BPM | SYSTOLIC BLOOD PRESSURE: 136 MMHG | BODY MASS INDEX: 31.01 KG/M2 | TEMPERATURE: 97.9 F

## 2019-01-25 DIAGNOSIS — G43.009 MIGRAINE WITHOUT AURA AND WITHOUT STATUS MIGRAINOSUS, NOT INTRACTABLE: ICD-10-CM

## 2019-01-25 DIAGNOSIS — E78.5 HYPERLIPIDEMIA LDL GOAL <130: ICD-10-CM

## 2019-01-25 DIAGNOSIS — R73.01 ABNORMAL FASTING GLUCOSE: ICD-10-CM

## 2019-01-25 DIAGNOSIS — I10 BENIGN ESSENTIAL HYPERTENSION: Primary | ICD-10-CM

## 2019-01-25 DIAGNOSIS — N18.30 CKD (CHRONIC KIDNEY DISEASE) STAGE 3, GFR 30-59 ML/MIN (H): ICD-10-CM

## 2019-01-25 DIAGNOSIS — R80.9 MICROALBUMINURIA: ICD-10-CM

## 2019-01-25 DIAGNOSIS — Z12.11 SPECIAL SCREENING FOR MALIGNANT NEOPLASMS, COLON: ICD-10-CM

## 2019-01-25 DIAGNOSIS — Z23 ENCOUNTER FOR IMMUNIZATION: ICD-10-CM

## 2019-01-25 LAB
ANION GAP SERPL CALCULATED.3IONS-SCNC: 9 MMOL/L (ref 3–14)
BUN SERPL-MCNC: 36 MG/DL (ref 7–30)
CALCIUM SERPL-MCNC: 9.9 MG/DL (ref 8.5–10.1)
CHLORIDE SERPL-SCNC: 98 MMOL/L (ref 94–109)
CHOLEST SERPL-MCNC: 192 MG/DL
CO2 SERPL-SCNC: 25 MMOL/L (ref 20–32)
CREAT SERPL-MCNC: 1.92 MG/DL (ref 0.66–1.25)
GFR SERPL CREATININE-BSD FRML MDRD: 37 ML/MIN/{1.73_M2}
GLUCOSE SERPL-MCNC: 137 MG/DL (ref 70–99)
HDLC SERPL-MCNC: 32 MG/DL
LDLC SERPL CALC-MCNC: 84 MG/DL
NONHDLC SERPL-MCNC: 160 MG/DL
POTASSIUM SERPL-SCNC: 3.4 MMOL/L (ref 3.4–5.3)
SODIUM SERPL-SCNC: 132 MMOL/L (ref 133–144)
TRIGL SERPL-MCNC: 382 MG/DL

## 2019-01-25 PROCEDURE — 36415 COLL VENOUS BLD VENIPUNCTURE: CPT | Performed by: FAMILY MEDICINE

## 2019-01-25 PROCEDURE — 90471 IMMUNIZATION ADMIN: CPT | Performed by: NURSE PRACTITIONER

## 2019-01-25 PROCEDURE — 99214 OFFICE O/P EST MOD 30 MIN: CPT | Mod: 25 | Performed by: NURSE PRACTITIONER

## 2019-01-25 PROCEDURE — 82043 UR ALBUMIN QUANTITATIVE: CPT | Performed by: NURSE PRACTITIONER

## 2019-01-25 PROCEDURE — 80048 BASIC METABOLIC PNL TOTAL CA: CPT | Performed by: FAMILY MEDICINE

## 2019-01-25 PROCEDURE — 80061 LIPID PANEL: CPT | Performed by: FAMILY MEDICINE

## 2019-01-25 PROCEDURE — 90750 HZV VACC RECOMBINANT IM: CPT | Performed by: NURSE PRACTITIONER

## 2019-01-25 RX ORDER — TOPIRAMATE 100 MG/1
100 TABLET, FILM COATED ORAL AT BEDTIME
Qty: 90 TABLET | Refills: 3 | Status: SHIPPED | OUTPATIENT
Start: 2019-01-25 | End: 2019-03-08

## 2019-01-25 RX ORDER — RIZATRIPTAN BENZOATE 5 MG/1
5 TABLET ORAL
Qty: 18 TABLET | Refills: 1 | Status: SHIPPED | OUTPATIENT
Start: 2019-01-25 | End: 2019-07-15

## 2019-01-25 ASSESSMENT — MIFFLIN-ST. JEOR: SCORE: 1832.85

## 2019-01-25 NOTE — RESULT ENCOUNTER NOTE
Karan,    I do not have the urine protein test back, but the blood test for kidney function shows further decline in kidney function and also an elevated fasting glucose.  I am following up on the glucose with a hemoglobin A1C test.  I've also put in an order for nephrology and want you to make an appointment.  I would prioritize this over urology right now.    If you have any questions, please feel free to contact the clinic.    MARJ Rashid

## 2019-01-25 NOTE — TELEPHONE ENCOUNTER
The lab only had the tube for cholesterol and so cannot run the A1C.  The order has been changed to future and he should stop in in the next week to have this drawn.  MARJ Rashid

## 2019-01-25 NOTE — PATIENT INSTRUCTIONS
Restart the topiramate for migraine prevention - start with 50 mg (1/2 tab) for two weeks then increase to 100 mg (1 tab) after two weeks.  Take at bedtime.    We can plan to discontinue in the summer and restart again in the late fall.    Likely I would like to change the atenolol to metoprolol    Double the losartan to two tablets (50 mg) - can take at the same time

## 2019-01-26 LAB
CREAT UR-MCNC: 54 MG/DL
MICROALBUMIN UR-MCNC: 7 MG/L
MICROALBUMIN/CREAT UR: 13.88 MG/G CR (ref 0–17)

## 2019-01-26 ASSESSMENT — ENCOUNTER SYMPTOMS
LIGHT-HEADEDNESS: 0
HEMATURIA: 1
EXERCISE INTOLERANCE: 0
SLEEP DISTURBANCES DUE TO BREATHING: 0
SYNCOPE: 0
DYSURIA: 1
HYPERTENSION: 1
DIFFICULTY URINATING: 1
FLANK PAIN: 0
ORTHOPNEA: 0
HYPOTENSION: 0
PALPITATIONS: 0
LEG PAIN: 0

## 2019-01-28 NOTE — TELEPHONE ENCOUNTER
Spoke with patient, relayed provider message below. Patient verbalized understanding.     Patient states he cannot see nephrology referral on MyChart. Provided patient with referral number. Patient verbalized understanding    Abiola Cano RN  Triage Nurse

## 2019-01-29 ENCOUNTER — OFFICE VISIT (OUTPATIENT)
Dept: UROLOGY | Facility: CLINIC | Age: 64
End: 2019-01-29
Payer: COMMERCIAL

## 2019-01-29 ENCOUNTER — PRE VISIT (OUTPATIENT)
Dept: UROLOGY | Facility: CLINIC | Age: 64
End: 2019-01-29

## 2019-01-29 ENCOUNTER — TELEPHONE (OUTPATIENT)
Dept: FAMILY MEDICINE | Facility: CLINIC | Age: 64
End: 2019-01-29

## 2019-01-29 ENCOUNTER — ANCILLARY PROCEDURE (OUTPATIENT)
Dept: CT IMAGING | Facility: CLINIC | Age: 64
End: 2019-01-29
Payer: COMMERCIAL

## 2019-01-29 VITALS
HEIGHT: 71 IN | BODY MASS INDEX: 30.78 KG/M2 | DIASTOLIC BLOOD PRESSURE: 90 MMHG | WEIGHT: 219.9 LBS | SYSTOLIC BLOOD PRESSURE: 132 MMHG | HEART RATE: 73 BPM

## 2019-01-29 DIAGNOSIS — R36.1 HEMATOSPERMIA: ICD-10-CM

## 2019-01-29 DIAGNOSIS — N20.0 NEPHROLITHIASIS: ICD-10-CM

## 2019-01-29 DIAGNOSIS — N18.30 TYPE 2 DIABETES MELLITUS WITH STAGE 3 CHRONIC KIDNEY DISEASE, WITHOUT LONG-TERM CURRENT USE OF INSULIN (H): ICD-10-CM

## 2019-01-29 DIAGNOSIS — R10.84 ABDOMINAL PAIN, GENERALIZED: ICD-10-CM

## 2019-01-29 DIAGNOSIS — R39.11 URINARY HESITANCY: Primary | ICD-10-CM

## 2019-01-29 DIAGNOSIS — R39.11 URINARY HESITANCY: ICD-10-CM

## 2019-01-29 DIAGNOSIS — R73.01 ABNORMAL FASTING GLUCOSE: ICD-10-CM

## 2019-01-29 DIAGNOSIS — E11.22 TYPE 2 DIABETES MELLITUS WITH STAGE 3 CHRONIC KIDNEY DISEASE, WITHOUT LONG-TERM CURRENT USE OF INSULIN (H): ICD-10-CM

## 2019-01-29 LAB
ALBUMIN UR-MCNC: NEGATIVE MG/DL
APPEARANCE UR: CLEAR
BILIRUB UR QL STRIP: NEGATIVE
COLOR UR AUTO: ABNORMAL
GLUCOSE UR STRIP-MCNC: NEGATIVE MG/DL
HBA1C MFR BLD: 7.3 % (ref 0–5.6)
HGB UR QL STRIP: ABNORMAL
HYALINE CASTS #/AREA URNS LPF: 1 /LPF (ref 0–2)
KETONES UR STRIP-MCNC: NEGATIVE MG/DL
LEUKOCYTE ESTERASE UR QL STRIP: NEGATIVE
MUCOUS THREADS #/AREA URNS LPF: PRESENT /LPF
NITRATE UR QL: NEGATIVE
PH UR STRIP: 6 PH (ref 5–7)
PSA SERPL-MCNC: 0.8 UG/L (ref 0–4)
RBC #/AREA URNS AUTO: 2 /HPF (ref 0–2)
SOURCE: ABNORMAL
SP GR UR STRIP: 1.01 (ref 1–1.03)
UROBILINOGEN UR STRIP-MCNC: 0 MG/DL (ref 0–2)
WBC #/AREA URNS AUTO: 2 /HPF (ref 0–5)

## 2019-01-29 RX ORDER — TAMSULOSIN HYDROCHLORIDE 0.4 MG/1
0.4 CAPSULE ORAL DAILY
Qty: 90 CAPSULE | Refills: 3 | Status: SHIPPED | OUTPATIENT
Start: 2019-01-29 | End: 2019-03-08

## 2019-01-29 ASSESSMENT — PAIN SCALES - GENERAL: PAINLEVEL: NO PAIN (0)

## 2019-01-29 ASSESSMENT — MIFFLIN-ST. JEOR: SCORE: 1820.54

## 2019-01-29 NOTE — PROGRESS NOTES
"CC: blood in the urine    HPI: It is a pleasure to see Mr. Karan Anglin, a very pleasant 64 year old male seen today in the urology clinic in consultation from JEFFERSON Ireland CNP for evaluation of blood in the urine. Patient denies any gross hematuria but had a urine sample on 12/17/18 that showed small blood on the urine dipstick but 0-2 RBC on the urine micro. He initially presented to his primary care clinic with complaints of intermittent right-sided lower abdominal pain. He thought that he was passing a kidney stone as he does have a history of stones and symptoms were similar to previous stone episodes. A renal ultrasound demonstrated a few punctate intraparenchymal calcifications in the right kidney but no obstructing stones or hydronephrosis. Today, he continues to complain of intermittent right-sided abdominal pain and \"stomach upset\" but denies gross hematuria, dysuria, fevers, chills, or vomiting.    He also reports a few years of intermittent urinary hesitancy, slow stream, and infrequent blood in the semen that happens maybe once or twice per year. Last episode of hematospermia was close to a year ago. His father had BPH and required a TURP in his late seventies.     Review of Diagnostics:  12/17/18 - UA: small blood, 0-2 RBC, o/w negative    Lab Results   Component Value Date    CR 1.92 01/25/2019    CR 1.26 10/25/2018    CR 1.30 06/14/2018    CR 1.21 09/20/2017       US RENAL COMPLETE, 12/18/2018   IMPRESSION:  1.  A few punctate intraparenchymal calcifications in the superior  pole of the right kidney without evidence of obstructing  nephrolithiasis or hydronephrosis. 2.8 cm right kidney superior pole  simple cyst. Otherwise, normal renal ultrasound.  2.  Steatosis of the partially visualized liver.    Hematuria Risk Factors:  Age >40: yes  Smoking history: no  Occupational exposure to chemicals or dyes (ie, benzenes, aromatic amines): no  History of urologic disorder or disease: " nephrolithiasis  History of irritative voiding symptoms: no  History of urinary tract infection: no  Analgesic abuse: no  History of pelvic irradiation: no    Past Medical History:   Diagnosis Date     Blood in semen 2016    Symptoms came and went     Hyperlipidemia 2015     Hypertension 2014     Migraine     Nausea and vomiting     Peptic ulcer hemorrhage 2013     Past Surgical History:   Procedure Laterality Date     GI SURGERY  2013    peptic ulcers stapled     OPEN REDUCTION INTERNAL FIXATION HUMERUS PROXIMAL Left 5/15/2018    Procedure: OPEN REDUCTION INTERNAL FIXATION HUMERUS PROXIMAL;  Left Glenoid Open Reduction Internal Fixation;  Surgeon: Rosa Joe MD;  Location: [facility] OR     Current Outpatient Medications   Medication Sig Dispense Refill     atenolol (TENORMIN) 50 MG tablet Take 1 tablet (50 mg) by mouth daily 90 tablet 1     atorvastatin (LIPITOR) 10 MG tablet Take 2 tablets (20 mg) by mouth daily 60 tablet 3     diclofenac (VOLTAREN) 1 % GEL topical gel Apply 2 grams to elbow as needed up to four times daily using enclosed dosing card. 100 g 1     Esomeprazole Magnesium (NEXIUM PO) Take by mouth daily       hydrochlorothiazide (HYDRODIURIL) 25 MG tablet Take 1 tablet (25 mg) by mouth daily 90 tablet 1     ibuprofen (ADVIL/MOTRIN) 600 MG tablet Take 1 tablet (600 mg) by mouth every 8 hours as needed for moderate pain 30 tablet 0     losartan (COZAAR) 25 MG tablet Take 1 tablet (25 mg) by mouth daily 90 tablet 1     rizatriptan (MAXALT) 5 MG tablet Take 1 tablet (5 mg) by mouth at onset of headache for migraine repeat after 2 hr if no relief; maximum: 30 mg/24 hours 18 tablet 1     topiramate (TOPAMAX) 100 MG tablet Take 1 tablet (100 mg) by mouth At Bedtime 90 tablet 3     No Known Allergies  FAMILY HISTORY: There is no reported history of genitourinary carcinoma.  There is no history of urolithiasis.      SOCIAL HISTORY: The patient does not smoke cigarettes, minimal EtOH and no illicit drug  "use.    ROS:  Answers for HPI/ROS submitted by the patient on 1/26/2019   General Symptoms: No  Skin Symptoms: No  HENT Symptoms: No  EYE SYMPTOMS: No  HEART SYMPTOMS: Yes  LUNG SYMPTOMS: No  INTESTINAL SYMPTOMS: No  URINARY SYMPTOMS: Yes  REPRODUCTIVE SYMPTOMS: Yes  SKELETAL SYMPTOMS: No  BLOOD SYMPTOMS: No  NERVOUS SYSTEM SYMPTOMS: No  MENTAL HEALTH SYMPTOMS: No  Chest pain or pressure: No  Fast or irregular heartbeat: No  Pain in legs with walking: No  Trouble breathing while lying down: No  Fingers or toes appear blue: No  High blood pressure: Yes  Low blood pressure: No  Fainting: No  Murmurs: No  Pacemaker: No  Varicose veins: No  Edema or swelling: No  Wake up at night with shortness of breath: No  Light-headedness: No  Exercise intolerance: No  Trouble holding urine or incontinence: Yes  Pain or burning: Yes  Trouble starting or stopping: Yes  Increased frequency of urination: Yes  Blood in urine: Yes  Decreased frequency of urination: No  Frequent nighttime urination: No  Flank pain: No  Difficulty emptying bladder: Yes  Scrotal pain or swelling: Yes  Erectile dysfunction: Yes  Penile discharge: No  Genital ulcers: No  Reduced libido: No    PHYSICAL EXAM:   Vitals:    01/29/19 0708   BP: 132/90   Pulse: 73   Weight: 99.7 kg (219 lb 14.4 oz)   Height: 1.805 m (5' 11.06\")     GENERAL: Well groomed, well developed, well nourished male in NAD.  HEENT: AT, NC, EOMI bilaterally.  SKIN: Warm to touch, dry.  No visible rashes or lesions on examined areas.  RESP: No increased respiratory effort.  MS: Full ROM in extremities.  : Deferred for now.  CHRISTOPHER: Good sphincter tone, smooth rectal mucosa. Normal to very mildly enlarged prostate that is smooth to palpation without nodules or masses; no tenderness to palpation although very mild bogginess.   NEURO: Alert and oriented x 3.  PSYCH: Normal mood and affect, pleasant and agreeable during interview and exam.    LABS:   PSA   0.92   12/10/15    IMAGING: see " above    ASSESSMENT and PLAN:    Mr. Karan Anglin is a pleasant 64 year old male with a history of nephrolithiasis, several weeks of intermittent right-sided abdominal pain, and a few years of mild urinary hesitancy, slow stream, and infrequent hematospermia.     For the intermittent abdominal pain in the setting of nephrolithiasis, we discussed that there were no obstructing stones or hydronephrosis seen on renal ultrasound, but also discussed the limitations of ultrasound when it comes to detecting small, distal stones. Given his ongoing symptoms, we discussed the option to proceed with low dose CT abd/pelvis without contrast (stone protocol) to further evaluate and he would like to proceed.  -Schedule CT A/P w/o contrast next available.    For the few years of mild obstructive LUTS, we discussed possible etiologies as well as management options to include observation, behavioral modifications, trial of an alpha blocker, or further evaluation with cystoscopy or UDS. Patient elects for a trial of an alpha blocker.  -Start tamsulosin 0.4 mg once daily. Side effects discussed.     For the infrequent hematospermia, we discussed that this is usually due to a benign source. However, further workup including CHRISTOPHER and PSA are warranted.  -CHRISTOPHER today normal other than possible very mild enlargement and mild bogginess of the gland, though non-tender and without nodules.   -Update PSA today.  -Continue to monitor and if hematospermia progresses or becomes more frequent, RTC for consideration of TRUS vs. cysto vs. other.    Additional recommendations pending results of the above.    Thank you for allowing me to participate in Mr. Anglin's care.      About 25 minutes were spent with the patient today, > 50% in counseling and coordination of care.    Savannah Burrows PA-C  Department of Urology

## 2019-01-29 NOTE — LETTER
"1/29/2019     RE: Karan Anglin  [address]  [place] [address]     Dear Colleague,    Thank you for referring your patient, Karan Anglin, to the [facility] AND [facility] FOR PROSTATE AND UROLOGIC CANCERS at [facility]. Please see a copy of my visit note below.    CC: blood in the urine    HPI: It is a pleasure to see . Karan Anglin, a very pleasant 64 year old male seen today in the urology clinic in consultation from JEFFERSON Ireland CNP for evaluation of blood in the urine. Patient denies any gross hematuria but had a urine sample on 12/17/18 that showed small blood on the urine dipstick but 0-2 RBC on the urine micro. He initially presented to his primary care clinic with complaints of intermittent right-sided lower abdominal pain. He thought that he was passing a kidney stone as he does have a history of stones and symptoms were similar to previous stone episodes. A renal ultrasound demonstrated a few punctate intraparenchymal calcifications in the right kidney but no obstructing stones or hydronephrosis. Today, he continues to complain of intermittent right-sided abdominal pain and \"stomach upset\" but denies gross hematuria, dysuria, fevers, chills, or vomiting.    He also reports a few years of intermittent urinary hesitancy, slow stream, and infrequent blood in the semen that happens maybe once or twice per year. Last episode of hematospermia was close to a year ago. His father had BPH and required a TURP in his late seventies.     Review of Diagnostics:  12/17/18 - UA: small blood, 0-2 RBC, o/w negative    Lab Results   Component Value Date    CR 1.92 01/25/2019    CR 1.26 10/25/2018    CR 1.30 06/14/2018    CR 1.21 09/20/2017       US RENAL COMPLETE, 12/18/2018   IMPRESSION:  1.  A few punctate intraparenchymal calcifications in the superior  pole of the right kidney without evidence of obstructing  nephrolithiasis or hydronephrosis. 2.8 cm right kidney " superior pole  simple cyst. Otherwise, normal renal ultrasound.  2.  Steatosis of the partially visualized liver.    Hematuria Risk Factors:  Age >40: yes  Smoking history: no  Occupational exposure to chemicals or dyes (ie, benzenes, aromatic amines): no  History of urologic disorder or disease: nephrolithiasis  History of irritative voiding symptoms: no  History of urinary tract infection: no  Analgesic abuse: no  History of pelvic irradiation: no    Past Medical History:   Diagnosis Date     Blood in semen 2016    Symptoms came and went     Hyperlipidemia 2015     Hypertension 2014     Migraine     Nausea and vomiting     Peptic ulcer hemorrhage 2013     Past Surgical History:   Procedure Laterality Date     GI SURGERY  2013    peptic ulcers stapled     OPEN REDUCTION INTERNAL FIXATION HUMERUS PROXIMAL Left 5/15/2018    Procedure: OPEN REDUCTION INTERNAL FIXATION HUMERUS PROXIMAL;  Left Glenoid Open Reduction Internal Fixation;  Surgeon: Rosa oJe MD;  Location:  OR     Current Outpatient Medications   Medication Sig Dispense Refill     atenolol (TENORMIN) 50 MG tablet Take 1 tablet (50 mg) by mouth daily 90 tablet 1     atorvastatin (LIPITOR) 10 MG tablet Take 2 tablets (20 mg) by mouth daily 60 tablet 3     diclofenac (VOLTAREN) 1 % GEL topical gel Apply 2 grams to elbow as needed up to four times daily using enclosed dosing card. 100 g 1     Esomeprazole Magnesium (NEXIUM PO) Take by mouth daily       hydrochlorothiazide (HYDRODIURIL) 25 MG tablet Take 1 tablet (25 mg) by mouth daily 90 tablet 1     ibuprofen (ADVIL/MOTRIN) 600 MG tablet Take 1 tablet (600 mg) by mouth every 8 hours as needed for moderate pain 30 tablet 0     losartan (COZAAR) 25 MG tablet Take 1 tablet (25 mg) by mouth daily 90 tablet 1     rizatriptan (MAXALT) 5 MG tablet Take 1 tablet (5 mg) by mouth at onset of headache for migraine repeat after 2 hr if no relief; maximum: 30 mg/24 hours 18 tablet 1     topiramate (TOPAMAX)  "100 MG tablet Take 1 tablet (100 mg) by mouth At Bedtime 90 tablet 3     No Known Allergies  FAMILY HISTORY: There is no reported history of genitourinary carcinoma.  There is no history of urolithiasis.      SOCIAL HISTORY: The patient does not smoke cigarettes, minimal EtOH and no illicit drug use.    ROS:  Answers for HPI/ROS submitted by the patient on 1/26/2019   General Symptoms: No  Skin Symptoms: No  HENT Symptoms: No  EYE SYMPTOMS: No  HEART SYMPTOMS: Yes  LUNG SYMPTOMS: No  INTESTINAL SYMPTOMS: No  URINARY SYMPTOMS: Yes  REPRODUCTIVE SYMPTOMS: Yes  SKELETAL SYMPTOMS: No  BLOOD SYMPTOMS: No  NERVOUS SYSTEM SYMPTOMS: No  MENTAL HEALTH SYMPTOMS: No  Chest pain or pressure: No  Fast or irregular heartbeat: No  Pain in legs with walking: No  Trouble breathing while lying down: No  Fingers or toes appear blue: No  High blood pressure: Yes  Low blood pressure: No  Fainting: No  Murmurs: No  Pacemaker: No  Varicose veins: No  Edema or swelling: No  Wake up at night with shortness of breath: No  Light-headedness: No  Exercise intolerance: No  Trouble holding urine or incontinence: Yes  Pain or burning: Yes  Trouble starting or stopping: Yes  Increased frequency of urination: Yes  Blood in urine: Yes  Decreased frequency of urination: No  Frequent nighttime urination: No  Flank pain: No  Difficulty emptying bladder: Yes  Scrotal pain or swelling: Yes  Erectile dysfunction: Yes  Penile discharge: No  Genital ulcers: No  Reduced libido: No    PHYSICAL EXAM:   Vitals:    01/29/19 0708   BP: 132/90   Pulse: 73   Weight: 99.7 kg (219 lb 14.4 oz)   Height: 1.805 m (5' 11.06\")     GENERAL: Well groomed, well developed, well nourished male in NAD.  HEENT: AT, NC, EOMI bilaterally.  SKIN: Warm to touch, dry.  No visible rashes or lesions on examined areas.  RESP: No increased respiratory effort.  MS: Full ROM in extremities.  : Deferred for now.  CHRISTOPHER: Good sphincter tone, smooth rectal mucosa. Normal to very mildly " enlarged prostate that is smooth to palpation without nodules or masses; no tenderness to palpation although very mild bogginess.   NEURO: Alert and oriented x 3.  PSYCH: Normal mood and affect, pleasant and agreeable during interview and exam.    LABS:   PSA   0.92   12/10/15    IMAGING: see above    ASSESSMENT and PLAN:    Mr. Karan Anglin is a pleasant 64 year old male with a history of nephrolithiasis, several weeks of intermittent right-sided abdominal pain, and a few years of mild urinary hesitancy, slow stream, and infrequent hematospermia.     For the intermittent abdominal pain in the setting of nephrolithiasis, we discussed that there were no obstructing stones or hydronephrosis seen on renal ultrasound, but also discussed the limitations of ultrasound when it comes to detecting small, distal stones. Given his ongoing symptoms, we discussed the option to proceed with low dose CT abd/pelvis without contrast (stone protocol) to further evaluate and he would like to proceed.  -Schedule CT A/P w/o contrast next available.    For the few years of mild obstructive LUTS, we discussed possible etiologies as well as management options to include observation, behavioral modifications, trial of an alpha blocker, or further evaluation with cystoscopy or UDS. Patient elects for a trial of an alpha blocker.  -Start tamsulosin 0.4 mg once daily. Side effects discussed.     For the infrequent hematospermia, we discussed that this is usually due to a benign source. However, further workup including CHRISTOPHER and PSA are warranted.  -CHRISTOPHER today normal other than possible very mild enlargement and mild bogginess of the gland, though non-tender and without nodules.   -Update PSA today.  -Continue to monitor and if hematospermia progresses or becomes more frequent, RTC for consideration of TRUS vs. cysto vs. other.    Additional recommendations pending results of the above.    Thank you for allowing me to participate in Mr. Anglin's  care.      About 25 minutes were spent with the patient today, > 50% in counseling and coordination of care.    Savannah Burrows PA-C  Department of Urology

## 2019-01-29 NOTE — PATIENT INSTRUCTIONS
UROLOGY CLINIC VISIT PATIENT INSTRUCTIONS    1) Schedule a CT scan without contrast to check for a kidney stone that may be trying to pass.    2) Go to the lab at 8:15 for PSA blood test, urine test, and your hemoglobin A1C.    3) I will contact you with results when available.     If you have any issues, questions or concerns in the meantime, do not hesitate to contact us at [phone] or via [employer].     It was a pleasure meeting with you today.  Thank you for allowing me and my team the privilege of caring for you today.  YOU are the reason we are here, and I truly hope we provided you with the excellent service you deserve.  Please let us know if there is anything else we can do for you so that we can be sure you are leaving completely satisfied with your care experience.

## 2019-01-29 NOTE — NURSING NOTE
"Chief Complaint   Patient presents with     Consult     hematuria       Blood pressure 132/90, pulse 73, height 1.805 m (5' 11.06\"), weight 99.7 kg (219 lb 14.4 oz). Body mass index is 30.62 kg/m .    Patient Active Problem List   Diagnosis     Benign essential hypertension     Hyperlipidemia LDL goal <130     Migraine without aura and without status migrainosus, not intractable     Blood in semen     Shoulder dislocation, left, initial encounter     Aftercare following surgery of the musculoskeletal system     Microalbuminuria       No Known Allergies    Current Outpatient Medications   Medication Sig Dispense Refill     atenolol (TENORMIN) 50 MG tablet Take 1 tablet (50 mg) by mouth daily 90 tablet 1     atorvastatin (LIPITOR) 10 MG tablet Take 2 tablets (20 mg) by mouth daily 60 tablet 3     diclofenac (VOLTAREN) 1 % GEL topical gel Apply 2 grams to elbow as needed up to four times daily using enclosed dosing card. 100 g 1     Esomeprazole Magnesium (NEXIUM PO) Take by mouth daily       hydrochlorothiazide (HYDRODIURIL) 25 MG tablet Take 1 tablet (25 mg) by mouth daily 90 tablet 1     ibuprofen (ADVIL/MOTRIN) 600 MG tablet Take 1 tablet (600 mg) by mouth every 8 hours as needed for moderate pain 30 tablet 0     losartan (COZAAR) 25 MG tablet Take 1 tablet (25 mg) by mouth daily 90 tablet 1     rizatriptan (MAXALT) 5 MG tablet Take 1 tablet (5 mg) by mouth at onset of headache for migraine repeat after 2 hr if no relief; maximum: 30 mg/24 hours 18 tablet 1     topiramate (TOPAMAX) 100 MG tablet Take 1 tablet (100 mg) by mouth At Bedtime 90 tablet 3       Social History     Tobacco Use     Smoking status: Never Smoker     Smokeless tobacco: Never Used   Substance Use Topics     Alcohol use: Yes     Comment: 3 per week     Drug use: No       Della Chahal LPN  1/29/2019  7:08 AM    "

## 2019-01-29 NOTE — TELEPHONE ENCOUNTER
Please call Karan to let him know that his A1C was 7.3.  This is diagnostic for diabetes.  Could he please schedule with me this week or next to discuss next steps.  MARJ Rashid

## 2019-01-29 NOTE — TELEPHONE ENCOUNTER
Spoke with pt he stated   has kidney stones and wants to deal with this 1st, needs to schedule surg. For this    Will call when above issue is taken care of    Peyton Story RN   [facility]

## 2019-01-30 ENCOUNTER — OFFICE VISIT (OUTPATIENT)
Dept: SURGERY | Facility: CLINIC | Age: 64
End: 2019-01-30
Payer: COMMERCIAL

## 2019-01-30 ENCOUNTER — ANESTHESIA EVENT (OUTPATIENT)
Dept: SURGERY | Facility: AMBULATORY SURGERY CENTER | Age: 64
End: 2019-01-30

## 2019-01-30 ENCOUNTER — APPOINTMENT (OUTPATIENT)
Dept: UROLOGY | Facility: CLINIC | Age: 64
End: 2019-01-30
Payer: COMMERCIAL

## 2019-01-30 ENCOUNTER — OFFICE VISIT (OUTPATIENT)
Dept: UROLOGY | Facility: CLINIC | Age: 64
End: 2019-01-30
Payer: COMMERCIAL

## 2019-01-30 ENCOUNTER — HOSPITAL ENCOUNTER (OUTPATIENT)
Facility: AMBULATORY SURGERY CENTER | Age: 64
End: 2019-01-30
Attending: UROLOGY
Payer: COMMERCIAL

## 2019-01-30 VITALS
WEIGHT: 219 LBS | BODY MASS INDEX: 30.66 KG/M2 | HEART RATE: 63 BPM | SYSTOLIC BLOOD PRESSURE: 120 MMHG | DIASTOLIC BLOOD PRESSURE: 78 MMHG | RESPIRATION RATE: 16 BRPM | OXYGEN SATURATION: 96 % | HEIGHT: 71 IN | TEMPERATURE: 97.9 F

## 2019-01-30 VITALS
SYSTOLIC BLOOD PRESSURE: 120 MMHG | DIASTOLIC BLOOD PRESSURE: 81 MMHG | HEIGHT: 71 IN | HEART RATE: 66 BPM | WEIGHT: 219 LBS | BODY MASS INDEX: 30.66 KG/M2

## 2019-01-30 DIAGNOSIS — Z01.818 PREOP GENERAL PHYSICAL EXAM: Primary | ICD-10-CM

## 2019-01-30 DIAGNOSIS — Z01.818 PREOP GENERAL PHYSICAL EXAM: ICD-10-CM

## 2019-01-30 DIAGNOSIS — N20.1 CALCULUS OF URETER: Primary | ICD-10-CM

## 2019-01-30 LAB
ALBUMIN UR-MCNC: NEGATIVE MG/DL
ANION GAP SERPL CALCULATED.3IONS-SCNC: 6 MMOL/L (ref 3–14)
APPEARANCE UR: CLEAR
BACTERIA #/AREA URNS HPF: ABNORMAL /HPF
BACTERIA SPEC CULT: NO GROWTH
BILIRUB UR QL STRIP: NEGATIVE
BUN SERPL-MCNC: 34 MG/DL (ref 7–30)
CALCIUM SERPL-MCNC: 9.2 MG/DL (ref 8.5–10.1)
CHLORIDE SERPL-SCNC: 101 MMOL/L (ref 94–109)
CO2 SERPL-SCNC: 30 MMOL/L (ref 20–32)
COLOR UR AUTO: YELLOW
CREAT SERPL-MCNC: 1.94 MG/DL (ref 0.66–1.25)
ERYTHROCYTE [DISTWIDTH] IN BLOOD BY AUTOMATED COUNT: 11.9 % (ref 10–15)
GFR SERPL CREATININE-BSD FRML MDRD: 36 ML/MIN/{1.73_M2}
GLUCOSE SERPL-MCNC: 97 MG/DL (ref 70–99)
GLUCOSE UR STRIP-MCNC: NEGATIVE MG/DL
HCT VFR BLD AUTO: 40.7 % (ref 40–53)
HGB BLD-MCNC: 13.3 G/DL (ref 13.3–17.7)
HGB UR QL STRIP: ABNORMAL
KETONES UR STRIP-MCNC: NEGATIVE MG/DL
LEUKOCYTE ESTERASE UR QL STRIP: NEGATIVE
Lab: NORMAL
MCH RBC QN AUTO: 28 PG (ref 26.5–33)
MCHC RBC AUTO-ENTMCNC: 32.7 G/DL (ref 31.5–36.5)
MCV RBC AUTO: 86 FL (ref 78–100)
MUCOUS THREADS #/AREA URNS LPF: PRESENT /LPF
NITRATE UR QL: NEGATIVE
PH UR STRIP: 5 PH (ref 5–7)
PLATELET # BLD AUTO: 263 10E9/L (ref 150–450)
POTASSIUM SERPL-SCNC: 3.5 MMOL/L (ref 3.4–5.3)
RBC # BLD AUTO: 4.75 10E12/L (ref 4.4–5.9)
RBC #/AREA URNS AUTO: 7 /HPF (ref 0–2)
SODIUM SERPL-SCNC: 137 MMOL/L (ref 133–144)
SOURCE: ABNORMAL
SP GR UR STRIP: 1.02 (ref 1–1.03)
SPECIMEN SOURCE: NORMAL
SPERM #/AREA URNS HPF: PRESENT /HPF
UROBILINOGEN UR STRIP-MCNC: 0 MG/DL (ref 0–2)
WBC # BLD AUTO: 7.6 10E9/L (ref 4–11)
WBC #/AREA URNS AUTO: 4 /HPF (ref 0–5)

## 2019-01-30 RX ORDER — DIPHENHYDRAMINE HCL 25 MG
25 TABLET ORAL
COMMUNITY

## 2019-01-30 RX ORDER — CEFAZOLIN SODIUM 1 G/50ML
1 INJECTION, SOLUTION INTRAVENOUS SEE ADMIN INSTRUCTIONS
Status: CANCELLED | OUTPATIENT
Start: 2019-01-30

## 2019-01-30 RX ORDER — CEFAZOLIN SODIUM 2 G/50ML
2 SOLUTION INTRAVENOUS
Status: CANCELLED | OUTPATIENT
Start: 2019-01-30

## 2019-01-30 ASSESSMENT — MIFFLIN-ST. JEOR
SCORE: 1816.46
SCORE: 1815.51

## 2019-01-30 ASSESSMENT — PAIN SCALES - GENERAL
PAINLEVEL: MILD PAIN (2)
PAINLEVEL: NO PAIN (0)

## 2019-01-30 NOTE — LETTER
RE: Karan Anglin  [address]  [place] [facility] [place] [facility] [address]     Dear Colleague,    Thank you for referring your patient, Karan Anglin, to the [facility] UROLOGY AND INST FOR PROSTATE AND UROLOGIC CANCERS at [facility]. Please see a copy of my visit note below.    We are pleased to see Mr. Karan Anglin in consultation for the evaluation of chief complaint listed below    Chief Complaint:    Urolithiasis         History of Present Illness:   Karan Anglin is a(n) 64 year old male w/ PMH of HLD, CKD, DM2 and a history of urolithiasis who presents for evaluation of urolithiasis. Pt developed intermittent right sided lower abdominal pain over the past month. A BRANDIE on 12/18 showed a few punctate intraparenchymal stones in the right kidney but no hydronephrosis. He was seen by Savannah Burrows PA-C in urology clinic yesterday with ongoing pain. He denies gross hematuria, dysuria, fevers, chills, or nausea. CT was obtained which shows a 9 mm obstructing stone at the right UVJ and another 5mm obstructing stone just proximally. He has had multiple stones in the passed, all passed with MET. He has had a metabolic stone workup in the past but does not know the results.           Past Medical History:     Past Medical History:   Diagnosis Date     Blood in semen 2016    Symptoms came and went     Hyperlipidemia 2015     Hypertension 2014     Migraine     Nausea and vomiting     Peptic ulcer hemorrhage 2013            Past Surgical History:     Past Surgical History:   Procedure Laterality Date     GI SURGERY  2013    peptic ulcers stapled     OPEN REDUCTION INTERNAL FIXATION HUMERUS PROXIMAL Left 5/15/2018    Procedure: OPEN REDUCTION INTERNAL FIXATION HUMERUS PROXIMAL;  Left Glenoid Open Reduction Internal Fixation;  Surgeon: Rosa Joe MD;  Location:  OR            Social History:   Smoking: No  Alcohol: Rare  IV Drug Use: None         Family History:     Family History   Problem  Relation Age of Onset     Colon Cancer Mother      Kidney Disease Mother      Coronary Artery Disease Father      Enlarged prostate Father      Other Cancer Brother      Down Syndrome Sister      Alzheimer Disease Sister      Nephrolithiasis Sister      Intellectual Disability (Mental Retardation) Sister         Down Syndrome     No urologic cancers in the family.         Allergies:   No Known Allergies         Medications:     Current Outpatient Medications   Medication Sig     atenolol (TENORMIN) 50 MG tablet Take 1 tablet (50 mg) by mouth daily     atorvastatin (LIPITOR) 10 MG tablet Take 2 tablets (20 mg) by mouth daily     diclofenac (VOLTAREN) 1 % GEL topical gel Apply 2 grams to elbow as needed up to four times daily using enclosed dosing card.     Esomeprazole Magnesium (NEXIUM PO) Take by mouth daily     hydrochlorothiazide (HYDRODIURIL) 25 MG tablet Take 1 tablet (25 mg) by mouth daily     ibuprofen (ADVIL/MOTRIN) 600 MG tablet Take 1 tablet (600 mg) by mouth every 8 hours as needed for moderate pain     losartan (COZAAR) 25 MG tablet Take 1 tablet (25 mg) by mouth daily     rizatriptan (MAXALT) 5 MG tablet Take 1 tablet (5 mg) by mouth at onset of headache for migraine repeat after 2 hr if no relief; maximum: 30 mg/24 hours     tamsulosin (FLOMAX) 0.4 MG capsule Take 1 capsule (0.4 mg) by mouth daily     topiramate (TOPAMAX) 100 MG tablet Take 1 tablet (100 mg) by mouth At Bedtime     No current facility-administered medications for this visit.           PHYSICAL EXAM   There were no vitals taken for this visit.  GENERAL: No acute distress. Well nourished.   HEENT:  Sclerae anicteric.  Conjunctivae pink.  Moist mucous membranes.  NECK:  Supple.  No lymphadenopathy.  CARDIAC:  Regular rate and rhythm.  LUNGS:  Non-labored breathing   BACK:  No costovertebral tenderness.  ABDOMEN: Soft, non-tender, no surgical scars, no organomegal.  SKIN: No rashes.  Dry.     EXTREMITIES:  Warm, well perfused. No lower  extremity edema bilaterally.  NEURO: normal gait, no focal deficits.           LABS AND IMAGIN/29 CT stone protocol  1. 9 mm obstructing stone at the right ureterovesicular junction.  Upstream to this, there is another bilobed stone measuring up to 5 mm  on the axial images in the distal right ureter. Moderate  ureterohydronephrosis on the right side.  Additional right renal  stones measuring up to 5 mm.  2.  Punctate left renal calcifications.    Cr 1.92 (<- 1.3)    UA clean          ASSESSMENT:   Karan Anglin is a 64 year old male who presents with a 9mm obstructing right UVJ stone. He has no evidence of infection, however his Cr is elevated. We covered the natural history of kidney stones, the risk of progression to symptomatic pain/infection, and the possibility of renal failure/kidney damage.  We covered treatment options including observation, ureteroscopy, extracorporeal shock wave lithotripsy (ESWL), and percutaneous nephrolithotomy (PCNL).  We covered surgical risks which include but are not limited to heart attack, stroke, blood clot in the legs or lungs, death, injury to surrounding organs and tissues, low risk of bleeding. Additional procedures may be necessary in the perioperative period.          PLAN:     Schedule for R URS/LL/stent placement 19    Tamsulosin, strain urine, pain control until then    Alexsander Heredia MD  Urology Resident    Patient was seen and examined with Dr. Cash    Physician Attestation   I, Shahrzad Cash, saw this patient and agree with the findings and plan of care as documented in the note.      Items personally reviewed/procedural attestation: vitals, labs and imaging and agree with the interpretation documented in the note.    I spent over 30 minutes with the patient.  Over half this time was spent on counseling for ureteral calculus.      Shahrzad Cash MD    CC  Patient Care Team:  Romelia Judd APRN CNP as PCP - General (Nurse Practitioner -  Family)  Romelia Judd, APRN CNP as PCP - Assigned PCP  Tc Hester MD as MD (Family Medicine - Sports Medicine)  Savannah Burrows PA-C as Physician Assistant (Physician Assistant)  Alisson Chawla, RN as Registered Nurse (Urology)  SELF, REFERRED    Copy to patient  SHELBY COOPER  [address]  [place] [facility] [place] [address]

## 2019-01-30 NOTE — TELEPHONE ENCOUNTER
He is going to need a pre-op for surgery and he can do that here.  Then we'd be able to have a conversation about the diagnosis.  The surgeon may want this addressed before surgery, but typically with the A1C he has it should not be a problem.  GUILLERMO Judd, JOELP

## 2019-01-30 NOTE — ANESTHESIA PREPROCEDURE EVALUATION
Anesthesia Pre-Procedure Evaluation    Patient: Karan Anglin   MRN:     [id number] Gender:   male   Age:    64 year old :      1955        Preoperative Diagnosis: Right Ureteral Calculi   Procedure(s):  Cystoscopy, Right Ureteroscopy, Laser Lithotripsy, Stent Placement     Past Medical History:   Diagnosis Date     Blood in semen 2016    Symptoms came and went     GERD (gastroesophageal reflux disease)      Hyperlipidemia      Hypertension      Migraine     Nausea and vomiting     Peptic ulcer hemorrhage       Past Surgical History:   Procedure Laterality Date     GI SURGERY      peptic ulcers stapled     OPEN REDUCTION INTERNAL FIXATION HUMERUS PROXIMAL Left 5/15/2018    Procedure: OPEN REDUCTION INTERNAL FIXATION HUMERUS PROXIMAL;  Left Glenoid Open Reduction Internal Fixation;  Surgeon: Rosa Joe MD;  Location: [facility] OR          Anesthesia Evaluation     . Pt has had prior anesthetic. Type: General and MAC           ROS/MED HX    ENT/Pulmonary:  - neg pulmonary ROS     Neurologic:  - neg neurologic ROS     Cardiovascular:     (+) hypertension----. : . . . :. . No previous cardiac testing       METS/Exercise Tolerance:  >4 METS   Hematologic:  - neg hematologic  ROS       Musculoskeletal:  - neg musculoskeletal ROS       GI/Hepatic:  - neg GI/hepatic ROS   (+) GERD Asymptomatic on medication,       Renal/Genitourinary:     (+) chronic renal disease, type: CRI, Nephrolithiasis ,       Endo: Comment: New diagnosis 2019, has not seen endocrinologist thus far - neg endo ROS   (+) type II DM Last HgA1c: 7.3 date: 2019 Not using insulin - not using insulin pump not previously admitted for DM/DKA .      Psychiatric:  - neg psychiatric ROS       Infectious Disease:  - neg infectious disease ROS       Malignancy:      - no malignancy   Other:    (+) No chance of pregnancy C-spine cleared: No, no H/O Chronic Pain,no other significant disability   - neg other ROS                      PHYSICAL EXAM:   Mental Status/Neuro: A/A/O   Airway: Facies: Feasible  Mallampati: II  Mouth/Opening: Full  TM distance: > 6 cm  Neck ROM: Full   Respiratory: Auscultation: CTAB     Resp. Rate: Normal     Resp. Effort: Normal      CV: Rhythm: Regular  Rate: Age appropriate  Heart: Normal Sounds   Comments:      Dental: Normal                  Results for SHELBY COOPER (MRN [id number]) as of 1/30/2019 14:43   Ref. Range 1/30/2019 12:00 1/30/2019 13:10   Sodium Latest Ref Range: 133 - 144 mmol/L  137   Potassium Latest Ref Range: 3.4 - 5.3 mmol/L  3.5   Chloride Latest Ref Range: 94 - 109 mmol/L  101   Carbon Dioxide Latest Ref Range: 20 - 32 mmol/L  30   Urea Nitrogen Latest Ref Range: 7 - 30 mg/dL  34 (H)   Creatinine Latest Ref Range: 0.66 - 1.25 mg/dL  1.94 (H)   GFR Estimate Latest Ref Range: >60 mL/min/1.73_m2  36 (L)   GFR Estimate If Black Latest Ref Range: >60 mL/min/1.73_m2  42 (L)   Calcium Latest Ref Range: 8.5 - 10.1 mg/dL  9.2   Anion Gap Latest Ref Range: 3 - 14 mmol/L  6   Glucose Latest Ref Range: 70 - 99 mg/dL  97   WBC Latest Ref Range: 4.0 - 11.0 10e9/L  7.6   Hemoglobin Latest Ref Range: 13.3 - 17.7 g/dL  13.3   Hematocrit Latest Ref Range: 40.0 - 53.0 %  40.7   Platelet Count Latest Ref Range: 150 - 450 10e9/L  263   RBC Count Latest Ref Range: 4.4 - 5.9 10e12/L  4.75   MCV Latest Ref Range: 78 - 100 fl  86   MCH Latest Ref Range: 26.5 - 33.0 pg  28.0   MCHC Latest Ref Range: 31.5 - 36.5 g/dL  32.7   RDW Latest Ref Range: 10.0 - 15.0 %  11.9   Color Urine Unknown Yellow    Appearance Urine Unknown Clear    Glucose Urine Latest Ref Range: NEG^Negative mg/dL Negative    Bilirubin Urine Latest Ref Range: NEG^Negative  Negative    Ketones Urine Latest Ref Range: NEG^Negative mg/dL Negative    Specific Gravity Urine Latest Ref Range: 1.003 - 1.035  1.016    pH Urine Latest Ref Range: 5.0 - 7.0 pH 5.0    Protein Albumin Urine Latest Ref Range: NEG^Negative mg/dL Negative    Urobilinogen mg/dL Latest Ref  "Range: 0.0 - 2.0 mg/dL 0.0    Nitrite Urine Latest Ref Range: NEG^Negative  Negative    Blood Urine Latest Ref Range: NEG^Negative  Small (A)    Leukocyte Esterase Urine Latest Ref Range: NEG^Negative  Negative    Source Unknown Midstream Urine    WBC Urine Latest Ref Range: 0 - 5 /HPF 4    RBC Urine Latest Ref Range: 0 - 2 /HPF 7 (H)    Bacteria Urine Latest Ref Range: NEG^Negative /HPF Few (A)    sperm Latest Ref Range: NEG^Negative /HPF Present (A)    Mucous Urine Latest Ref Range: NEG^Negative /LPF Present (A)    Specimen Description Unknown Midstream Urine    Culture Micro Unknown PENDING    URINE CULTURE AEROBIC BACTERIAL Unknown Rpt        Preop Vitals  BP Readings from Last 3 Encounters:   01/30/19 120/78   01/30/19 120/81   01/29/19 132/90    Pulse Readings from Last 3 Encounters:   01/30/19 63   01/30/19 66   01/29/19 73      Resp Readings from Last 3 Encounters:   01/30/19 16   12/17/18 16   05/15/18 16    SpO2 Readings from Last 3 Encounters:   01/30/19 96%   01/25/19 96%   12/17/18 96%      Temp Readings from Last 1 Encounters:   01/30/19 97.9  F (36.6  C) (Oral)    Ht Readings from Last 1 Encounters:   01/30/19 1.803 m (5' 11\")      Wt Readings from Last 1 Encounters:   01/30/19 99.3 kg (219 lb)    Estimated body mass index is 30.54 kg/m  as calculated from the following:    Height as of this encounter: 1.803 m (5' 11\").    Weight as of this encounter: 99.3 kg (219 lb).     LDA:  Peripheral IV 05/15/18 Right Hand (Active)   Number of days: 260            JZG FV AN PLAN NO PONV RULE         PAC Discussion and Assessment    ASA Classification: 2  Case is suitable for: ASC  Anesthetic techniques and relevant risks discussed: GA  Invasive monitoring and risk discussed:   Types:   Possibility and Risk of blood transfusion discussed:   NPO instructions given:   Additional anesthetic preparation and risks discussed:   Needs early admission to pre-op area:   Other:     PAC Resident/NP Anesthesia " Assessment:  Karan Anglin is a 65 yo male scheduled for Cystoscopy, Right Ureteroscopy, Laser Lithotripsy, Stent Placement on 2/11/2019 by Dr. Cash in treatment of kidney stones      Previous anesthesia without complications  1) Cardiac: HTN.  METS well over 4  2) Pulmonary: Never smoked, denies pulmonary symptoms  3) GI: History of peptic ulcer. GERD, well managed with nexium  4) : History of kidney stones. GRF most recent 36, down from 50's earlier this year  CT 1/29/2019  IMPRESSION:   1. 9 mm obstructing stone at the right ureterovesicular junction.  Upstream to this, there is another bilobed stone measuring up to 5 mm  on the axial images in the distal right ureter. Moderate  ureterohydronephrosis on the right side.  Additional right renal  stones measuring up to 5 mm.  2.  Punctate left renal calcifications.     5) Endo: Newly diagnosed DM II (yesterday) with A1c of 7.3. He has not met with PCP or endocrinologist as yet              Reviewed and Signed by PAC Mid-Level Provider/Resident  Mid-Level Provider/Resident: JEFFERSON Peters  Date: 1/30/2019  Time: 1500    Attending Anesthesiologist Anesthesia Assessment:  64 jamin old for cysto, ureteroscopy and lithotripsy. Active without cardiac or pulmonary disease.     Patient/case discussed with JAMEEL/resident; agree with above assessment. No need to see patient. Patient is appropriate for the planned procedure without further work-up or medical management.      Reviewed and Signed by PAC Anesthesiologist  Anesthesiologist: hari  Date: 1/30/2019  Time:   Pass/Fail: Pass  Disposition:     PAC Pharmacist Assessment:        Pharmacist:   Date:   Time:        JEFFERSON Buck CNS

## 2019-01-30 NOTE — NURSING NOTE
"Chief Complaint   Patient presents with     Follow Up     stones       Blood pressure 120/81, pulse 66, height 1.805 m (5' 11.06\"), weight 99.3 kg (219 lb). Body mass index is 30.49 kg/m .    Patient Active Problem List   Diagnosis     Benign essential hypertension     Hyperlipidemia LDL goal <130     Migraine without aura and without status migrainosus, not intractable     Blood in semen     Shoulder dislocation, left, initial encounter     Aftercare following surgery of the musculoskeletal system     Microalbuminuria     Type 2 diabetes mellitus with stage 3 chronic kidney disease, without long-term current use of insulin (H)       No Known Allergies    Current Outpatient Medications   Medication Sig Dispense Refill     atenolol (TENORMIN) 50 MG tablet Take 1 tablet (50 mg) by mouth daily 90 tablet 1     atorvastatin (LIPITOR) 10 MG tablet Take 2 tablets (20 mg) by mouth daily 60 tablet 3     diclofenac (VOLTAREN) 1 % GEL topical gel Apply 2 grams to elbow as needed up to four times daily using enclosed dosing card. 100 g 1     Esomeprazole Magnesium (NEXIUM PO) Take by mouth daily       hydrochlorothiazide (HYDRODIURIL) 25 MG tablet Take 1 tablet (25 mg) by mouth daily 90 tablet 1     ibuprofen (ADVIL/MOTRIN) 600 MG tablet Take 1 tablet (600 mg) by mouth every 8 hours as needed for moderate pain 30 tablet 0     losartan (COZAAR) 25 MG tablet Take 1 tablet (25 mg) by mouth daily 90 tablet 1     rizatriptan (MAXALT) 5 MG tablet Take 1 tablet (5 mg) by mouth at onset of headache for migraine repeat after 2 hr if no relief; maximum: 30 mg/24 hours 18 tablet 1     tamsulosin (FLOMAX) 0.4 MG capsule Take 1 capsule (0.4 mg) by mouth daily (Patient not taking: Reported on 1/30/2019) 90 capsule 3     topiramate (TOPAMAX) 100 MG tablet Take 1 tablet (100 mg) by mouth At Bedtime (Patient not taking: Reported on 1/30/2019) 90 tablet 3       Social History     Tobacco Use     Smoking status: Never Smoker     Smokeless " tobacco: Never Used   Substance Use Topics     Alcohol use: Yes     Comment: 3 per week     Drug use: No       Wen Moy LPN  1/30/2019  11:05 AM

## 2019-01-30 NOTE — H&P
Pre-Operative H & P     CC:  Preoperative exam to assess for increased cardiopulmonary risk while undergoing surgery and anesthesia.    Date of Encounter: 1/30/2019  Primary Care Physician:  Romelia Judd      Reason for visit:  Preop general physical exam  Kidney stones      HPI  Karan Anglin is a 64 year old male who presents for pre-operative H & P in preparation for Cystoscopy, Right Ureteroscopy, Laser Lithotripsy, Stent Placement on 2/11/2019 by Dr. Cash in treatment of kidney stones at [facility].     History is obtained from the patient.       Previous anesthesia without complications    1) Cardiac: HTN.  METS well over 4  2) Pulmonary: Never smoked, denies pulmonary symptoms  3) GI: History of peptic ulcer. GERD, well managed with nexium  4) : History of kidney stones. GRF most recent 36, down from 50 s earlier this year    CT 1/29/2019  IMPRESSION:   1. 9 mm obstructing stone at the right ureterovesicular junction.  Upstream to this, there is another bilobed stone measuring up to 5 mm  on the axial images in the distal right ureter. Moderate  ureterohydronephrosis on the right side.  Additional right renal  stones measuring up to 5 mm.  2.  Punctate left renal calcifications.     5) Endo: Newly diagnosed DM II (yesterday) with A1c of 7.3. He has not met with PCP or endocrinologist as yet           Past Medical History  Past Medical History:   Diagnosis Date     Blood in semen 2016    Symptoms came and went     GERD (gastroesophageal reflux disease)      Hyperlipidemia 2015     Hypertension 2014     Migraine     Nausea and vomiting     Peptic ulcer hemorrhage 2013       Past Surgical History  Past Surgical History:   Procedure Laterality Date     GI SURGERY  2013    peptic ulcers stapled     OPEN REDUCTION INTERNAL FIXATION HUMERUS PROXIMAL Left 5/15/2018    Procedure: OPEN REDUCTION INTERNAL FIXATION HUMERUS PROXIMAL;  Left Glenoid Open Reduction Internal  Fixation;  Surgeon: Rosa Joe MD;  Location: [facility] OR       Hx of Blood transfusions/reactions: none     Hx of abnormal bleeding or anti-platelet use: none    Menstrual history: No LMP for male patient.:     Steroid use in the last year: none    Personal or FH with difficulty with Anesthesia:  None        Prior to Admission Medications  Current Outpatient Medications   Medication Sig Dispense Refill     atenolol (TENORMIN) 50 MG tablet Take 1 tablet (50 mg) by mouth daily 90 tablet 1     atorvastatin (LIPITOR) 10 MG tablet Take 2 tablets (20 mg) by mouth daily 60 tablet 3     diphenhydrAMINE (BENADRYL) 25 MG tablet Take 25 mg by mouth nightly as needed for itching or allergies       Esomeprazole Magnesium (NEXIUM PO) Take by mouth daily       hydrochlorothiazide (HYDRODIURIL) 25 MG tablet Take 1 tablet (25 mg) by mouth daily 90 tablet 1     losartan (COZAAR) 25 MG tablet Take 1 tablet (25 mg) by mouth daily 90 tablet 1     rizatriptan (MAXALT) 5 MG tablet Take 1 tablet (5 mg) by mouth at onset of headache for migraine repeat after 2 hr if no relief; maximum: 30 mg/24 hours 18 tablet 1     diclofenac (VOLTAREN) 1 % GEL topical gel Apply 2 grams to elbow as needed up to four times daily using enclosed dosing card. 100 g 1     ibuprofen (ADVIL/MOTRIN) 600 MG tablet Take 1 tablet (600 mg) by mouth every 8 hours as needed for moderate pain 30 tablet 0     tamsulosin (FLOMAX) 0.4 MG capsule Take 1 capsule (0.4 mg) by mouth daily (Patient not taking: Reported on 1/30/2019) 90 capsule 3     topiramate (TOPAMAX) 100 MG tablet Take 1 tablet (100 mg) by mouth At Bedtime (Patient not taking: Reported on 1/30/2019) 90 tablet 3       Allergies  No Known Allergies    Social History  Social History     Socioeconomic History     Marital status: Single     Spouse name: Not on file     Number of children: Not on file     Years of education: Not on file     Highest education level: Not on file   Social Needs     Financial  resource strain: Not on file     Food insecurity - worry: Not on file     Food insecurity - inability: Not on file     Transportation needs - medical: Not on file     Transportation needs - non-medical: Not on file   Occupational History     Not on file   Tobacco Use     Smoking status: Never Smoker     Smokeless tobacco: Never Used   Substance and Sexual Activity     Alcohol use: Yes     Comment: 3 per week     Drug use: No     Sexual activity: Not Currently     Partners: Male     Birth control/protection: None   Other Topics Concern     Parent/sibling w/ CABG, MI or angioplasty before 65F 55M? No   Social History Narrative     Not on file       Family History  Family History   Problem Relation Age of Onset     Colon Cancer Mother      Kidney Disease Mother      Coronary Artery Disease Father      Enlarged prostate Father      Other Cancer Brother      Down Syndrome Sister      Alzheimer Disease Sister      Nephrolithiasis Sister      Intellectual Disability (Mental Retardation) Sister         Down Syndrome             Anesthesia Pre-Procedure Evaluation    Patient: Karan Anglin   MRN:     [id number] Gender:   male   Age:    64 year old :      1955        Preoperative Diagnosis: Right Ureteral Calculi   Procedure(s):  Cystoscopy, Right Ureteroscopy, Laser Lithotripsy, Stent Placement     Past Medical History:   Diagnosis Date     Blood in semen 2016    Symptoms came and went     GERD (gastroesophageal reflux disease)      Hyperlipidemia      Hypertension      Migraine     Nausea and vomiting     Peptic ulcer hemorrhage       Past Surgical History:   Procedure Laterality Date     GI SURGERY      peptic ulcers stapled     OPEN REDUCTION INTERNAL FIXATION HUMERUS PROXIMAL Left 5/15/2018    Procedure: OPEN REDUCTION INTERNAL FIXATION HUMERUS PROXIMAL;  Left Glenoid Open Reduction Internal Fixation;  Surgeon: Rosa Joe MD;  [facility]          Anesthesia Evaluation     . Pt has  "had prior anesthetic. Type: General and MAC           ROS/MED HX    ENT/Pulmonary:  - neg pulmonary ROS     Neurologic:  - neg neurologic ROS     Cardiovascular:     (+) hypertension----. : . . . :. . No previous cardiac testing       METS/Exercise Tolerance:  >4 METS   Hematologic:  - neg hematologic  ROS       Musculoskeletal:  - neg musculoskeletal ROS       GI/Hepatic:  - neg GI/hepatic ROS   (+) GERD Asymptomatic on medication,       Renal/Genitourinary:     (+) chronic renal disease, type: CRI, Nephrolithiasis ,       Endo: Comment: New diagnosis 1/29/2019, has not seen endocrinologist thus far - neg endo ROS   (+) type II DM Last HgA1c: 7.3 date: 1/29/2019 Not using insulin - not using insulin pump not previously admitted for DM/DKA .      Psychiatric:  - neg psychiatric ROS       Infectious Disease:  - neg infectious disease ROS       Malignancy:      - no malignancy   Other:    (+) No chance of pregnancy C-spine cleared: No, no H/O Chronic Pain,no other significant disability   - neg other ROS                     PHYSICAL EXAM:   Mental Status/Neuro: A/A/O   Airway: Facies: Feasible  Mallampati: II  Mouth/Opening: Full  TM distance: > 6 cm  Neck ROM: Full   Respiratory: Auscultation: CTAB     Resp. Rate: Normal     Resp. Effort: Normal      CV: Rhythm: Regular  Rate: Age appropriate  Heart: Normal Sounds   Comments:      Dental: Normal                  Preop Vitals  BP Readings from Last 3 Encounters:   01/30/19 120/78   01/30/19 120/81   01/29/19 132/90    Pulse Readings from Last 3 Encounters:   01/30/19 63   01/30/19 66   01/29/19 73      Resp Readings from Last 3 Encounters:   01/30/19 16   12/17/18 16   05/15/18 16    SpO2 Readings from Last 3 Encounters:   01/30/19 96%   01/25/19 96%   12/17/18 96%      Temp Readings from Last 1 Encounters:   01/30/19 97.9  F (36.6  C) (Oral)    Ht Readings from Last 1 Encounters:   01/30/19 1.803 m (5' 11\")      Wt Readings from Last 1 Encounters:   01/30/19 99.3 kg " "(219 lb)    Estimated body mass index is 30.54 kg/m  as calculated from the following:    Height as of this encounter: 1.803 m (5' 11\").    Weight as of this encounter: 99.3 kg (219 lb).           The complete review of systems is negative other than noted in the HPI or here.   Temp: 97.9  F (36.6  C) Temp src: Oral BP: 120/78 Pulse: 63   Resp: 16 SpO2: 96 %         219 lbs 0 oz  5' 11\"   Body mass index is 30.54 kg/m .       Physical Exam  Constitutional: Awake, alert, cooperative, no apparent distress, and appears stated age.  Eyes: Pupils equal, round and reactive to light, extra ocular muscles intact, sclera clear, conjunctiva normal.  HENT: Normocephalic, oral pharynx with moist mucus membranes, good dentition. No goiter appreciated.   Respiratory: Clear to auscultation bilaterally, no crackles or wheezing.  Cardiovascular: Regular rate and rhythm, normal S1 and S2, and no murmur noted.  Carotids +2, no bruits. No edema. Palpable pulses to radial  DP and PT arteries.   GI: Normal bowel sounds, soft, non-distended, non-tender, no masses palpated, no hepatosplenomegaly.    Lymph/Hematologic: No cervical lymphadenopathy and no supraclavicular lymphadenopathy.  Genitourinary:  Deferred   Skin: Warm and dry.  No rashes at anticipated surgical site.   Musculoskeletal: Full ROM of neck. There is no redness, warmth, or swelling of the joints. Gross motor strength is normal.    Neurologic: Awake, alert, oriented to name, place and time. Cranial nerves II-XII are grossly intact. Gait is normal.   Neuropsychiatric: Calm, cooperative. Normal affect.     Labs: (personally reviewed)  Results for SHELBY COOPER (MRN [id number]) as of 1/30/2019 15:01   Ref. Range 1/30/2019 12:00 1/30/2019 13:10   Sodium Latest Ref Range: 133 - 144 mmol/L  137   Potassium Latest Ref Range: 3.4 - 5.3 mmol/L  3.5   Chloride Latest Ref Range: 94 - 109 mmol/L  101   Carbon Dioxide Latest Ref Range: 20 - 32 mmol/L  30   Urea Nitrogen Latest Ref Range: " 7 - 30 mg/dL  34 (H)   Creatinine Latest Ref Range: 0.66 - 1.25 mg/dL  1.94 (H)   GFR Estimate Latest Ref Range: >60 mL/min/1.73_m2  36 (L)   GFR Estimate If Black Latest Ref Range: >60 mL/min/1.73_m2  42 (L)   Calcium Latest Ref Range: 8.5 - 10.1 mg/dL  9.2   Anion Gap Latest Ref Range: 3 - 14 mmol/L  6   Glucose Latest Ref Range: 70 - 99 mg/dL  97   WBC Latest Ref Range: 4.0 - 11.0 10e9/L  7.6   Hemoglobin Latest Ref Range: 13.3 - 17.7 g/dL  13.3   Hematocrit Latest Ref Range: 40.0 - 53.0 %  40.7   Platelet Count Latest Ref Range: 150 - 450 10e9/L  263   RBC Count Latest Ref Range: 4.4 - 5.9 10e12/L  4.75   MCV Latest Ref Range: 78 - 100 fl  86   MCH Latest Ref Range: 26.5 - 33.0 pg  28.0   MCHC Latest Ref Range: 31.5 - 36.5 g/dL  32.7   RDW Latest Ref Range: 10.0 - 15.0 %  11.9   Color Urine Unknown Yellow    Appearance Urine Unknown Clear    Glucose Urine Latest Ref Range: NEG^Negative mg/dL Negative    Bilirubin Urine Latest Ref Range: NEG^Negative  Negative    Ketones Urine Latest Ref Range: NEG^Negative mg/dL Negative    Specific Gravity Urine Latest Ref Range: 1.003 - 1.035  1.016    pH Urine Latest Ref Range: 5.0 - 7.0 pH 5.0    Protein Albumin Urine Latest Ref Range: NEG^Negative mg/dL Negative    Urobilinogen mg/dL Latest Ref Range: 0.0 - 2.0 mg/dL 0.0    Nitrite Urine Latest Ref Range: NEG^Negative  Negative    Blood Urine Latest Ref Range: NEG^Negative  Small (A)    Leukocyte Esterase Urine Latest Ref Range: NEG^Negative  Negative    Source Unknown Midstream Urine    WBC Urine Latest Ref Range: 0 - 5 /HPF 4    RBC Urine Latest Ref Range: 0 - 2 /HPF 7 (H)    Bacteria Urine Latest Ref Range: NEG^Negative /HPF Few (A)    sperm Latest Ref Range: NEG^Negative /HPF Present (A)    Mucous Urine Latest Ref Range: NEG^Negative /LPF Present (A)    Specimen Description Unknown Midstream Urine    Culture Micro Unknown PENDING    URINE CULTURE AEROBIC BACTERIAL Unknown Rpt        EKG: Personally reviewed but formal  cardiology read pending: not indicated        Outside records reviewed from: care everywhere      ASSESSMENT and PLAN  Karan Anglin is a 64 year old male scheduled to undergo Cystoscopy, Right Ureteroscopy, Laser Lithotripsy, Stent Placement on 2/11/2019 by Dr. Cash in treatment of kidney stones. He has the following specific operative considerations:   - RCRI : Intermediate serious cardiac risks.  0.9% risk of major adverse cardiac event.   - Anesthesia considerations:  Refer to PAC assessment in anesthesia records  - VTE risk: 1.8%  - PARVIN # of risks 3/8 = intermediate risk  - Post-op delirium risk: high risk due to age  - Risk of PONV score = 2.  If > 2, anti-emetic intervention recommended.    Pt optimized for surgery. AVS with information on surgery time/arrival time, meds and NPO status given by nursing staff     I spent 30 minutes with patient, greater than 50% educating on preop meds, counseling on anesthesia and coordinating care for surgery  All labs and imaging personally reviewed.       Patient was discussed with Dr Solomon.    JEFFERSON Buck CNS  Preoperative Assessment Center  [facility]  Clinic and Surgery Center  Phone: [phone]  Fax: [phone]

## 2019-01-30 NOTE — PROGRESS NOTES
We are pleased to see Mr. Karan Anglin in consultation for the evaluation of chief complaint listed below    Chief Complaint:    Urolithiasis         History of Present Illness:   Karan Anglin is a(n) 64 year old male w/ PMH of HLD, CKD, DM2 and a history of urolithiasis who presents for evaluation of urolithiasis. Pt developed intermittent right sided lower abdominal pain over the past month. A BRANDIE on 12/18 showed a few punctate intraparenchymal stones in the right kidney but no hydronephrosis. He was seen by Savannah Burrows PA-C in urology clinic yesterday with ongoing pain. He denies gross hematuria, dysuria, fevers, chills, or nausea. CT was obtained which shows a 9 mm obstructing stone at the right UVJ and another 5mm obstructing stone just proximally. He has had multiple stones in the passed, all passed with MET. He has had a metabolic stone workup in the past but does not know the results.           Past Medical History:     Past Medical History:   Diagnosis Date     Blood in semen 2016    Symptoms came and went     Hyperlipidemia 2015     Hypertension 2014     Migraine     Nausea and vomiting     Peptic ulcer hemorrhage 2013            Past Surgical History:     Past Surgical History:   Procedure Laterality Date     GI SURGERY  2013    peptic ulcers stapled     OPEN REDUCTION INTERNAL FIXATION HUMERUS PROXIMAL Left 5/15/2018    Procedure: OPEN REDUCTION INTERNAL FIXATION HUMERUS PROXIMAL;  Left Glenoid Open Reduction Internal Fixation;  Surgeon: Rosa Joe MD;  Location:  OR            Social History:   Smoking: No  Alcohol: Rare  IV Drug Use: None         Family History:     Family History   Problem Relation Age of Onset     Colon Cancer Mother      Kidney Disease Mother      Coronary Artery Disease Father      Enlarged prostate Father      Other Cancer Brother      Down Syndrome Sister      Alzheimer Disease Sister      Nephrolithiasis Sister      Intellectual Disability (Mental Retardation) Sister          Down Syndrome     No urologic cancers in the family.         Allergies:   No Known Allergies         Medications:     Current Outpatient Medications   Medication Sig     atenolol (TENORMIN) 50 MG tablet Take 1 tablet (50 mg) by mouth daily     atorvastatin (LIPITOR) 10 MG tablet Take 2 tablets (20 mg) by mouth daily     diclofenac (VOLTAREN) 1 % GEL topical gel Apply 2 grams to elbow as needed up to four times daily using enclosed dosing card.     Esomeprazole Magnesium (NEXIUM PO) Take by mouth daily     hydrochlorothiazide (HYDRODIURIL) 25 MG tablet Take 1 tablet (25 mg) by mouth daily     ibuprofen (ADVIL/MOTRIN) 600 MG tablet Take 1 tablet (600 mg) by mouth every 8 hours as needed for moderate pain     losartan (COZAAR) 25 MG tablet Take 1 tablet (25 mg) by mouth daily     rizatriptan (MAXALT) 5 MG tablet Take 1 tablet (5 mg) by mouth at onset of headache for migraine repeat after 2 hr if no relief; maximum: 30 mg/24 hours     tamsulosin (FLOMAX) 0.4 MG capsule Take 1 capsule (0.4 mg) by mouth daily     topiramate (TOPAMAX) 100 MG tablet Take 1 tablet (100 mg) by mouth At Bedtime     No current facility-administered medications for this visit.             REVIEW OF SYSTEMS:    See HPI for pertinent details.  Remainder of 10-point ROS negative.         PHYSICAL EXAM   There were no vitals taken for this visit.  GENERAL: No acute distress. Well nourished.   HEENT:  Sclerae anicteric.  Conjunctivae pink.  Moist mucous membranes.  NECK:  Supple.  No lymphadenopathy.  CARDIAC:  Regular rate and rhythm.  LUNGS:  Non-labored breathing   BACK:  No costovertebral tenderness.  ABDOMEN: Soft, non-tender, no surgical scars, no organomegal.  SKIN: No rashes.  Dry.     EXTREMITIES:  Warm, well perfused. No lower extremity edema bilaterally.  NEURO: normal gait, no focal deficits.           LABS AND IMAGIN/29 CT stone protocol  1. 9 mm obstructing stone at the right ureterovesicular junction.  Upstream to this,  there is another bilobed stone measuring up to 5 mm  on the axial images in the distal right ureter. Moderate  ureterohydronephrosis on the right side.  Additional right renal  stones measuring up to 5 mm.  2.  Punctate left renal calcifications.    Cr 1.92 (<- 1.3)    UA clean          ASSESSMENT:   Karan Anglin is a 64 year old male who presents with a 9mm obstructing right UVJ stone. He has no evidence of infection, however his Cr is elevated. We covered the natural history of kidney stones, the risk of progression to symptomatic pain/infection, and the possibility of renal failure/kidney damage.  We covered treatment options including observation, ureteroscopy, extracorporeal shock wave lithotripsy (ESWL), and percutaneous nephrolithotomy (PCNL).  We covered surgical risks which include but are not limited to heart attack, stroke, blood clot in the legs or lungs, death, injury to surrounding organs and tissues, low risk of bleeding. Additional procedures may be necessary in the perioperative period.          PLAN:     Schedule for R URS/LL/stent placement 1/4/19    Tamsulosin, strain urine, pain control until then    Alexsander Heredia MD  Urology Resident    Patient was seen and examined with Dr. Cash    Physician Attestation   I, Shahrzad Cash, saw this patient and agree with the findings and plan of care as documented in the note.      Items personally reviewed/procedural attestation: vitals, labs and imaging and agree with the interpretation documented in the note.    I spent over 30 minutes with the patient.  Over half this time was spent on counseling for ureteral calculus.      Shahrzad Cash MD    CC  Patient Care Team:  Romelia Judd APRN CNP as PCP - General (Nurse Practitioner - Family)  Romelia Judd APRN CNP as PCP - Assigned PCP  Tc Hester MD as MD (Family Medicine - Sports Medicine)  Savannah Burrows PA-C as Physician Assistant (Physician Assistant)  Denton  Alisson, RN as Registered Nurse (Urology)  SELF, REFERRED    Copy to patient  SHELBY ALLISON  [address]  [facility] [address]

## 2019-01-30 NOTE — PATIENT INSTRUCTIONS
Preparing for Your Surgery      Name:  Karan Anglin   MRN:  [id number]   :  1955   Today's Date:  2019     Arriving for surgery:  Surgery date:  2019  Arrival time:  11:30 m  Please come to:     [facility]  [address] [place] [id number]     Parking is available in front of the Clinics and Surgery Center building from 5:30AM to 8:00PM.  -  Proceed to the 5th floor to check into the Ambulatory Surgery Center.              >> There will be patient concierges on the 1st and 5th floor, for assistance or an escort, if you would like.              >> Please call [phone] with any questions.    What can I eat or drink?  -  You may have solid food or milk products until 8 hours prior to your surgery.(5 am)  -  You may have water, apple juice or 7up/Sprite until 2 hours prior to your surgery.(until 11 am)    Which medicines can I take?        Stop Aspirin, vitamins and supplements one week prior to surgery.      Hold Ibuprofen for 24 hours and/or Naproxen for 48 hours prior to surgery.     -  Do NOT take these medications in the morning, the day of surgery:     Hydrochlorothiazide      Losartan            -  Please take these medications the day of surgery:     All other usual am prescription medications       How do I prepare myself?  -  Take two showers: one the night before surgery; and one the morning of surgery.         Use Scrubcare or Hibiclens to wash from neck down.  You may use your own shampoo and conditioner. No other hair products.   -  Do NOT use lotion, powder, deodorant, or antiperspirant the day of your surgery.  -  Do NOT wear any makeup, fingernail polish or jewelry.  - Do not bring your own medications to the hospital, except for inhalers and eye drops.  -  Bring your ID and insurance card.    Questions or Concerns:    -If you are scheduled at the Ambulatory Surgery Center please call [phone].    -For questions after surgery please call  your surgeons office.

## 2019-01-30 NOTE — TELEPHONE ENCOUNTER
Writer called patient left non detailed message requesting return call to clinic and ask to speak with nurse    Denise Humphrey RN

## 2019-01-31 DIAGNOSIS — N20.0 KIDNEY STONE: Primary | ICD-10-CM

## 2019-01-31 LAB
BACTERIA SPEC CULT: NO GROWTH
Lab: NORMAL
SPECIMEN SOURCE: NORMAL

## 2019-02-05 ENCOUNTER — DOCUMENTATION ONLY (OUTPATIENT)
Dept: CARE COORDINATION | Facility: CLINIC | Age: 64
End: 2019-02-05

## 2019-02-07 ENCOUNTER — ANCILLARY PROCEDURE (OUTPATIENT)
Dept: CT IMAGING | Facility: CLINIC | Age: 64
End: 2019-02-07
Payer: COMMERCIAL

## 2019-02-07 DIAGNOSIS — Z12.11 SPECIAL SCREENING FOR MALIGNANT NEOPLASMS, COLON: ICD-10-CM

## 2019-02-07 DIAGNOSIS — N20.0 KIDNEY STONE: ICD-10-CM

## 2019-02-07 LAB — HEMOCCULT STL QL IA: NEGATIVE

## 2019-02-08 ENCOUNTER — TELEPHONE (OUTPATIENT)
Dept: UROLOGY | Facility: CLINIC | Age: 64
End: 2019-02-08

## 2019-02-08 RX ORDER — ONDANSETRON 2 MG/ML
4 INJECTION INTRAMUSCULAR; INTRAVENOUS EVERY 30 MIN PRN
Status: CANCELLED | OUTPATIENT
Start: 2019-02-08

## 2019-02-08 RX ORDER — FENTANYL CITRATE 50 UG/ML
25-50 INJECTION, SOLUTION INTRAMUSCULAR; INTRAVENOUS
Status: CANCELLED | OUTPATIENT
Start: 2019-02-08

## 2019-02-08 RX ORDER — SODIUM CHLORIDE, SODIUM LACTATE, POTASSIUM CHLORIDE, CALCIUM CHLORIDE 600; 310; 30; 20 MG/100ML; MG/100ML; MG/100ML; MG/100ML
INJECTION, SOLUTION INTRAVENOUS CONTINUOUS
Status: CANCELLED | OUTPATIENT
Start: 2019-02-08

## 2019-02-08 RX ORDER — ACETAMINOPHEN 325 MG/1
975 TABLET ORAL ONCE
Status: CANCELLED | OUTPATIENT
Start: 2019-02-08 | End: 2019-02-08

## 2019-02-08 RX ORDER — OXYCODONE HYDROCHLORIDE 5 MG/1
5-10 TABLET ORAL EVERY 4 HOURS PRN
Status: CANCELLED | OUTPATIENT
Start: 2019-02-08

## 2019-02-08 RX ORDER — HYDROMORPHONE HYDROCHLORIDE 1 MG/ML
.3-.5 INJECTION, SOLUTION INTRAMUSCULAR; INTRAVENOUS; SUBCUTANEOUS EVERY 10 MIN PRN
Status: CANCELLED | OUTPATIENT
Start: 2019-02-08

## 2019-02-08 RX ORDER — DEXAMETHASONE SODIUM PHOSPHATE 4 MG/ML
4 INJECTION, SOLUTION INTRA-ARTICULAR; INTRALESIONAL; INTRAMUSCULAR; INTRAVENOUS; SOFT TISSUE EVERY 10 MIN PRN
Status: CANCELLED | OUTPATIENT
Start: 2019-02-08

## 2019-02-08 RX ORDER — MEPERIDINE HYDROCHLORIDE 25 MG/ML
12.5 INJECTION INTRAMUSCULAR; INTRAVENOUS; SUBCUTANEOUS
Status: CANCELLED | OUTPATIENT
Start: 2019-02-08

## 2019-02-08 RX ORDER — KETOROLAC TROMETHAMINE 30 MG/ML
30 INJECTION, SOLUTION INTRAMUSCULAR; INTRAVENOUS EVERY 6 HOURS PRN
Status: CANCELLED | OUTPATIENT
Start: 2019-02-08 | End: 2019-02-13

## 2019-02-08 RX ORDER — ONDANSETRON 4 MG/1
4 TABLET, ORALLY DISINTEGRATING ORAL EVERY 30 MIN PRN
Status: CANCELLED | OUTPATIENT
Start: 2019-02-08

## 2019-02-08 RX ORDER — GABAPENTIN 300 MG/1
300 CAPSULE ORAL ONCE
Status: CANCELLED | OUTPATIENT
Start: 2019-02-08 | End: 2019-02-08

## 2019-02-08 RX ORDER — ALBUTEROL SULFATE 0.83 MG/ML
2.5 SOLUTION RESPIRATORY (INHALATION) EVERY 4 HOURS PRN
Status: CANCELLED | OUTPATIENT
Start: 2019-02-08

## 2019-02-08 RX ORDER — NALOXONE HYDROCHLORIDE 0.4 MG/ML
.1-.4 INJECTION, SOLUTION INTRAMUSCULAR; INTRAVENOUS; SUBCUTANEOUS
Status: CANCELLED | OUTPATIENT
Start: 2019-02-08 | End: 2019-02-09

## 2019-02-08 NOTE — TELEPHONE ENCOUNTER
Patient called to cancel surgery with Dr Cash on 2/11/19 @ [facility] OR.  He doesn't want surgery.

## 2019-02-11 ENCOUNTER — ANESTHESIA (OUTPATIENT)
Dept: SURGERY | Facility: AMBULATORY SURGERY CENTER | Age: 64
End: 2019-02-11

## 2019-03-07 NOTE — PROGRESS NOTES
SUBJECTIVE:   Karan Anglin is a 64 year old male who presents to clinic today for the following health issues:    Diabetes New Diagnosis    Patient is checking blood sugars: not at all    Diabetic concerns: None     Symptoms of hypoglycemia (low blood sugar): none     Paresthesias (numbness or burning in feet) or sores: Yes, some     Date of last diabetic eye exam: has not have one, was just diagnosed with it    BP Readings from Last 2 Encounters:   01/30/19 120/78   01/30/19 120/81     Hemoglobin A1C (%)   Date Value   01/29/2019 7.3 (H)     LDL Cholesterol Calculated (mg/dL)   Date Value   01/25/2019 84   09/20/2017 85       Hypertension Follow-up      Outpatient blood pressures are not being checked.    Low Salt Diet: not monitoring salt    Amount of exercise or physical activity: 4-5 days/week for an average of 45-60 minutes - walking outside at work    Problems taking medications regularly: No    Medication side effects: none    Diet: regular (no restrictions)    Questions/Concerns: would like to talk about left knee pain that started on Tuesday if there is time.   On Tuesday 3 days ago, stood up at work and felt abrupt pain in left knee.  No popping sound, no swelling or warmth then or now.  Pain with standing and walking, but can also be mildly painful at rest.  Has started to radiate up thigh and down lower leg.  No back pain and no ankle pain.  No previous injury with this knee.  Previously would hurt briefly with something like a wrong step on the stairs, but resolve quickly.  This is similar pain, but constant.    Kidney stones  Passed both stones the day he got home from pre-op and so requested CT - never got results or discussion of plan.  Has had several episodes previously - has stones that he passed long ago - believes it was calcium stones    CKD  Nephrology appt end of April    Problem list and histories reviewed & adjusted, as indicated.  Additional history: as documented    Reviewed and updated  as needed this visit by clinical staff       Reviewed and updated as needed this visit by Provider         ROS:  Constitutional, HEENT, cardiovascular, pulmonary, gi and gu systems are negative, except as otherwise noted.    OBJECTIVE:     /82   Pulse 72   Temp 98.1  F (36.7  C) (Oral)   Wt 99.8 kg (220 lb 1.6 oz)   SpO2 96%   BMI 30.70 kg/m    Body mass index is 30.7 kg/m .  GENERAL: healthy, alert and no distress  NECK: no adenopathy, no asymmetry, masses, or scars and thyroid normal to palpation  RESP: lungs clear to auscultation - no rales, rhonchi or wheezes  CV: regular rate and rhythm, normal S1 S2, no S3 or S4, no murmur, click or rub, no peripheral edema and peripheral pulses strong  MS: LLE exam shows normal strength and muscle mass, no deformities, no erythema, induration, or nodules, ROM of all joints is normal and no evidence of joint instability; mild lateral and medial effusion, limping, crepitus on flexion and extension  SKIN: no suspicious lesions or rashes  NEURO: Normal strength and tone, mentation intact and speech normal  PSYCH: mentation appears normal, affect normal/bright    Diagnostic Test Results:  Results for orders placed or performed in visit on 03/08/19 (from the past 24 hour(s))   Hemoglobin A1c   Result Value Ref Range    Hemoglobin A1C 6.8 (H) 0 - 5.6 %     Remainder pending  Please note greater than 50% of this 40 minute appointment were spent face-to-face in counseling with the patient of the issues described above in the history of present illness and in the plan, including diabetes diagnosis, goals, monitoring and management including medications, diet and physical activity, prevention of complications; recent kidney stone and imaging that was done, nephrology appt, HTN control impact on kidney function, knee differential and role for physical therapy.  ASSESSMENT/PLAN:     Diabetes Type II, A1c Controlled, non-insulin dependent   Associated with the following  complications    Renal Complications:  CKD - stage 3 or 4    Ophthalmologic Complications: due for eye visit    Neurologic Complications: None    Peripheral Vascular Complications:  None    Other: None   Plan:  Labs:  See orders  Referrals:  Ophthalmology and nephrology  Other:  Dietary changes stressed, Regular exercise and Information on diabetes provided      Hypertension; controlled   Associated with the following complications:    CKD and Diabetes   Plan:  Labs:   See orders  Referrals/Other:    Nephrology appointment    Chronic Kidney Disease Stage 3 , slightly worsened     Associated with the following complications: HTN and DM     Plan:  Labs See orders  Referral to Nephrology                (E11.22,  N18.3) Type 2 diabetes mellitus with stage 3 chronic kidney disease, without long-term current use of insulin (H)  (primary encounter diagnosis)  Comment:   Plan: Basic metabolic panel, Hemoglobin A1c, aspirin         (ASA) 81 MG tablet    Reviewed diagnosis - patient wondering if single A1C can be diagnostic, but he has also had elevated fasting glucose.  Today's A1C confirms diagnosis.  Reviewed A1C management goals, but also monitoring and preventing complications.  Reviewed carb choice counting and carb limits.  Is already exercising almost daily.  He is already taking a statin and I added ASA 81 mg today.  Possible CKD diagnosis and seeing nephrology end of April    (I10) Benign essential hypertension  Comment:   Plan: Controlled - nephrology end of April    (M25.562) Acute pain of left knee  Comment:   Plan: [facility] PT, HAND, AND CHIROPRACTIC REFERRAL        OA vs meniscus.  I'd like to be proactive and aggressive with physical therapy so that patient can maintain exercise program    (N18.9) Chronic kidney disease, unspecified CKD stage  Comment:   Plan: Seeing nephrology end of April - will contact nephrologist re: imaging or labs ahead of appt.        Patient Instructions   Ice and voltaren gel on your left  knee over the weekend.  No vigorous exercise, but do do gentle exercise  If not feeling better early next week then schedule physical therapy   The two possibilities are arthritis and meniscus    For the next month or so use the carb choices sheet and track meals    Continue with nephrology as you have planned  I will ask the doctor if they want any more imaging before you arrive      JEFFERSON [facility]

## 2019-03-08 ENCOUNTER — OFFICE VISIT (OUTPATIENT)
Dept: FAMILY MEDICINE | Facility: CLINIC | Age: 64
End: 2019-03-08
Payer: COMMERCIAL

## 2019-03-08 VITALS
WEIGHT: 220.1 LBS | SYSTOLIC BLOOD PRESSURE: 128 MMHG | TEMPERATURE: 98.1 F | DIASTOLIC BLOOD PRESSURE: 82 MMHG | OXYGEN SATURATION: 96 % | BODY MASS INDEX: 30.7 KG/M2 | HEART RATE: 72 BPM

## 2019-03-08 DIAGNOSIS — I10 BENIGN ESSENTIAL HYPERTENSION: ICD-10-CM

## 2019-03-08 DIAGNOSIS — M25.562 ACUTE PAIN OF LEFT KNEE: ICD-10-CM

## 2019-03-08 DIAGNOSIS — E11.22 TYPE 2 DIABETES MELLITUS WITH STAGE 3 CHRONIC KIDNEY DISEASE, WITHOUT LONG-TERM CURRENT USE OF INSULIN (H): Primary | ICD-10-CM

## 2019-03-08 DIAGNOSIS — N18.9 CHRONIC KIDNEY DISEASE, UNSPECIFIED CKD STAGE: ICD-10-CM

## 2019-03-08 DIAGNOSIS — N18.30 TYPE 2 DIABETES MELLITUS WITH STAGE 3 CHRONIC KIDNEY DISEASE, WITHOUT LONG-TERM CURRENT USE OF INSULIN (H): Primary | ICD-10-CM

## 2019-03-08 LAB
ANION GAP SERPL CALCULATED.3IONS-SCNC: 9 MMOL/L (ref 3–14)
BUN SERPL-MCNC: 28 MG/DL (ref 7–30)
CALCIUM SERPL-MCNC: 9.5 MG/DL (ref 8.5–10.1)
CHLORIDE SERPL-SCNC: 104 MMOL/L (ref 94–109)
CO2 SERPL-SCNC: 27 MMOL/L (ref 20–32)
CREAT SERPL-MCNC: 1.5 MG/DL (ref 0.66–1.25)
GFR SERPL CREATININE-BSD FRML MDRD: 49 ML/MIN/{1.73_M2}
GLUCOSE SERPL-MCNC: 128 MG/DL (ref 70–99)
HBA1C MFR BLD: 6.8 % (ref 0–5.6)
POTASSIUM SERPL-SCNC: 3.7 MMOL/L (ref 3.4–5.3)
SODIUM SERPL-SCNC: 140 MMOL/L (ref 133–144)

## 2019-03-08 PROCEDURE — 36415 COLL VENOUS BLD VENIPUNCTURE: CPT | Performed by: NURSE PRACTITIONER

## 2019-03-08 PROCEDURE — 83036 HEMOGLOBIN GLYCOSYLATED A1C: CPT | Performed by: NURSE PRACTITIONER

## 2019-03-08 PROCEDURE — 80048 BASIC METABOLIC PNL TOTAL CA: CPT | Performed by: NURSE PRACTITIONER

## 2019-03-08 PROCEDURE — 99215 OFFICE O/P EST HI 40 MIN: CPT | Performed by: NURSE PRACTITIONER

## 2019-03-08 NOTE — RESULT ENCOUNTER NOTE
"Karan,    You A1C is down to 6.8 which is fantastic especially without doing anything in particular.  It does still mean a diabetes diagnosis.  Part of the diagnosis is the glucose monitoring, but as I mentioned in the visit, another part is looking at your body as a whole and preventing problems associated with diabetes so even if you stay \"diet controlled\" we want to keep in mind all the things we need to do to prevent problems down the road.    Do you have any interest in seeing diabetic education?  For the first calendar year after diagnosis you are eligible for more hours of education than you will be in the future.      If you have any questions, please feel free to contact the clinic.    MARJ Rashid  "

## 2019-03-11 NOTE — TELEPHONE ENCOUNTER
RECORDS RECEIVED FROM: possible miniscus tear, pain in left knee   DATE RECEIVED: 3/14/19   NOTES STATUS DETAILS   OFFICE NOTE from referring provider Internal 3/8/19   OFFICE NOTE from other specialist N/A    DISCHARGE SUMMARY from hospital N/A    DISCHARGE REPORT from the ER N/A    OPERATIVE REPORT N/A    MEDICATION LIST Internal    IMPLANT RECORD/STICKER N/A    LABS     CBC/DIFF Internal    CULTURES Internal    INJECTIONS DONE IN RADIOLOGY N/A    MRI N/A    CT SCAN N/A    XRAYS (IMAGES & REPORTS) N/A    TUMOR     PATHOLOGY  Slides & report N/A

## 2019-03-14 ENCOUNTER — PRE VISIT (OUTPATIENT)
Dept: ORTHOPEDICS | Facility: CLINIC | Age: 64
End: 2019-03-14

## 2019-03-14 ENCOUNTER — OFFICE VISIT (OUTPATIENT)
Dept: ORTHOPEDICS | Facility: CLINIC | Age: 64
End: 2019-03-14
Payer: COMMERCIAL

## 2019-03-14 ENCOUNTER — ANCILLARY PROCEDURE (OUTPATIENT)
Dept: GENERAL RADIOLOGY | Facility: CLINIC | Age: 64
End: 2019-03-14
Payer: COMMERCIAL

## 2019-03-14 VITALS — RESPIRATION RATE: 16 BRPM | WEIGHT: 220 LBS | HEIGHT: 71 IN | BODY MASS INDEX: 30.8 KG/M2

## 2019-03-14 DIAGNOSIS — M25.562 LEFT KNEE PAIN, UNSPECIFIED CHRONICITY: ICD-10-CM

## 2019-03-14 DIAGNOSIS — M25.562 LEFT KNEE PAIN, UNSPECIFIED CHRONICITY: Primary | ICD-10-CM

## 2019-03-14 DIAGNOSIS — M22.2X2 PATELLOFEMORAL PAIN SYNDROME OF LEFT KNEE: Primary | ICD-10-CM

## 2019-03-14 RX ORDER — ASPIRIN 81 MG/1
TABLET ORAL
COMMUNITY
Start: 2019-03-08 | End: 2021-02-04

## 2019-03-14 ASSESSMENT — MIFFLIN-ST. JEOR: SCORE: 1820.04

## 2019-03-14 NOTE — RESULT ENCOUNTER NOTE
Karan,    The kidney function labs are slightly improved and stable.  If you have any questions, please feel free to contact the clinic.    MARJ Rashid

## 2019-03-14 NOTE — LETTER
"  RE: Karan Anglin  [address]  [place] [address]     Sports Medicine Clinic Visit    PCP: Romelia Judd    Karan Anglin is a 64 year old male who is seen  as self referral presenting with left anterior knee pain. Denies any catching, clicking, locking of the knee.  Noted it one week ago with rising from a chair.  No previous knee issues.      Injury: None    Location of Pain: left knee  Duration of Pain: 1 week(s)  Rating of Pain: 8/10  Pain is better with: Ice and Elevation  Pain is worse with: walking, standing, weight bearing  Additional Features: Desk job, he does walking   Treatment so far consists of: Ice and Elevation  Prior History of related problems: None    Resp 16   Ht 1.803 m (5' 11\")   Wt 99.8 kg (220 lb)   BMI 30.68 kg/m        PMH:  Past Medical History:   Diagnosis Date     Blood in semen 2016    Symptoms came and went     GERD (gastroesophageal reflux disease)      Hyperlipidemia 2015     Hypertension 2014     Migraine     Nausea and vomiting     Peptic ulcer hemorrhage 2013     Active problem list:  Patient Active Problem List   Diagnosis     Benign essential hypertension     Hyperlipidemia LDL goal <130     Migraine without aura and without status migrainosus, not intractable     Blood in semen     Shoulder dislocation, left, initial encounter     Aftercare following surgery of the musculoskeletal system     Microalbuminuria     Type 2 diabetes mellitus with stage 3 chronic kidney disease, without long-term current use of insulin (H)       FH:  Family History   Problem Relation Age of Onset     Colon Cancer Mother      Kidney Disease Mother      Coronary Artery Disease Father      Enlarged prostate Father      Other Cancer Brother      Down Syndrome Sister      Alzheimer Disease Sister      Nephrolithiasis Sister      Intellectual Disability (Mental Retardation) Sister         Down Syndrome       SH:  Social History     Socioeconomic History     Marital status: Single     " Spouse name: Not on file     Number of children: Not on file     Years of education: Not on file     Highest education level: Not on file   Occupational History     Not on file   Social Needs     Financial resource strain: Not on file     Food insecurity:     Worry: Not on file     Inability: Not on file     Transportation needs:     Medical: Not on file     Non-medical: Not on file   Tobacco Use     Smoking status: Never Smoker     Smokeless tobacco: Never Used   Substance and Sexual Activity     Alcohol use: Yes     Comment: 3 per week     Drug use: No     Sexual activity: Not Currently     Partners: Male     Birth control/protection: None   Lifestyle     Physical activity:     Days per week: Not on file     Minutes per session: Not on file     Stress: Not on file   Relationships     Social connections:     Talks on phone: Not on file     Gets together: Not on file     Attends Zoroastrianism service: Not on file     Active member of club or organization: Not on file     Attends meetings of clubs or organizations: Not on file     Relationship status: Not on file     Intimate partner violence:     Fear of current or ex partner: Not on file     Emotionally abused: Not on file     Physically abused: Not on file     Forced sexual activity: Not on file   Other Topics Concern     Parent/sibling w/ CABG, MI or angioplasty before 65F 55M? No   Social History Narrative     Not on file     MEDS:  See EMR, reviewed  ALL:  See EMR, reviewed    Objective: He points to the anterior knee near the medial patellar facet this is area of discomfort.  Left knee reveals no effusion.  He can do an active straight leg raise.  He is nontender over the medial lateral patellar facet.  He is mildly tender in the area of the fat pad.  Nontender at the patellar tendon.  Nontender over the medial lateral joint line.  I can flex and extend the knee fully.  Anterior posterior drawer is negative.  Lachman's negative.  Osito's is negative.  Normal  range of motion at the hip.  Nontender over the patellar tendon pes anserine bursa or distal IT band.  He has a pronated heel bilaterally.  Distal pulses intact.  Sensation is normal distally.  Appropriate in conversation and affect.    An x-ray of the knee shows mild patellofemoral DJD and good maintenance of the tibiofemoral space with some small squaring at the peripheral corners and sharpening of the tibial spines it could be consistent with mild DJD assessment: Patellofemoral discomfort left knee    Plan: We discussed his x-rays in detail.  He does have some mild wear.  His physical exam and story does not seem consistent with a degenerative meniscal tear.  He will start with physical therapy and some patellar taping.  He will look for improvements over the next 6 weeks.  Potentially a cortisone injection or Synvisc injection could be considered if not improving.  He cannot take nonsteroidal anti-inflammatories and has been taking Tylenol and occasional Vicodin.  It is best to avoid narcotics for these sorts of joint complaints.  He has a knee brace that has been helpful and will continue with Tylenol.    This note was created with the use of Dragon software and unintentional spelling or errors may have occurred.    Chris Arroyo MD

## 2019-03-14 NOTE — PROGRESS NOTES
"Sports Medicine Clinic Visit    PCP: Romelia Judd    Karan Anglin is a 64 year old male who is seen  as self referral presenting with left anterior knee pain. Denies any catching, clicking, locking of the knee.  Noted it one week ago with rising from a chair.  No previous knee issues.      Injury: None    Location of Pain: left knee  Duration of Pain: 1 week(s)  Rating of Pain: 8/10  Pain is better with: Ice and Elevation  Pain is worse with: walking, standing, weight bearing  Additional Features: Desk job, he does walking   Treatment so far consists of: Ice and Elevation  Prior History of related problems: None        Resp 16   Ht 1.803 m (5' 11\")   Wt 99.8 kg (220 lb)   BMI 30.68 kg/m             PMH:  Past Medical History:   Diagnosis Date     Blood in semen 2016    Symptoms came and went     GERD (gastroesophageal reflux disease)      Hyperlipidemia 2015     Hypertension 2014     Migraine     Nausea and vomiting     Peptic ulcer hemorrhage 2013       Active problem list:  Patient Active Problem List   Diagnosis     Benign essential hypertension     Hyperlipidemia LDL goal <130     Migraine without aura and without status migrainosus, not intractable     Blood in semen     Shoulder dislocation, left, initial encounter     Aftercare following surgery of the musculoskeletal system     Microalbuminuria     Type 2 diabetes mellitus with stage 3 chronic kidney disease, without long-term current use of insulin (H)       FH:  Family History   Problem Relation Age of Onset     Colon Cancer Mother      Kidney Disease Mother      Coronary Artery Disease Father      Enlarged prostate Father      Other Cancer Brother      Down Syndrome Sister      Alzheimer Disease Sister      Nephrolithiasis Sister      Intellectual Disability (Mental Retardation) Sister         Down Syndrome       SH:  Social History     Socioeconomic History     Marital status: Single     Spouse name: Not on file     Number of children: Not on file "     Years of education: Not on file     Highest education level: Not on file   Occupational History     Not on file   Social Needs     Financial resource strain: Not on file     Food insecurity:     Worry: Not on file     Inability: Not on file     Transportation needs:     Medical: Not on file     Non-medical: Not on file   Tobacco Use     Smoking status: Never Smoker     Smokeless tobacco: Never Used   Substance and Sexual Activity     Alcohol use: Yes     Comment: 3 per week     Drug use: No     Sexual activity: Not Currently     Partners: Male     Birth control/protection: None   Lifestyle     Physical activity:     Days per week: Not on file     Minutes per session: Not on file     Stress: Not on file   Relationships     Social connections:     Talks on phone: Not on file     Gets together: Not on file     Attends Confucianist service: Not on file     Active member of club or organization: Not on file     Attends meetings of clubs or organizations: Not on file     Relationship status: Not on file     Intimate partner violence:     Fear of current or ex partner: Not on file     Emotionally abused: Not on file     Physically abused: Not on file     Forced sexual activity: Not on file   Other Topics Concern     Parent/sibling w/ CABG, MI or angioplasty before 65F 55M? No   Social History Narrative     Not on file       MEDS:  See EMR, reviewed  ALL:  See EMR, reviewed    REVIEW OF SYSTEMS:  CONSTITUTIONAL:NEGATIVE for fever, chills, change in weight  INTEGUMENTARY/SKIN: NEGATIVE for worrisome rashes, moles or lesions  EYES: NEGATIVE for vision changes or irritation  ENT/MOUTH: NEGATIVE for ear, mouth and throat problems  RESP:NEGATIVE for significant cough or SOB  BREAST: NEGATIVE for masses, tenderness or discharge  CV: NEGATIVE for chest pain, palpitations or peripheral edema  GI: NEGATIVE for nausea, abdominal pain, heartburn, or change in bowel habits  :NEGATIVE for frequency, dysuria, or hematuria  :NEGATIVE  for frequency, dysuria, or hematuria  NEURO: NEGATIVE for weakness, dizziness or paresthesias  ENDOCRINE: NEGATIVE for temperature intolerance, skin/hair changes  HEME/ALLERGY/IMMUNE: NEGATIVE for bleeding problems  PSYCHIATRIC: NEGATIVE for changes in mood or affect      Objective: He points to the anterior knee near the medial patellar facet this is area of discomfort.  Left knee reveals no effusion.  He can do an active straight leg raise.  He is nontender over the medial lateral patellar facet.  He is mildly tender in the area of the fat pad.  Nontender at the patellar tendon.  Nontender over the medial lateral joint line.  I can flex and extend the knee fully.  Anterior posterior drawer is negative.  Lachman's negative.  Osito's is negative.  Normal range of motion at the hip.  Nontender over the patellar tendon pes anserine bursa or distal IT band.  He has a pronated heel bilaterally.  Distal pulses intact.  Sensation is normal distally.  Appropriate in conversation and affect.    An x-ray of the knee shows mild patellofemoral DJD and good maintenance of the tibiofemoral space with some small squaring at the peripheral corners and sharpening of the tibial spines it could be consistent with mild DJD assessment: Patellofemoral discomfort left knee    Plan: We discussed his x-rays in detail.  He does have some mild wear.  His physical exam and story does not seem consistent with a degenerative meniscal tear.  He will start with physical therapy and some patellar taping.  He will look for improvements over the next 6 weeks.  Potentially a cortisone injection or Synvisc injection could be considered if not improving.  He cannot take nonsteroidal anti-inflammatories and has been taking Tylenol and occasional Vicodin.  It is best to avoid narcotics for these sorts of joint complaints.  He has a knee brace that has been helpful and will continue with Tylenol.    This note was created with the use of Dragon software  and unintentional spelling or errors may have occurred.

## 2019-03-29 ENCOUNTER — THERAPY VISIT (OUTPATIENT)
Dept: PHYSICAL THERAPY | Facility: CLINIC | Age: 64
End: 2019-03-29
Payer: COMMERCIAL

## 2019-03-29 DIAGNOSIS — M25.562 ACUTE PAIN OF LEFT KNEE: ICD-10-CM

## 2019-03-29 PROCEDURE — 97161 PT EVAL LOW COMPLEX 20 MIN: CPT | Mod: GP | Performed by: PHYSICAL THERAPIST

## 2019-03-29 PROCEDURE — 97110 THERAPEUTIC EXERCISES: CPT | Mod: GP | Performed by: PHYSICAL THERAPIST

## 2019-03-29 PROCEDURE — 97530 THERAPEUTIC ACTIVITIES: CPT | Mod: GP | Performed by: PHYSICAL THERAPIST

## 2019-03-29 ASSESSMENT — ACTIVITIES OF DAILY LIVING (ADL)
GO UP STAIRS: ACTIVITY IS SOMEWHAT DIFFICULT
LIMPING: THE SYMPTOM AFFECTS MY ACTIVITY SLIGHTLY
HOW_WOULD_YOU_RATE_THE_OVERALL_FUNCTION_OF_YOUR_KNEE_DURING_YOUR_USUAL_DAILY_ACTIVITIES?: NEARLY NORMAL
SWELLING: I DO NOT HAVE THE SYMPTOM
KNEEL ON THE FRONT OF YOUR KNEE: ACTIVITY IS FAIRLY DIFFICULT
AS_A_RESULT_OF_YOUR_KNEE_INJURY,_HOW_WOULD_YOU_RATE_YOUR_CURRENT_LEVEL_OF_DAILY_ACTIVITY?: NEARLY NORMAL
SQUAT: ACTIVITY IS FAIRLY DIFFICULT
GIVING WAY, BUCKLING OR SHIFTING OF KNEE: I DO NOT HAVE THE SYMPTOM
WEAKNESS: I DO NOT HAVE THE SYMPTOM
GO DOWN STAIRS: ACTIVITY IS MINIMALLY DIFFICULT
PAIN: THE SYMPTOM AFFECTS MY ACTIVITY MODERATELY
KNEE_ACTIVITY_OF_DAILY_LIVING_SCORE: 70
WALK: ACTIVITY IS SOMEWHAT DIFFICULT
STIFFNESS: THE SYMPTOM AFFECTS MY ACTIVITY SLIGHTLY
HOW_WOULD_YOU_RATE_THE_CURRENT_FUNCTION_OF_YOUR_KNEE_DURING_YOUR_USUAL_DAILY_ACTIVITIES_ON_A_SCALE_FROM_0_TO_100_WITH_100_BEING_YOUR_LEVEL_OF_KNEE_FUNCTION_PRIOR_TO_YOUR_INJURY_AND_0_BEING_THE_INABILITY_TO_PERFORM_ANY_OF_YOUR_USUAL_DAILY_ACTIVITIES?: 70
KNEE_ACTIVITY_OF_DAILY_LIVING_SUM: 49
SIT WITH YOUR KNEE BENT: ACTIVITY IS MINIMALLY DIFFICULT
RAW_SCORE: 49
STAND: ACTIVITY IS MINIMALLY DIFFICULT
RISE FROM A CHAIR: ACTIVITY IS MINIMALLY DIFFICULT

## 2019-03-30 PROBLEM — M25.562 ACUTE PAIN OF LEFT KNEE: Status: ACTIVE | Noted: 2019-03-30

## 2019-03-30 NOTE — PROGRESS NOTES
[facility] Initial Evaluation    Physical Therapy Initial Examination/Evaluation  March 29, 2019    Karan Anglin  is a 64 year old  male referred to physical therapy by Dr. Arroyo for treatment of L anterior knee pain.      DOI/onset 3-8-19  Mechanism of injury no incident. He reports feeling a sharp pain in his anterior L knee upon standing up from sitting.  DOS n/a  Prior treatment none.     Chief Complaint:   Intermittent L knee pain.   Pain location: anterior L knee  Quality: sharp  Constant/Intermittent: intermittent. The frequency of pain is reducing.  Time of day: not time dependent  Symptoms have improved since onset.    Current pain 1/10.  Pain at best 1/10.  Pain at worst 5/10.    Symptoms aggravated by prolonged walking and stairs.    Symptoms improved with rest.       Occupation: health care administration.  Job duties:  Computer/desk work.    Patient having difficulty with ADLs: stairs.    Patient's goals are to reduce L knee pain.    Patient reports general health as good.  Related medical history no hx of L knee pain/injury.    Surgical History:  none.    Imaging: x-rays.    Medications:  none.       Return to MD:  4-6 weeks.        Subjective:  HPI                    Objective:  Standing Alignment:              Knee:  Genu varus L      Gait:    Gait Type:  Normal         Flexibility/Screens:   Positive screens:  Knee        Lower Extremity:  Normal                                                    Knee Evaluation:  ROM:  Strength wnl knee: L hip flex 4/5.  AROM    Hyperextension:  Left:  0    Right: 0  Extension:  Left: 0    Right:  0  Flexion: Left: 130    Right: 130        Strength:     Extension:  Left: 4/5   Pain:        Flexion:  Left: 4+/5   Pain:        Quad Set Left: Good    Pain:     Ligament Testing:  Normal                Special Tests:     Left knee negative for the following special tests:  Meninscal; Patellar Tracking-Abduction Medial and Patellar Tracking-Abduction  Lateral    Palpation:    Left knee tenderness present at:  Medial Joint Line and Patellar Inferior    Edema:  Normal    Mobility Testing:  Normal            Functional Testing:          Quad:    Single Leg Squat:  Left:      Moderate loss of control  Right:        Bilateral Leg Squat:   Excessive anterior knee excursion                  General Evaluation:          Lower Extremity Flexibility:  normal                                                                             ROS    Assessment/Plan:    Patient is a 64 year old male with left side knee complaints.    Patient has the following significant findings with corresponding treatment plan.                Diagnosis 1:  L knee pain  Pain -  self management and education  Decreased strength - therapeutic exercise and therapeutic activities  Impaired muscle performance - neuro re-education  Decreased function - therapeutic activities    Therapy Evaluation Codes:   1) History comprised of:   Personal factors that impact the plan of care:      None.    Comorbidity factors that impact the plan of care are:      None.     Medications impacting care: None.  2) Examination of Body Systems comprised of:   Body structures and functions that impact the plan of care:      Knee.   Activity limitations that impact the plan of care are:      Stairs and Walking.  3) Clinical presentation characteristics are:   Stable/Uncomplicated.  4) Decision-Making    Low complexity using standardized patient assessment instrument and/or measureable assessment of functional outcome.  Cumulative Therapy Evaluation is: Low complexity.    Previous and current functional limitations:  (See Goal Flow Sheet for this information)    Short term and Long term goals: (See Goal Flow Sheet for this information)     Communication ability:  Patient appears to be able to clearly communicate and understand verbal and written communication and follow directions correctly.  Treatment Explanation - The  following has been discussed with the patient:   RX ordered/plan of care  Anticipated outcomes  Possible risks and side effects  This patient would benefit from PT intervention to resume normal activities.   Rehab potential is good.    Frequency:  2 X a month, once daily  Duration:  for 2 months  Discharge Plan:  Achieve all LTG.  Independent in home treatment program.  Reach maximal therapeutic benefit.    Please refer to the daily flowsheet for treatment today, total treatment time and time spent performing 1:1 timed codes.

## 2019-04-11 ENCOUNTER — THERAPY VISIT (OUTPATIENT)
Dept: PHYSICAL THERAPY | Facility: CLINIC | Age: 64
End: 2019-04-11
Payer: COMMERCIAL

## 2019-04-11 DIAGNOSIS — M25.562 ACUTE PAIN OF LEFT KNEE: ICD-10-CM

## 2019-04-11 PROCEDURE — 97110 THERAPEUTIC EXERCISES: CPT | Mod: GP | Performed by: PHYSICAL THERAPIST

## 2019-04-11 PROCEDURE — 97530 THERAPEUTIC ACTIVITIES: CPT | Mod: GP | Performed by: PHYSICAL THERAPIST

## 2019-04-11 NOTE — PROGRESS NOTES
Subjective:  HPI                    Objective:  System    Physical Exam    General     ROS    Assessment/Plan:    SUBJECTIVE  Subjective changes as noted by pt:   Pt. reports slight decrease in L knee pain this week. He still has pain with prolonged walking.   Current pain level:  4/10   Changes in function:  Yes (See Goal flowsheet attached for changes in current functional level)     Adverse reaction to treatment or activity:  None    OBJECTIVE  Changes in objective findings:  Strength- L hip flex 4/5; quad 4/5; hams 4+/5     ASSESSMENT  Karan continues to require intervention to meet STG and LTG's: PT  Patient is progressing as expected.  Response to therapy has shown an improvement in  pain level  Progress made towards STG/LTG?  Yes (See Goal flowsheet attached for updates on achievement of STG and LTG)    PLAN  Current treatment program is being advanced to more complex exercises.    PTA/ATC plan:  N/A    Please refer to the daily flowsheet for treatment today, total treatment time and time spent performing 1:1 timed codes.

## 2019-04-22 DIAGNOSIS — E78.5 HYPERLIPIDEMIA LDL GOAL <130: ICD-10-CM

## 2019-04-22 RX ORDER — ATORVASTATIN CALCIUM 10 MG/1
20 TABLET, FILM COATED ORAL DAILY
Qty: 60 TABLET | Refills: 3 | Status: CANCELLED | OUTPATIENT
Start: 2019-04-22

## 2019-04-22 NOTE — TELEPHONE ENCOUNTER
"Requested Prescriptions   Pending Prescriptions Disp Refills     atorvastatin (LIPITOR) 10 MG tablet 60 tablet 3     Sig: Take 2 tablets (20 mg) by mouth daily  Last Written Prescription Date:  12/13/2018  Last Fill Quantity: 60,  # refills: 3   Last office visit: 3/8/2019 with prescribing provider:  03/08/2019   Future Office Visit:         Statins Protocol Passed - 4/22/2019  9:45 AM        Passed - LDL on file in past 12 months     Recent Labs   Lab Test 01/25/19  0803   LDL 84             Passed - No abnormal creatine kinase in past 12 months     No lab results found.             Passed - Recent (12 mo) or future (30 days) visit within the authorizing provider's specialty     Patient had office visit in the last 12 months or has a visit in the next 30 days with authorizing provider or within the authorizing provider's specialty.  See \"Patient Info\" tab in inbasket, or \"Choose Columns\" in Meds & Orders section of the refill encounter.              Passed - Medication is active on med list        Passed - Patient is age 18 or older        "

## 2019-04-22 NOTE — TELEPHONE ENCOUNTER
See pt request for refill    Cued up 20mg tab as well as the 10mg, did you want to change him to 1 20mg tab    Peyton Story RN   [facility] [place]

## 2019-04-24 RX ORDER — ATORVASTATIN CALCIUM 20 MG/1
20 TABLET, FILM COATED ORAL DAILY
Qty: 90 TABLET | Refills: 3 | Status: SHIPPED | OUTPATIENT
Start: 2019-04-24 | End: 2020-04-08

## 2019-04-24 NOTE — TELEPHONE ENCOUNTER
Called patient, left voicemail to return call to clinic.    atorvastatin (LIPITOR) dose has been changed to 20 mg daily (had previously been 10 mg -two tablets daily)    Abiola Cano RN  Triage Nurse

## 2019-04-24 NOTE — TELEPHONE ENCOUNTER
Called patient, informed patient of dosing instruction change. Patient verbalized understanding    Abiola Cano, RN  Triage Nurse

## 2019-04-26 DIAGNOSIS — N18.30 TYPE 2 DIABETES MELLITUS WITH STAGE 3 CHRONIC KIDNEY DISEASE, WITHOUT LONG-TERM CURRENT USE OF INSULIN (H): ICD-10-CM

## 2019-04-26 DIAGNOSIS — E11.22 TYPE 2 DIABETES MELLITUS WITH STAGE 3 CHRONIC KIDNEY DISEASE, WITHOUT LONG-TERM CURRENT USE OF INSULIN (H): ICD-10-CM

## 2019-04-26 DIAGNOSIS — R80.9 MICROALBUMINURIA: Primary | ICD-10-CM

## 2019-04-29 ENCOUNTER — OFFICE VISIT (OUTPATIENT)
Dept: NEPHROLOGY | Facility: CLINIC | Age: 64
End: 2019-04-29
Attending: NURSE PRACTITIONER
Payer: COMMERCIAL

## 2019-04-29 VITALS
BODY MASS INDEX: 31.47 KG/M2 | HEIGHT: 71 IN | HEART RATE: 63 BPM | WEIGHT: 224.8 LBS | SYSTOLIC BLOOD PRESSURE: 153 MMHG | DIASTOLIC BLOOD PRESSURE: 98 MMHG | OXYGEN SATURATION: 96 %

## 2019-04-29 DIAGNOSIS — N18.30 CKD (CHRONIC KIDNEY DISEASE) STAGE 3, GFR 30-59 ML/MIN (H): ICD-10-CM

## 2019-04-29 DIAGNOSIS — I10 BENIGN ESSENTIAL HYPERTENSION: ICD-10-CM

## 2019-04-29 DIAGNOSIS — R80.9 MICROALBUMINURIA: ICD-10-CM

## 2019-04-29 DIAGNOSIS — E11.22 TYPE 2 DIABETES MELLITUS WITH STAGE 3 CHRONIC KIDNEY DISEASE, WITHOUT LONG-TERM CURRENT USE OF INSULIN (H): ICD-10-CM

## 2019-04-29 DIAGNOSIS — N20.0 NEPHROLITHIASIS: ICD-10-CM

## 2019-04-29 DIAGNOSIS — N18.30 TYPE 2 DIABETES MELLITUS WITH STAGE 3 CHRONIC KIDNEY DISEASE, WITHOUT LONG-TERM CURRENT USE OF INSULIN (H): ICD-10-CM

## 2019-04-29 LAB
ALBUMIN SERPL-MCNC: 3.8 G/DL (ref 3.4–5)
ALBUMIN UR-MCNC: NEGATIVE MG/DL
ANION GAP SERPL CALCULATED.3IONS-SCNC: 7 MMOL/L (ref 3–14)
APPEARANCE UR: CLEAR
BILIRUB UR QL STRIP: NEGATIVE
BUN SERPL-MCNC: 26 MG/DL (ref 7–30)
CALCIUM SERPL-MCNC: 9.5 MG/DL (ref 8.5–10.1)
CHLORIDE SERPL-SCNC: 100 MMOL/L (ref 94–109)
CO2 SERPL-SCNC: 28 MMOL/L (ref 20–32)
COLOR UR AUTO: ABNORMAL
CREAT SERPL-MCNC: 1.37 MG/DL (ref 0.66–1.25)
CREAT UR-MCNC: 40 MG/DL
ERYTHROCYTE [DISTWIDTH] IN BLOOD BY AUTOMATED COUNT: 12.3 % (ref 10–15)
FERRITIN SERPL-MCNC: 144 NG/ML (ref 26–388)
GFR SERPL CREATININE-BSD FRML MDRD: 55 ML/MIN/{1.73_M2}
GLUCOSE SERPL-MCNC: 138 MG/DL (ref 70–99)
GLUCOSE UR STRIP-MCNC: NEGATIVE MG/DL
HCT VFR BLD AUTO: 39.4 % (ref 40–53)
HGB BLD-MCNC: 13.1 G/DL (ref 13.3–17.7)
HGB UR QL STRIP: NEGATIVE
KETONES UR STRIP-MCNC: NEGATIVE MG/DL
LEUKOCYTE ESTERASE UR QL STRIP: NEGATIVE
MCH RBC QN AUTO: 29.4 PG (ref 26.5–33)
MCHC RBC AUTO-ENTMCNC: 33.2 G/DL (ref 31.5–36.5)
MCV RBC AUTO: 89 FL (ref 78–100)
MICROALBUMIN UR-MCNC: 9 MG/L
MICROALBUMIN/CREAT UR: 21.46 MG/G CR (ref 0–17)
MUCOUS THREADS #/AREA URNS LPF: PRESENT /LPF
NITRATE UR QL: NEGATIVE
PH UR STRIP: 6 PH (ref 5–7)
PHOSPHATE SERPL-MCNC: 2.4 MG/DL (ref 2.5–4.5)
PLATELET # BLD AUTO: 258 10E9/L (ref 150–450)
POTASSIUM SERPL-SCNC: 4.1 MMOL/L (ref 3.4–5.3)
PROT UR-MCNC: 0.06 G/L
PROT/CREAT 24H UR: 0.14 G/G CR (ref 0–0.2)
PTH-INTACT SERPL-MCNC: 67 PG/ML (ref 18–80)
RBC # BLD AUTO: 4.45 10E12/L (ref 4.4–5.9)
RBC #/AREA URNS AUTO: 1 /HPF (ref 0–2)
SODIUM SERPL-SCNC: 135 MMOL/L (ref 133–144)
SOURCE: ABNORMAL
SP GR UR STRIP: 1.01 (ref 1–1.03)
UROBILINOGEN UR STRIP-MCNC: 0 MG/DL (ref 0–2)
WBC # BLD AUTO: 7.9 10E9/L (ref 4–11)
WBC #/AREA URNS AUTO: 1 /HPF (ref 0–5)

## 2019-04-29 PROCEDURE — 80069 RENAL FUNCTION PANEL: CPT | Performed by: INTERNAL MEDICINE

## 2019-04-29 PROCEDURE — 36415 COLL VENOUS BLD VENIPUNCTURE: CPT | Performed by: INTERNAL MEDICINE

## 2019-04-29 PROCEDURE — G0463 HOSPITAL OUTPT CLINIC VISIT: HCPCS | Mod: ZF

## 2019-04-29 PROCEDURE — 81001 URINALYSIS AUTO W/SCOPE: CPT | Performed by: INTERNAL MEDICINE

## 2019-04-29 PROCEDURE — 82043 UR ALBUMIN QUANTITATIVE: CPT | Performed by: INTERNAL MEDICINE

## 2019-04-29 PROCEDURE — 83970 ASSAY OF PARATHORMONE: CPT | Performed by: INTERNAL MEDICINE

## 2019-04-29 PROCEDURE — 82306 VITAMIN D 25 HYDROXY: CPT | Performed by: INTERNAL MEDICINE

## 2019-04-29 PROCEDURE — 85027 COMPLETE CBC AUTOMATED: CPT | Performed by: INTERNAL MEDICINE

## 2019-04-29 PROCEDURE — 84156 ASSAY OF PROTEIN URINE: CPT | Performed by: INTERNAL MEDICINE

## 2019-04-29 PROCEDURE — 82728 ASSAY OF FERRITIN: CPT | Performed by: INTERNAL MEDICINE

## 2019-04-29 ASSESSMENT — PAIN SCALES - GENERAL: PAINLEVEL: NO PAIN (0)

## 2019-04-29 ASSESSMENT — MIFFLIN-ST. JEOR: SCORE: 1841.82

## 2019-04-29 NOTE — LETTER
2019      RE: Karan Anglin  [address] [facility] [address]  [place] [address]         Nephrology Clinic    Itzel Caldwell MD  2019     Name: Karan Anglin  MRN: [id number]  Age: 64 year old  : 1955  Referring provider: Romelia Judd     Assessment and Plan:    # CKD (chronic kidney disease) stage 3:  Microvascular disease from long standing hypertension. Baseline creatinine of 1.2-1.4 mg/dl. JACKIE in  due to obstructing stone, resolved s/p lithotripsy. UA with microscopic hematuria, no proteinuria. Occasional mild albuminuria.   He has recently diagnosed DM, not likely contributing to his CKD.   --his creatinine is now at baseline  --recommend that he keeps the use of NSAID's to minimum, for his migraine headaches, as it puts him at risk for JACKIE's, especially wile on Losartan.     # Benign Essential Hypertension:On losartan (25 mg), atenolol (50 mg), hydrochlorothiazide (25 mg). Goal blood pressures would be < 140/90 mm of Hg. Recommend that he checks his home blood pressures and reports them to us in 2 weeks.     # Kidney Stones: diagnosed in s. He was told that the stones were Ca oxalate. Last passed one in . He has never had a litholink and does not want one. Most recent obstructive stones s/p lithotripsy . Continues to have b/l non obstructing stones.   --Educated on increased fluid intake and provided other lifestyle modifications to prevent formation of stones.     # mild hypophosphatemia: Likely dietary.   # GERD : on omeprazole.  # migraines  # HLP       Follow-up: 3 months    Itzel Caldwell MD     Reason For Visit: Follow up    HPI:   Karan Anglin is a 64 year old male with a h/o HTN since , nephrolithiasis diagnosed in , migraines, diabetes diagnosed 2019 is being seen in the clinic for elevated creatinine.   He has a baseline creatinine of 0.88 mg/dl in  since then has had a creatinine of 1.2-1.4 mg/dl. Most recently in the month of , he had an JACKIE with a  creatinine of 1.9 mg/dl in the setting of obstructing kidney stones however has now resolved s/p lithotripsy. At the time, he had rt sided abdominal pain and microscopic hematuria and hence seen by urology. He also complained of LUTS and was placed on Tamsulosin. UA with small blood and few occasional RBC's. He also has hematospermia and is being followed by urology. He had a repeat CT done post lithotripsy which showed that the stones obstructing rt UVJ were no longer there however he still has non obstructing stones in both kidneys ranging from 2-6 mm the right and 2-3 mm on the left.  He reports of being diagnosed with HTN in 2003 and has been on antihypertensive since with quite optimally controlled blood pressures. He has been diagnosed with DM in 3/19 and is currently not on any medications for the same.   He uses Ibuprofen occasionally when he has migraines. Stopped tamsulosin as he did not fell well. Denies symptoms concerning for incomplete emptying of his bladder at this time.   Denies use of other non prescribed meds, recent exposure to abx or contrast, obstructive urinary symptoms, skin rashes, joint pains, fevers, recurrent sinus infections or UTI's        Review of Systems:   Pertinent items are noted in HPI or as below, remainder of complete ROS is negative.      Active Medications:     Current Outpatient Medications:      aspirin (ASA) 81 MG tablet, Take 1 tablet (81 mg) by mouth daily, Disp: 90 tablet, Rfl: 1     atorvastatin (LIPITOR) 10 MG tablet, Take 2 tablets (20 mg) by mouth daily, Disp: 60 tablet, Rfl: 3     diclofenac (VOLTAREN) 1 % GEL topical gel, Apply 2 grams to elbow as needed up to four times daily using enclosed dosing card., Disp: 100 g, Rfl: 1     diphenhydrAMINE (BENADRYL) 25 MG tablet, Take 25 mg by mouth nightly as needed for itching or allergies, Disp: , Rfl:      Esomeprazole Magnesium (NEXIUM PO), Take by mouth daily, Disp: , Rfl:      hydrochlorothiazide (HYDRODIURIL) 25 MG  tablet, Take 1 tablet (25 mg) by mouth daily, Disp: 90 tablet, Rfl: 1     losartan (COZAAR) 25 MG tablet, Take 1 tablet (25 mg) by mouth daily, Disp: 90 tablet, Rfl: 1     rizatriptan (MAXALT) 5 MG tablet, Take 1 tablet (5 mg) by mouth at onset of headache for migraine repeat after 2 hr if no relief; maximum: 30 mg/24 hours, Disp: 18 tablet, Rfl: 1     ASPIRIN ADULT LOW STRENGTH 81 MG EC tablet, , Disp: , Rfl:      atenolol (TENORMIN) 50 MG tablet, Take 1 tablet (50 mg) by mouth daily, Disp: 90 tablet, Rfl: 1     atorvastatin (LIPITOR) 20 MG tablet, Take 1 tablet (20 mg) by mouth daily (Patient not taking: Reported on 4/29/2019), Disp: 90 tablet, Rfl: 3     ibuprofen (ADVIL/MOTRIN) 600 MG tablet, Take 1 tablet (600 mg) by mouth every 8 hours as needed for moderate pain (Patient not taking: Reported on 4/29/2019), Disp: 30 tablet, Rfl: 0     Allergies:   Patient has no known allergies.      Past Medical History:  Past Medical History:   Diagnosis Date     Blood in semen 2016    Symptoms came and went     GERD (gastroesophageal reflux disease)      Hyperlipidemia 2015     Hypertension 2014     Migraine     Nausea and vomiting     Peptic ulcer hemorrhage 2013     Patient Active Problem List   Diagnosis     Benign essential hypertension     Hyperlipidemia LDL goal <130     Migraine without aura and without status migrainosus, not intractable     Blood in semen     Shoulder dislocation, left, initial encounter     Aftercare following surgery of the musculoskeletal system     Microalbuminuria     Type 2 diabetes mellitus with stage 3 chronic kidney disease, without long-term current use of insulin (H)     Acute pain of left knee     Past Surgical History:  Past Surgical History:   Procedure Laterality Date     GI SURGERY  2013    peptic ulcers stapled     OPEN REDUCTION INTERNAL FIXATION HUMERUS PROXIMAL Left 5/15/2018    Procedure: OPEN REDUCTION INTERNAL FIXATION HUMERUS PROXIMAL;  Left Glenoid Open Reduction Internal  Fixation;  Surgeon: Rosa Joe MD;  Location: [facility] OR     Family History:   - 1 sibling has high blood pressure  - Mother had problems with kidneys, attributed to cancer  Family History   Problem Relation Age of Onset     Colon Cancer Mother      Kidney Disease Mother      Coronary Artery Disease Father      Enlarged prostate Father      Other Cancer Brother      Down Syndrome Sister      Alzheimer Disease Sister      Nephrolithiasis Sister      Intellectual Disability (Mental Retardation) Sister         Down Syndrome      Social History:   Social History     Socioeconomic History     Marital status: Single     Spouse name: Not on file     Number of children: Not on file     Years of education: Not on file     Highest education level: Not on file   Occupational History     Not on file   Social Needs     Financial resource strain: Not on file     Food insecurity:     Worry: Not on file     Inability: Not on file     Transportation needs:     Medical: Not on file     Non-medical: Not on file   Tobacco Use     Smoking status: Never Smoker     Smokeless tobacco: Never Used   Substance and Sexual Activity     Alcohol use: Yes     Comment: 3 per week     Drug use: No     Sexual activity: Not Currently     Partners: Male     Birth control/protection: None   Lifestyle     Physical activity:     Days per week: Not on file     Minutes per session: Not on file     Stress: Not on file   Relationships     Social connections:     Talks on phone: Not on file     Gets together: Not on file     Attends Mu-ism service: Not on file     Active member of club or organization: Not on file     Attends meetings of clubs or organizations: Not on file     Relationship status: Not on file     Intimate partner violence:     Fear of current or ex partner: Not on file     Emotionally abused: Not on file     Physically abused: Not on file     Forced sexual activity: Not on file   Other Topics Concern     Parent/sibling w/ CABG, MI  "or angioplasty before 65F 55M? No   Social History Narrative     Not on file     Physical Exam:  Pulse 63   Ht 1.803 m (5' 11\")   Wt 102 kg (224 lb 12.8 oz)   SpO2 96%   BMI 31.35 kg/m        Physical Exam   Constitutional: He appears well-nourished. No distress.   Eyes: No scleral icterus.   Neck: No JVD present.   Cardiovascular: Normal rate and regular rhythm.   Pulmonary/Chest: Breath sounds normal.   Abdominal: Normal appearance. There is no tenderness.   Neurological: He is alert. He has normal strength.   Skin: Skin is intact.   Psychiatric: He has a normal mood and affect. His speech is normal and behavior is normal. Thought content normal.   Extremities: no LE edema present.     Laboratory:  CMP  Recent Labs   Lab Test 04/29/19  0756 03/08/19  0851 01/30/19  1310 01/25/19  0803  06/14/18  0837    140 137 132*   < > 137   POTASSIUM 4.1 3.7 3.5 3.4   < > 3.6   CHLORIDE 100 104 101 98   < > 101   CO2 28 27 30 25   < > 24   ANIONGAP 7 9 6 9   < > 12   * 128* 97 137*   < > 100*   BUN 26 28 34* 36*   < > 29   CR 1.37* 1.50* 1.94* 1.92*   < > 1.30*   GFRESTIMATED 55* 49* 36* 37*   < > 56*   GFRESTBLACK 64 57* 42* 42*   < > 68   CHAR 9.5 9.5 9.2 9.9   < > 10.0   PHOS 2.4*  --   --   --   --   --    PROTTOTAL  --   --   --   --   --  8.0   ALBUMIN 3.8  --   --   --   --  4.3   BILITOTAL  --   --   --   --   --  0.4   ALKPHOS  --   --   --   --   --  113   AST  --   --   --   --   --  22   ALT  --   --   --   --   --  42    < > = values in this interval not displayed.     CBC  Recent Labs   Lab Test 04/29/19  0756 01/30/19  1310   HGB 13.1* 13.3   WBC 7.9 7.6   RBC 4.45 4.75   HCT 39.4* 40.7   MCV 89 86   MCH 29.4 28.0   MCHC 33.2 32.7   RDW 12.3 11.9    263     INR  No lab results found.  ABG  No lab results found.   URINE STUDIES  Recent Labs   Lab Test 01/30/19  1200 01/29/19  0752 12/17/18  1521   COLOR Yellow Straw Yellow   APPEARANCE Clear Clear Clear   URINEGLC Negative Negative Negative "   URINEBILI Negative Negative Negative   URINEKETONE Negative Negative Negative   SG 1.016 1.010 1.015   UBLD Small* Small* Small*   URINEPH 5.0 6.0 6.0   PROTEIN Negative Negative Negative   UROBILINOGEN  --   --  0.2   NITRITE Negative Negative Negative   LEUKEST Negative Negative Negative   RBCU 7* 2 O - 2   WBCU 4 2 0 - 5     No lab results found.  PTH  No lab results found.  IRON STUDIES   No lab results found.    Scribe Disclosure:   Mallory THOMAS, am serving as a scribe to document services personally performed by Itzel Caldwell MD at this visit, based upon the provider's statements to me. All documentation has been reviewed by the aforementioned provider prior to being entered into the official medical record.     Mallory THOMAS, served as a scribe preparing the chart for the clinic encounter through chart review for the provider team.       Itzel Caldwell MD

## 2019-04-29 NOTE — LETTER
2019       RE: Karan Anglin  [address]  [place] [address]     Dear Colleague,    Thank you for referring your patient, Karan Anglin, to the [facility] NEPHROLOGY at [facility]. Please see a copy of my visit note below.      Nephrology Clinic    Itzel Caldwell MD  2019     Name: Karan Anglin  MRN: [id number]  Age: 64 year old  : 1955  Referring provider: Romelia Judd     Assessment and Plan:    # CKD (chronic kidney disease) stage 3:  Microvascular disease from long standing hypertension. Baseline creatinine of 1.2-1.4 mg/dl. JACKIE in  due to obstructing stone, resolved s/p lithotripsy. UA with microscopic hematuria, no proteinuria. Occasional mild albuminuria.   He has recently diagnosed DM, not likely contributing to his CKD.   --his creatinine is now at baseline  --recommend that he keeps the use of NSAID's to minimum, for his migraine headaches, as it puts him at risk for JACKIE's, especially wile on Losartan.     # Benign Essential Hypertension:On losartan (25 mg), atenolol (50 mg), hydrochlorothiazide (25 mg). Goal blood pressures would be < 140/90 mm of Hg. Recommend that he checks his home blood pressures and reports them to us in 2 weeks.     # Kidney Stones: diagnosed in s. He was told that the stones were Ca oxalate. Last passed one in . He has never had a litholink and does not want one. Most recent obstructive stones s/p lithotripsy . Continues to have b/l non obstructing stones.   --Educated on increased fluid intake and provided other lifestyle modifications to prevent formation of stones.     # mild hypophosphatemia: Likely dietary.   # GERD : on omeprazole.  # migraines  # HLP       Follow-up: 3 months    Itzel Caldwell MD     Reason For Visit: Follow up    HPI:   Karan Anglin is a 64 year old male with a h/o HTN since , nephrolithiasis diagnosed in 's, migraines, diabetes diagnosed 2019 is being seen in the  clinic for elevated creatinine.   He has a baseline creatinine of 0.88 mg/dl in 4/14 since then has had a creatinine of 1.2-1.4 mg/dl. Most recently in the month of Jan 19, he had an JACKIE with a creatinine of 1.9 mg/dl in the setting of obstructing kidney stones however has now resolved s/p lithotripsy. At the time, he had rt sided abdominal pain and microscopic hematuria and hence seen by urology. He also complained of LUTS and was placed on Tamsulosin. UA with small blood and few occasional RBC's. He also has hematospermia and is being followed by urology. He had a repeat CT done post lithotripsy which showed that the stones obstructing rt UVJ were no longer there however he still has non obstructing stones in both kidneys ranging from 2-6 mm the right and 2-3 mm on the left.  He reports of being diagnosed with HTN in 2003 and has been on antihypertensive since with quite optimally controlled blood pressures. He has been diagnosed with DM in 3/19 and is currently not on any medications for the same.   He uses Ibuprofen occasionally when he has migraines. Stopped tamsulosin as he did not fell well. Denies symptoms concerning for incomplete emptying of his bladder at this time.   Denies use of other non prescribed meds, recent exposure to abx or contrast, obstructive urinary symptoms, skin rashes, joint pains, fevers, recurrent sinus infections or UTI's        Review of Systems:   Pertinent items are noted in HPI or as below, remainder of complete ROS is negative.      Active Medications:     Current Outpatient Medications:      aspirin (ASA) 81 MG tablet, Take 1 tablet (81 mg) by mouth daily, Disp: 90 tablet, Rfl: 1     atorvastatin (LIPITOR) 10 MG tablet, Take 2 tablets (20 mg) by mouth daily, Disp: 60 tablet, Rfl: 3     diclofenac (VOLTAREN) 1 % GEL topical gel, Apply 2 grams to elbow as needed up to four times daily using enclosed dosing card., Disp: 100 g, Rfl: 1     diphenhydrAMINE (BENADRYL) 25 MG tablet, Take  25 mg by mouth nightly as needed for itching or allergies, Disp: , Rfl:      Esomeprazole Magnesium (NEXIUM PO), Take by mouth daily, Disp: , Rfl:      hydrochlorothiazide (HYDRODIURIL) 25 MG tablet, Take 1 tablet (25 mg) by mouth daily, Disp: 90 tablet, Rfl: 1     losartan (COZAAR) 25 MG tablet, Take 1 tablet (25 mg) by mouth daily, Disp: 90 tablet, Rfl: 1     rizatriptan (MAXALT) 5 MG tablet, Take 1 tablet (5 mg) by mouth at onset of headache for migraine repeat after 2 hr if no relief; maximum: 30 mg/24 hours, Disp: 18 tablet, Rfl: 1     ASPIRIN ADULT LOW STRENGTH 81 MG EC tablet, , Disp: , Rfl:      atenolol (TENORMIN) 50 MG tablet, Take 1 tablet (50 mg) by mouth daily, Disp: 90 tablet, Rfl: 1     atorvastatin (LIPITOR) 20 MG tablet, Take 1 tablet (20 mg) by mouth daily (Patient not taking: Reported on 4/29/2019), Disp: 90 tablet, Rfl: 3     ibuprofen (ADVIL/MOTRIN) 600 MG tablet, Take 1 tablet (600 mg) by mouth every 8 hours as needed for moderate pain (Patient not taking: Reported on 4/29/2019), Disp: 30 tablet, Rfl: 0     Allergies:   Patient has no known allergies.      Past Medical History:  Past Medical History:   Diagnosis Date     Blood in semen 2016    Symptoms came and went     GERD (gastroesophageal reflux disease)      Hyperlipidemia 2015     Hypertension 2014     Migraine     Nausea and vomiting     Peptic ulcer hemorrhage 2013     Patient Active Problem List   Diagnosis     Benign essential hypertension     Hyperlipidemia LDL goal <130     Migraine without aura and without status migrainosus, not intractable     Blood in semen     Shoulder dislocation, left, initial encounter     Aftercare following surgery of the musculoskeletal system     Microalbuminuria     Type 2 diabetes mellitus with stage 3 chronic kidney disease, without long-term current use of insulin (H)     Acute pain of left knee     Past Surgical History:  Past Surgical History:   Procedure Laterality Date     GI SURGERY  2013     peptic ulcers stapled     OPEN REDUCTION INTERNAL FIXATION HUMERUS PROXIMAL Left 5/15/2018    Procedure: OPEN REDUCTION INTERNAL FIXATION HUMERUS PROXIMAL;  Left Glenoid Open Reduction Internal Fixation;  Surgeon: Rosa Joe MD;  Location:  OR     Family History:   - 1 sibling has high blood pressure  - Mother had problems with kidneys, attributed to cancer  Family History   Problem Relation Age of Onset     Colon Cancer Mother      Kidney Disease Mother      Coronary Artery Disease Father      Enlarged prostate Father      Other Cancer Brother      Down Syndrome Sister      Alzheimer Disease Sister      Nephrolithiasis Sister      Intellectual Disability (Mental Retardation) Sister         Down Syndrome      Social History:   Social History     Socioeconomic History     Marital status: Single     Spouse name: Not on file     Number of children: Not on file     Years of education: Not on file     Highest education level: Not on file   Occupational History     Not on file   Social Needs     Financial resource strain: Not on file     Food insecurity:     Worry: Not on file     Inability: Not on file     Transportation needs:     Medical: Not on file     Non-medical: Not on file   Tobacco Use     Smoking status: Never Smoker     Smokeless tobacco: Never Used   Substance and Sexual Activity     Alcohol use: Yes     Comment: 3 per week     Drug use: No     Sexual activity: Not Currently     Partners: Male     Birth control/protection: None   Lifestyle     Physical activity:     Days per week: Not on file     Minutes per session: Not on file     Stress: Not on file   Relationships     Social connections:     Talks on phone: Not on file     Gets together: Not on file     Attends Mu-ism service: Not on file     Active member of club or organization: Not on file     Attends meetings of clubs or organizations: Not on file     Relationship status: Not on file     Intimate partner violence:     Fear of current  "or ex partner: Not on file     Emotionally abused: Not on file     Physically abused: Not on file     Forced sexual activity: Not on file   Other Topics Concern     Parent/sibling w/ CABG, MI or angioplasty before 65F 55M? No   Social History Narrative     Not on file     Physical Exam:  Pulse 63   Ht 1.803 m (5' 11\")   Wt 102 kg (224 lb 12.8 oz)   SpO2 96%   BMI 31.35 kg/m        Physical Exam   Constitutional: He appears well-nourished. No distress.   Eyes: No scleral icterus.   Neck: No JVD present.   Cardiovascular: Normal rate and regular rhythm.   Pulmonary/Chest: Breath sounds normal.   Abdominal: Normal appearance. There is no tenderness.   Neurological: He is alert. He has normal strength.   Skin: Skin is intact.   Psychiatric: He has a normal mood and affect. His speech is normal and behavior is normal. Thought content normal.   Extremities: no LE edema present.     Laboratory:  CMP  Recent Labs   Lab Test 04/29/19  0756 03/08/19  0851 01/30/19  1310 01/25/19  0803  06/14/18  0837    140 137 132*   < > 137   POTASSIUM 4.1 3.7 3.5 3.4   < > 3.6   CHLORIDE 100 104 101 98   < > 101   CO2 28 27 30 25   < > 24   ANIONGAP 7 9 6 9   < > 12   * 128* 97 137*   < > 100*   BUN 26 28 34* 36*   < > 29   CR 1.37* 1.50* 1.94* 1.92*   < > 1.30*   GFRESTIMATED 55* 49* 36* 37*   < > 56*   GFRESTBLACK 64 57* 42* 42*   < > 68   CHAR 9.5 9.5 9.2 9.9   < > 10.0   PHOS 2.4*  --   --   --   --   --    PROTTOTAL  --   --   --   --   --  8.0   ALBUMIN 3.8  --   --   --   --  4.3   BILITOTAL  --   --   --   --   --  0.4   ALKPHOS  --   --   --   --   --  113   AST  --   --   --   --   --  22   ALT  --   --   --   --   --  42    < > = values in this interval not displayed.     CBC  Recent Labs   Lab Test 04/29/19  0756 01/30/19  1310   HGB 13.1* 13.3   WBC 7.9 7.6   RBC 4.45 4.75   HCT 39.4* 40.7   MCV 89 86   MCH 29.4 28.0   MCHC 33.2 32.7   RDW 12.3 11.9    263     INR  No lab results found.  ABG  No lab " results found.   URINE STUDIES  Recent Labs   Lab Test 01/30/19  1200 01/29/19  0752 12/17/18  1521   COLOR Yellow Straw Yellow   APPEARANCE Clear Clear Clear   URINEGLC Negative Negative Negative   URINEBILI Negative Negative Negative   URINEKETONE Negative Negative Negative   SG 1.016 1.010 1.015   UBLD Small* Small* Small*   URINEPH 5.0 6.0 6.0   PROTEIN Negative Negative Negative   UROBILINOGEN  --   --  0.2   NITRITE Negative Negative Negative   LEUKEST Negative Negative Negative   RBCU 7* 2 O - 2   WBCU 4 2 0 - 5     No lab results found.  PTH  No lab results found.  IRON STUDIES   No lab results found.    Scribe Disclosure:   IMallory, am serving as a scribe to document services personally performed by Itzel Caldwell MD at this visit, based upon the provider's statements to me. All documentation has been reviewed by the aforementioned provider prior to being entered into the official medical record.     Mallory THOMAS, served as a scribe preparing the chart for the clinic encounter through chart review for the provider team.       Again, thank you for allowing me to participate in the care of your patient.      Sincerely,    Itzel Caldwell MD

## 2019-04-29 NOTE — PROGRESS NOTES
Nephrology Clinic    Itzel Caldwell MD  2019     Name: Karan Anglin  MRN: [id number]  Age: 64 year old  : 1955  Referring provider: Romelia Judd     Assessment and Plan:    # CKD (chronic kidney disease) stage 3:  Microvascular disease from long standing hypertension. Baseline creatinine of 1.2-1.4 mg/dl. JACKIE in  due to obstructing stone, resolved s/p lithotripsy. UA with microscopic hematuria, no proteinuria. Occasional mild albuminuria.   He has recently diagnosed DM, not likely contributing to his CKD.   --his creatinine is now at baseline  --recommend that he keeps the use of NSAID's to minimum, for his migraine headaches, as it puts him at risk for JACKIE's, especially wile on Losartan.     # Benign Essential Hypertension:On losartan (25 mg), atenolol (50 mg), hydrochlorothiazide (25 mg). Goal blood pressures would be < 140/90 mm of Hg. Recommend that he checks his home blood pressures and reports them to us in 2 weeks.     # Kidney Stones: diagnosed in . He was told that the stones were Ca oxalate. Last passed one in 2016. He has never had a litholink and does not want one. Most recent obstructive stones s/p lithotripsy . Continues to have b/l non obstructing stones.   --Educated on increased fluid intake and provided other lifestyle modifications to prevent formation of stones.     # mild hypophosphatemia: Likely dietary.   # GERD : on omeprazole.  # migraines  # HLP       Follow-up: 3 months    Itzel Caldwell MD     Reason For Visit: Follow up    HPI:   Karan Anglin is a 64 year old male with a h/o HTN since , nephrolithiasis diagnosed in , migraines, diabetes diagnosed 2019 is being seen in the clinic for elevated creatinine.   He has a baseline creatinine of 0.88 mg/dl in  since then has had a creatinine of 1.2-1.4 mg/dl. Most recently in the month of , he had an JACKIE with a creatinine of 1.9 mg/dl in the setting of obstructing kidney stones however has now  resolved s/p lithotripsy. At the time, he had rt sided abdominal pain and microscopic hematuria and hence seen by urology. He also complained of LUTS and was placed on Tamsulosin. UA with small blood and few occasional RBC's. He also has hematospermia and is being followed by urology. He had a repeat CT done post lithotripsy which showed that the stones obstructing rt UVJ were no longer there however he still has non obstructing stones in both kidneys ranging from 2-6 mm the right and 2-3 mm on the left.  He reports of being diagnosed with HTN in 2003 and has been on antihypertensive since with quite optimally controlled blood pressures. He has been diagnosed with DM in 3/19 and is currently not on any medications for the same.   He uses Ibuprofen occasionally when he has migraines. Stopped tamsulosin as he did not fell well. Denies symptoms concerning for incomplete emptying of his bladder at this time.   Denies use of other non prescribed meds, recent exposure to abx or contrast, obstructive urinary symptoms, skin rashes, joint pains, fevers, recurrent sinus infections or UTI's        Review of Systems:   Pertinent items are noted in HPI or as below, remainder of complete ROS is negative.      Active Medications:     Current Outpatient Medications:      aspirin (ASA) 81 MG tablet, Take 1 tablet (81 mg) by mouth daily, Disp: 90 tablet, Rfl: 1     atorvastatin (LIPITOR) 10 MG tablet, Take 2 tablets (20 mg) by mouth daily, Disp: 60 tablet, Rfl: 3     diclofenac (VOLTAREN) 1 % GEL topical gel, Apply 2 grams to elbow as needed up to four times daily using enclosed dosing card., Disp: 100 g, Rfl: 1     diphenhydrAMINE (BENADRYL) 25 MG tablet, Take 25 mg by mouth nightly as needed for itching or allergies, Disp: , Rfl:      Esomeprazole Magnesium (NEXIUM PO), Take by mouth daily, Disp: , Rfl:      hydrochlorothiazide (HYDRODIURIL) 25 MG tablet, Take 1 tablet (25 mg) by mouth daily, Disp: 90 tablet, Rfl: 1     losartan  (COZAAR) 25 MG tablet, Take 1 tablet (25 mg) by mouth daily, Disp: 90 tablet, Rfl: 1     rizatriptan (MAXALT) 5 MG tablet, Take 1 tablet (5 mg) by mouth at onset of headache for migraine repeat after 2 hr if no relief; maximum: 30 mg/24 hours, Disp: 18 tablet, Rfl: 1     ASPIRIN ADULT LOW STRENGTH 81 MG EC tablet, , Disp: , Rfl:      atenolol (TENORMIN) 50 MG tablet, Take 1 tablet (50 mg) by mouth daily, Disp: 90 tablet, Rfl: 1     atorvastatin (LIPITOR) 20 MG tablet, Take 1 tablet (20 mg) by mouth daily (Patient not taking: Reported on 4/29/2019), Disp: 90 tablet, Rfl: 3     ibuprofen (ADVIL/MOTRIN) 600 MG tablet, Take 1 tablet (600 mg) by mouth every 8 hours as needed for moderate pain (Patient not taking: Reported on 4/29/2019), Disp: 30 tablet, Rfl: 0     Allergies:   Patient has no known allergies.      Past Medical History:  Past Medical History:   Diagnosis Date     Blood in semen 2016    Symptoms came and went     GERD (gastroesophageal reflux disease)      Hyperlipidemia 2015     Hypertension 2014     Migraine     Nausea and vomiting     Peptic ulcer hemorrhage 2013     Patient Active Problem List   Diagnosis     Benign essential hypertension     Hyperlipidemia LDL goal <130     Migraine without aura and without status migrainosus, not intractable     Blood in semen     Shoulder dislocation, left, initial encounter     Aftercare following surgery of the musculoskeletal system     Microalbuminuria     Type 2 diabetes mellitus with stage 3 chronic kidney disease, without long-term current use of insulin (H)     Acute pain of left knee     Past Surgical History:  Past Surgical History:   Procedure Laterality Date     GI SURGERY  2013    peptic ulcers stapled     OPEN REDUCTION INTERNAL FIXATION HUMERUS PROXIMAL Left 5/15/2018    Procedure: OPEN REDUCTION INTERNAL FIXATION HUMERUS PROXIMAL;  Left Glenoid Open Reduction Internal Fixation;  Surgeon: Rosa Joe MD;  Location:  OR     Family History:    - 1 sibling has high blood pressure  - Mother had problems with kidneys, attributed to cancer  Family History   Problem Relation Age of Onset     Colon Cancer Mother      Kidney Disease Mother      Coronary Artery Disease Father      Enlarged prostate Father      Other Cancer Brother      Down Syndrome Sister      Alzheimer Disease Sister      Nephrolithiasis Sister      Intellectual Disability (Mental Retardation) Sister         Down Syndrome      Social History:   Social History     Socioeconomic History     Marital status: Single     Spouse name: Not on file     Number of children: Not on file     Years of education: Not on file     Highest education level: Not on file   Occupational History     Not on file   Social Needs     Financial resource strain: Not on file     Food insecurity:     Worry: Not on file     Inability: Not on file     Transportation needs:     Medical: Not on file     Non-medical: Not on file   Tobacco Use     Smoking status: Never Smoker     Smokeless tobacco: Never Used   Substance and Sexual Activity     Alcohol use: Yes     Comment: 3 per week     Drug use: No     Sexual activity: Not Currently     Partners: Male     Birth control/protection: None   Lifestyle     Physical activity:     Days per week: Not on file     Minutes per session: Not on file     Stress: Not on file   Relationships     Social connections:     Talks on phone: Not on file     Gets together: Not on file     Attends Judaism service: Not on file     Active member of club or organization: Not on file     Attends meetings of clubs or organizations: Not on file     Relationship status: Not on file     Intimate partner violence:     Fear of current or ex partner: Not on file     Emotionally abused: Not on file     Physically abused: Not on file     Forced sexual activity: Not on file   Other Topics Concern     Parent/sibling w/ CABG, MI or angioplasty before 65F 55M? No   Social History Narrative     Not on file  "    Physical Exam:  Pulse 63   Ht 1.803 m (5' 11\")   Wt 102 kg (224 lb 12.8 oz)   SpO2 96%   BMI 31.35 kg/m       Physical Exam   Constitutional: He appears well-nourished. No distress.   Eyes: No scleral icterus.   Neck: No JVD present.   Cardiovascular: Normal rate and regular rhythm.   Pulmonary/Chest: Breath sounds normal.   Abdominal: Normal appearance. There is no tenderness.   Neurological: He is alert. He has normal strength.   Skin: Skin is intact.   Psychiatric: He has a normal mood and affect. His speech is normal and behavior is normal. Thought content normal.   Extremities: no LE edema present.     Laboratory:  CMP  Recent Labs   Lab Test 04/29/19  0756 03/08/19  0851 01/30/19  1310 01/25/19  0803  06/14/18  0837    140 137 132*   < > 137   POTASSIUM 4.1 3.7 3.5 3.4   < > 3.6   CHLORIDE 100 104 101 98   < > 101   CO2 28 27 30 25   < > 24   ANIONGAP 7 9 6 9   < > 12   * 128* 97 137*   < > 100*   BUN 26 28 34* 36*   < > 29   CR 1.37* 1.50* 1.94* 1.92*   < > 1.30*   GFRESTIMATED 55* 49* 36* 37*   < > 56*   GFRESTBLACK 64 57* 42* 42*   < > 68   CHAR 9.5 9.5 9.2 9.9   < > 10.0   PHOS 2.4*  --   --   --   --   --    PROTTOTAL  --   --   --   --   --  8.0   ALBUMIN 3.8  --   --   --   --  4.3   BILITOTAL  --   --   --   --   --  0.4   ALKPHOS  --   --   --   --   --  113   AST  --   --   --   --   --  22   ALT  --   --   --   --   --  42    < > = values in this interval not displayed.     CBC  Recent Labs   Lab Test 04/29/19  0756 01/30/19  1310   HGB 13.1* 13.3   WBC 7.9 7.6   RBC 4.45 4.75   HCT 39.4* 40.7   MCV 89 86   MCH 29.4 28.0   MCHC 33.2 32.7   RDW 12.3 11.9    263     INR  No lab results found.  ABG  No lab results found.   URINE STUDIES  Recent Labs   Lab Test 01/30/19  1200 01/29/19  0752 12/17/18  1521   COLOR Yellow Straw Yellow   APPEARANCE Clear Clear Clear   URINEGLC Negative Negative Negative   URINEBILI Negative Negative Negative   URINEKETONE Negative Negative " Negative   SG 1.016 1.010 1.015   UBLD Small* Small* Small*   URINEPH 5.0 6.0 6.0   PROTEIN Negative Negative Negative   UROBILINOGEN  --   --  0.2   NITRITE Negative Negative Negative   LEUKEST Negative Negative Negative   RBCU 7* 2 O - 2   WBCU 4 2 0 - 5     No lab results found.  PTH  No lab results found.  IRON STUDIES   No lab results found.    Scribe Disclosure:   IMallory, am serving as a scribe to document services personally performed by Itzel Caldwell MD at this visit, based upon the provider's statements to me. All documentation has been reviewed by the aforementioned provider prior to being entered into the official medical record.     IMallory, served as a scribe preparing the chart for the clinic encounter through chart review for the provider team.

## 2019-04-30 LAB — DEPRECATED CALCIDIOL+CALCIFEROL SERPL-MC: 13 UG/L (ref 20–75)

## 2019-05-02 ENCOUNTER — THERAPY VISIT (OUTPATIENT)
Dept: PHYSICAL THERAPY | Facility: CLINIC | Age: 64
End: 2019-05-02
Payer: COMMERCIAL

## 2019-05-02 DIAGNOSIS — M25.562 ACUTE PAIN OF LEFT KNEE: ICD-10-CM

## 2019-05-02 PROCEDURE — 97530 THERAPEUTIC ACTIVITIES: CPT | Mod: GP | Performed by: PHYSICAL THERAPIST

## 2019-05-02 PROCEDURE — 97110 THERAPEUTIC EXERCISES: CPT | Mod: GP | Performed by: PHYSICAL THERAPIST

## 2019-05-02 NOTE — PROGRESS NOTES
Subjective:  HPI                    Objective:  System    Physical Exam       General     ROS    Assessment/Plan:    DISCHARGE REPORT    Progress reporting period is from 3-29-19 to 5-2-19.       SUBJECTIVE  Subjective changes noted by patient:   Pt. has made good progress in PT with decreased L knee pain and much improved funcional mobility. Pt. has resumed his normal daily activities without any difficulty. He can walk up to 30 minutes without a problem. He will continue his HEP with no further PT visits planned unless increased problems arise.       Current pain level is 1/10.     Previous pain level was  5/10.   Changes in function:  Yes (See Goal flowsheet attached for changes in current functional level)  Adverse reaction to treatment or activity: None    OBJECTIVE  Changes noted in objective findings:  Strength L hip flex 5/5; abd 4+/5; ext 4+/5.      ASSESSMENT/PLAN  Updated problem list and treatment plan: Diagnosis 1:  L PF knee pain  Pain -  self management and education  Decreased strength - therapeutic exercise and therapeutic activities  Impaired muscle performance - neuro re-education  Decreased function - therapeutic activities  STG/LTGs have been met or progress has been made towards goals:  Yes (See Goal flow sheet completed today.)  Assessment of Progress: Patient is meeting short term goals and is progressing towards long term goals.  Self Management Plans:  Patient is independent in a home treatment program.  Patient is independent in self management of symptoms.  I have re-evaluated this patient and find that the nature, scope, duration and intensity of the therapy is appropriate for the medical condition of the patient.  Karan continues to require the following intervention to meet STG and LTG's:  PT intervention is no longer required to meet STG/LTG.    Recommendations:  This patient is ready to be discharged from therapy and continue their home treatment program.    Please refer to the daily  flowsheet for treatment today, total treatment time and time spent performing 1:1 timed codes.

## 2019-05-29 ENCOUNTER — TELEPHONE (OUTPATIENT)
Dept: NEPHROLOGY | Facility: CLINIC | Age: 64
End: 2019-05-29

## 2019-05-29 DIAGNOSIS — E55.9 VITAMIN D DEFICIENCY: Primary | ICD-10-CM

## 2019-05-29 NOTE — TELEPHONE ENCOUNTER
----- Message from Juliocesar Alba RN sent at 5/29/2019  8:31 AM CDT -----      ----- Message -----  From: Itzel Caldwell MD  Sent: 5/28/2019   4:41 PM  To: Juliocesar Alba RN    Hi J,    Can you send in cholecalciferol 2000 international unit(s) daily to his pharmacy and let him know that he has Vit D deficiency as well?    Thanks,    -S

## 2019-05-29 NOTE — TELEPHONE ENCOUNTER
Date: 5/29/2019    Time of Call: 12:19 PM     Diagnosis:  Vitamin d deficiency     [ VORB ] Ordering provider: Dr. Itzel Caldwell  Order: Cholecalciferol 2000 units PO daily     Order received by: Melissa Mckeon LPN     Follow-up/additional notes:     Message left for patient, asking him to call clinic if prescription preferred.  Melissa Mckeon LPN  Nephrology  [phone]

## 2019-06-03 DIAGNOSIS — I10 BENIGN ESSENTIAL HYPERTENSION: ICD-10-CM

## 2019-06-03 DIAGNOSIS — G43.009 MIGRAINE WITHOUT AURA AND WITHOUT STATUS MIGRAINOSUS, NOT INTRACTABLE: ICD-10-CM

## 2019-06-03 RX ORDER — HYDROCHLOROTHIAZIDE 25 MG/1
25 TABLET ORAL DAILY
Qty: 30 TABLET | Refills: 0 | Status: SHIPPED | OUTPATIENT
Start: 2019-06-03 | End: 2019-06-28

## 2019-06-03 RX ORDER — LOSARTAN POTASSIUM 25 MG/1
25 TABLET ORAL DAILY
Qty: 30 TABLET | Refills: 0 | Status: SHIPPED | OUTPATIENT
Start: 2019-06-03 | End: 2019-06-28

## 2019-06-03 RX ORDER — ATENOLOL 50 MG/1
50 TABLET ORAL DAILY
Qty: 30 TABLET | Refills: 0 | Status: SHIPPED | OUTPATIENT
Start: 2019-06-03 | End: 2019-06-28

## 2019-06-03 NOTE — TELEPHONE ENCOUNTER
"Requested Prescriptions   Pending Prescriptions Disp Refills     atenolol (TENORMIN) 50 MG tablet 90 tablet 1     Sig: Take 1 tablet (50 mg) by mouth daily  Last Written Prescription Date:  10/25/2018  Last Fill Quantity: 90,  # refills: 1   Last office visit: 3/8/2019 with prescribing provider:  03/08/2019   Future Office Visit:         Beta-Blockers Protocol Failed - 6/3/2019 10:18 AM        Failed - Blood pressure under 140/90 in past 12 months     BP Readings from Last 3 Encounters:   04/29/19 (!) 153/98   03/08/19 128/82   01/30/19 120/78                 Passed - Patient is age 6 or older        Passed - Recent (12 mo) or future (30 days) visit within the authorizing provider's specialty     Patient had office visit in the last 12 months or has a visit in the next 30 days with authorizing provider or within the authorizing provider's specialty.  See \"Patient Info\" tab in inbasket, or \"Choose Columns\" in Meds & Orders section of the refill encounter.              Passed - Medication is active on med list        losartan (COZAAR) 25 MG tablet 90 tablet 1     Sig: Take 1 tablet (25 mg) by mouth daily  Last Written Prescription Date:  12/06/2018  Last Fill Quantity: 90,  # refills: 1   Last office visit: 3/8/2019 with prescribing provider:  03/08/2019   Future Office Visit:         Angiotensin-II Receptors Failed - 6/3/2019 10:18 AM        Failed - Blood pressure under 140/90 in past 12 months     BP Readings from Last 3 Encounters:   04/29/19 (!) 153/98   03/08/19 128/82   01/30/19 120/78                 Failed - Normal serum creatinine on file in past 12 months     Recent Labs   Lab Test 04/29/19  0756   CR 1.37*             Passed - Recent (12 mo) or future (30 days) visit within the authorizing provider's specialty     Patient had office visit in the last 12 months or has a visit in the next 30 days with authorizing provider or within the authorizing provider's specialty.  See \"Patient Info\" tab in inbasket, or " "\"Choose Columns\" in Meds & Orders section of the refill encounter.              Passed - Medication is active on med list        Passed - Patient is age 18 or older        Passed - Normal serum potassium on file in past 12 months     Recent Labs   Lab Test 04/29/19  0756   POTASSIUM 4.1                    hydrochlorothiazide (HYDRODIURIL) 25 MG tablet 90 tablet 1     Sig: Take 1 tablet (25 mg) by mouth daily  Last Written Prescription Date:  12/06/2018  Last Fill Quantity: 90,  # refills: 1   Last office visit: 3/8/2019 with prescribing provider:  03/08/2019   Future Office Visit:         Diuretics (Including Combos) Protocol Failed - 6/3/2019 10:18 AM        Failed - Blood pressure under 140/90 in past 12 months     BP Readings from Last 3 Encounters:   04/29/19 (!) 153/98   03/08/19 128/82   01/30/19 120/78                 Failed - Normal serum creatinine on file in past 12 months     Recent Labs   Lab Test 04/29/19  0756   CR 1.37*              Passed - Recent (12 mo) or future (30 days) visit within the authorizing provider's specialty     Patient had office visit in the last 12 months or has a visit in the next 30 days with authorizing provider or within the authorizing provider's specialty.  See \"Patient Info\" tab in inbasket, or \"Choose Columns\" in Meds & Orders section of the refill encounter.              Passed - Medication is active on med list        Passed - Patient is age 18 or older        Passed - Normal serum potassium on file in past 12 months     Recent Labs   Lab Test 04/29/19  0756   POTASSIUM 4.1                    Passed - Normal serum sodium on file in past 12 months     Recent Labs   Lab Test 04/29/19  0756                 "

## 2019-06-03 NOTE — TELEPHONE ENCOUNTER
Called patient, LVM to call clinic. He is due for OV with .    Medication is being filled for 1 time refill only due to:  Patient needs to be seen because he is due for f/u.    Teressa Lee RN  [facility]

## 2019-06-28 ENCOUNTER — OFFICE VISIT (OUTPATIENT)
Dept: FAMILY MEDICINE | Facility: CLINIC | Age: 64
End: 2019-06-28
Payer: COMMERCIAL

## 2019-06-28 VITALS
DIASTOLIC BLOOD PRESSURE: 84 MMHG | TEMPERATURE: 98.6 F | HEIGHT: 71 IN | WEIGHT: 221.5 LBS | BODY MASS INDEX: 31.01 KG/M2 | HEART RATE: 76 BPM | SYSTOLIC BLOOD PRESSURE: 128 MMHG | OXYGEN SATURATION: 96 %

## 2019-06-28 DIAGNOSIS — E11.22 TYPE 2 DIABETES MELLITUS WITH STAGE 3 CHRONIC KIDNEY DISEASE, WITHOUT LONG-TERM CURRENT USE OF INSULIN (H): ICD-10-CM

## 2019-06-28 DIAGNOSIS — G43.009 MIGRAINE WITHOUT AURA AND WITHOUT STATUS MIGRAINOSUS, NOT INTRACTABLE: ICD-10-CM

## 2019-06-28 DIAGNOSIS — E78.5 HYPERLIPIDEMIA LDL GOAL <130: ICD-10-CM

## 2019-06-28 DIAGNOSIS — N18.30 TYPE 2 DIABETES MELLITUS WITH STAGE 3 CHRONIC KIDNEY DISEASE, WITHOUT LONG-TERM CURRENT USE OF INSULIN (H): ICD-10-CM

## 2019-06-28 DIAGNOSIS — N18.30 CKD (CHRONIC KIDNEY DISEASE) STAGE 3, GFR 30-59 ML/MIN (H): ICD-10-CM

## 2019-06-28 DIAGNOSIS — I10 BENIGN ESSENTIAL HYPERTENSION: Primary | ICD-10-CM

## 2019-06-28 PROCEDURE — 99214 OFFICE O/P EST MOD 30 MIN: CPT | Performed by: NURSE PRACTITIONER

## 2019-06-28 RX ORDER — LOSARTAN POTASSIUM 25 MG/1
25 TABLET ORAL DAILY
Qty: 90 TABLET | Refills: 3 | Status: SHIPPED | OUTPATIENT
Start: 2019-06-28 | End: 2020-06-30

## 2019-06-28 RX ORDER — HYDROCHLOROTHIAZIDE 25 MG/1
25 TABLET ORAL DAILY
Qty: 90 TABLET | Refills: 3 | Status: SHIPPED | OUTPATIENT
Start: 2019-06-28 | End: 2020-06-30

## 2019-06-28 RX ORDER — ATENOLOL 50 MG/1
50 TABLET ORAL DAILY
Qty: 90 TABLET | Refills: 3 | Status: SHIPPED | OUTPATIENT
Start: 2019-06-28 | End: 2019-12-05

## 2019-06-28 ASSESSMENT — MIFFLIN-ST. JEOR: SCORE: 1826.85

## 2019-06-28 NOTE — PROGRESS NOTES
Subjective     Karan Anglin is a 64 year old male who presents to clinic today for the following health issues:    HPI     Hyperlipidemia Follow-Up      Are you having any of the following symptoms? (Select all that apply)  No complaints of shortness of breath, chest pain or pressure.  No increased sweating or nausea with activity.  No left-sided neck or arm pain.  No complaints of pain in calves when walking 1-2 blocks.    Are you regularly taking any medication or supplement to lower your cholesterol?   Yes- everyday    Are you having muscle aches or other side effects that you think could be caused by your cholesterol lowering medication?  No    Hypertension Follow-up      Do you check your blood pressure regularly outside of the clinic? No     Are you following a low salt diet? No    Are your blood pressures ever more than 140 on the top number (systolic) OR more   than 90 on the bottom number (diastolic), for example 140/90? Yes   Nephrologist clinic is considered hospital OP and very expensive - contesting the bill, but cannot follow-up there if that is the cost    Has migraines 2-3 x per month - both maxalt and NSAID needed for resolution.    Does not want to talk about diabetes.  Is diet managed.  Not due for A1C.      BP Readings from Last 2 Encounters:   06/28/19 128/84   04/29/19 (!) 153/98     Hemoglobin A1C (%)   Date Value   03/08/2019 6.8 (H)   01/29/2019 7.3 (H)     LDL Cholesterol Calculated (mg/dL)   Date Value   01/25/2019 84   09/20/2017 85       Amount of exercise or physical activity: 4-5 days/week for an average of 30-45 minutes - walking    Has cut portions in half    Problems taking medications regularly: No    Medication side effects: none    Diet: regular (no restrictions)    Wt Readings from Last 5 Encounters:   06/28/19 100.5 kg (221 lb 8 oz)   04/29/19 102 kg (224 lb 12.8 oz)   03/14/19 99.8 kg (220 lb)   03/08/19 99.8 kg (220 lb 1.6 oz)   01/30/19 99.3 kg (219 lb)       Reviewed and  "updated as needed this visit by Provider         Review of Systems   ROS COMP: Constitutional, HEENT, cardiovascular, pulmonary, gi and gu systems are negative, except as otherwise noted.      Objective    /84   Pulse 76   Temp 98.6  F (37  C) (Oral)   Ht 1.803 m (5' 11\")   Wt 100.5 kg (221 lb 8 oz)   SpO2 96%   BMI 30.89 kg/m    Body mass index is 30.89 kg/m .  Physical Exam   GENERAL: healthy, alert and no distress  EYES: Eyes grossly normal to inspection, PERRL and conjunctivae and sclerae normal  HENT: ear canals and TM's normal, nose and mouth without ulcers or lesions  NECK: no adenopathy, no asymmetry, masses, or scars and thyroid normal to palpation  RESP: lungs clear to auscultation - no rales, rhonchi or wheezes  CV: regular rate and rhythm, normal S1 S2, no S3 or S4, no murmur, click or rub, no peripheral edema and peripheral pulses strong  ABDOMEN: soft, nontender, no hepatosplenomegaly, no masses and bowel sounds normal  MS: no gross musculoskeletal defects noted, no edema  SKIN: no suspicious lesions or rashes  NEURO: Normal strength and tone, mentation intact and speech normal  PSYCH: mentation appears normal, affect normal/bright    Diagnostic Test Results:  Labs reviewed in Epic  none         Assessment & Plan     (I10) Benign essential hypertension  (primary encounter diagnosis)  Comment:   Plan: atenolol (TENORMIN) 50 MG tablet,         hydrochlorothiazide (HYDRODIURIL) 25 MG tablet,        losartan (COZAAR) 25 MG tablet, NEPHROLOGY         ADULT REFERRAL  Blood pressure stable on current doses.  Nephrology goal is <140/90, not 130/80    (E78.5) Hyperlipidemia LDL goal <130  Comment:   Plan: No changes - was able to get atorvastatin 20 mg as single tab now and this was recently renewed    (G43.009) Migraine without aura and without status migrainosus, not intractable  Comment:   Plan: atenolol (TENORMIN) 50 MG tablet        No increase or change in migraines, but 2-3 times a month does " "lead to NSAID use as he needs this for resolution in conjunction with triptan.  May be better beta-blocker to use a preventative and would definitely be reasonable to change for HTN.  Patient does not want to change the atentolol at this time.  Try aspirin 325 mg with next migraine instead of ibuprofen.    (E11.22,  N18.3) Type 2 diabetes mellitus with stage 3 chronic kidney disease, without long-term current use of insulin (H)  Comment:   Plan: aspirin (ASA) 81 MG tablet            (N18.3) CKD (chronic kidney disease) stage 3, GFR 30-59 ml/min (H)  Comment:   Plan: NEPHROLOGY ADULT REFERRAL        Alternative nephology referrals given       BMI:   Estimated body mass index is 31.35 kg/m  as calculated from the following:    Height as of 4/29/19: 1.803 m (5' 11\").    Weight as of 4/29/19: 102 kg (224 lb 12.8 oz).   Weight management plan: Discussed healthy diet and exercise guidelines        Patient Instructions   Try aspirin 325 mg with the maxalt instead of an NSAID with next migraine      Return in about 3 months (around 9/25/2019) for Physical Exam, A!JEFFERSON [facility]        "

## 2019-07-01 PROBLEM — M25.562 ACUTE PAIN OF LEFT KNEE: Status: RESOLVED | Noted: 2019-03-30 | Resolved: 2019-07-01

## 2019-07-15 DIAGNOSIS — G43.009 MIGRAINE WITHOUT AURA AND WITHOUT STATUS MIGRAINOSUS, NOT INTRACTABLE: ICD-10-CM

## 2019-07-15 RX ORDER — RIZATRIPTAN BENZOATE 5 MG/1
5 TABLET ORAL
Qty: 18 TABLET | Refills: 1 | Status: SHIPPED | OUTPATIENT
Start: 2019-07-15 | End: 2019-11-20

## 2019-07-15 NOTE — TELEPHONE ENCOUNTER
Patient would be avg 6-8 per month    Signed Prescriptions:                        Disp   Refills    rizatriptan (MAXALT) 5 MG tablet           18 tab*1        Sig: Take 1 tablet (5 mg) by mouth at onset of headache           for migraine repeat after 2 hr if no relief;           maximum: 30 mg/24 hours  Authorizing Provider: KT MERCADO  Ordering User: YOSEF GARCÍA

## 2019-07-15 NOTE — TELEPHONE ENCOUNTER
"Requested Prescriptions   Pending Prescriptions Disp Refills     rizatriptan (MAXALT) 5 MG tablet 18 tablet 1     Sig: Take 1 tablet (5 mg) by mouth at onset of headache for migraine repeat after 2 hr if no relief; maximum: 30 mg/24 hours  Last Written Prescription Date:  01/25/2019  Last Fill Quantity: 18,  # refills: 1   Last office visit: 6/28/2019 with prescribing provider:  06/28/2019   Future Office Visit:         Serotonin Agonists Failed - 7/15/2019 10:07 AM        Failed - Serotonin Agonist request needs review.     Please review patient's record. If patient has had 8 or more treatments in the past month, please forward to provider.          Passed - Blood pressure under 140/90 in past 12 months     BP Readings from Last 3 Encounters:   06/28/19 128/84   04/29/19 (!) 153/98   03/08/19 128/82                 Passed - Recent (12 mo) or future (30 days) visit within the authorizing provider's specialty     Patient had office visit in the last 12 months or has a visit in the next 30 days with authorizing provider or within the authorizing provider's specialty.  See \"Patient Info\" tab in inbasket, or \"Choose Columns\" in Meds & Orders section of the refill encounter.              Passed - Medication is active on med list        Passed - Patient is age 18 or older        "

## 2019-09-09 ENCOUNTER — DOCUMENTATION ONLY (OUTPATIENT)
Dept: ORTHOPEDICS | Facility: CLINIC | Age: 64
End: 2019-09-09

## 2019-09-09 NOTE — PROGRESS NOTES
Completed Health Care Provider Report and faxed to Edd Cantrell att'n Katie Mancini at [phone]. Submitted original to Medical Records for scanning.

## 2019-11-20 DIAGNOSIS — G43.009 MIGRAINE WITHOUT AURA AND WITHOUT STATUS MIGRAINOSUS, NOT INTRACTABLE: ICD-10-CM

## 2019-11-20 RX ORDER — RIZATRIPTAN BENZOATE 5 MG/1
5 TABLET ORAL
Qty: 18 TABLET | Refills: 0 | Status: SHIPPED | OUTPATIENT
Start: 2019-11-20 | End: 2019-12-05

## 2019-11-20 NOTE — TELEPHONE ENCOUNTER
Uses med 1 to 2xweek  He is going out of town for the holiday and doesn't want to be out, he has 6 left    He made a f/u with provider    Med refill sent    Peyton Story RN   [facility]

## 2019-11-20 NOTE — TELEPHONE ENCOUNTER
"Requested Prescriptions   Pending Prescriptions Disp Refills     rizatriptan (MAXALT) 5 MG tablet 18 tablet 1     Sig: Take 1 tablet (5 mg) by mouth at onset of headache for migraine repeat after 2 hr if no relief; maximum: 30 mg/24 hours  Last Written Prescription Date:  07/15/2019  Last Fill Quantity: 18,  # refills: 1   Last office visit: 6/28/2019 with prescribing provider:  06/28/2019   Future Office Visit:         Serotonin Agonists Failed - 11/20/2019  9:03 AM        Failed - Serotonin Agonist request needs review.     Please review patient's record. If patient has had 8 or more treatments in the past month, please forward to provider.          Passed - Blood pressure under 140/90 in past 12 months     BP Readings from Last 3 Encounters:   06/28/19 128/84   04/29/19 (!) 153/98   03/08/19 128/82                 Passed - Recent (12 mo) or future (30 days) visit within the authorizing provider's specialty     Patient has had an office visit with the authorizing provider or a provider within the authorizing providers department within the previous 12 mos or has a future within next 30 days. See \"Patient Info\" tab in inbasket, or \"Choose Columns\" in Meds & Orders section of the refill encounter.              Passed - Medication is active on med list        Passed - Patient is age 18 or older        "

## 2019-12-05 ENCOUNTER — OFFICE VISIT (OUTPATIENT)
Dept: FAMILY MEDICINE | Facility: CLINIC | Age: 64
End: 2019-12-05
Payer: COMMERCIAL

## 2019-12-05 VITALS
DIASTOLIC BLOOD PRESSURE: 84 MMHG | OXYGEN SATURATION: 95 % | HEIGHT: 71 IN | TEMPERATURE: 97.9 F | WEIGHT: 223 LBS | BODY MASS INDEX: 31.22 KG/M2 | SYSTOLIC BLOOD PRESSURE: 128 MMHG | HEART RATE: 77 BPM

## 2019-12-05 DIAGNOSIS — G43.009 MIGRAINE WITHOUT AURA AND WITHOUT STATUS MIGRAINOSUS, NOT INTRACTABLE: ICD-10-CM

## 2019-12-05 DIAGNOSIS — N18.30 CKD (CHRONIC KIDNEY DISEASE) STAGE 3, GFR 30-59 ML/MIN (H): ICD-10-CM

## 2019-12-05 DIAGNOSIS — E78.5 HYPERLIPIDEMIA LDL GOAL <130: ICD-10-CM

## 2019-12-05 DIAGNOSIS — E11.22 TYPE 2 DIABETES MELLITUS WITH STAGE 3 CHRONIC KIDNEY DISEASE, WITHOUT LONG-TERM CURRENT USE OF INSULIN (H): Primary | ICD-10-CM

## 2019-12-05 DIAGNOSIS — R80.9 MICROALBUMINURIA: ICD-10-CM

## 2019-12-05 DIAGNOSIS — I10 BENIGN ESSENTIAL HYPERTENSION: ICD-10-CM

## 2019-12-05 DIAGNOSIS — N18.30 TYPE 2 DIABETES MELLITUS WITH STAGE 3 CHRONIC KIDNEY DISEASE, WITHOUT LONG-TERM CURRENT USE OF INSULIN (H): Primary | ICD-10-CM

## 2019-12-05 DIAGNOSIS — E55.9 HYPOVITAMINOSIS D: ICD-10-CM

## 2019-12-05 LAB
ERYTHROCYTE [DISTWIDTH] IN BLOOD BY AUTOMATED COUNT: 13.1 % (ref 10–15)
HBA1C MFR BLD: 6.5 % (ref 0–5.6)
HCT VFR BLD AUTO: 42.3 % (ref 40–53)
HGB BLD-MCNC: 14.7 G/DL (ref 13.3–17.7)
MCH RBC QN AUTO: 29.3 PG (ref 26.5–33)
MCHC RBC AUTO-ENTMCNC: 34.8 G/DL (ref 31.5–36.5)
MCV RBC AUTO: 84 FL (ref 78–100)
PLATELET # BLD AUTO: 276 10E9/L (ref 150–450)
RBC # BLD AUTO: 5.01 10E12/L (ref 4.4–5.9)
WBC # BLD AUTO: 8.2 10E9/L (ref 4–11)

## 2019-12-05 PROCEDURE — 83036 HEMOGLOBIN GLYCOSYLATED A1C: CPT | Performed by: NURSE PRACTITIONER

## 2019-12-05 PROCEDURE — 82306 VITAMIN D 25 HYDROXY: CPT | Performed by: NURSE PRACTITIONER

## 2019-12-05 PROCEDURE — 80061 LIPID PANEL: CPT | Performed by: NURSE PRACTITIONER

## 2019-12-05 PROCEDURE — 36415 COLL VENOUS BLD VENIPUNCTURE: CPT | Performed by: NURSE PRACTITIONER

## 2019-12-05 PROCEDURE — 99214 OFFICE O/P EST MOD 30 MIN: CPT | Performed by: NURSE PRACTITIONER

## 2019-12-05 PROCEDURE — 85027 COMPLETE CBC AUTOMATED: CPT | Performed by: NURSE PRACTITIONER

## 2019-12-05 PROCEDURE — 84443 ASSAY THYROID STIM HORMONE: CPT | Performed by: NURSE PRACTITIONER

## 2019-12-05 PROCEDURE — 80053 COMPREHEN METABOLIC PANEL: CPT | Performed by: NURSE PRACTITIONER

## 2019-12-05 RX ORDER — METOPROLOL SUCCINATE 50 MG/1
50 TABLET, EXTENDED RELEASE ORAL DAILY
Qty: 90 TABLET | Refills: 3 | Status: SHIPPED | OUTPATIENT
Start: 2019-12-05 | End: 2020-07-14

## 2019-12-05 RX ORDER — RIZATRIPTAN BENZOATE 5 MG/1
5 TABLET ORAL
Qty: 18 TABLET | Refills: 1 | Status: SHIPPED | OUTPATIENT
Start: 2019-12-05 | End: 2020-05-12

## 2019-12-05 RX ORDER — CHOLECALCIFEROL (VITAMIN D3) 50 MCG
1 TABLET ORAL DAILY
COMMUNITY
Start: 2019-12-05 | End: 2020-07-14

## 2019-12-05 ASSESSMENT — MIFFLIN-ST. JEOR: SCORE: 1833.65

## 2019-12-05 NOTE — PROGRESS NOTES
Subjective     Karan Anglin is a 64 year old male who presents to clinic today for the following health issues:    HPI   Diabetes Follow-up      How often are you checking your blood sugar? Not at all    What concerns do you have today about your diabetes? None     Do you have any of these symptoms? (Select all that apply)  Burning in feet     Have you had a diabetic eye exam in the last 12 months? No       Diabetes Management Resource    Hyperlipidemia Follow-Up      Are you regularly taking any medication or supplement to lower your cholesterol?   Yes- lipitor    Are you having muscle aches or other side effects that you think could be caused by your cholesterol lowering medication?  No    Hypertension Follow-up      Do you check your blood pressure regularly outside of the clinic? Yes  - 130/80-90's    Are you following a low salt diet? No    Are your blood pressures ever more than 140 on the top number (systolic) OR more   than 90 on the bottom number (diastolic), for example 140/90? No    BP Readings from Last 2 Encounters:   12/05/19 128/84   06/28/19 128/84     Hemoglobin A1C (%)   Date Value   12/05/2019 6.5 (H)   03/08/2019 6.8 (H)     LDL Cholesterol Calculated (mg/dL)   Date Value   12/05/2019 94   01/25/2019 84         How many servings of fruits and vegetables do you eat daily?  2-3    On average, how many sweetened beverages do you drink each day (Examples: soda, juice, sweet tea, etc.  Do NOT count diet or artificially sweetened beverages)?   0    How many days per week do you miss taking your medication? 0    Migraine     Since your last clinic visit, how have your headaches changed?  Worsened - increased in frequency due to weather, travel, holidays.  Will have longer stretches for part of the year    How often are you getting headaches or migraines? 2 times a week     Are you able to do normal daily activities when you have a migraine? Yes    Are you taking rescue/relief medications? (Select all that  "apply) Maxalt    How helpful is your rescue/relief medication?  I get total relief    Are you taking any medications to prevent migraines? (Select all that apply)  No    In the past 4 weeks, how often have you gone to urgent care or the emergency room because of your headaches?  0          Reviewed and updated as needed this visit by Provider         Review of Systems   ROS COMP: Constitutional, HEENT, cardiovascular, pulmonary, gi and gu systems are negative, except as otherwise noted.    Wt Readings from Last 5 Encounters:   12/05/19 101.2 kg (223 lb)   06/28/19 100.5 kg (221 lb 8 oz)   04/29/19 102 kg (224 lb 12.8 oz)   03/14/19 99.8 kg (220 lb)   03/08/19 99.8 kg (220 lb 1.6 oz)             Objective    /84   Pulse 77   Temp 97.9  F (36.6  C) (Oral)   Ht 1.803 m (5' 11\")   Wt 101.2 kg (223 lb)   SpO2 95%   BMI 31.10 kg/m    Body mass index is 31.1 kg/m .  Physical Exam   GENERAL: healthy, alert and no distress  EYES: Eyes grossly normal to inspection, PERRL and conjunctivae and sclerae normal  HENT: ear canals and TM's normal, nose and mouth without ulcers or lesions  NECK: no adenopathy, no asymmetry, masses, or scars and thyroid normal to palpation  RESP: lungs clear to auscultation - no rales, rhonchi or wheezes  CV: regular rate and rhythm, normal S1 S2, no S3 or S4, no murmur, click or rub, no peripheral edema and peripheral pulses strong  ABDOMEN: soft, nontender, no hepatosplenomegaly, no masses and bowel sounds normal  MS: no gross musculoskeletal defects noted, no edema  SKIN: no suspicious lesions or rashes  NEURO: Normal strength and tone, mentation intact and speech normal  PSYCH: mentation appears normal, affect normal/bright    Diagnostic Test Results:  Labs reviewed in Epic  Results for orders placed or performed in visit on 12/05/19   Comprehensive metabolic panel     Status: Abnormal   Result Value Ref Range    Sodium 132 (L) 133 - 144 mmol/L    Potassium 3.7 3.4 - 5.3 mmol/L    " Chloride 98 94 - 109 mmol/L    Carbon Dioxide 26 20 - 32 mmol/L    Anion Gap 8 3 - 14 mmol/L    Glucose 111 (H) 70 - 99 mg/dL    Urea Nitrogen 27 7 - 30 mg/dL    Creatinine 1.47 (H) 0.66 - 1.25 mg/dL    GFR Estimate 50 (L) >60 mL/min/[1.73_m2]    GFR Estimate If Black 58 (L) >60 mL/min/[1.73_m2]    Calcium 10.1 8.5 - 10.1 mg/dL    Bilirubin Total 0.7 0.2 - 1.3 mg/dL    Albumin 4.2 3.4 - 5.0 g/dL    Protein Total 8.1 6.8 - 8.8 g/dL    Alkaline Phosphatase 111 40 - 150 U/L    ALT 40 0 - 70 U/L    AST 29 0 - 45 U/L   TSH with free T4 reflex     Status: None   Result Value Ref Range    TSH 0.94 0.40 - 4.00 mU/L   CBC with platelets     Status: None   Result Value Ref Range    WBC 8.2 4.0 - 11.0 10e9/L    RBC Count 5.01 4.4 - 5.9 10e12/L    Hemoglobin 14.7 13.3 - 17.7 g/dL    Hematocrit 42.3 40.0 - 53.0 %    MCV 84 78 - 100 fl    MCH 29.3 26.5 - 33.0 pg    MCHC 34.8 31.5 - 36.5 g/dL    RDW 13.1 10.0 - 15.0 %    Platelet Count 276 150 - 450 10e9/L   Lipid panel reflex to direct LDL Fasting     Status: Abnormal   Result Value Ref Range    Cholesterol 188 <200 mg/dL    Triglycerides 258 (H) <150 mg/dL    HDL Cholesterol 42 >39 mg/dL    LDL Cholesterol Calculated 94 <100 mg/dL    Non HDL Cholesterol 146 (H) <130 mg/dL   Hemoglobin A1c     Status: Abnormal   Result Value Ref Range    Hemoglobin A1C 6.5 (H) 0 - 5.6 %   Vitamin D Deficiency     Status: None   Result Value Ref Range    Vitamin D Deficiency screening 68 20 - 75 ug/L           Assessment & Plan     (E11.22,  N18.3) Type 2 diabetes mellitus with stage 3 chronic kidney disease, without long-term current use of insulin (H)  (primary encounter diagnosis)  Comment:   Plan: Comprehensive metabolic panel, TSH with free T4        reflex, CBC with platelets, Lipid panel reflex         to direct LDL Fasting, Hemoglobin A1c            (E78.5) Hyperlipidemia LDL goal <130  Comment:   Plan:     (I10) Benign essential hypertension  Comment:   Plan: metoprolol succinate ER  "(TOPROL-XL) 50 MG 24 hr        tablet, NEPHROLOGY ADULT REFERRAL            (G43.009) Migraine without aura and without status migrainosus, not intractable  Comment:   Plan: rizatriptan (MAXALT) 5 MG tablet            (N18.3) CKD (chronic kidney disease) stage 3, GFR 30-59 ml/min (H)  Comment:   Plan: NEPHROLOGY ADULT REFERRAL            (R80.9) Microalbuminuria  Comment:   Plan: NEPHROLOGY ADULT REFERRAL            (E55.9) Hypovitaminosis D  Comment:   Plan: vitamin D3 (CHOLECALCIFEROL) 2000 units (50         mcg) tablet, Vitamin D Deficiency               BMI:   Estimated body mass index is 31.1 kg/m  as calculated from the following:    Height as of this encounter: 1.803 m (5' 11\").    Weight as of this encounter: 101.2 kg (223 lb).   Weight management plan: Discussed healthy diet and exercise guidelines        Patient Instructions   Stop atenolol and switch to metoprolol 50 mg   If you feel symptoms of low blood pressure - dizziness, lightheaded, changing positions provoking those, please take your blood pressure at home and report it to me  Return in 6 months      Return in about 6 months (around 6/5/2020) for BP Recheck, diabetes.    JEFFERSON Cook [facility]    "

## 2019-12-05 NOTE — PATIENT INSTRUCTIONS
Stop atenolol and switch to metoprolol 50 mg   If you feel symptoms of low blood pressure - dizziness, lightheaded, changing positions provoking those, please take your blood pressure at home and report it to me  Return in 6 months

## 2019-12-06 LAB
ALBUMIN SERPL-MCNC: 4.2 G/DL (ref 3.4–5)
ALP SERPL-CCNC: 111 U/L (ref 40–150)
ALT SERPL W P-5'-P-CCNC: 40 U/L (ref 0–70)
ANION GAP SERPL CALCULATED.3IONS-SCNC: 8 MMOL/L (ref 3–14)
AST SERPL W P-5'-P-CCNC: 29 U/L (ref 0–45)
BILIRUB SERPL-MCNC: 0.7 MG/DL (ref 0.2–1.3)
BUN SERPL-MCNC: 27 MG/DL (ref 7–30)
CALCIUM SERPL-MCNC: 10.1 MG/DL (ref 8.5–10.1)
CHLORIDE SERPL-SCNC: 98 MMOL/L (ref 94–109)
CHOLEST SERPL-MCNC: 188 MG/DL
CO2 SERPL-SCNC: 26 MMOL/L (ref 20–32)
CREAT SERPL-MCNC: 1.47 MG/DL (ref 0.66–1.25)
DEPRECATED CALCIDIOL+CALCIFEROL SERPL-MC: 68 UG/L (ref 20–75)
GFR SERPL CREATININE-BSD FRML MDRD: 50 ML/MIN/{1.73_M2}
GLUCOSE SERPL-MCNC: 111 MG/DL (ref 70–99)
HDLC SERPL-MCNC: 42 MG/DL
LDLC SERPL CALC-MCNC: 94 MG/DL
NONHDLC SERPL-MCNC: 146 MG/DL
POTASSIUM SERPL-SCNC: 3.7 MMOL/L (ref 3.4–5.3)
PROT SERPL-MCNC: 8.1 G/DL (ref 6.8–8.8)
SODIUM SERPL-SCNC: 132 MMOL/L (ref 133–144)
TRIGL SERPL-MCNC: 258 MG/DL
TSH SERPL DL<=0.005 MIU/L-ACNC: 0.94 MU/L (ref 0.4–4)

## 2019-12-12 NOTE — RESULT ENCOUNTER NOTE
Karan,    Thyroid is normal.  Lipids look good with exception of triglycerides which we expected with you having had food.  Kidney function is stable.  Sodium is a little low.  You may want to restrict fluid a little if you have been having more than normal.  If you have any confusion or stumbling you should be seen as this can mean sodium has gotten lower.  Vitamin D looks great - no changes here.    If you have any questions, please feel free to contact the clinic.    MARJ Rashid

## 2020-03-05 ENCOUNTER — TELEPHONE (OUTPATIENT)
Dept: FAMILY MEDICINE | Facility: CLINIC | Age: 65
End: 2020-03-05

## 2020-03-05 NOTE — TELEPHONE ENCOUNTER
Panel Management Review      Patient has the following on his problem list:     Diabetes    ASA: Passed    Last A1C  Lab Results   Component Value Date    A1C 6.5 12/05/2019    A1C 6.8 03/08/2019    A1C 7.3 01/29/2019     A1C tested: Passed    Last LDL:    Lab Results   Component Value Date    CHOL 188 12/05/2019     Lab Results   Component Value Date    HDL 42 12/05/2019     Lab Results   Component Value Date    LDL 94 12/05/2019     Lab Results   Component Value Date    TRIG 258 12/05/2019     No results found for: CHOLHDLRATIO  Lab Results   Component Value Date    NHDL 146 12/05/2019       Is the patient on a Statin? YES             Is the patient on Aspirin? YES    Medications     HMG CoA Reductase Inhibitors     atorvastatin (LIPITOR) 20 MG tablet       Salicylates     aspirin (ASA) 81 MG tablet        ASPIRIN ADULT LOW STRENGTH 81 MG EC tablet             Last three blood pressure readings:  BP Readings from Last 3 Encounters:   12/05/19 128/84   06/28/19 128/84   04/29/19 (!) 153/98       Date of last diabetes office visit: 12/5/19     Tobacco History:     History   Smoking Status     Never Smoker   Smokeless Tobacco     Never Used         Hypertension   Last three blood pressure readings:  BP Readings from Last 3 Encounters:   12/05/19 128/84   06/28/19 128/84   04/29/19 (!) 153/98     Blood pressure: Passed    HTN Guidelines:  Less than 140/90      Composite cancer screening  Chart review shows that this patient is due/due soon for the following Fecal Colorectal (FIT)  Summary:    Patient is due/failing the following:   EYE EXAM, FIT and PHYSICAL    Action needed:   Patient needs office visit for physical and FIT test. and Patient needs referral/order: ophthalmology    Type of outreach:    Sent Scali message.    Questions for provider review:    None                                                                                                                                    Brandon Oleary CMA.        Chart routed to none.

## 2020-03-11 ENCOUNTER — HEALTH MAINTENANCE LETTER (OUTPATIENT)
Age: 65
End: 2020-03-11

## 2020-04-06 DIAGNOSIS — E78.5 HYPERLIPIDEMIA LDL GOAL <130: ICD-10-CM

## 2020-04-08 RX ORDER — ATORVASTATIN CALCIUM 20 MG/1
20 TABLET, FILM COATED ORAL DAILY
Qty: 90 TABLET | Refills: 2 | Status: SHIPPED | OUTPATIENT
Start: 2020-04-08 | End: 2021-01-11

## 2020-04-08 NOTE — TELEPHONE ENCOUNTER
"Requested Prescriptions   Pending Prescriptions Disp Refills     atorvastatin (LIPITOR) 20 MG tablet 90 tablet 3     Sig: Take 1 tablet (20 mg) by mouth daily   Last Written Prescription Date:  4/24/19  Last Fill Quantity: 90,  # refills: 3   Last office visit: 12/5/2019 with prescribing provider:     Future Office Visit:      Statins Protocol Passed - 4/6/2020  2:56 PM        Passed - LDL on file in past 12 months     Recent Labs   Lab Test 12/05/19  1458   LDL 94             Passed - No abnormal creatine kinase in past 12 months     No lab results found.             Passed - Recent (12 mo) or future (30 days) visit within the authorizing provider's specialty     Patient has had an office visit with the authorizing provider or a provider within the authorizing providers department within the previous 12 mos or has a future within next 30 days. See \"Patient Info\" tab in inbasket, or \"Choose Columns\" in Meds & Orders section of the refill encounter.              Passed - Medication is active on med list        Passed - Patient is age 18 or older         Prescription approved per [facility] Refill Protocol.    Peyton Story RN   [facility]        "

## 2020-05-12 ENCOUNTER — MYC REFILL (OUTPATIENT)
Dept: FAMILY MEDICINE | Facility: CLINIC | Age: 65
End: 2020-05-12

## 2020-05-12 DIAGNOSIS — G43.009 MIGRAINE WITHOUT AURA AND WITHOUT STATUS MIGRAINOSUS, NOT INTRACTABLE: ICD-10-CM

## 2020-05-12 RX ORDER — RIZATRIPTAN BENZOATE 5 MG/1
5 TABLET ORAL
Qty: 18 TABLET | Refills: 1 | Status: SHIPPED | OUTPATIENT
Start: 2020-05-12 | End: 2020-07-14

## 2020-06-24 DIAGNOSIS — I10 BENIGN ESSENTIAL HYPERTENSION: ICD-10-CM

## 2020-06-25 ENCOUNTER — MYC REFILL (OUTPATIENT)
Dept: FAMILY MEDICINE | Facility: CLINIC | Age: 65
End: 2020-06-25

## 2020-06-25 DIAGNOSIS — I10 BENIGN ESSENTIAL HYPERTENSION: ICD-10-CM

## 2020-06-25 NOTE — TELEPHONE ENCOUNTER
Routing refill request to provider for review/approval because:  Labs out of range:  Na, Cr, provider approval needed.

## 2020-06-25 NOTE — TELEPHONE ENCOUNTER
Due for labs.  Please schedule.  No provider visit needed.  Please let me know shane he is scheduled for labs.  MARJ Rashid

## 2020-06-30 RX ORDER — HYDROCHLOROTHIAZIDE 25 MG/1
25 TABLET ORAL DAILY
Qty: 90 TABLET | Refills: 3 | Status: SHIPPED | OUTPATIENT
Start: 2020-06-30 | End: 2020-07-14

## 2020-06-30 RX ORDER — HYDROCHLOROTHIAZIDE 25 MG/1
25 TABLET ORAL DAILY
Qty: 90 TABLET | Refills: 3 | Status: SHIPPED | OUTPATIENT
Start: 2020-06-30 | End: 2021-09-13

## 2020-06-30 RX ORDER — LOSARTAN POTASSIUM 25 MG/1
25 TABLET ORAL DAILY
Qty: 90 TABLET | Refills: 3 | Status: SHIPPED | OUTPATIENT
Start: 2020-06-30 | End: 2021-09-13

## 2020-06-30 RX ORDER — LOSARTAN POTASSIUM 25 MG/1
25 TABLET ORAL DAILY
Qty: 90 TABLET | Refills: 3 | Status: SHIPPED | OUTPATIENT
Start: 2020-06-30 | End: 2020-07-14

## 2020-06-30 NOTE — TELEPHONE ENCOUNTER
Routing refill request to provider for review/approval because:  Labs not current:  Loly Patel

## 2020-07-01 ENCOUNTER — TELEPHONE (OUTPATIENT)
Dept: FAMILY MEDICINE | Facility: CLINIC | Age: 65
End: 2020-07-01

## 2020-07-01 DIAGNOSIS — I10 BENIGN ESSENTIAL HYPERTENSION: ICD-10-CM

## 2020-07-01 DIAGNOSIS — N18.30 TYPE 2 DIABETES MELLITUS WITH STAGE 3 CHRONIC KIDNEY DISEASE, WITHOUT LONG-TERM CURRENT USE OF INSULIN (H): ICD-10-CM

## 2020-07-01 DIAGNOSIS — E11.22 TYPE 2 DIABETES MELLITUS WITH STAGE 3 CHRONIC KIDNEY DISEASE, WITHOUT LONG-TERM CURRENT USE OF INSULIN (H): ICD-10-CM

## 2020-07-01 DIAGNOSIS — I10 BENIGN ESSENTIAL HYPERTENSION: Primary | ICD-10-CM

## 2020-07-01 RX ORDER — HYDROCHLOROTHIAZIDE 25 MG/1
25 TABLET ORAL DAILY
Qty: 90 TABLET | Refills: 3 | Status: CANCELLED | OUTPATIENT
Start: 2020-07-01

## 2020-07-01 RX ORDER — LOSARTAN POTASSIUM 25 MG/1
25 TABLET ORAL DAILY
Qty: 90 TABLET | Refills: 3 | Status: CANCELLED | OUTPATIENT
Start: 2020-07-01

## 2020-07-02 DIAGNOSIS — I10 BENIGN ESSENTIAL HYPERTENSION: ICD-10-CM

## 2020-07-02 PROCEDURE — 80053 COMPREHEN METABOLIC PANEL: CPT | Performed by: NURSE PRACTITIONER

## 2020-07-02 PROCEDURE — 36415 COLL VENOUS BLD VENIPUNCTURE: CPT | Performed by: NURSE PRACTITIONER

## 2020-07-02 RX ORDER — ATENOLOL 50 MG/1
50 TABLET ORAL DAILY
Qty: 90 TABLET | Refills: 0 | Status: SHIPPED | OUTPATIENT
Start: 2020-07-02 | End: 2020-11-11

## 2020-07-02 NOTE — TELEPHONE ENCOUNTER
,    Patient said he tried decreasing dosing for atenolol and started metoprolol back in dec/shelly, but he had increased heart rate when trying to get off of atenolol. He is still taking atenolol and did not continue metoprolol. He scheduled a virtual with you on 7/14/20. Refill for atenolol sent.    Thanks  Sherrill Todd RN   [facility]

## 2020-07-02 NOTE — TELEPHONE ENCOUNTER
Patient came in for lab today. Per the lab there were no orders, but patient said he was here for covid test. Lab only sol a red top tube as they thought he was here for antibody covid tests. Per previous notes in chart appt today was for labs/follow up labs for refills of medications for BP and diabetes. Lab came to RNs to ask for orders and looking at chart there is no talk with patient regarding covid testing. Spoke with provider to verify which tests she would like. She wanted labs for refills per previous notes. Spoke with patient and he said he never told anyone he was there for covid testing, he said he was there for a follow up 6 month lab draw. Told patient he will need to come back for more labs because lab did not get orders or check which labs needed to be done. Writer will put labs in per provider.     Sherrill Todd RN   [facility]

## 2020-07-02 NOTE — TELEPHONE ENCOUNTER
Patient needs video visit and labs.  Ok with refills to cover any gap.  He has been told about needing routine 6 month visits.  Also - he is requesting atenolol, but we switched to metoprolol at his Dec 2019 visit. Is he taking both or is the atenolol request an error?  MARJ Rashid

## 2020-07-03 ENCOUNTER — APPOINTMENT (OUTPATIENT)
Dept: LAB | Facility: CLINIC | Age: 65
End: 2020-07-03
Payer: COMMERCIAL

## 2020-07-03 DIAGNOSIS — E11.22 TYPE 2 DIABETES MELLITUS WITH STAGE 3 CHRONIC KIDNEY DISEASE, WITHOUT LONG-TERM CURRENT USE OF INSULIN (H): ICD-10-CM

## 2020-07-03 DIAGNOSIS — N18.30 TYPE 2 DIABETES MELLITUS WITH STAGE 3 CHRONIC KIDNEY DISEASE, WITHOUT LONG-TERM CURRENT USE OF INSULIN (H): ICD-10-CM

## 2020-07-03 LAB
ALBUMIN SERPL-MCNC: 4.2 G/DL (ref 3.4–5)
ALP SERPL-CCNC: 109 U/L (ref 40–150)
ALT SERPL W P-5'-P-CCNC: 38 U/L (ref 0–70)
ANION GAP SERPL CALCULATED.3IONS-SCNC: 8 MMOL/L (ref 3–14)
AST SERPL W P-5'-P-CCNC: 20 U/L (ref 0–45)
BILIRUB SERPL-MCNC: 0.8 MG/DL (ref 0.2–1.3)
BUN SERPL-MCNC: 22 MG/DL (ref 7–30)
CALCIUM SERPL-MCNC: 10 MG/DL (ref 8.5–10.1)
CHLORIDE SERPL-SCNC: 100 MMOL/L (ref 94–109)
CO2 SERPL-SCNC: 26 MMOL/L (ref 20–32)
CREAT SERPL-MCNC: 1.33 MG/DL (ref 0.66–1.25)
GFR SERPL CREATININE-BSD FRML MDRD: 56 ML/MIN/{1.73_M2}
GLUCOSE SERPL-MCNC: 114 MG/DL (ref 70–99)
HBA1C MFR BLD: 7.7 % (ref 0–5.6)
POTASSIUM SERPL-SCNC: 3.7 MMOL/L (ref 3.4–5.3)
PROT SERPL-MCNC: 7.9 G/DL (ref 6.8–8.8)
SODIUM SERPL-SCNC: 134 MMOL/L (ref 133–144)

## 2020-07-03 PROCEDURE — 82043 UR ALBUMIN QUANTITATIVE: CPT | Performed by: NURSE PRACTITIONER

## 2020-07-03 PROCEDURE — 83036 HEMOGLOBIN GLYCOSYLATED A1C: CPT | Performed by: NURSE PRACTITIONER

## 2020-07-03 PROCEDURE — 36415 COLL VENOUS BLD VENIPUNCTURE: CPT | Performed by: NURSE PRACTITIONER

## 2020-07-05 LAB
CREAT UR-MCNC: 54 MG/DL
MICROALBUMIN UR-MCNC: 23 MG/L
MICROALBUMIN/CREAT UR: 42.27 MG/G CR (ref 0–17)

## 2020-07-14 ENCOUNTER — VIRTUAL VISIT (OUTPATIENT)
Dept: FAMILY MEDICINE | Facility: CLINIC | Age: 65
End: 2020-07-14
Payer: COMMERCIAL

## 2020-07-14 DIAGNOSIS — E55.9 HYPOVITAMINOSIS D: ICD-10-CM

## 2020-07-14 DIAGNOSIS — N18.30 TYPE 2 DIABETES MELLITUS WITH STAGE 3 CHRONIC KIDNEY DISEASE, WITHOUT LONG-TERM CURRENT USE OF INSULIN (H): Primary | ICD-10-CM

## 2020-07-14 DIAGNOSIS — R20.2 NUMBNESS AND TINGLING OF BOTH FEET: ICD-10-CM

## 2020-07-14 DIAGNOSIS — G43.009 MIGRAINE WITHOUT AURA AND WITHOUT STATUS MIGRAINOSUS, NOT INTRACTABLE: ICD-10-CM

## 2020-07-14 DIAGNOSIS — E11.22 TYPE 2 DIABETES MELLITUS WITH STAGE 3 CHRONIC KIDNEY DISEASE, WITHOUT LONG-TERM CURRENT USE OF INSULIN (H): Primary | ICD-10-CM

## 2020-07-14 DIAGNOSIS — E78.5 HYPERLIPIDEMIA LDL GOAL <130: ICD-10-CM

## 2020-07-14 DIAGNOSIS — N18.30 CKD (CHRONIC KIDNEY DISEASE) STAGE 3, GFR 30-59 ML/MIN (H): ICD-10-CM

## 2020-07-14 DIAGNOSIS — R20.0 NUMBNESS AND TINGLING OF BOTH FEET: ICD-10-CM

## 2020-07-14 DIAGNOSIS — R80.9 MICROALBUMINURIA: ICD-10-CM

## 2020-07-14 DIAGNOSIS — I10 BENIGN ESSENTIAL HYPERTENSION: ICD-10-CM

## 2020-07-14 PROCEDURE — 99214 OFFICE O/P EST MOD 30 MIN: CPT | Mod: TEL | Performed by: NURSE PRACTITIONER

## 2020-07-14 RX ORDER — GLUCOSAMINE HCL/CHONDROITIN SU 500-400 MG
CAPSULE ORAL
Qty: 100 EACH | Refills: 3 | Status: SHIPPED | OUTPATIENT
Start: 2020-07-14 | End: 2022-06-08

## 2020-07-14 RX ORDER — METOPROLOL TARTRATE 50 MG
50 TABLET ORAL 2 TIMES DAILY
Qty: 180 TABLET | Refills: 1 | Status: SHIPPED | OUTPATIENT
Start: 2020-07-14 | End: 2021-02-04

## 2020-07-14 RX ORDER — RIZATRIPTAN BENZOATE 5 MG/1
5 TABLET ORAL
Qty: 18 TABLET | Refills: 1 | Status: SHIPPED | OUTPATIENT
Start: 2020-07-14 | End: 2021-02-04

## 2020-07-14 RX ORDER — LANCETS
EACH MISCELLANEOUS
Qty: 90 EACH | Refills: 6 | Status: SHIPPED | OUTPATIENT
Start: 2020-07-14 | End: 2022-04-26

## 2020-07-14 RX ORDER — CHOLECALCIFEROL (VITAMIN D3) 50 MCG
2 TABLET ORAL DAILY
COMMUNITY
Start: 2020-07-14 | End: 2022-05-03

## 2020-07-14 NOTE — PROGRESS NOTES
"Karan Anglin is a 65 year old male who is being evaluated via a billable telephone visit.      The patient has been notified of following:     \"This telephone visit will be conducted via a call between you and your physician/provider. We have found that certain health care needs can be provided without the need for a physical exam.  This service lets us provide the care you need with a short phone conversation.  If a prescription is necessary we can send it directly to your pharmacy.  If lab work is needed we can place an order for that and you can then stop by our lab to have the test done at a later time.    Telephone visits are billed at different rates depending on your insurance coverage. During this emergency period, for some insurers they may be billed the same as an in-person visit.  Please reach out to your insurance provider with any questions.    If during the course of the call the physician/provider feels a telephone visit is not appropriate, you will not be charged for this service.\"    Patient has given verbal consent for Telephone visit?  Yes    What phone number would you like to be contacted at? [phone]    How would you like to obtain your AVS? Murtaza Schultz     Karan Anglin is a 65 year old male who presents via phone visit today for the following health issues:    Working from home and doesn't like it - harder to get ahold of people and have access to resources.  Works for [employer].  Sitting at table at home \"scrunched up\" leaning forward with pressure on his toes and noticing numbness of his toes.  Wondering if it is due to home ergonomics or diabetes.  No complete loss of sensation.  Gets some burning at times.  Walks around home barefoot or socks.      HPI  Diabetes Follow-up      How often are you checking your blood sugar? Not at all    What concerns do you have today about your diabetes? None except concern about possible future complications     Do you have any of these symptoms? " (Select all that apply)  Numbness in feet    Have you had a diabetic eye exam in the last 12 months? No     Has gained weight, more sedentary - does still walk daily, eating more at home with food constantly available.  Recently has cut back portions.    Two kidney stones since he has been working remotely    Hyperlipidemia Follow-Up      Are you regularly taking any medication or supplement to lower your cholesterol?   Yes-      Are you having muscle aches or other side effects that you think could be caused by your cholesterol lowering medication?  No    Hypertension Follow-up      Do you check your blood pressure regularly outside of the clinic? Yes     In December switched from atenolol to metoprolol - tried to switch and heart was pounding all the time      Are you following a low salt diet? No    Are your blood pressures ever more than 140 on the top number (systolic) OR more   than 90 on the bottom number (diastolic), for example 140/90? Yes 120/70-80's    BP Readings from Last 2 Encounters:   12/05/19 128/84   06/28/19 128/84     Hemoglobin A1C (%)   Date Value   07/03/2020 7.7 (H)   12/05/2019 6.5 (H)     LDL Cholesterol Calculated (mg/dL)   Date Value   12/05/2019 94   01/25/2019 84         How many servings of fruits and vegetables do you eat daily?  4 or more    On average, how many sweetened beverages do you drink each day (Examples: soda, juice, sweet tea, etc.  Do NOT count diet or artificially sweetened beverages)?   0    How many days per week do you exercise enough to make your heart beat faster? 7    How many minutes a day do you exercise enough to make your heart beat faster? 30 - 60    How many days per week do you miss taking your medication? 0    I have reviewed and updated the patient's Past Medical History, Social History, Family History and Medication List    Reviewed and updated as needed this visit by Provider  Tobacco  Med Hx  Surg Hx  Fam Hx  Soc Hx        Review of Systems    Constitutional, HEENT, cardiovascular, pulmonary, gi and gu systems are negative, except as otherwise noted.       Objective   Reported vitals:  There were no vitals taken for this visit.   healthy, alert and no distress  PSYCH: Alert and oriented times 3; coherent speech, normal   rate and volume, able to articulate logical thoughts, able   to abstract reason, no tangential thoughts, no hallucinations   or delusions  His affect is normal and pleasant  RESP: No cough, no audible wheezing, able to talk in full sentences  Remainder of exam unable to be completed due to telephone visits    Diagnostic Test Results:  Labs reviewed in Epic          Assessment/Plan:  1. Type 2 diabetes mellitus with stage 3 chronic kidney disease, without long-term current use of insulin (H)    - blood glucose monitoring (NO BRAND SPECIFIED) meter device kit; Use to test blood sugar 4 times daily or as directed.  Dispense: 1 kit; Refill: 0  - aspirin (ASA) 81 MG EC tablet; Take 1 tablet (81 mg) by mouth daily  Dispense: 90 tablet; Refill: 3  - blood glucose monitoring (NO BRAND SPECIFIED) meter device kit; Use to test blood sugar 4 times daily or as directed. Preferred blood glucose meter OR supplies to accompany: Blood Glucose Monitor Brands: per insurance.  Dispense: 1 kit; Refill: 0  - blood glucose (NO BRAND SPECIFIED) test strip; Use to test blood sugar 4 times daily or as directed. To accompany: Blood Glucose Monitor Brands: per insurance.  Dispense: 100 strip; Refill: 6  - blood glucose calibration (NO BRAND SPECIFIED) solution; To accompany: Blood Glucose Monitor Brands: per insurance.  Dispense: 1 Bottle; Refill: 3  - thin (NO BRAND SPECIFIED) lancets; Use with lanceting device. To accompany: Blood Glucose Monitor Brands: per insurance.  Dispense: 90 each; Refill: 6  - alcohol swab prep pads; Use to swab area of injection/camryn as directed.  Dispense: 100 each; Refill: 3    2. Benign essential hypertension    - metoprolol  tartrate (LOPRESSOR) 50 MG tablet; Take 1 tablet (50 mg) by mouth 2 times daily  Dispense: 180 tablet; Refill: 1  - aspirin (ASA) 81 MG EC tablet; Take 1 tablet (81 mg) by mouth daily  Dispense: 90 tablet; Refill: 3    3. Hyperlipidemia LDL goal <130  Stable    4. CKD (chronic kidney disease) stage 3, GFR 30-59 ml/min (H)  stable    5. Microalbuminuria  Increased - BP ok - reduce sugars    6. Numbness and tingling of both feet  Concern for diabetes complication  Fix ergonomic problem  Send [employer] messge in a couple months with update    7. Hypovitaminosis D    - vitamin D3 (CHOLECALCIFEROL) 50 mcg (2000 units) tablet; Take 2 tablets (100 mcg) by mouth daily    8. Migraine without aura and without status migrainosus, not intractable    - rizatriptan (MAXALT) 5 MG tablet; Take 1 tablet (5 mg) by mouth at onset of headache for migraine repeat after 2 hr if no relief; maximum: 30 mg/24 hours  Dispense: 18 tablet; Refill: 1    Return in about 6 months (around 1/14/2021) for diabetes, BP Recheck.    End:  5:49 PM   Start:  5:14 pm    Phone call duration:  35 minutes    JEFFERSON Cook CNP

## 2020-07-14 NOTE — PATIENT INSTRUCTIONS
Change home office to reduce chance of sitting with pressure on your toes  Talk to ergonomic team at work to see    Follow blood sugar - check up to four times a day (fasting in the AM, 2 hours after a meal)  If your fasting blood sugar stays in 170 range, we should talk about how to reduce that because it means that A1C is still elevated  ADA chart - [email or web address]  A1C Fasting blood sugar  % mg/dl  6 126   6.5 140   7 154   7.5 169  8 183   8.5 197   9 212   9.5 226   10 240     Start metoprolol 50 mg twice a day in place of atenolol.    Send [employer] message in 1-2 months with sugar measurements and if you've been able to change around your office and how your feet are feeling

## 2020-11-11 ENCOUNTER — MYC REFILL (OUTPATIENT)
Dept: FAMILY MEDICINE | Facility: CLINIC | Age: 65
End: 2020-11-11

## 2020-11-11 DIAGNOSIS — I10 BENIGN ESSENTIAL HYPERTENSION: ICD-10-CM

## 2020-11-11 RX ORDER — ATENOLOL 50 MG/1
50 TABLET ORAL DAILY
Qty: 90 TABLET | Refills: 0 | Status: SHIPPED | OUTPATIENT
Start: 2020-11-11 | End: 2021-02-04

## 2020-11-11 NOTE — TELEPHONE ENCOUNTER
Prescription approved per [facility] Refill Protocol.    Peyton Story RN   [facility]

## 2020-12-27 ENCOUNTER — HEALTH MAINTENANCE LETTER (OUTPATIENT)
Age: 65
End: 2020-12-27

## 2021-01-08 DIAGNOSIS — E78.5 HYPERLIPIDEMIA LDL GOAL <130: ICD-10-CM

## 2021-01-13 RX ORDER — ATORVASTATIN CALCIUM 20 MG/1
20 TABLET, FILM COATED ORAL DAILY
Qty: 90 TABLET | Refills: 0 | Status: SHIPPED | OUTPATIENT
Start: 2021-01-13 | End: 2021-02-04

## 2021-01-14 ENCOUNTER — MYC MEDICAL ADVICE (OUTPATIENT)
Dept: FAMILY MEDICINE | Facility: CLINIC | Age: 66
End: 2021-01-14

## 2021-01-14 NOTE — TELEPHONE ENCOUNTER
See pt MyChart message  Refill encounter was sent to you     Peyton Story RN   [facility]

## 2021-01-15 ENCOUNTER — HEALTH MAINTENANCE LETTER (OUTPATIENT)
Age: 66
End: 2021-01-15

## 2021-01-24 DIAGNOSIS — E78.5 HYPERLIPIDEMIA LDL GOAL <130: ICD-10-CM

## 2021-01-25 RX ORDER — ATORVASTATIN CALCIUM 20 MG/1
20 TABLET, FILM COATED ORAL DAILY
Qty: 90 TABLET | Refills: 0 | OUTPATIENT
Start: 2021-01-25

## 2021-01-26 DIAGNOSIS — E78.5 HYPERLIPIDEMIA LDL GOAL <130: ICD-10-CM

## 2021-01-27 RX ORDER — ATORVASTATIN CALCIUM 20 MG/1
20 TABLET, FILM COATED ORAL DAILY
Start: 2021-01-27

## 2021-02-02 DIAGNOSIS — E78.5 HYPERLIPIDEMIA LDL GOAL <130: ICD-10-CM

## 2021-02-02 RX ORDER — ATORVASTATIN CALCIUM 20 MG/1
20 TABLET, FILM COATED ORAL DAILY
Qty: 90 TABLET | Refills: 0 | Status: CANCELLED | OUTPATIENT
Start: 2021-02-02

## 2021-02-04 ENCOUNTER — VIRTUAL VISIT (OUTPATIENT)
Dept: FAMILY MEDICINE | Facility: CLINIC | Age: 66
End: 2021-02-04
Payer: COMMERCIAL

## 2021-02-04 DIAGNOSIS — N20.0 NEPHROLITHIASIS: ICD-10-CM

## 2021-02-04 DIAGNOSIS — G43.009 MIGRAINE WITHOUT AURA AND WITHOUT STATUS MIGRAINOSUS, NOT INTRACTABLE: ICD-10-CM

## 2021-02-04 DIAGNOSIS — E11.22 TYPE 2 DIABETES MELLITUS WITH STAGE 3A CHRONIC KIDNEY DISEASE, WITHOUT LONG-TERM CURRENT USE OF INSULIN (H): ICD-10-CM

## 2021-02-04 DIAGNOSIS — Z12.5 SCREENING FOR PROSTATE CANCER: ICD-10-CM

## 2021-02-04 DIAGNOSIS — I10 BENIGN ESSENTIAL HYPERTENSION: Primary | ICD-10-CM

## 2021-02-04 DIAGNOSIS — N18.31 TYPE 2 DIABETES MELLITUS WITH STAGE 3A CHRONIC KIDNEY DISEASE, WITHOUT LONG-TERM CURRENT USE OF INSULIN (H): ICD-10-CM

## 2021-02-04 DIAGNOSIS — Z12.11 ENCOUNTER FOR COLORECTAL CANCER SCREENING: ICD-10-CM

## 2021-02-04 DIAGNOSIS — Z12.12 ENCOUNTER FOR COLORECTAL CANCER SCREENING: ICD-10-CM

## 2021-02-04 DIAGNOSIS — N18.31 STAGE 3A CHRONIC KIDNEY DISEASE (H): ICD-10-CM

## 2021-02-04 DIAGNOSIS — E78.5 HYPERLIPIDEMIA LDL GOAL <130: ICD-10-CM

## 2021-02-04 PROCEDURE — 99214 OFFICE O/P EST MOD 30 MIN: CPT | Mod: TEL | Performed by: NURSE PRACTITIONER

## 2021-02-04 RX ORDER — ATORVASTATIN CALCIUM 20 MG/1
20 TABLET, FILM COATED ORAL DAILY
Qty: 90 TABLET | Refills: 3 | Status: SHIPPED | OUTPATIENT
Start: 2021-02-04 | End: 2022-04-14

## 2021-02-04 RX ORDER — ATENOLOL 50 MG/1
50 TABLET ORAL DAILY
Qty: 90 TABLET | Refills: 0 | Status: SHIPPED | OUTPATIENT
Start: 2021-02-04 | End: 2021-05-03

## 2021-02-04 RX ORDER — RIZATRIPTAN BENZOATE 5 MG/1
5 TABLET ORAL
Qty: 18 TABLET | Refills: 1 | Status: SHIPPED | OUTPATIENT
Start: 2021-02-04 | End: 2021-06-04

## 2021-02-04 NOTE — PROGRESS NOTES
Karan is a 66 year old who is being evaluated via a billable telephone visit.      What phone number would you like to be contacted at? [phone]  How would you like to obtain your AVS? Murtaza     6:22 PM    Assessment & Plan     (I10) Benign essential hypertension  (primary encounter diagnosis)  Comment:   Plan: atenolol (TENORMIN) 50 MG tablet, **Basic         metabolic panel FUTURE anytime, Albumin Random         Urine Quantitative with Creat Ratio   Atenolol discontinuation causes heart pounding, even when metoprolol was started in its place.  BP is below the 140/90 threshold per nephrology so no changes at this time.    (E11.21,  N18.31) Type 2 diabetes mellitus with stage 3a chronic kidney disease, without long-term current use of insulin (H)  Comment:   Plan: **Basic metabolic panel FUTURE anytime, Albumin        Random Urine Quantitative with Creat Ratio,         **A1C FUTURE anytime    Likely A1C still below 8    (N18.31) Stage 3a chronic kidney disease  Comment:   Plan: **Basic metabolic panel FUTURE anytime, Albumin        Random Urine Quantitative with Creat Ratio        Nephrology had asked for 3 month f/u in 2019.  At this time, no JACKIE and BPs controlled.  Will prioritize kidney stone clinic    (E78.5) Hyperlipidemia LDL goal <130  Comment:   Plan: atorvastatin (LIPITOR) 20 MG tablet, Lipid         panel reflex to direct LDL Fasting            (G43.009) Migraine without aura and without status migrainosus, not intractable  Comment:   Plan: rizatriptan (MAXALT) 5 MG tablet            (N20.0) Nephrolithiasis  Comment:   Plan: UROLOGY ADULT REFERRAL        [facility] referral    (Z12.5) Screening for prostate cancer  Comment:   Plan: **Prostate spec antigen screen FUTURE anytime            (Z12.11,  Z12.12) Encounter for colorectal cancer screening  Comment:   Plan: Fecal colorectal cancer screen (FIT)                  23 minutes spent on the date of the encounter doing chart review, history  and exam, documentation and further activities as noted above      Patient Instructions   Protein snack right at bedtime - see if this helps lower fasting sugars    Schedule with Kidney Stone program    Schedule lab work within the next four weeks - come fasting 10-12 hours      COVID testing - you can always contact me with questions or concerns and we do have testing at [place]  I recommend [facility] covid testing for accessibility and turn around  In person testing (quicker, but around other people) - https://[place].gov/covid19/get-tested/testing-locations/community-testing.jsp  At home testing (a bit slower, but stay at home) - https://www.health.[facility].[place]./diseases/coronavirus/testsites/athome.html    With the new variants that are more contagious we do not yet know if our current precautions are sufficient.  Based on reports from the [place] suggesting that they might not be sufficient, I now recommend the following measures:  1) Get a well fitting surgical mask rather than a cloth mask (cloth masks have been effective with current strain, but were not meant to be long-term solution and it is unclear if they are effective with newer strains).  Look for masks that are [employer] certified.   - KN94 or KN95 are some options and this is one company in [place] with several good masks options -                      https://ZEEF.com/   - this company has N95 masks which are pricier, but their Respokare version is reusable and self-sanitizing    https://i37hrukdl.[employer]/  or MetaFarms.[employer]  2) Do not enter stores or other indoor places unless absolutely needed.  Wear masks in situations you might not have previously (on a walk outside, for example)  3) Vaccine - the Pfizer and Moderna vaccines are essentially 100% effective.  Out of 32,000 people in the trials only 1 person who had been vaccinated got severe covid.  At this point, I expect that [facility] will be the source for when a vaccine is available to the  general public.            Return in about 4 weeks (around 3/4/2021) for Lab Work.    JEFFERSON Cook CNP  [facility]    Marion Russo is a 66 year old who presents to clinic today for the following health issues  accompanied by his self:    HPI     Moved to [facility] in December  Working entirely remotely    Hypertension Follow-up      Do you check your blood pressure regularly outside of the clinic? Yes     Are you following a low salt diet? Yes    Are your blood pressures ever more than 140 on the top number (systolic) OR more   than 90 on the bottom number (diastolic), for example 140/90? No   Blood pressures: 130's/70-80's - switched to metoprolol from atenolol, but was getting heart pounding so switched back.  Had been taking metoprolol BID.  No change in BP since switching back to atenolol.  Blood sugars:  Average about 130 fasting AM  lower later in the day - 110's  Migraines: can go a few weeks without and then migraines a couple times a week.  Does need a refill of maxalt    Still having kidney stones - last urology visit was over a year ago    Flu shot Sept 2020      How many servings of fruits and vegetables do you eat daily?  2-3    On average, how many sweetened beverages do you drink each day (Examples: soda, juice, sweet tea, etc.  Do NOT count diet or artificially sweetened beverages)?   1-2 times a week     How many days per week do you exercise enough to make your heart beat faster? 5    How many minutes a day do you exercise enough to make your heart beat faster? 20 - 29    How many days per week do you miss taking your medication? 0    I have reviewed and updated the patient's Past Medical History, Social History, Family History and Medication List      Review of Systems   Constitutional, HEENT, cardiovascular, pulmonary, gi and gu systems are negative, except as otherwise noted.      Objective           Vitals:  No vitals were obtained today due to  virtual visit.    Physical Exam   healthy, alert and no distress  PSYCH: Alert and oriented times 3; coherent speech, normal   rate and volume, able to articulate logical thoughts, able   to abstract reason, no tangential thoughts, no hallucinations   or delusions  His affect is normal and pleasant  RESP: No cough, no audible wheezing, able to talk in full sentences  Remainder of exam unable to be completed due to telephone visits    Labs pended        Phone call duration: 20 minutes

## 2021-02-05 NOTE — PATIENT INSTRUCTIONS
Protein snack right at bedtime - see if this helps lower fasting sugars    Schedule with Kidney Stone program    Schedule lab work within the next four weeks - come fasting 10-12 hours      COVID testing - you can always contact me with questions or concerns and we do have testing at [place]  I recommend [facility] covid testing for accessibility and turn around  In person testing (quicker, but around other people) - https://[place].gov/covid19/get-tested/testing-locations/community-testing.jsp  At home testing (a bit slower, but stay at home) - https://www.health.[facility].[place]./diseases/coronavirus/testsites/athome.html    With the new variants that are more contagious we do not yet know if our current precautions are sufficient.  Based on reports from the [place] suggesting that they might not be sufficient, I now recommend the following measures:  1) Get a well fitting surgical mask rather than a cloth mask (cloth masks have been effective with current strain, but were not meant to be long-term solution and it is unclear if they are effective with newer strains).  Look for masks that are [employer] certified.   - KN94 or KN95 are some options and this is one company in [place] with several good masks options -                      https://IOCOM/   - this company has N95 masks which are pricier, but their Respokare version is reusable and self-sanitizing    https://p81euynax.SeeVolution/  or PercuVision.SeeVolution  2) Do not enter stores or other indoor places unless absolutely needed.  Wear masks in situations you might not have previously (on a walk outside, for example)  3) Vaccine - the Pfizer and Moderna vaccines are essentially 100% effective.  Out of 32,000 people in the trials only 1 person who had been vaccinated got severe covid.  At this point, I expect that [facility] will be the source for when a vaccine is available to the general public.

## 2021-02-19 ENCOUNTER — DOCUMENTATION ONLY (OUTPATIENT)
Dept: CARE COORDINATION | Facility: CLINIC | Age: 66
End: 2021-02-19

## 2021-02-27 DIAGNOSIS — I10 BENIGN ESSENTIAL HYPERTENSION: ICD-10-CM

## 2021-02-27 DIAGNOSIS — Z12.5 SCREENING FOR PROSTATE CANCER: ICD-10-CM

## 2021-02-27 DIAGNOSIS — N18.31 TYPE 2 DIABETES MELLITUS WITH STAGE 3A CHRONIC KIDNEY DISEASE, WITHOUT LONG-TERM CURRENT USE OF INSULIN (H): ICD-10-CM

## 2021-02-27 DIAGNOSIS — N18.31 STAGE 3A CHRONIC KIDNEY DISEASE (H): ICD-10-CM

## 2021-02-27 DIAGNOSIS — E78.5 HYPERLIPIDEMIA LDL GOAL <130: ICD-10-CM

## 2021-02-27 DIAGNOSIS — E11.22 TYPE 2 DIABETES MELLITUS WITH STAGE 3A CHRONIC KIDNEY DISEASE, WITHOUT LONG-TERM CURRENT USE OF INSULIN (H): ICD-10-CM

## 2021-02-27 DIAGNOSIS — Z12.12 ENCOUNTER FOR COLORECTAL CANCER SCREENING: ICD-10-CM

## 2021-02-27 DIAGNOSIS — Z12.11 ENCOUNTER FOR COLORECTAL CANCER SCREENING: ICD-10-CM

## 2021-02-27 LAB
ANION GAP SERPL CALCULATED.3IONS-SCNC: 8 MMOL/L (ref 3–14)
BUN SERPL-MCNC: 27 MG/DL (ref 7–30)
CALCIUM SERPL-MCNC: 10.1 MG/DL (ref 8.5–10.1)
CHLORIDE SERPL-SCNC: 101 MMOL/L (ref 94–109)
CHOLEST SERPL-MCNC: 176 MG/DL
CO2 SERPL-SCNC: 26 MMOL/L (ref 20–32)
CREAT SERPL-MCNC: 1.41 MG/DL (ref 0.66–1.25)
CREAT UR-MCNC: 100 MG/DL
GFR SERPL CREATININE-BSD FRML MDRD: 52 ML/MIN/{1.73_M2}
GLUCOSE SERPL-MCNC: 144 MG/DL (ref 70–99)
HBA1C MFR BLD: 7.5 % (ref 0–5.6)
HDLC SERPL-MCNC: 37 MG/DL
LDLC SERPL CALC-MCNC: 89 MG/DL
MICROALBUMIN UR-MCNC: 22 MG/L
MICROALBUMIN/CREAT UR: 22 MG/G CR (ref 0–17)
NONHDLC SERPL-MCNC: 139 MG/DL
POTASSIUM SERPL-SCNC: 4 MMOL/L (ref 3.4–5.3)
PSA SERPL-ACNC: 0.76 UG/L (ref 0–4)
SODIUM SERPL-SCNC: 135 MMOL/L (ref 133–144)
TRIGL SERPL-MCNC: 252 MG/DL

## 2021-02-27 PROCEDURE — 83036 HEMOGLOBIN GLYCOSYLATED A1C: CPT | Performed by: NURSE PRACTITIONER

## 2021-02-27 PROCEDURE — 82043 UR ALBUMIN QUANTITATIVE: CPT | Performed by: NURSE PRACTITIONER

## 2021-02-27 PROCEDURE — G0103 PSA SCREENING: HCPCS | Performed by: NURSE PRACTITIONER

## 2021-02-27 PROCEDURE — 80061 LIPID PANEL: CPT | Performed by: NURSE PRACTITIONER

## 2021-02-27 PROCEDURE — 80048 BASIC METABOLIC PNL TOTAL CA: CPT | Performed by: NURSE PRACTITIONER

## 2021-02-27 PROCEDURE — 36415 COLL VENOUS BLD VENIPUNCTURE: CPT | Performed by: NURSE PRACTITIONER

## 2021-03-01 NOTE — RESULT ENCOUNTER NOTE
Triage - can you make sure he reads this result note?  I do want to hear back if he is up for returning to nephrology or seeing cardiology.  Thank you!    Karan,    Labs are more or less stable.  A1C is slightly improved.  Overall nothing looks really bad, but your kidneys are still taking a hit from blood pressure and glucoses. I am also wondering what you would think about seeing cardiology to discuss if there are any changes we can made to either hypertension or cholesterol medications to help prevent future problems.  Would you be open to that?  You could also return to Dr. Caldwell in nephrology who you saw in 2019, but cardiology might have advice also on cholesterol in addition to hypertension.  Let me know what you think.    If you have any questions, please feel free to contact the clinic.    MARJ Rashid

## 2021-03-02 ENCOUNTER — PRE VISIT (OUTPATIENT)
Dept: UROLOGY | Facility: CLINIC | Age: 66
End: 2021-03-02

## 2021-03-02 NOTE — TELEPHONE ENCOUNTER
Chief Complaint : Referral - Romelia Judd CNP    Hx/Sx: Kidney stones, CKD (stage 3)    Records/Orders: Available    Pt Contacted: N/a    At Rooming: Pedrito Knott EMT

## 2021-03-05 ENCOUNTER — VIRTUAL VISIT (OUTPATIENT)
Dept: UROLOGY | Facility: CLINIC | Age: 66
End: 2021-03-05
Attending: NURSE PRACTITIONER
Payer: COMMERCIAL

## 2021-03-05 DIAGNOSIS — N20.0 NEPHROLITHIASIS: ICD-10-CM

## 2021-03-05 PROCEDURE — 99213 OFFICE O/P EST LOW 20 MIN: CPT | Mod: 95 | Performed by: NURSE PRACTITIONER

## 2021-03-05 NOTE — PATIENT INSTRUCTIONS
UROLOGY CLINIC VISIT PATIENT INSTRUCTIONS    -Will obtain an updated CT scan to evaluate your current stone burden. To schedule this scan, please call [phone].   -I have ordered the Litholink urine collection study to be done x2. This kit will be sent to your home address. Once completed, please schedule a follow up visit to review results.       If you have any issues, questions or concerns in the meantime, do not hesitate to contact us at [phone] or via [employer].     It was a pleasure meeting with you today.  Thank you for allowing me and my team the privilege of caring for you today.  YOU are the reason we are here, and I truly hope we provided you with the excellent service you deserve.  Please let us know if there is anything else we can do for you so that we can be sure you are leaving completely satisfied with your care experience.    Lisa Solis, CNP

## 2021-03-05 NOTE — LETTER
3/5/2021       RE: Karan Anglin  [address]  [facility] [address]     Dear Colleague,    Thank you for referring your patient, Karan Anglin, to the [facility]OLOG[facility] [place] at [facility]. Please see a copy of my visit note below.    Karan is a 66 year old who is being evaluated via a billable video visit.      How would you like to obtain your AVS? MyChart  If the video visit is dropped, the invitation should be resent by: Text to cell phone: [phone]  Will anyone else be joining your video visit? No    Video Start Time: 4:03 PM  Video-Visit Details    Type of service:  Video Visit    Video End Time: 4:20 PM    Originating Location (pt. Location): Home    Distant Location (provider location):  [facility] UROLOGY [facility]     Platform used for Video Visit: Julián       Karan Anglin complains of   Chief Complaint   Patient presents with     Consult     Kidney stones, CKD (stage 3)         Assessment/Plan:  66 year old male patient with a history of HLD, CKD, DM2 and kidney stones. Has not been evaluated by urology in a few years. Has passed 3-4 stones this year on his own at home. Given his CKD, need to manage his stones and optimize stone prevention efforts.   -Will obtain an updated CT A/P now to assess current stone burden  -Will also obtain Litholink x2. Patient to follow up with me once complete to review results.       Lisa Solis, CNP  Department of Urology      Subjective:   66 year old male with a history of HLD, CKD, DM2 and kidney stones. Previous patient of Dr. Cash, last seen by her in 01/2019, at which time a 9 mm obstructing UVJ stone had been identified on CT. Had a procedure to remove this stone by another provider, with CT showing resolution.     Was seen by FP about one month ago and reported at that time that he was still having kidney stones, thus his referral back to urology. States that he has had  3-4 stones over the past year that he passed at home. No current symptoms. He believes he did a 24-hour urine collection study for stone prevention in the past, but this was likely 30+ years ago.       Objective:     Exam:   Patient is a 66 year old male   General Appearance: Well groomed, hygenic  Respiratory: No cough, no respiratory distress or labored breathing  Musculoskeletal: Grossly normal with no gross deficits  Skin: No discoloration or apparent rashes  Neurologic: No tremors  Psychiatric: Alert and oriented  Further examination is deferred due to the nature of our visit.

## 2021-03-05 NOTE — PROGRESS NOTES
Karan is a 66 year old who is being evaluated via a billable video visit.      How would you like to obtain your AVS? MyChart  If the video visit is dropped, the invitation should be resent by: Text to cell phone: [phone]  Will anyone else be joining your video visit? No    Video Start Time: 4:03 PM  Video-Visit Details    Type of service:  Video Visit    Video End Time: 4:20 PM    Originating Location (pt. Location): Home    Distant Location (provider location):  [facility] UROLOGY CL[facility]IC [place]     Platform used for Video Visit: EliseDayne       Karan Anglin complains of   Chief Complaint   Patient presents with     Consult     Kidney stones, CKD (stage 3)         Assessment/Plan:  66 year old male patient with a history of HLD, CKD, DM2 and kidney stones. Has not been evaluated by urology in a few years. Has passed 3-4 stones this year on his own at home. Given his CKD, need to manage his stones and optimize stone prevention efforts.   -Will obtain an updated CT A/P now to assess current stone burden  -Will also obtain Litholink x2. Patient to follow up with me once complete to review results.       Lisa Solis, CNP  Department of Urology      Subjective:   66 year old male with a history of HLD, CKD, DM2 and kidney stones. Previous patient of Dr. Cash, last seen by her in 01/2019, at which time a 9 mm obstructing UVJ stone had been identified on CT. Had a procedure to remove this stone by another provider, with CT showing resolution.     Was seen by FP about one month ago and reported at that time that he was still having kidney stones, thus his referral back to urology. States that he has had 3-4 stones over the past year that he passed at home. No current symptoms. He believes he did a 24-hour urine collection study for stone prevention in the past, but this was likely 30+ years ago.       Objective:     Exam:   Patient is a 66 year old male   General Appearance: Well groomed,  hygenic  Respiratory: No cough, no respiratory distress or labored breathing  Musculoskeletal: Grossly normal with no gross deficits  Skin: No discoloration or apparent rashes  Neurologic: No tremors  Psychiatric: Alert and oriented  Further examination is deferred due to the nature of our visit.

## 2021-03-24 ENCOUNTER — IMMUNIZATION (OUTPATIENT)
Dept: NURSING | Facility: CLINIC | Age: 66
End: 2021-03-24
Payer: COMMERCIAL

## 2021-03-24 PROCEDURE — 0001A PR COVID VAC PFIZER DIL RECON 30 MCG/0.3 ML IM: CPT

## 2021-03-24 PROCEDURE — 91300 PR COVID VAC PFIZER DIL RECON 30 MCG/0.3 ML IM: CPT

## 2021-04-14 ENCOUNTER — OFFICE VISIT (OUTPATIENT)
Dept: NURSING | Facility: CLINIC | Age: 66
End: 2021-04-14
Attending: INTERNAL MEDICINE
Payer: COMMERCIAL

## 2021-04-14 PROCEDURE — 91300 PR COVID VAC PFIZER DIL RECON 30 MCG/0.3 ML IM: CPT

## 2021-04-14 PROCEDURE — 0002A PR COVID VAC PFIZER DIL RECON 30 MCG/0.3 ML IM: CPT

## 2021-04-25 ENCOUNTER — HEALTH MAINTENANCE LETTER (OUTPATIENT)
Age: 66
End: 2021-04-25

## 2021-05-03 ENCOUNTER — TELEPHONE (OUTPATIENT)
Dept: FAMILY MEDICINE | Facility: CLINIC | Age: 66
End: 2021-05-03

## 2021-05-03 DIAGNOSIS — I10 BENIGN ESSENTIAL HYPERTENSION: ICD-10-CM

## 2021-05-03 RX ORDER — ATENOLOL 50 MG/1
50 TABLET ORAL DAILY
Qty: 90 TABLET | Refills: 0 | Status: SHIPPED | OUTPATIENT
Start: 2021-05-03 | End: 2021-07-29

## 2021-05-04 ENCOUNTER — MYC REFILL (OUTPATIENT)
Dept: FAMILY MEDICINE | Facility: CLINIC | Age: 66
End: 2021-05-04

## 2021-05-04 DIAGNOSIS — I10 BENIGN ESSENTIAL HYPERTENSION: ICD-10-CM

## 2021-05-04 RX ORDER — ATENOLOL 50 MG/1
50 TABLET ORAL DAILY
Qty: 90 TABLET | Refills: 0 | OUTPATIENT
Start: 2021-05-04

## 2021-05-09 ENCOUNTER — OFFICE VISIT (OUTPATIENT)
Dept: URGENT CARE | Facility: URGENT CARE | Age: 66
End: 2021-05-09
Payer: COMMERCIAL

## 2021-05-09 VITALS
SYSTOLIC BLOOD PRESSURE: 132 MMHG | HEART RATE: 73 BPM | OXYGEN SATURATION: 97 % | BODY MASS INDEX: 29.8 KG/M2 | HEIGHT: 72 IN | DIASTOLIC BLOOD PRESSURE: 76 MMHG | WEIGHT: 220 LBS

## 2021-05-09 DIAGNOSIS — M54.50 ACUTE LEFT-SIDED LOW BACK PAIN WITHOUT SCIATICA: Primary | ICD-10-CM

## 2021-05-09 PROCEDURE — 99214 OFFICE O/P EST MOD 30 MIN: CPT | Performed by: PHYSICIAN ASSISTANT

## 2021-05-09 RX ORDER — OXYCODONE HYDROCHLORIDE 5 MG/1
5 TABLET ORAL EVERY 6 HOURS PRN
Qty: 6 TABLET | Refills: 0 | Status: SHIPPED | OUTPATIENT
Start: 2021-05-09 | End: 2021-05-12

## 2021-05-09 RX ORDER — CYCLOBENZAPRINE HCL 10 MG
10 TABLET ORAL 3 TIMES DAILY PRN
Qty: 30 TABLET | Refills: 0 | Status: SHIPPED | OUTPATIENT
Start: 2021-05-09 | End: 2021-05-16

## 2021-05-09 ASSESSMENT — MIFFLIN-ST. JEOR: SCORE: 1825.91

## 2021-05-09 NOTE — PROGRESS NOTES
URGENT CARE VISIT:    SUBJECTIVE:   Karan Anglin is a 66 year old male who presents for evaluation of back pain  Symptoms began 2 week(s) ago, have been onset gradual and are worse.  Pain is located in the low back left region, without radiation.  Pain is exacerbated by: changing position.  Pain is relieved by: vicodin. Has tried tylenol, heat, ice, and ibuprofen with no relief. Associated symptoms include: none. Denies any fever, unintentional weight loss,bladder urgency, bladder incontinence and bowel incontinence. Recent injury:recent injury at work    Personal hx of back pain is recurrent self limited episodes of low back pain in the past.     PMH:   Past Medical History:   Diagnosis Date     Blood in semen 2016    Symptoms came and went     GERD (gastroesophageal reflux disease)      Hyperlipidemia 2015     Hypertension 2014     Migraine     Nausea and vomiting     Peptic ulcer hemorrhage 2013     Allergies: Patient has no known allergies.  Medications:   Current Outpatient Medications   Medication Sig Dispense Refill     alcohol swab prep pads Use to swab area of injection/camryn as directed. 100 each 3     aspirin (ASA) 81 MG EC tablet Take 1 tablet (81 mg) by mouth daily 90 tablet 3     atenolol (TENORMIN) 50 MG tablet Take 1 tablet (50 mg) by mouth daily 90 tablet 0     atorvastatin (LIPITOR) 20 MG tablet Take 1 tablet (20 mg) by mouth daily 90 tablet 3     blood glucose (NO BRAND SPECIFIED) test strip Use to test blood sugar 4 times daily or as directed. To accompany: Blood Glucose Monitor Brands: per insurance. 100 strip 6     blood glucose calibration (NO BRAND SPECIFIED) solution To accompany: Blood Glucose Monitor Brands: per insurance. 1 Bottle 3     blood glucose monitoring (NO BRAND SPECIFIED) meter device kit Use to test blood sugar 4 times daily or as directed. 1 kit 0     blood glucose monitoring (NO BRAND SPECIFIED) meter device kit Use to test blood sugar 4 times daily or as directed. Preferred  blood glucose meter OR supplies to accompany: Blood Glucose Monitor Brands: per insurance. 1 kit 0     cholecalciferol (VITAMIN D3) 1000 units (25 mcg) capsule Take 2 capsules (2,000 Units) by mouth daily 60 capsule 11     cyclobenzaprine (FLEXERIL) 10 MG tablet Take 1 tablet (10 mg) by mouth 3 times daily as needed for muscle spasms 30 tablet 0     diclofenac (VOLTAREN) 1 % GEL topical gel Apply 2 grams to elbow as needed up to four times daily using enclosed dosing card. 100 g 1     diphenhydrAMINE (BENADRYL) 25 MG tablet Take 25 mg by mouth nightly as needed for itching or allergies       Esomeprazole Magnesium (NEXIUM PO) Take by mouth daily       hydrochlorothiazide (HYDRODIURIL) 25 MG tablet Take 1 tablet (25 mg) by mouth daily 90 tablet 3     ibuprofen (ADVIL/MOTRIN) 600 MG tablet Take 1 tablet (600 mg) by mouth every 8 hours as needed for moderate pain 30 tablet 0     losartan (COZAAR) 25 MG tablet Take 1 tablet (25 mg) by mouth daily 90 tablet 3     oxyCODONE (ROXICODONE) 5 MG tablet Take 1 tablet (5 mg) by mouth every 6 hours as needed for pain 6 tablet 0     rizatriptan (MAXALT) 5 MG tablet Take 1 tablet (5 mg) by mouth at onset of headache for migraine repeat after 2 hr if no relief; maximum: 30 mg/24 hours 18 tablet 1     thin (NO BRAND SPECIFIED) lancets Use with lanceting device. To accompany: Blood Glucose Monitor Brands: per insurance. 90 each 6     vitamin D3 (CHOLECALCIFEROL) 50 mcg (2000 units) tablet Take 2 tablets (100 mcg) by mouth daily       Social History:   Social History     Tobacco Use     Smoking status: Never Smoker     Smokeless tobacco: Never Used   Substance Use Topics     Alcohol use: Yes     Comment: 3 per week       ROS: ROS otherwise found to be negative except as noted above.     OBJECTIVE:  /76   Pulse 73   Ht 1.829 m (6')   Wt 99.8 kg (220 lb)   SpO2 97%   BMI 29.84 kg/m    General: WDWN in NAD.   Cardiac: RRR without murmurs, rubs, or gallops.  Respiratory: LCTAB  without adventitious sounds. Non-labored breathing.  Musculoskeletal: Ambulating without difficulty. Slow to stand from sitting. Back symmetric, no curvature. ROM slow but normal. No CVA tenderness. Tender to palpation over left paralumbar spine. Straight leg raise test: negative  Neurological: Normal strength and tone with no weakness or sensory deficit noted, reflexes normal.     ASSESSMENT:    ICD-10-CM    1. Acute left-sided low back pain without sciatica  M54.5 oxyCODONE (ROXICODONE) 5 MG tablet     cyclobenzaprine (FLEXERIL) 10 MG tablet         PLAN:  Patient Instructions   Patient was educated on the natural course of injury which usually resolves within a few weeks.  Conservative measures discussed including rest, ice for 48 hours then heat after, exercises, and over-the-counter analgesics (Ibuprofen). Take medications as prescribed. Side effects discussed. See your primary care provider if symptoms worsen or do not improve in 7 days.  Seek emergency care if you develop severe pain, weakness, or changes in bowel/bladder habits.     Patient verbalized understanding and is agreeable to plan. The patient was discharged ambulatory and in stable condition.    Meg Rose PA-C ....................  5/9/2021   10:39 AM

## 2021-05-09 NOTE — PATIENT INSTRUCTIONS
Patient was educated on the natural course of injury which usually resolves within a few weeks.  Conservative measures discussed including rest, ice for 48 hours then heat after, exercises, and over-the-counter analgesics (Ibuprofen). Take medications as prescribed. Side effects discussed. See your primary care provider if symptoms worsen or do not improve in 7 days.  Seek emergency care if you develop severe pain, weakness, or changes in bowel/bladder habits.

## 2021-06-04 ENCOUNTER — TELEPHONE (OUTPATIENT)
Dept: FAMILY MEDICINE | Facility: CLINIC | Age: 66
End: 2021-06-04

## 2021-06-04 DIAGNOSIS — G43.009 MIGRAINE WITHOUT AURA AND WITHOUT STATUS MIGRAINOSUS, NOT INTRACTABLE: ICD-10-CM

## 2021-06-04 RX ORDER — RIZATRIPTAN BENZOATE 5 MG/1
5 TABLET ORAL
Qty: 18 TABLET | Refills: 1 | Status: SHIPPED | OUTPATIENT
Start: 2021-06-04 | End: 2021-11-09

## 2021-06-04 NOTE — TELEPHONE ENCOUNTER
Reason for Call:  Medication or medication refill:    Do you use a [facility]?  Name of the pharmacy and phone number for the current request:   [id number] [place], [place] - [address]  Ph: [phone]    Name of the medication requested:   rizatriptan (MAXALT) 5 MG tablet     Other request: Patient states prescription needs to be renewed    Can we leave a detailed message on this number? NO    Phone number patient can be reached at: Home number on file [phone] (home)    Best Time: ANY    Call taken on 6/4/2021 at 10:07 AM by Rosa Grimm

## 2021-07-29 ENCOUNTER — MYC REFILL (OUTPATIENT)
Dept: FAMILY MEDICINE | Facility: CLINIC | Age: 66
End: 2021-07-29

## 2021-07-29 DIAGNOSIS — I10 BENIGN ESSENTIAL HYPERTENSION: ICD-10-CM

## 2021-07-29 RX ORDER — ATENOLOL 50 MG/1
50 TABLET ORAL DAILY
Qty: 90 TABLET | Refills: 0 | Status: SHIPPED | OUTPATIENT
Start: 2021-07-29 | End: 2021-10-25

## 2021-07-29 NOTE — TELEPHONE ENCOUNTER
Prescription approved per [facility] Refill Protocol.    Peyton Story RN   [facility]

## 2021-09-13 ENCOUNTER — MYC MEDICAL ADVICE (OUTPATIENT)
Dept: FAMILY MEDICINE | Facility: CLINIC | Age: 66
End: 2021-09-13

## 2021-09-13 DIAGNOSIS — I10 BENIGN ESSENTIAL HYPERTENSION: ICD-10-CM

## 2021-09-13 RX ORDER — HYDROCHLOROTHIAZIDE 25 MG/1
25 TABLET ORAL DAILY
Qty: 90 TABLET | Refills: 3 | Status: SHIPPED | OUTPATIENT
Start: 2021-09-13 | End: 2022-04-26

## 2021-09-13 RX ORDER — LOSARTAN POTASSIUM 25 MG/1
25 TABLET ORAL DAILY
Qty: 90 TABLET | Refills: 3 | Status: SHIPPED | OUTPATIENT
Start: 2021-09-13 | End: 2022-04-26

## 2021-09-13 NOTE — TELEPHONE ENCOUNTER
KS,  Please see below [place]. comhart message and advise.  Pt calling about refills also    Routing refill request to provider for review/approval because:  Labs out of range:  Creatinine     Thanks,  Alyssa MOE RN

## 2021-09-29 DIAGNOSIS — E11.22 TYPE 2 DIABETES MELLITUS WITH STAGE 3 CHRONIC KIDNEY DISEASE, WITHOUT LONG-TERM CURRENT USE OF INSULIN (H): ICD-10-CM

## 2021-09-29 DIAGNOSIS — N18.30 TYPE 2 DIABETES MELLITUS WITH STAGE 3 CHRONIC KIDNEY DISEASE, WITHOUT LONG-TERM CURRENT USE OF INSULIN (H): ICD-10-CM

## 2021-09-29 DIAGNOSIS — I10 BENIGN ESSENTIAL HYPERTENSION: ICD-10-CM

## 2021-09-29 NOTE — TELEPHONE ENCOUNTER
"Requested Prescriptions   Signed Prescriptions Disp Refills    aspirin (ASA) 81 MG EC tablet 90 tablet 1     Sig: Take 1 tablet (81 mg) by mouth daily       Analgesics (Non-Narcotic Tylenol and ASA Only) Passed - 9/29/2021 10:14 AM        Passed - Recent (12 mo) or future (30 days) visit within the authorizing provider's specialty     Patient has had an office visit with the authorizing provider or a provider within the authorizing providers department within the previous 12 mos or has a future within next 30 days. See \"Patient Info\" tab in inbasket, or \"Choose Columns\" in Meds & Orders section of the refill encounter.              Passed - Patient is age 20 years or older     If ASA is flagged for ages under 20 years old. Forward to provider for confirmation Ryes Syndrome is not a concern.              Passed - Medication is active on med list           Madison Myers RN  [facility]    "

## 2021-10-09 ENCOUNTER — HEALTH MAINTENANCE LETTER (OUTPATIENT)
Age: 66
End: 2021-10-09

## 2021-10-24 ENCOUNTER — MYC REFILL (OUTPATIENT)
Dept: FAMILY MEDICINE | Facility: CLINIC | Age: 66
End: 2021-10-24

## 2021-10-24 DIAGNOSIS — I10 BENIGN ESSENTIAL HYPERTENSION: ICD-10-CM

## 2021-10-25 ENCOUNTER — MYC REFILL (OUTPATIENT)
Dept: FAMILY MEDICINE | Facility: CLINIC | Age: 66
End: 2021-10-25

## 2021-10-25 DIAGNOSIS — I10 BENIGN ESSENTIAL HYPERTENSION: ICD-10-CM

## 2021-10-26 RX ORDER — ATENOLOL 50 MG/1
50 TABLET ORAL DAILY
Qty: 90 TABLET | Refills: 0 | Status: SHIPPED | OUTPATIENT
Start: 2021-10-26 | End: 2022-01-25

## 2021-10-26 RX ORDER — ATENOLOL 50 MG/1
50 TABLET ORAL DAILY
Qty: 90 TABLET | Refills: 0 | OUTPATIENT
Start: 2021-10-26

## 2021-10-26 NOTE — TELEPHONE ENCOUNTER
"Requested Prescriptions   Signed Prescriptions Disp Refills    atenolol (TENORMIN) 50 MG tablet 90 tablet 0     Sig: Take 1 tablet (50 mg) by mouth daily       Beta-Blockers Protocol Passed - 10/25/2021 10:10 PM        Passed - Blood pressure under 140/90 in past 12 months     BP Readings from Last 3 Encounters:   05/09/21 132/76   12/05/19 128/84   06/28/19 128/84                 Passed - Patient is age 6 or older        Passed - Recent (12 mo) or future (30 days) visit within the authorizing provider's specialty     Patient has had an office visit with the authorizing provider or a provider within the authorizing providers department within the previous 12 mos or has a future within next 30 days. See \"Patient Info\" tab in inbasket, or \"Choose Columns\" in Meds & Orders section of the refill encounter.              Passed - Medication is active on med list           Cookie Bello RN  [facility]     "

## 2021-11-08 ENCOUNTER — MYC MEDICAL ADVICE (OUTPATIENT)
Dept: FAMILY MEDICINE | Facility: CLINIC | Age: 66
End: 2021-11-08
Payer: COMMERCIAL

## 2021-11-08 DIAGNOSIS — G43.009 MIGRAINE WITHOUT AURA AND WITHOUT STATUS MIGRAINOSUS, NOT INTRACTABLE: ICD-10-CM

## 2021-11-09 RX ORDER — RIZATRIPTAN BENZOATE 5 MG/1
5 TABLET ORAL
Qty: 18 TABLET | Refills: 1 | Status: SHIPPED | OUTPATIENT
Start: 2021-11-09 | End: 2022-03-14

## 2021-11-09 NOTE — TELEPHONE ENCOUNTER
"Requested Prescriptions   Pending Prescriptions Disp Refills     rizatriptan (MAXALT) 5 MG tablet 18 tablet 1     Sig: Take 1 tablet (5 mg) by mouth at onset of headache for migraine repeat after 2 hr if no relief; maximum: 30 mg/24 hours       Serotonin Agonists Failed - 11/8/2021 11:19 AM        Failed - Serotonin Agonist request needs review.     Please review patient's record. If patient has had 8 or more treatments in the past month, please forward to provider.          Passed - Blood pressure under 140/90 in past 12 months     BP Readings from Last 3 Encounters:   05/09/21 132/76   12/05/19 128/84   06/28/19 128/84                 Passed - Recent (12 mo) or future (30 days) visit within the authorizing provider's specialty     Patient has had an office visit with the authorizing provider or a provider within the authorizing providers department within the previous 12 mos or has a future within next 30 days. See \"Patient Info\" tab in inbasket, or \"Choose Columns\" in Meds & Orders section of the refill encounter.              Passed - Medication is active on med list        Passed - Patient is age 18 or older           Needs provider review.     Thanks,  TALIA Gary  [facility]     "

## 2022-03-14 DIAGNOSIS — G43.009 MIGRAINE WITHOUT AURA AND WITHOUT STATUS MIGRAINOSUS, NOT INTRACTABLE: ICD-10-CM

## 2022-03-14 RX ORDER — RIZATRIPTAN BENZOATE 5 MG/1
5 TABLET ORAL
Qty: 18 TABLET | Refills: 1 | Status: SHIPPED | OUTPATIENT
Start: 2022-03-14 | End: 2022-07-20

## 2022-03-14 NOTE — TELEPHONE ENCOUNTER
"Requested Prescriptions   Pending Prescriptions Disp Refills     rizatriptan (MAXALT) 5 MG tablet 18 tablet 1     Sig: Take 1 tablet (5 mg) by mouth at onset of headache for migraine repeat after 2 hr if no relief; maximum: 30 mg/24 hours       Serotonin Agonists Failed - 3/14/2022  9:01 AM        Failed - Serotonin Agonist request needs review.     Please review patient's record. If patient has had 8 or more treatments in the past month, please forward to provider.          Failed - Recent (12 mo) or future (30 days) visit within the authorizing provider's specialty     Patient has had an office visit with the authorizing provider or a provider within the authorizing providers department within the previous 12 mos or has a future within next 30 days. See \"Patient Info\" tab in inbasket, or \"Choose Columns\" in Meds & Orders section of the refill encounter.              Passed - Blood pressure under 140/90 in past 12 months     BP Readings from Last 3 Encounters:   05/09/21 132/76   12/05/19 128/84   06/28/19 128/84                 Passed - Medication is active on med list        Passed - Patient is age 18 or older           Routing refill request to provider for review/approval because:  Needs provider review.     Thanks,  TALIA Gary  [facility]           "

## 2022-03-26 ENCOUNTER — HEALTH MAINTENANCE LETTER (OUTPATIENT)
Age: 67
End: 2022-03-26

## 2022-04-14 DIAGNOSIS — I10 BENIGN ESSENTIAL HYPERTENSION: ICD-10-CM

## 2022-04-14 DIAGNOSIS — E11.22 TYPE 2 DIABETES MELLITUS WITH STAGE 3 CHRONIC KIDNEY DISEASE, WITHOUT LONG-TERM CURRENT USE OF INSULIN (H): ICD-10-CM

## 2022-04-14 DIAGNOSIS — N18.30 TYPE 2 DIABETES MELLITUS WITH STAGE 3 CHRONIC KIDNEY DISEASE, WITHOUT LONG-TERM CURRENT USE OF INSULIN (H): ICD-10-CM

## 2022-04-14 DIAGNOSIS — E78.5 HYPERLIPIDEMIA LDL GOAL <130: ICD-10-CM

## 2022-04-14 RX ORDER — ATORVASTATIN CALCIUM 20 MG/1
20 TABLET, FILM COATED ORAL DAILY
Qty: 90 TABLET | Refills: 3 | Status: SHIPPED | OUTPATIENT
Start: 2022-04-14 | End: 2022-06-08

## 2022-04-14 NOTE — TELEPHONE ENCOUNTER
"Nazanin barry, upcoming appt.     Requested Prescriptions   Pending Prescriptions Disp Refills     atorvastatin (LIPITOR) 20 MG tablet 90 tablet 3     Sig: Take 1 tablet (20 mg) by mouth daily       Statins Protocol Failed - 4/14/2022  8:34 AM        Failed - LDL on file in past 12 months     Recent Labs   Lab Test 02/27/21  0912   LDL 89             Passed - No abnormal creatine kinase in past 12 months     No lab results found.             Passed - Recent (12 mo) or future (30 days) visit within the authorizing provider's specialty     Patient has had an office visit with the authorizing provider or a provider within the authorizing providers department within the previous 12 mos or has a future within next 30 days. See \"Patient Info\" tab in inbasket, or \"Choose Columns\" in Meds & Orders section of the refill encounter.              Passed - Medication is active on med list        Passed - Patient is age 18 or older           aspirin (ASA) 81 MG EC tablet 90 tablet 1     Sig: Take 1 tablet (81 mg) by mouth daily       Analgesics (Non-Narcotic Tylenol and ASA Only) Passed - 4/14/2022  8:34 AM        Passed - Recent (12 mo) or future (30 days) visit within the authorizing provider's specialty     Patient has had an office visit with the authorizing provider or a provider within the authorizing providers department within the previous 12 mos or has a future within next 30 days. See \"Patient Info\" tab in inbasket, or \"Choose Columns\" in Meds & Orders section of the refill encounter.              Passed - Patient is age 20 years or older     If ASA is flagged for ages under 20 years old. Forward to provider for confirmation Ryes Syndrome is not a concern.              Passed - Medication is active on med list           Cookie Bello RN  [facility]     "

## 2022-04-20 DIAGNOSIS — I10 BENIGN ESSENTIAL HYPERTENSION: ICD-10-CM

## 2022-04-20 RX ORDER — ATENOLOL 50 MG/1
50 TABLET ORAL DAILY
Qty: 90 TABLET | Refills: 0 | Status: SHIPPED | OUTPATIENT
Start: 2022-04-20 | End: 2022-07-16

## 2022-04-20 NOTE — TELEPHONE ENCOUNTER
"Requested Prescriptions   Signed Prescriptions Disp Refills    atenolol (TENORMIN) 50 MG tablet 90 tablet 0     Sig: Take 1 tablet (50 mg) by mouth daily       Beta-Blockers Protocol Passed - 4/20/2022  8:19 AM        Passed - Blood pressure under 140/90 in past 12 months     BP Readings from Last 3 Encounters:   05/09/21 132/76   12/05/19 128/84   06/28/19 128/84                 Passed - Patient is age 6 or older        Passed - Recent (12 mo) or future (30 days) visit within the authorizing provider's specialty     Patient has had an office visit with the authorizing provider or a provider within the authorizing providers department within the previous 12 mos or has a future within next 30 days. See \"Patient Info\" tab in inbasket, or \"Choose Columns\" in Meds & Orders section of the refill encounter.              Passed - Medication is active on med list           Cookie Bello RN  [facility]     "

## 2022-04-23 ASSESSMENT — ACTIVITIES OF DAILY LIVING (ADL): CURRENT_FUNCTION: NO ASSISTANCE NEEDED

## 2022-04-26 ENCOUNTER — LAB (OUTPATIENT)
Dept: LAB | Facility: CLINIC | Age: 67
End: 2022-04-26
Payer: COMMERCIAL

## 2022-04-26 ENCOUNTER — OFFICE VISIT (OUTPATIENT)
Dept: FAMILY MEDICINE | Facility: CLINIC | Age: 67
End: 2022-04-26

## 2022-04-26 VITALS
BODY MASS INDEX: 30.18 KG/M2 | SYSTOLIC BLOOD PRESSURE: 134 MMHG | HEART RATE: 80 BPM | TEMPERATURE: 97.5 F | WEIGHT: 222.8 LBS | DIASTOLIC BLOOD PRESSURE: 78 MMHG | HEIGHT: 72 IN | OXYGEN SATURATION: 96 %

## 2022-04-26 DIAGNOSIS — E11.42 TYPE 2 DIABETES MELLITUS WITH DIABETIC POLYNEUROPATHY, WITHOUT LONG-TERM CURRENT USE OF INSULIN (H): ICD-10-CM

## 2022-04-26 DIAGNOSIS — Z12.5 SCREENING FOR PROSTATE CANCER: ICD-10-CM

## 2022-04-26 DIAGNOSIS — E78.5 HYPERLIPIDEMIA LDL GOAL <130: ICD-10-CM

## 2022-04-26 DIAGNOSIS — E11.65 TYPE 2 DIABETES MELLITUS WITH HYPERGLYCEMIA, WITHOUT LONG-TERM CURRENT USE OF INSULIN (H): ICD-10-CM

## 2022-04-26 DIAGNOSIS — N18.31 STAGE 3A CHRONIC KIDNEY DISEASE (H): ICD-10-CM

## 2022-04-26 DIAGNOSIS — N18.31 TYPE 2 DIABETES MELLITUS WITH STAGE 3A CHRONIC KIDNEY DISEASE, WITHOUT LONG-TERM CURRENT USE OF INSULIN (H): ICD-10-CM

## 2022-04-26 DIAGNOSIS — E11.22 TYPE 2 DIABETES MELLITUS WITH STAGE 3A CHRONIC KIDNEY DISEASE, WITHOUT LONG-TERM CURRENT USE OF INSULIN (H): ICD-10-CM

## 2022-04-26 DIAGNOSIS — I10 BENIGN ESSENTIAL HYPERTENSION: ICD-10-CM

## 2022-04-26 DIAGNOSIS — G43.009 MIGRAINE WITHOUT AURA AND WITHOUT STATUS MIGRAINOSUS, NOT INTRACTABLE: ICD-10-CM

## 2022-04-26 DIAGNOSIS — Z00.00 ROUTINE HISTORY AND PHYSICAL EXAMINATION OF ADULT: Primary | ICD-10-CM

## 2022-04-26 DIAGNOSIS — Z12.11 COLON CANCER SCREENING: ICD-10-CM

## 2022-04-26 DIAGNOSIS — Z23 NEED FOR COVID-19 VACCINE: ICD-10-CM

## 2022-04-26 DIAGNOSIS — F41.1 GAD (GENERALIZED ANXIETY DISORDER): ICD-10-CM

## 2022-04-26 DIAGNOSIS — R20.2 PARESTHESIA OF BOTH FEET: ICD-10-CM

## 2022-04-26 LAB — HBA1C MFR BLD: 10.4 % (ref 0–5.6)

## 2022-04-26 PROCEDURE — 82043 UR ALBUMIN QUANTITATIVE: CPT

## 2022-04-26 PROCEDURE — 83036 HEMOGLOBIN GLYCOSYLATED A1C: CPT

## 2022-04-26 PROCEDURE — 0054A COVID-19,PF,PFIZER (12+ YRS): CPT | Performed by: NURSE PRACTITIONER

## 2022-04-26 PROCEDURE — 99397 PER PM REEVAL EST PAT 65+ YR: CPT | Mod: 25 | Performed by: NURSE PRACTITIONER

## 2022-04-26 PROCEDURE — 91305 COVID-19,PF,PFIZER (12+ YRS): CPT | Performed by: NURSE PRACTITIONER

## 2022-04-26 PROCEDURE — 99207 PR FOOT EXAM NO CHARGE: CPT | Mod: 25 | Performed by: NURSE PRACTITIONER

## 2022-04-26 PROCEDURE — G0103 PSA SCREENING: HCPCS

## 2022-04-26 PROCEDURE — 36415 COLL VENOUS BLD VENIPUNCTURE: CPT

## 2022-04-26 PROCEDURE — 80053 COMPREHEN METABOLIC PANEL: CPT

## 2022-04-26 PROCEDURE — 80061 LIPID PANEL: CPT

## 2022-04-26 PROCEDURE — 99214 OFFICE O/P EST MOD 30 MIN: CPT | Mod: 25 | Performed by: NURSE PRACTITIONER

## 2022-04-26 RX ORDER — LOSARTAN POTASSIUM AND HYDROCHLOROTHIAZIDE 25; 100 MG/1; MG/1
1 TABLET ORAL DAILY
Qty: 90 TABLET | Refills: 1 | Status: SHIPPED | OUTPATIENT
Start: 2022-04-26 | End: 2022-09-15

## 2022-04-26 RX ORDER — GABAPENTIN 100 MG/1
100 CAPSULE ORAL 3 TIMES DAILY
Qty: 270 CAPSULE | Refills: 1 | Status: SHIPPED | OUTPATIENT
Start: 2022-04-26 | End: 2022-06-08

## 2022-04-26 RX ORDER — CITALOPRAM HYDROBROMIDE 10 MG/1
10 TABLET ORAL DAILY
Qty: 90 TABLET | Refills: 1 | Status: SHIPPED | OUTPATIENT
Start: 2022-04-26 | End: 2022-09-15

## 2022-04-26 NOTE — PROGRESS NOTES
"  SUBJECTIVE:   Karan Anglin is a 67 year old male who presents for Preventive Visit.      Patient has been advised of split billing requirements and indicates understanding: Yes  Are you in the first 12 months of your Medicare Part B coverage?  Yes,  Visual Acuity:  Right Eye: 20/25   Left Eye: 10/12.5      Answers for HPI/ROS submitted by the patient on 4/23/2022  In general, how would you rate your overall physical health?: good  Frequency of exercise:: 4-5 days/week  Do you usually eat at least 4 servings of fruit and vegetables a day, include whole grains & fiber, and avoid regularly eating high fat or \"junk\" foods? : Yes  Taking medications regularly:: Yes  Activities of Daily Living: no assistance needed  Home safety: no safety concerns identified  Hearing Impairment:: no hearing concerns  In the past 6 months, have you been bothered by leaking of urine?: No  In general, how would you rate your overall mental or emotional health?: fair  Additional concerns today:: No  Duration of exercise:: 15-30 minutes    Physical Health:    In general, how would you rate your overall physical health? good    Outside of work, how many days during the week do you exercise? 4-5 days/week    Outside of work, approximately how many minutes a day do you exercise?45-60 minutes    If you drink alcohol do you typically have >3 drinks per day or >7 drinks per week? No    Do you usually eat at least 4 servings of fruit and vegetables a day, include whole grains & fiber and avoid regularly eating high fat or \"junk\" foods? Yes    Do you have any problems taking medications regularly?  No    Do you have any side effects from medications? none    Needs assistance for the following daily activities: no assistance needed    Which of the following safety concerns are present in your home?  none identified     Hearing impairment: No    In the past 6 months, have you been bothered by leaking of urine? no    Mental Health:    In general, how " would you rate your overall mental or emotional health? good  PHQ-2 Score: (P) 2    Do you feel safe in your environment? Yes    Have you ever done Advance Care Planning? (For example, a Health Directive, POLST, or a discussion with a medical provider or your loved ones about your wishes): No, advance care planning information given to patient to review.  Patient plans to discuss their wishes with loved ones or provider.      Additional concerns to address?  No    Fall risk:     click delete button to remove this line now  Cognitive Screenin) Repeat 3 items (Leader, Season, Table)    2) Clock draw: NORMAL  3) 3 item recall: Recalls 3 objects  Results: NORMAL clock, 1-2 items recalled: COGNITIVE IMPAIRMENT LESS LIKELY    Mini-CogTM Copyright S Ankush. Licensed by the author for use in [place] [employer]; reprinted with permission (david@[facility]). All rights reserved.      Do you have sleep apnea, excessive snoring or daytime drowsiness?: no    Waking up in the night usually at 3 am    Does note High stress.  ISAC = 8  Was on paxil and MOA inhibitors in the past for migraine prevention    Diabetes Follow-up      How often are you checking your blood sugar? Not at all    What concerns do you have today about your diabetes? Other: Blood sugar      Do you have any of these symptoms? (Select all that apply)  Numbness in feet and Burning in feet    Have you had a diabetic eye exam in the last 12 months? No    Hyperlipidemia Follow-Up      Are you regularly taking any medication or supplement to lower your cholesterol?   Yes- Lipitor     Are you having muscle aches or other side effects that you think could be caused by your cholesterol lowering medication?  No    Hypertension Follow-up      Do you check your blood pressure regularly outside of the clinic? Yes -140's  DBP 80-90s    Are you following a low salt diet? Yes    Are your blood pressures ever more than 140 on the top number (systolic) OR  more   than 90 on the bottom number (diastolic), for example 140/90? Yes    BP Readings from Last 2 Encounters:   04/26/22 134/78   05/09/21 132/76     Hemoglobin A1C POCT (%)   Date Value   02/27/2021 7.5 (H)   07/03/2020 7.7 (H)     LDL Cholesterol Calculated (mg/dL)   Date Value   02/27/2021 89   12/05/2019 94     Component      Latest Ref Rng & Units 2/27/2021   Sodium      133 - 144 mmol/L 135   Potassium      3.4 - 5.3 mmol/L 4.0   Chloride      94 - 109 mmol/L 101   Carbon Dioxide      20 - 32 mmol/L 26   Anion Gap      3 - 14 mmol/L 8   Glucose      70 - 99 mg/dL 144 (H)   Urea Nitrogen      7 - 30 mg/dL 27   Creatinine      0.66 - 1.25 mg/dL 1.41 (H)   GFR Estimate      >60 mL/min/1.73:m2 52 (L)   GFR Estimate If Black      >60 mL/min/1.73:m2 61   Calcium      8.5 - 10.1 mg/dL 10.1   Cholesterol      <200 mg/dL 176   Triglycerides      <150 mg/dL 252 (H)   HDL Cholesterol      >39 mg/dL 37 (L)   LDL Cholesterol Calculated      <100 mg/dL 89   Non HDL Cholesterol      <130 mg/dL 139 (H)   Creatinine Urine      mg/dL 100   Albumin Urine mg/L      mg/L 22   Albumin Urine mg/g Cr      0 - 17 mg/g Cr 22.00 (H)   PSA      0 - 4 ug/L 0.76   Hemoglobin A1C POCT      0 - 5.6 % 7.5 (H)     Chronic Kidney Disease Follow-up      Do you take any over the counter pain medicine?: No    Migraines - 6-8 times a month and then other months with none.  Rizatriptan is helpful as rescue.  Triggers are weather changes or lack of sleep.  Has tried topiramate in the past for prophylaxis and had dizziness and extremity numbness.  Takes vitamin D 2000 international unit(s).  Has a been a couple years since seeing neurology      Reviewed and updated as needed this visit by clinical staff   Tobacco  Allergies  Meds                Reviewed and updated as needed this visit by Provider                   Social History     Tobacco Use     Smoking status: Never Smoker     Smokeless tobacco: Never Used   Substance Use Topics     Alcohol  use: Yes     Comment: 3 per week                           Current providers sharing in care for this patient include:     Patient Care Team:  Romelia Judd, JEFFERSON CNP as PCP - General (Nurse Practitioner - Family)  Tc Hester MD as MD (Family Medicine - Sports Medicine)  Savannah Burrows PA-C as Physician Assistant (Physician Assistant)  Alisson Chawla, RN as Registered Nurse (Urology)  Shahrzad Cash MD as MD (Urology)  Naima Elise RN as Registered Nurse (Urology)  Romelia Judd, APRN CNP as Referring Physician (Nurse Practitioner - Family)  Romelia Judd APRN CNP as Assigned PCP  Lisa Solis CNP as Assigned Surgical Provider    The following health maintenance items are reviewed in Epic and correct as of today:  Health Maintenance   Topic Date Due     EYE EXAM  Never done     HIV SCREENING  Never done     HEPATITIS C SCREENING  Never done     COLORECTAL CANCER SCREENING  02/07/2020     HEMOGLOBIN  12/05/2020     A1C  08/27/2021     FALL RISK ASSESSMENT  Never done     Pneumococcal Vaccine: Pediatrics (0 to 5 Years) and At-Risk Patients (6 to 64 Years) (1 of 1 - PPSV23) Never done     BMP  02/27/2022     LIPID  02/27/2022     MICROALBUMIN  02/27/2022     Pneumococcal Vaccine: 65+ Years (1 of 1 - PPSV23) 01/26/2022     MEDICARE ANNUAL WELLNESS VISIT  04/26/2023     DIABETIC FOOT EXAM  04/26/2023     ANNUAL REVIEW OF HM ORDERS  04/26/2023     DTAP/TDAP/TD IMMUNIZATION (2 - Td or Tdap) 12/15/2023     ADVANCE CARE PLANNING  04/26/2027     PHQ-2 (once per calendar year)  Completed     INFLUENZA VACCINE  Completed     URINALYSIS  Completed     ZOSTER IMMUNIZATION  Completed     AORTIC ANEURYSM SCREENING (SYSTEM ASSIGNED)  Completed     COVID-19 Vaccine  Completed     IPV IMMUNIZATION  Aged Out     MENINGITIS IMMUNIZATION  Aged Out     Lab work is in process  Labs reviewed in EPIC  Pneumonia Vaccine:For adults 65 years or older who do not have an immunocompromising condition,  cerebrospinal fluid leak, or cochlear implant and want to receive PPSV23 ONLY: Administer 1 dose of PPSV23. Anyone who received any doses of PPSV23 before age 65 should receive 1 final dose of the vaccine at age 65 or older. Administer this last dose at least 5 years after the prior PPSV23 dose.   Opts to defer today due to getting covid booster    STOPBANG score = 5    ROS:  Constitutional, HEENT, cardiovascular, pulmonary, GI, , musculoskeletal, neuro, skin, endocrine and psych systems are negative, except as otherwise noted.    OBJECTIVE:   /78   Pulse 80   Temp 97.5  F (36.4  C) (Temporal)   Ht 1.829 m (6')   Wt 101.1 kg (222 lb 12.8 oz)   SpO2 96%   BMI 30.22 kg/m   Estimated body mass index is 30.22 kg/m  as calculated from the following:    Height as of this encounter: 1.829 m (6').    Weight as of this encounter: 101.1 kg (222 lb 12.8 oz).  EXAM:   GENERAL: healthy, alert and no distress  EYES: Eyes grossly normal to inspection, PERRL and conjunctivae and sclerae normal  HENT: ear canals and TM's normal, nose and mouth without ulcers or lesions  NECK: no adenopathy, no asymmetry, masses, or scars and thyroid normal to palpation  RESP: lungs clear to auscultation - no rales, rhonchi or wheezes  CV: regular rate and rhythm, normal S1 S2, no S3 or S4, no murmur, click or rub, no peripheral edema and peripheral pulses strong  ABDOMEN: soft, nontender, no hepatosplenomegaly, no masses and bowel sounds normal  MS: no gross musculoskeletal defects noted, no edema  SKIN: no suspicious lesions or rashes  NEURO: Normal strength and tone, mentation intact and speech normal  PSYCH: mentation appears normal, affect normal/bright  Diabetic foot exam: normal DP and PT pulses, no trophic changes or ulcerative lesions, normal sensory exam and normal monofilament exam    Diagnostic Test Results:  Labs reviewed in Epic  Results for orders placed or performed in visit on 04/26/22   Comprehensive metabolic panel  (BMP + Alb, Alk Phos, ALT, AST, Total. Bili, TP)     Status: Abnormal   Result Value Ref Range    Sodium 134 133 - 144 mmol/L    Potassium 3.8 3.4 - 5.3 mmol/L    Chloride 99 94 - 109 mmol/L    Carbon Dioxide (CO2) 26 20 - 32 mmol/L    Anion Gap 9 3 - 14 mmol/L    Urea Nitrogen 17 7 - 30 mg/dL    Creatinine 1.16 0.66 - 1.25 mg/dL    Calcium 9.8 8.5 - 10.1 mg/dL    Glucose 206 (H) 70 - 99 mg/dL    Alkaline Phosphatase 111 40 - 150 U/L    AST 24 0 - 45 U/L    ALT 47 0 - 70 U/L    Protein Total 8.1 6.8 - 8.8 g/dL    Albumin 4.2 3.4 - 5.0 g/dL    Bilirubin Total 0.6 0.2 - 1.3 mg/dL    GFR Estimate 70 >60 mL/min/1.73m2   Albumin Random Urine Quantitative with Creat Ratio     Status: Abnormal   Result Value Ref Range    Creatinine Urine mg/dL 53 mg/dL    Albumin Urine mg/L 147 mg/L    Albumin Urine mg/g Cr 277.36 (H) 0.00 - 17.00 mg/g Cr   Hemoglobin A1c     Status: Abnormal   Result Value Ref Range    Hemoglobin A1C 10.4 (H) 0.0 - 5.6 %   Lipid panel reflex to direct LDL Fasting     Status: Abnormal   Result Value Ref Range    Cholesterol 166 <200 mg/dL    Triglycerides 262 (H) <150 mg/dL    Direct Measure HDL 39 (L) >=40 mg/dL    LDL Cholesterol Calculated 75 <=100 mg/dL    Non HDL Cholesterol 127 <130 mg/dL    Patient Fasting > 8hrs? Yes     Narrative    Cholesterol  Desirable:  <200 mg/dL    Triglycerides  Normal:  Less than 150 mg/dL  Borderline High:  150-199 mg/dL  High:  200-499 mg/dL  Very High:  Greater than or equal to 500 mg/dL    Direct Measure HDL  Female:  Greater than or equal to 50 mg/dL   Male:  Greater than or equal to 40 mg/dL    LDL Cholesterol  Desirable:  <100mg/dL  Above Desirable:  100-129 mg/dL   Borderline High:  130-159 mg/dL   High:  160-189 mg/dL   Very High:  >= 190 mg/dL    Non HDL Cholesterol  Desirable:  130 mg/dL  Above Desirable:  130-159 mg/dL  Borderline High:  160-189 mg/dL  High:  190-219 mg/dL  Very High:  Greater than or equal to 220 mg/dL   PSA, screen     Status: Normal   Result  Value Ref Range    Prostate Specific Antigen Screen 0.70 0.00 - 4.00 ug/L       ASSESSMENT / PLAN:   (Z00.00) Routine history and physical examination of adult  (primary encounter diagnosis)  Comment:   Plan: Stop Bang score = 5 and I recommend sleep study.  Patient declines today.    (E11.22,  N18.31) Type 2 diabetes mellitus with stage 3a chronic kidney disease, without long-term current use of insulin (H)  Comment:   Plan: FOOT EXAM, Comprehensive metabolic panel (BMP +        Alb, Alk Phos, ALT, AST, Total. Bili, TP),         Albumin Random Urine Quantitative with Creat         Ratio, Hemoglobin A1c, Lipid panel reflex to         direct LDL Fasting, Adult Eye Referral, blood         glucose monitoring (NO BRAND SPECIFIED) meter         device kit, blood glucose (NO BRAND SPECIFIED)         lancets standard, blood glucose (NO BRAND         SPECIFIED) test strip, Med Therapy Management         Referral          (E11.65) Type 2 diabetes mellitus with hyperglycemia, without long-term current use of insulin (H)  Comment:   Plan: Large increase in A1C indicating need for medication management.  Recommend metformin + SGLT21 or GLP1a.  Referred to MTM to discuss options and start.  Will follow-up in 3 months.    (N18.31) Stage 3a chronic kidney disease (H)  Comment:   Plan: Albumin Random Urine Quantitative with Creat         Ratio  Worsened, suspect due to suboptimally controlled HTN and diabetes.  Increase losartan dose and start diabetes medications.  Follow-up three months.            (E11.42) Type 2 diabetes mellitus with diabetic polyneuropathy, without long-term current use of insulin (H)  Comment:   Plan: Starting gabapentin    (I10) Benign essential hypertension  Comment:   Plan: Albumin Random Urine Quantitative with Creat         Ratio, Lipid panel reflex to direct LDL         Fasting, losartan-hydrochlorothiazide (HYZAAR)         100-25 MG tablet, OFFICE/OUTPT VISIT,EST,LEVL         IV        Increase  losartan dose to 100 mg and ordered combined losartan-hydrochlorothiazide medication     (G43.009) Migraine without aura and without status migrainosus, not intractable  Comment:   Plan: At this point, try magnesium.  Possibly a benefit from gabapentin as well?  Believes he has tried topiramate in the past with dizziness side effect.  Atenolol was also ordered for migraine prophylaxis, but we continue now due to pounding heartbeat that occurs when it is discontinued (even in context of adding metoprolol concurrent to atenolol discontinuation). Long-term plan to discontinue atenolol.            (F41.1) ISAC (generalized anxiety disorder)  Comment:   Plan: citalopram (CELEXA) 10 MG tablet  Is physically active >120 minutes per week.  Hasn't had good match with therapy in the past and expresses interest in medication start.  Citalopram 10 mg started.  Gabapentin that is for diabetic neuropathy may also help    (Z12.11) Colon cancer screening  Comment:   Plan: COLOGUARD(EXACT SCIENCES)            (E78.5) Hyperlipidemia LDL goal <130  Comment:   Plan: Lipid panel reflex to direct LDL Fasting            (Z12.5) Screening for prostate cancer  Comment:   Plan: PSA, screen            (Z23) Need for COVID-19 vaccine  Comment:   Plan: COVID-19,PF,PFIZER (12+ Yrs GRAY LABEL)            (R20.2) Paresthesia of both feet  Comment:   Plan: gabapentin (NEURONTIN) 100 MG capsule,                 Patient has been advised of split billing requirements and indicates understanding: Yes    COUNSELING:  Reviewed preventive health counseling, as reflected in patient instructions    Estimated body mass index is 30.22 kg/m  as calculated from the following:    Height as of this encounter: 1.829 m (6').    Weight as of this encounter: 101.1 kg (222 lb 12.8 oz).    Weight management plan: Discussed healthy diet and exercise guidelines    He reports that he has never smoked. He has never used smokeless tobacco.    Appropriate preventive services  were discussed with this patient, including applicable screening as appropriate for cardiovascular disease, diabetes, osteopenia/osteoporosis, and glaucoma.  As appropriate for age/gender, discussed screening for colorectal cancer, prostate cancer, breast cancer, and cervical cancer. Checklist reviewing preventive services available has been given to the patient.    Reviewed patients plan of care and provided an AVS. The Complex Care Plan (for patients with higher acuity and needing more deliberate coordination of services) for Karan meets the Care Plan requirement. This Care Plan has been established and reviewed with the Patient.    Counseling Resources:  ATP IV Guidelines  Pooled Cohorts Equation Calculator  Breast Cancer Risk Calculator  BRCA-Related Cancer Risk Assessment: FHS-7 Tool  FRAX Risk Assessment  ICSI Preventive Guidelines  Dietary Guidelines for Americans, 2010  USDA's MyPlate  ASA Prophylaxis  Lung CA Screening    JEFFERSON [facility]

## 2022-04-26 NOTE — PATIENT INSTRUCTIONS
"Diabetes  Schedule appt for six months follow-up  Schedule eye exam  Start gabapentin 100 mg nightly or PRN - can increase to three times a day or 300 mg at once at night    Hypertension  Switch to combined losartan-hydochlorthiazide 100mg-25mg    Screenings  Do cologuard colon cancer screening test that will be mailed to you    Migraines  Magnesium glycinate 400-600 mg nightly  Butterbur is 50 mg daily    Anxiety  Start citalopram 10 mg  Follow-up three months  Consider therapy    [facility] - [phone]  Wendy Perdomo;   Mindy Cazares at [place];   Jose Mcpherson - [place]    Outside of [place] - recommended by patients who have had good experiences    [facility]    [facility]  [place] (?taking new patients?)  [phone]    [facility] [place] - birth trauma, body work  [phone]    [facility] and [place] on [address]  Takes [employer]  [phone]    Linda McKinney  [phone]  [address]   [place], [place], [address]    Simon Terrazas - \"sex-positive\"  [address]  [address]  [place], [place] [address]     Larry Cuadra - LGBT experience  [place] [facility] on [address] in [place]  [address]  [place] [address]  Telephone: [phone]    Shayna Avalos  [address]  [place] [address]  Phone: [phone] X [phone]  [email or web address]    [employer] - family systems   [email or web address]  [address]  [place] [address]  Tel: [phone]    Dajuan Parekh  [email or web address]     Kate Zamudio [email or web address]      Patient Education   Personalized Prevention Plan  You are due for the preventive services outlined below.  Your care team is available to assist you in scheduling these services.  " If you have already completed any of these items, please share that information with your care team to update in your medical record.  Health Maintenance Due   Topic Date Due    Diabetic Foot Exam  Never done    ANNUAL REVIEW OF HM ORDERS  Never done    Discuss Advance Care Planning  Never done    Eye Exam  Never done    HIV Screening  Never done    Hepatitis C Screening  Never done    Colorectal Cancer Screening  02/07/2020    Hemoglobin  12/05/2020    A1C Lab  08/27/2021    FALL RISK ASSESSMENT  Never done    Pneumococcal Vaccine (1 of 1 - PPSV23) Never done    Basic Metabolic Panel  02/27/2022    Cholesterol Lab  02/27/2022    Kidney Microalbumin Urine Test  02/27/2022    Pneumococcal Vaccine (1 of 1 - PPSV23) 01/26/2022

## 2022-04-27 LAB
ALBUMIN SERPL-MCNC: 4.2 G/DL (ref 3.4–5)
ALP SERPL-CCNC: 111 U/L (ref 40–150)
ALT SERPL W P-5'-P-CCNC: 47 U/L (ref 0–70)
ANION GAP SERPL CALCULATED.3IONS-SCNC: 9 MMOL/L (ref 3–14)
AST SERPL W P-5'-P-CCNC: 24 U/L (ref 0–45)
BILIRUB SERPL-MCNC: 0.6 MG/DL (ref 0.2–1.3)
BUN SERPL-MCNC: 17 MG/DL (ref 7–30)
CALCIUM SERPL-MCNC: 9.8 MG/DL (ref 8.5–10.1)
CHLORIDE BLD-SCNC: 99 MMOL/L (ref 94–109)
CHOLEST SERPL-MCNC: 166 MG/DL
CO2 SERPL-SCNC: 26 MMOL/L (ref 20–32)
CREAT SERPL-MCNC: 1.16 MG/DL (ref 0.66–1.25)
CREAT UR-MCNC: 53 MG/DL
FASTING STATUS PATIENT QL REPORTED: YES
GFR SERPL CREATININE-BSD FRML MDRD: 70 ML/MIN/1.73M2
GLUCOSE BLD-MCNC: 206 MG/DL (ref 70–99)
HDLC SERPL-MCNC: 39 MG/DL
LDLC SERPL CALC-MCNC: 75 MG/DL
MICROALBUMIN UR-MCNC: 147 MG/L
MICROALBUMIN/CREAT UR: 277.36 MG/G CR (ref 0–17)
NONHDLC SERPL-MCNC: 127 MG/DL
POTASSIUM BLD-SCNC: 3.8 MMOL/L (ref 3.4–5.3)
PROT SERPL-MCNC: 8.1 G/DL (ref 6.8–8.8)
PSA SERPL-MCNC: 0.7 UG/L (ref 0–4)
SODIUM SERPL-SCNC: 134 MMOL/L (ref 133–144)
TRIGL SERPL-MCNC: 262 MG/DL

## 2022-05-03 ENCOUNTER — VIRTUAL VISIT (OUTPATIENT)
Dept: PHARMACY | Facility: CLINIC | Age: 67
End: 2022-05-03
Payer: COMMERCIAL

## 2022-05-03 DIAGNOSIS — N18.30 TYPE 2 DIABETES MELLITUS WITH STAGE 3 CHRONIC KIDNEY DISEASE, WITHOUT LONG-TERM CURRENT USE OF INSULIN, UNSPECIFIED WHETHER STAGE 3A OR 3B CKD (H): Primary | ICD-10-CM

## 2022-05-03 DIAGNOSIS — E11.22 TYPE 2 DIABETES MELLITUS WITH STAGE 3 CHRONIC KIDNEY DISEASE, WITHOUT LONG-TERM CURRENT USE OF INSULIN, UNSPECIFIED WHETHER STAGE 3A OR 3B CKD (H): Primary | ICD-10-CM

## 2022-05-03 DIAGNOSIS — I10 BENIGN ESSENTIAL HYPERTENSION: ICD-10-CM

## 2022-05-03 DIAGNOSIS — E78.5 HYPERLIPIDEMIA LDL GOAL <130: ICD-10-CM

## 2022-05-03 DIAGNOSIS — K21.9 GASTROESOPHAGEAL REFLUX DISEASE WITHOUT ESOPHAGITIS: ICD-10-CM

## 2022-05-03 DIAGNOSIS — G62.9 NEUROPATHY: ICD-10-CM

## 2022-05-03 PROCEDURE — 99607 MTMS BY PHARM ADDL 15 MIN: CPT | Performed by: PHARMACIST

## 2022-05-03 PROCEDURE — 99605 MTMS BY PHARM NP 15 MIN: CPT | Performed by: PHARMACIST

## 2022-05-03 RX ORDER — METFORMIN HCL 500 MG
TABLET, EXTENDED RELEASE 24 HR ORAL
Qty: 60 TABLET | Refills: 2 | Status: SHIPPED | OUTPATIENT
Start: 2022-05-03 | End: 2022-08-02

## 2022-05-03 NOTE — PATIENT INSTRUCTIONS
"Recommendations from today's MTM visit:                                                    MTM (medication therapy management) is a service provided by a clinical pharmacist designed to help you get the most of out of your medicines.   Today we reviewed what your medicines are for, how to know if they are working, that your medicines are safe and how to make your medicine regimen as easy as possible.      Start metformin 1 pill daily in the morning x5-7 days, then increase to 2 pills daily.  Common side effects: upset stomach, diarrhea.  Should not have these side effects as your body is used to the medication.  Take with food or separate the pills if needed to reduce side effects.    2. Diabetes.org is a great website.  Here is a section in particular to look at:    https://www.diabetes.org/healthy-living/recipes-nutrition/understanding-carbs    [facility] has diabetes information:  https://www.cdc.gov/diabetes/managing/eat-well/meal-plan-method.html    Also attached a [employer] resource.    3. I do recommend you take a higher dose of atorvastatin (40mg instead of 20mg).  Let me know if that's something you want to change.     Follow-up: 1 month    It was great speaking with you today.  I value your experience and would be very thankful for your time in providing feedback in our clinic survey. In the next few days, you may receive an email or text message from "[employer]" with a link to a survey related to your  clinical pharmacist.\"     To schedule another MTM appointment, please call the clinic directly or you may call the MTM scheduling line at [phone] or toll-free at [phone].     My Clinical Pharmacist's contact information:                                                      Please feel free to contact me with any questions or concerns you have.      Nena Aquino, PharmD, BCACP   Medication Management Pharmacist   [facility]  [phone]     "

## 2022-05-03 NOTE — PROGRESS NOTES
Medication Therapy Management (MTM) Encounter    ASSESSMENT:                            Medication Adherence/Access: No issues identified    Type 2 Diabetes: Patient is not meeting A1c goal of < 7%. Self monitoring of blood glucose is not at goal of fasting  mg/dL and post prandial < 180 mg/dL. Patient would benefit from Metformin.   Briefly discussed plans for second line agent if needed; patient prefers to avoid injectables, no cost issues, minimize weight gain. Would consider SGLT2 or oral semaglutide as next agent. Depending on response to metformin and diet/exercise changes, may need to consider basal insulin.     Hypertension: stable    Hyperlipidemia: Patient is not on high intensity statin which is indicated based on 2019 ACC/AHA guidelines for lipid management.  Discussed increasing atorvastatin to 40mg dose but no change made today.     Neuropathy: likely worsened 2/2 hyperglycemia. Will monitor.     Anxiety: did not review in detail today, deferred evaluation to follow-up.     GERD: stable  PLAN:                            Start metformin XR 500mg daily x5 days, then increase to 1000mg daily    Follow-up: 1 month    SUBJECTIVE/OBJECTIVE:                          Karan Anglin is a 67 year old male called for an initial visit. He was referred to me from Romelia Judd.      Reason for visit: hyperglycemia.    Allergies/ADRs: Reviewed in chart  Past Medical History: Reviewed in chart  Tobacco: He reports that he has never smoked. He has never used smokeless tobacco.  Alcohol: Less than 1 beverages / week    Medication Adherence/Access: no issues reported  Patient uses pill box(es).  Patient takes medications 1 time(s) per day.   Per patient, misses medication 0 times per week.     Type 2 Diabetes:  Currently no medication.  Blood sugar monitoring: 3 time(s) daily. Ranges (patient reported):   Fasting 200-300s  Before lunch 248 today  Before dinner 200s  Medication preferences: limit weight gain, prefers  non injectable.  Symptoms of low blood sugar? none  Symptoms of high blood sugar? Burning in feet.  Eye exam: due  Foot exam: up to date  Diet/Exercise: usually 3 meals a day, skip breakfast on the weekend.  Dinner is the largest, typically around 7pm.  Diet recall: pork chop, baked potato, green beans.   Sweet tooth after meals started about a year ago.   Exercise - 30 minute walk on work days, no time to do that since December and restarted a couple weeks ago.   Energy drink - had stopped.   Aspirin: Taking 81mg daily and denies side effects   Statin: Yes: atorvastatin   ACEi/ARB: Yes: losartan.   Urine Albumin:   Lab Results   Component Value Date    UMALCR 277.36 (H) 04/26/2022      Lab Results   Component Value Date    A1C 10.4 04/26/2022    A1C 7.5 02/27/2021    A1C 7.7 07/03/2020    A1C 6.5 12/05/2019    A1C 6.8 03/08/2019    A1C 7.3 01/29/2019       Hypertension: Current medications include atenolol 50mg daily, losartan/hctz 100/25mg daily.  Patient does self-monitor blood pressure. Home BP monitoring in range of 130's systolic over 80's diastolic.  Patient reports no current medication side effects.  BP Readings from Last 3 Encounters:   04/26/22 134/78   05/09/21 132/76   12/05/19 128/84     Hyperlipidemia: Current therapy includes atorvastatin 20mg daily.  Patient reports no significant myalgias or other side effects.  The 10-year ASCVD risk score ([place] DELANEY Jr., et al., 2013) is: 28.8%    Values used to calculate the score:      Age: 67 years      Sex: Male      Is Non- : No      Diabetic: Yes      Tobacco smoker: No      Systolic Blood Pressure: 134 mmHg      Is BP treated: Yes      HDL Cholesterol: 39 mg/dL      Total Cholesterol: 166 mg/dL  Recent Labs   Lab Test 04/26/22  1551 02/27/21  0912   CHOL 166 176   HDL 39* 37*   LDL 75 89   TRIG 262* 252*     Neuropathy: recently start gabapentin 100mg 3 times a day for burning in his feet.  No side effects noted.     Anxiety: recently  started citalopram, no side effect concerns at this time. Did not discuss mental health in detail today.     GERD: takes esomeprazole 20mg daily without problems and works well.     Today's Vitals: There were no vitals taken for this visit.  ----------------    I spent 30 minutes with this patient today. All changes were made via collaborative practice agreement with JEFFERSON Cook CNP. A copy of the visit note was provided to the patient's provider(s).    The patient was sent via [employer] a summary of these recommendations.     Nena Aquino, PharmD, BCACP     Telemedicine Visit Details  Type of service:  Telephone visit  Start Time: 1:38 PM  End Time: 2:10 PM  Originating Location (patient location): [place]  Distant Location (provider location):  [facility]     Medication Therapy Recommendations  Type 2 diabetes mellitus with stage 3 chronic kidney disease, without long-term current use of insulin (H)    Current Medication: metFORMIN (GLUCOPHAGE-XR) 500 MG 24 hr tablet   Rationale: Untreated condition - Needs additional medication therapy - Indication   Recommendation: Start Medication   Status: Accepted per CPA

## 2022-06-08 ENCOUNTER — VIRTUAL VISIT (OUTPATIENT)
Dept: PHARMACY | Facility: CLINIC | Age: 67
End: 2022-06-08
Payer: COMMERCIAL

## 2022-06-08 DIAGNOSIS — R20.2 PARESTHESIA OF BOTH FEET: ICD-10-CM

## 2022-06-08 DIAGNOSIS — E78.5 HYPERLIPIDEMIA LDL GOAL <130: ICD-10-CM

## 2022-06-08 DIAGNOSIS — E11.22 TYPE 2 DIABETES MELLITUS WITH STAGE 3 CHRONIC KIDNEY DISEASE, WITHOUT LONG-TERM CURRENT USE OF INSULIN, UNSPECIFIED WHETHER STAGE 3A OR 3B CKD (H): Primary | ICD-10-CM

## 2022-06-08 DIAGNOSIS — N18.30 TYPE 2 DIABETES MELLITUS WITH STAGE 3 CHRONIC KIDNEY DISEASE, WITHOUT LONG-TERM CURRENT USE OF INSULIN, UNSPECIFIED WHETHER STAGE 3A OR 3B CKD (H): Primary | ICD-10-CM

## 2022-06-08 DIAGNOSIS — F41.9 ANXIETY: ICD-10-CM

## 2022-06-08 PROCEDURE — 99607 MTMS BY PHARM ADDL 15 MIN: CPT | Performed by: PHARMACIST

## 2022-06-08 PROCEDURE — 99606 MTMS BY PHARM EST 15 MIN: CPT | Performed by: PHARMACIST

## 2022-06-08 RX ORDER — GABAPENTIN 300 MG/1
300 CAPSULE ORAL 3 TIMES DAILY
Qty: 90 CAPSULE | Refills: 3 | Status: SHIPPED | OUTPATIENT
Start: 2022-06-08 | End: 2022-09-15

## 2022-06-08 RX ORDER — ATORVASTATIN CALCIUM 40 MG/1
40 TABLET, FILM COATED ORAL DAILY
Qty: 90 TABLET | Refills: 3 | Status: SHIPPED | OUTPATIENT
Start: 2022-06-08 | End: 2022-09-15

## 2022-06-08 NOTE — Clinical Note
MIGDALIAI - he will follow-up with you, wasn't interested in scheduling another med check with me right now so not planning to reach out for follow-up. Diabetes is great but mental health needs check in.

## 2022-06-08 NOTE — PROGRESS NOTES
Medication Therapy Management (MTM) Encounter    ASSESSMENT:                            Medication Adherence/Access: No issues identified    Type 2 Diabetes: Patient is not meeting A1c goal of < 7%. Self monitoring of blood glucose is at goal of fasting  mg/dL and post prandial < 180 mg/dL. Continue current medications and plan to recheck A1c in 2 months with PCP visit.    Neuropathy: persistent symptoms, discussed trial of higher dose gabapentin and he is agreeable.  He did not want to schedule a follow-up for further titration of gabapentin dose at this time but will review with PCP at next appointment.    Anxiety: unchanged. Discussed ongoing monitoring vs dose adjustment of citalopram and he prefers to monitor at this time.      Hyperlipidemia: Patient is not on high intensity statin which is indicated based on 2019 ACC/AHA guidelines for lipid management. Last LDL >70.   Pt would benefit from increasing dose of statin.     PLAN:                            Increase gabapentin 300mg 3 times a day    Increase atorvastatin to 40mg daily      Follow-up: 2 months with PCP, MTM as needed    SUBJECTIVE/OBJECTIVE:                          Karan Anglin is a 67 year old male called for a follow-up visit.  Today's visit is a follow-up MTM visit from 5/3/22     Reason for visit: diabetes recheck.    Allergies/ADRs: Reviewed in chart  Past Medical History: Reviewed in chart  Tobacco: He reports that he has never smoked. He has never used smokeless tobacco.  Alcohol: Less than 1 beverages / week    Medication Adherence/Access: no issues reported  Patient uses pill box(es).  Patient takes medications 1 time(s) per day.   Per patient, misses medication 0 times per week.     Type 2 Diabetes:  Currently taking metformin XR 1000mg daily.  Patient reports the following side effects: none  Blood sugar monitoring: 3 time(s) daily. Fasting, 2 hours after lunch, 2 hours after dinner. Ranges (patient reported):   7-day avg  127  14-day avg 134  30-day avg 160  PP-150  Symptoms of low blood sugar? none  Symptoms of high blood sugar? Burning in feet continues after eating.  Eye exam: due  Foot exam: up to date  Diet/Exercise: not eating much with increase in stress, thinks he has lost 15 pounds in a short amount of time.  Aspirin: Taking 81mg daily and denies side effects   Statin: Yes: atorvastatin   ACEi/ARB: Yes: losartan.   Urine Albumin:   Lab Results   Component Value Date    UMALCR 277.36 (H) 2022      Lab Results   Component Value Date    A1C 10.4 2022    A1C 7.5 2021    A1C 7.7 2020    A1C 6.5 2019    A1C 6.8 2019    A1C 7.3 2019     Neuropathy: recently start gabapentin 100mg 3 times a day for burning in his feet. Helps a little. No side effects noted.     Anxiety: currently taking citalopram 10mg daily, no side effect concerns at this time. Increase in stress recently, had to move out of his apartment because of mold and currently staying with his sister. Work has been stressful, going into the office again.   No improvement in anxiety with starting citalopram about 6 weeks ago. Attributes much of his anxiety to be situational.   Hard to concentrate or focus with anxiety.   Sleep: 5-6 hours per night. Typically sleeps 6-7 hours. Wakes up in the middle of the night and hard to fall back asleep sometimes.   Appetite: a little lower but somewhat intentional reduction in food intake due to blood sugars. Thinks he has lost about 15 pounds.    Hyperlipidemia: Current therapy includes atorvastatin 20mg daily.  Patient reports no significant myalgias or other side effects.  The 10-year ASCVD risk score ([place] DELANEY Jr., et al., 2013) is: 28.8%    Values used to calculate the score:      Age: 67 years      Sex: Male      Is Non- : No      Diabetic: Yes      Tobacco smoker: No      Systolic Blood Pressure: 134 mmHg      Is BP treated: Yes      HDL Cholesterol: 39  mg/dL      Total Cholesterol: 166 mg/dL  Recent Labs   Lab Test 04/26/22  1551 02/27/21  0912   CHOL 166 176   HDL 39* 37*   LDL 75 89   TRIG 262* 252*         Today's Vitals: There were no vitals taken for this visit.  ----------------      I spent 20 minutes with this patient today. All changes were made via collaborative practice agreement with JEFFERSON Cook CNP. A copy of the visit note was provided to the patient's provider(s).    The patient was sent via [employer] a summary of these recommendations.     Nena Aquino, PharmD, BCACP     Telemedicine Visit Details  Type of service:  Telephone visit  Start Time: 1:01 PM  End Time: 1:20 PM  Originating Location (patient location): Home  Distant Location (provider location):  [facility]     Medication Therapy Recommendations  Hyperlipidemia LDL goal <130    Current Medication: atorvastatin (LIPITOR) 20 MG tablet (Discontinued)   Rationale: Dose too low - Dosage too low - Effectiveness   Recommendation: Increase Dose - atorvastatin 40 MG tablet   Status: Accepted per CPA         Paresthesia of both feet    Current Medication: gabapentin (NEURONTIN) 100 MG capsule (Discontinued)   Rationale: Dose too low - Dosage too low - Effectiveness   Recommendation: Increase Dose - gabapentin 300 MG capsule   Status: Accepted per CPA

## 2022-06-09 NOTE — PATIENT INSTRUCTIONS
"Recommendations from today's MTM visit:                                                       Let's try increasing gabapentin to see if this helps make the burning sensation in your feet more tolerable.    Conservative dose increase directions--  Day 1-3 Take gabapentin 100mg AM, 100mg noon, 300mg PM  Day 4-6 Take gabapentin 300mg AM, 100mg noon, 300mg PM  Day 7+ Take gabapentin 300mg 3 times a day    You can do this faster or more slowly depending on side effects (commonly dizziness, fatigue)    2. Finish up atorvastatin 20mg tablets. When you refill, please increase the dose to 40mg which is the recommended dose based on your cardiovascular risk factors for heart disease.    3. Follow-up with  about your anxiety and stress at your next visit. If citalopram remains unhelpful, you could consider a dose increase to 20mg which is a typical/medium dose of that medicine.    4. Congratulations on the fantastic improvement in your blood sugars!  If your levels remain stable, you could back off on testing your blood sugar to once a day but vary the time (sometimes fasting, sometimes 2 hours after meals) to make sure they are staying in range.  Goals are  fasting, less than 180 for 2 hours after eating    Follow-up: August with . Please come see me again as needed for medication adjustments.    It was great speaking with you today.  I value your experience and would be very thankful for your time in providing feedback in our clinic survey. In the next few days, you may receive an email or text message from [employer] with a link to a survey related to your  clinical pharmacist.\"     To schedule another MTM appointment, please call the clinic directly or you may call the MTM scheduling line at [phone] or toll-free at [phone].     My Clinical Pharmacist's contact information:                                                      Please feel free to contact me with any questions or concerns you have. "      Nena Aquino, PharmD, BCACP   Medication Management Pharmacist   [facility]  [phone]

## 2022-07-16 ENCOUNTER — MYC REFILL (OUTPATIENT)
Dept: FAMILY MEDICINE | Facility: CLINIC | Age: 67
End: 2022-07-16

## 2022-07-16 DIAGNOSIS — I10 BENIGN ESSENTIAL HYPERTENSION: ICD-10-CM

## 2022-07-19 RX ORDER — ATENOLOL 50 MG/1
50 TABLET ORAL DAILY
Qty: 90 TABLET | Refills: 1 | Status: SHIPPED | OUTPATIENT
Start: 2022-07-19 | End: 2022-09-15

## 2022-07-20 DIAGNOSIS — G43.009 MIGRAINE WITHOUT AURA AND WITHOUT STATUS MIGRAINOSUS, NOT INTRACTABLE: ICD-10-CM

## 2022-07-20 RX ORDER — RIZATRIPTAN BENZOATE 5 MG/1
5 TABLET ORAL
Qty: 18 TABLET | Refills: 1 | Status: SHIPPED | OUTPATIENT
Start: 2022-07-20 | End: 2022-09-15

## 2022-07-20 NOTE — TELEPHONE ENCOUNTER
"Requested Prescriptions   Pending Prescriptions Disp Refills     rizatriptan (MAXALT) 5 MG tablet 18 tablet 1     Sig: Take 1 tablet (5 mg) by mouth at onset of headache for migraine repeat after 2 hr if no relief; maximum: 30 mg/24 hours       Serotonin Agonists Failed - 7/20/2022  2:39 PM        Failed - Serotonin Agonist request needs review.     Please review patient's record. If patient has had 8 or more treatments in the past month, please forward to provider.          Passed - Blood pressure under 140/90 in past 12 months     BP Readings from Last 3 Encounters:   04/26/22 134/78   05/09/21 132/76   12/05/19 128/84                 Passed - Recent (12 mo) or future (30 days) visit within the authorizing provider's specialty     Patient has had an office visit with the authorizing provider or a provider within the authorizing providers department within the previous 12 mos or has a future within next 30 days. See \"Patient Info\" tab in inbasket, or \"Choose Columns\" in Meds & Orders section of the refill encounter.              Passed - Medication is active on med list        Passed - Patient is age 18 or older           Unable to refill per protocol.  Lashawn HENRY RN    "

## 2022-08-01 DIAGNOSIS — N18.30 TYPE 2 DIABETES MELLITUS WITH STAGE 3 CHRONIC KIDNEY DISEASE, WITHOUT LONG-TERM CURRENT USE OF INSULIN, UNSPECIFIED WHETHER STAGE 3A OR 3B CKD (H): ICD-10-CM

## 2022-08-01 DIAGNOSIS — E11.22 TYPE 2 DIABETES MELLITUS WITH STAGE 3 CHRONIC KIDNEY DISEASE, WITHOUT LONG-TERM CURRENT USE OF INSULIN, UNSPECIFIED WHETHER STAGE 3A OR 3B CKD (H): ICD-10-CM

## 2022-08-02 RX ORDER — METFORMIN HCL 500 MG
TABLET, EXTENDED RELEASE 24 HR ORAL
Qty: 60 TABLET | Refills: 2 | Status: SHIPPED | OUTPATIENT
Start: 2022-08-02 | End: 2022-09-15

## 2022-08-02 NOTE — TELEPHONE ENCOUNTER
"Requested Prescriptions   Pending Prescriptions Disp Refills     metFORMIN (GLUCOPHAGE XR) 500 MG 24 hr tablet 60 tablet 2     Sig: Take 1 tablet once a day with breakfast x5 days, then increase to 2 tablets once a day       Biguanide Agents Failed - 8/1/2022  8:27 AM        Failed - Patient has documented A1c within the specified period of time.     If HgbA1C is 8 or greater, it needs to be on file within the past 3 months.  If less than 8, must be on file within the past 6 months.     Recent Labs   Lab Test 04/26/22  1551   A1C 10.4*             Passed - Patient is age 10 or older        Passed - Patient's CR is NOT>1.4 OR Patient's EGFR is NOT<45 within past 12 mos.     Recent Labs   Lab Test 04/26/22  1551 02/27/21  0912   GFRESTIMATED 70 52*   GFRESTBLACK  --  61       Recent Labs   Lab Test 04/26/22  1551   CR 1.16             Passed - Patient does NOT have a diagnosis of CHF.        Passed - Medication is active on med list        Passed - Recent (6 mo) or future (30 days) visit within the authorizing provider's specialty     Patient had office visit in the last 6 months or has a visit in the next 30 days with authorizing provider or within the authorizing provider's specialty.  See \"Patient Info\" tab in inbasket, or \"Choose Columns\" in Meds & Orders section of the refill encounter.               Has appt with PCP 9-15-22.  Lashawn HENRY RN    "

## 2022-09-17 ENCOUNTER — HEALTH MAINTENANCE LETTER (OUTPATIENT)
Age: 67
End: 2022-09-17

## 2022-09-19 ENCOUNTER — LAB (OUTPATIENT)
Dept: LAB | Facility: CLINIC | Age: 67
End: 2022-09-19
Payer: COMMERCIAL

## 2022-09-19 ENCOUNTER — ALLIED HEALTH/NURSE VISIT (OUTPATIENT)
Dept: FAMILY MEDICINE | Facility: CLINIC | Age: 67
End: 2022-09-19

## 2022-09-19 DIAGNOSIS — Z23 NEED FOR INFLUENZA VACCINATION: ICD-10-CM

## 2022-09-19 DIAGNOSIS — E11.22 TYPE 2 DIABETES MELLITUS WITH STAGE 3A CHRONIC KIDNEY DISEASE, WITHOUT LONG-TERM CURRENT USE OF INSULIN (H): ICD-10-CM

## 2022-09-19 DIAGNOSIS — N18.31 TYPE 2 DIABETES MELLITUS WITH STAGE 3A CHRONIC KIDNEY DISEASE, WITHOUT LONG-TERM CURRENT USE OF INSULIN (H): ICD-10-CM

## 2022-09-19 DIAGNOSIS — I10 BENIGN ESSENTIAL HYPERTENSION: ICD-10-CM

## 2022-09-19 DIAGNOSIS — Z23 NEED FOR COVID-19 VACCINE: Primary | ICD-10-CM

## 2022-09-19 LAB — HBA1C MFR BLD: 6.7 % (ref 0–5.6)

## 2022-09-19 PROCEDURE — 84443 ASSAY THYROID STIM HORMONE: CPT

## 2022-09-19 PROCEDURE — 90662 IIV NO PRSV INCREASED AG IM: CPT

## 2022-09-19 PROCEDURE — 99207 PR NO CHARGE NURSE ONLY: CPT

## 2022-09-19 PROCEDURE — 83036 HEMOGLOBIN GLYCOSYLATED A1C: CPT

## 2022-09-19 PROCEDURE — 90471 IMMUNIZATION ADMIN: CPT

## 2022-09-19 PROCEDURE — 36415 COLL VENOUS BLD VENIPUNCTURE: CPT

## 2022-09-19 PROCEDURE — 80048 BASIC METABOLIC PNL TOTAL CA: CPT

## 2022-09-19 PROCEDURE — 0124A COVID-19,PF,PFIZER BOOSTER BIVALENT: CPT

## 2022-09-19 PROCEDURE — 91312 COVID-19,PF,PFIZER BOOSTER BIVALENT: CPT

## 2022-09-19 NOTE — PROGRESS NOTES
Prior to immunization administration, verified patients identity using patient s name and date of birth. Please see Immunization Activity for additional information.     Screening Questionnaire for Adult Immunization    Are you sick today?   No   Do you have allergies to medications, food, a vaccine component or latex?   No   Have you ever had a serious reaction after receiving a vaccination?   No   Do you have a long-term health problem with heart, lung, kidney, or metabolic disease (e.g., diabetes), asthma, a blood disorder, no spleen, complement component deficiency, a cochlear implant, or a spinal fluid leak?  Are you on long-term aspirin therapy?   No   Do you have cancer, leukemia, HIV/AIDS, or any other immune system problem?   No   Do you have a parent, brother, or sister with an immune system problem?   No   In the past 3 months, have you taken medications that affect  your immune system, such as prednisone, other steroids, or anticancer drugs; drugs for the treatment of rheumatoid arthritis, Crohn s disease, or psoriasis; or have you had radiation treatments?   No   Have you had a seizure, or a brain or other nervous system problem?   No   During the past year, have you received a transfusion of blood or blood    products, or been given immune (gamma) globulin or antiviral drug?   No   For women: Are you pregnant or is there a chance you could become       pregnant during the next month?   No   Have you received any vaccinations in the past 4 weeks?   No     Immunization questionnaire answers were all negative.        Per orders of Dr. Celestin, injection of Bivalent Pfizer and FLU Shot given by Sheila Bains CMA. Patient instructed to remain in clinic for 15 minutes afterwards, and to report any adverse reaction to me immediately.       Screening performed by Sheila Bains CMA on 9/19/2022 at 3:11 PM.

## 2022-09-20 LAB
ANION GAP SERPL CALCULATED.3IONS-SCNC: 10 MMOL/L (ref 3–14)
BUN SERPL-MCNC: 36 MG/DL (ref 7–30)
CALCIUM SERPL-MCNC: 9.8 MG/DL (ref 8.5–10.1)
CHLORIDE BLD-SCNC: 103 MMOL/L (ref 94–109)
CO2 SERPL-SCNC: 22 MMOL/L (ref 20–32)
CREAT SERPL-MCNC: 1.47 MG/DL (ref 0.66–1.25)
GFR SERPL CREATININE-BSD FRML MDRD: 53 ML/MIN/1.73M2
GLUCOSE BLD-MCNC: 90 MG/DL (ref 70–99)
POTASSIUM BLD-SCNC: 3.8 MMOL/L (ref 3.4–5.3)
SODIUM SERPL-SCNC: 135 MMOL/L (ref 133–144)
TSH SERPL DL<=0.005 MIU/L-ACNC: 0.69 MU/L (ref 0.4–4)

## 2022-12-27 DIAGNOSIS — E11.22 TYPE 2 DIABETES MELLITUS WITH STAGE 3 CHRONIC KIDNEY DISEASE, WITHOUT LONG-TERM CURRENT USE OF INSULIN, UNSPECIFIED WHETHER STAGE 3A OR 3B CKD (H): ICD-10-CM

## 2022-12-27 DIAGNOSIS — N18.30 TYPE 2 DIABETES MELLITUS WITH STAGE 3 CHRONIC KIDNEY DISEASE, WITHOUT LONG-TERM CURRENT USE OF INSULIN, UNSPECIFIED WHETHER STAGE 3A OR 3B CKD (H): ICD-10-CM

## 2022-12-27 RX ORDER — METFORMIN HCL 500 MG
1000 TABLET, EXTENDED RELEASE 24 HR ORAL
Qty: 180 TABLET | Refills: 3 | Status: SHIPPED | OUTPATIENT
Start: 2022-12-27 | End: 2023-10-09

## 2022-12-27 NOTE — TELEPHONE ENCOUNTER
"Requested Prescriptions   Pending Prescriptions Disp Refills     metFORMIN (GLUCOPHAGE XR) 500 MG 24 hr tablet 60 tablet 2     Sig: Take 2 tablets (1,000 mg) by mouth daily (with dinner) Take 1 tablet once a day with breakfast x5 days, then increase to 2 tablets once a day       Biguanide Agents Failed - 12/27/2022 10:42 AM        Failed - Recent (6 mo) or future (30 days) visit within the authorizing provider's specialty     Patient had office visit in the last 6 months or has a visit in the next 30 days with authorizing provider or within the authorizing provider's specialty.  See \"Patient Info\" tab in inbasket, or \"Choose Columns\" in Meds & Orders section of the refill encounter.            Passed - Patient is age 10 or older        Passed - Patient has documented A1c within the specified period of time.     If HgbA1C is 8 or greater, it needs to be on file within the past 3 months.  If less than 8, must be on file within the past 6 months.     Recent Labs   Lab Test 09/19/22  1502   A1C 6.7*             Passed - Patient's CR is NOT>1.4 OR Patient's EGFR is NOT<45 within past 12 mos.     Recent Labs   Lab Test 09/19/22  1502 04/26/22  1551 02/27/21  0912   GFRESTIMATED 53*   < > 52*   GFRESTBLACK  --   --  61    < > = values in this interval not displayed.       Recent Labs   Lab Test 09/19/22  1502   CR 1.47*             Passed - Patient does NOT have a diagnosis of CHF.        Passed - Medication is active on med list           Last office visit 4/26/22. Routing to provider for approval.     Paty Desai RN  [facility]      "

## 2023-01-26 DIAGNOSIS — G43.009 MIGRAINE WITHOUT AURA AND WITHOUT STATUS MIGRAINOSUS, NOT INTRACTABLE: ICD-10-CM

## 2023-01-26 RX ORDER — RIZATRIPTAN BENZOATE 5 MG/1
5 TABLET ORAL
Qty: 18 TABLET | Refills: 1 | Status: SHIPPED | OUTPATIENT
Start: 2023-01-26 | End: 2023-06-02

## 2023-01-26 NOTE — TELEPHONE ENCOUNTER
"Requested Prescriptions   Pending Prescriptions Disp Refills     rizatriptan (MAXALT) 5 MG tablet 18 tablet 1     Sig: Take 1 tablet (5 mg) by mouth at onset of headache for migraine repeat after 2 hr if no relief; maximum: 30 mg/24 hours       Serotonin Agonists Failed - 1/26/2023 12:06 PM        Failed - Serotonin Agonist request needs review.     Please review patient's record. If patient has had 8 or more treatments in the past month, please forward to provider.          Passed - Blood pressure under 140/90 in past 12 months     BP Readings from Last 3 Encounters:   04/26/22 134/78   05/09/21 132/76   12/05/19 128/84                 Passed - Recent (12 mo) or future (30 days) visit within the authorizing provider's specialty     Patient has had an office visit with the authorizing provider or a provider within the authorizing providers department within the previous 12 mos or has a future within next 30 days. See \"Patient Info\" tab in inbasket, or \"Choose Columns\" in Meds & Orders section of the refill encounter.              Passed - Medication is active on med list        Passed - Patient is age 18 or older           Routing refill request to provider for review/approval because medication did not pass protocol.    Pt had a visit on 04/26/22    Graciela Franco RN  [facility]   "

## 2023-03-15 DIAGNOSIS — R20.2 PARESTHESIA OF BOTH FEET: ICD-10-CM

## 2023-03-15 RX ORDER — GABAPENTIN 300 MG/1
300 CAPSULE ORAL 3 TIMES DAILY
Qty: 90 CAPSULE | Refills: 3 | Status: CANCELLED | OUTPATIENT
Start: 2023-03-15

## 2023-03-15 NOTE — TELEPHONE ENCOUNTER
Requested Prescriptions   Pending Prescriptions Disp Refills     gabapentin (NEURONTIN) 300 MG capsule 90 capsule 3     Sig: Take 1 capsule (300 mg) by mouth 3 times daily       There is no refill protocol information for this order        Routing refill request to provider for review/approval because:  Drug not on the [facility] refill protocol   Last annual OV on 4/26/22    Thanks!  Miguel Almazan RN   [facility]

## 2023-03-16 NOTE — TELEPHONE ENCOUNTER
Please schedule for an appt in person for preventative and HTN and diabetes. Then route back to me and I can refill to get to that appt.  MARJ Rashid

## 2023-03-20 DIAGNOSIS — R20.2 PARESTHESIA OF BOTH FEET: ICD-10-CM

## 2023-03-20 RX ORDER — GABAPENTIN 300 MG/1
300 CAPSULE ORAL 3 TIMES DAILY
Qty: 90 CAPSULE | Refills: 0 | Status: SHIPPED | OUTPATIENT
Start: 2023-03-20 | End: 2023-04-17

## 2023-03-27 DIAGNOSIS — F41.1 GAD (GENERALIZED ANXIETY DISORDER): ICD-10-CM

## 2023-03-27 DIAGNOSIS — I10 BENIGN ESSENTIAL HYPERTENSION: ICD-10-CM

## 2023-03-27 NOTE — TELEPHONE ENCOUNTER
Pt requesting refill for citalopram 10mg, atenolol 50mg, losartan hydro 100-25.    Thanks!  Miguel Almazan RN   [facility]

## 2023-03-28 RX ORDER — ATENOLOL 50 MG/1
50 TABLET ORAL DAILY
Qty: 30 TABLET | Refills: 0 | Status: SHIPPED | OUTPATIENT
Start: 2023-03-28 | End: 2023-04-24

## 2023-03-28 RX ORDER — CITALOPRAM HYDROBROMIDE 10 MG/1
10 TABLET ORAL DAILY
Qty: 30 TABLET | Refills: 0 | Status: SHIPPED | OUTPATIENT
Start: 2023-03-28 | End: 2023-04-24

## 2023-03-28 RX ORDER — LOSARTAN POTASSIUM AND HYDROCHLOROTHIAZIDE 25; 100 MG/1; MG/1
1 TABLET ORAL DAILY
Qty: 30 TABLET | Refills: 0 | Status: SHIPPED | OUTPATIENT
Start: 2023-03-28 | End: 2023-04-24

## 2023-03-28 NOTE — TELEPHONE ENCOUNTER
Called pt to help schedule a office visit. Message was left. Will also send a My chart message. Thanks    Graciela Franco RN  [facility]

## 2023-03-28 NOTE — TELEPHONE ENCOUNTER
"Requested Prescriptions   Pending Prescriptions Disp Refills     citalopram (CELEXA) 10 MG tablet 30 tablet 0     Sig: Take 1 tablet (10 mg) by mouth daily       SSRIs Protocol Passed - 3/27/2023 10:48 AM        Passed - Recent (12 mo) or future (30 days) visit within the authorizing provider's specialty     Patient has had an office visit with the authorizing provider or a provider within the authorizing providers department within the previous 12 mos or has a future within next 30 days. See \"Patient Info\" tab in inbasket, or \"Choose Columns\" in Meds & Orders section of the refill encounter.              Passed - Medication is active on med list        Passed - Patient is age 18 or older           atenolol (TENORMIN) 50 MG tablet 90 tablet 1     Sig: Take 1 tablet (50 mg) by mouth daily       There is no refill protocol information for this order        losartan-hydrochlorothiazide (HYZAAR) 100-25 MG tablet 90 tablet 1     Sig: Take 1 tablet by mouth daily       There is no refill protocol information for this order        Routing refill request to provider for review/approval because:  Drug not on the [facility] refill protocol     Graciela Franco RN  [facility]       "

## 2023-04-17 DIAGNOSIS — R20.2 PARESTHESIA OF BOTH FEET: ICD-10-CM

## 2023-04-17 RX ORDER — GABAPENTIN 300 MG/1
300 CAPSULE ORAL 3 TIMES DAILY
Qty: 90 CAPSULE | Refills: 0 | Status: SHIPPED | OUTPATIENT
Start: 2023-04-17 | End: 2023-05-15

## 2023-04-17 NOTE — TELEPHONE ENCOUNTER
Requested Prescriptions   Pending Prescriptions Disp Refills     gabapentin (NEURONTIN) 300 MG capsule 90 capsule 0     Sig: Take 1 capsule (300 mg) by mouth 3 times daily Due for appt       There is no refill protocol information for this order        Routing refill request to provider for review/approval because:  Drug not on the [facility] refill protocol     Graciela Franco RN  [facility]

## 2023-04-24 ENCOUNTER — MYC REFILL (OUTPATIENT)
Dept: FAMILY MEDICINE | Facility: CLINIC | Age: 68
End: 2023-04-24
Payer: COMMERCIAL

## 2023-04-24 DIAGNOSIS — I10 BENIGN ESSENTIAL HYPERTENSION: ICD-10-CM

## 2023-04-24 DIAGNOSIS — F41.1 GAD (GENERALIZED ANXIETY DISORDER): ICD-10-CM

## 2023-04-24 RX ORDER — ATENOLOL 50 MG/1
50 TABLET ORAL DAILY
Qty: 30 TABLET | Refills: 0 | Status: SHIPPED | OUTPATIENT
Start: 2023-04-24 | End: 2023-05-26

## 2023-04-24 RX ORDER — LOSARTAN POTASSIUM AND HYDROCHLOROTHIAZIDE 25; 100 MG/1; MG/1
1 TABLET ORAL DAILY
Qty: 30 TABLET | Refills: 0 | Status: SHIPPED | OUTPATIENT
Start: 2023-04-24 | End: 2023-05-30

## 2023-04-24 RX ORDER — CITALOPRAM HYDROBROMIDE 10 MG/1
10 TABLET ORAL DAILY
Qty: 30 TABLET | Refills: 0 | Status: SHIPPED | OUTPATIENT
Start: 2023-04-24 | End: 2023-05-30

## 2023-04-24 NOTE — TELEPHONE ENCOUNTER
"Requested Prescriptions   Pending Prescriptions Disp Refills     citalopram (CELEXA) 10 MG tablet 30 tablet 0     Sig: Take 1 tablet (10 mg) by mouth daily       SSRIs Protocol Passed - 4/24/2023  7:24 AM        Passed - Recent (12 mo) or future (30 days) visit within the authorizing provider's specialty     Patient has had an office visit with the authorizing provider or a provider within the authorizing providers department within the previous 12 mos or has a future within next 30 days. See \"Patient Info\" tab in inbasket, or \"Choose Columns\" in Meds & Orders section of the refill encounter.              Passed - Medication is active on med list        Passed - Patient is age 18 or older           atenolol (TENORMIN) 50 MG tablet 30 tablet 0     Sig: Take 1 tablet (50 mg) by mouth daily       Beta-Blockers Protocol Passed - 4/24/2023  7:24 AM        Passed - Blood pressure under 140/90 in past 12 months     BP Readings from Last 3 Encounters:   04/26/22 134/78   05/09/21 132/76   12/05/19 128/84                 Passed - Patient is age 6 or older        Passed - Recent (12 mo) or future (30 days) visit within the authorizing provider's specialty     Patient has had an office visit with the authorizing provider or a provider within the authorizing providers department within the previous 12 mos or has a future within next 30 days. See \"Patient Info\" tab in [employer], or \"Choose Columns\" in Meds & Orders section of the refill encounter.              Passed - Medication is active on med list           losartan-hydrochlorothiazide (HYZAAR) 100-25 MG tablet 30 tablet 0     Sig: Take 1 tablet by mouth daily       Angiotensin-II Receptors Failed - 4/24/2023  7:24 AM        Failed - Normal serum creatinine on file in past 12 months     Recent Labs   Lab Test 09/19/22  1502   CR 1.47*       Ok to refill medication if creatinine is low          Passed - Last blood pressure under 140/90 in past 12 months     BP Readings from " "Last 3 Encounters:   04/26/22 134/78   05/09/21 132/76   12/05/19 128/84                 Passed - Recent (12 mo) or future (30 days) visit within the authorizing provider's specialty     Patient has had an office visit with the authorizing provider or a provider within the authorizing providers department within the previous 12 mos or has a future within next 30 days. See \"Patient Info\" tab in inbasket, or \"Choose Columns\" in Meds & Orders section of the refill encounter.              Passed - Medication is active on med list        Passed - Patient is age 18 or older        Passed - Normal serum potassium on file in past 12 months     Recent Labs   Lab Test 09/19/22  1502   POTASSIUM 3.8                   Diuretics (Including Combos) Protocol Failed - 4/24/2023  7:24 AM        Failed - Normal serum creatinine on file in past 12 months     Recent Labs   Lab Test 09/19/22  1502   CR 1.47*              Passed - Blood pressure under 140/90 in past 12 months     BP Readings from Last 3 Encounters:   04/26/22 134/78   05/09/21 132/76   12/05/19 128/84                 Passed - Recent (12 mo) or future (30 days) visit within the authorizing provider's specialty     Patient has had an office visit with the authorizing provider or a provider within the authorizing providers department within the previous 12 mos or has a future within next 30 days. See \"Patient Info\" tab in inbasket, or \"Choose Columns\" in Meds & Orders section of the refill encounter.              Passed - Medication is active on med list        Passed - Patient is age 18 or older        Passed - Normal serum potassium on file in past 12 months     Recent Labs   Lab Test 09/19/22  1502   POTASSIUM 3.8                    Passed - Normal serum sodium on file in past 12 months     Recent Labs   Lab Test 09/19/22  1502                  Routing refill request to provider for review/approval because medication did not pass protocol.    Pt has a appointment " 07/25/23.    Graciela Franco RN  [facility]

## 2023-05-01 ENCOUNTER — TELEPHONE (OUTPATIENT)
Dept: FAMILY MEDICINE | Facility: CLINIC | Age: 68
End: 2023-05-01
Payer: COMMERCIAL

## 2023-05-01 NOTE — TELEPHONE ENCOUNTER
Patient Quality Outreach    Patient is due for the following:   Diabetes -  A1C, LDL (Fasting), Eye Exam, Microalbumin and Foot Exam  Colon Cancer Screening  Physical Annual Wellness Visit      Topic Date Due     Pneumococcal Vaccine (1 - PCV) Never done     Hepatitis B Vaccine (1 of 3 - Risk 3-dose series) Never done       Next Steps:   Patient has upcoming appointment, these items will be addressed at that time.    Type of outreach:    patient has upcoming appt 7/25/2023    Next Steps:  Reach out within 90 days via N/A.    Max number of attempts reached: N/A. Will try again in 90 days if patient still on fail list.    Questions for provider review:    None     Vaishali Barreto MA  Chart routed to Care Team.

## 2023-05-06 ENCOUNTER — HEALTH MAINTENANCE LETTER (OUTPATIENT)
Age: 68
End: 2023-05-06

## 2023-05-15 DIAGNOSIS — R20.2 PARESTHESIA OF BOTH FEET: ICD-10-CM

## 2023-05-15 RX ORDER — GABAPENTIN 300 MG/1
300 CAPSULE ORAL 3 TIMES DAILY
Qty: 90 CAPSULE | Refills: 0 | Status: SHIPPED | OUTPATIENT
Start: 2023-05-15 | End: 2023-05-16

## 2023-05-15 NOTE — TELEPHONE ENCOUNTER
Requested Prescriptions   Pending Prescriptions Disp Refills     gabapentin (NEURONTIN) 300 MG capsule 90 capsule 0     Sig: Take 1 capsule (300 mg) by mouth 3 times daily Due for appt       There is no refill protocol information for this order        Routing refill request to provider for review/approval because:  Drug not on the [facility] refill protocol     Graciela Franco RN  [facility]

## 2023-05-16 ENCOUNTER — MYC REFILL (OUTPATIENT)
Dept: FAMILY MEDICINE | Facility: CLINIC | Age: 68
End: 2023-05-16
Payer: COMMERCIAL

## 2023-05-16 DIAGNOSIS — R20.2 PARESTHESIA OF BOTH FEET: ICD-10-CM

## 2023-05-16 RX ORDER — GABAPENTIN 300 MG/1
300 CAPSULE ORAL 3 TIMES DAILY
Qty: 90 CAPSULE | Refills: 0 | Status: SHIPPED | OUTPATIENT
Start: 2023-05-16 | End: 2023-06-14

## 2023-05-16 NOTE — TELEPHONE ENCOUNTER
Requested Prescriptions   Pending Prescriptions Disp Refills     gabapentin (NEURONTIN) 300 MG capsule 90 capsule 0     Sig: Take 1 capsule (300 mg) by mouth 3 times daily Due for appt       There is no refill protocol information for this order        Routing refill request to provider for review/approval because:  Drug not on the [facility] refill protocol     Graciela Franco RN  [facility]

## 2023-05-26 ENCOUNTER — MYC REFILL (OUTPATIENT)
Dept: FAMILY MEDICINE | Facility: CLINIC | Age: 68
End: 2023-05-26
Payer: COMMERCIAL

## 2023-05-26 DIAGNOSIS — I10 BENIGN ESSENTIAL HYPERTENSION: ICD-10-CM

## 2023-05-26 RX ORDER — ATENOLOL 50 MG/1
50 TABLET ORAL DAILY
Qty: 30 TABLET | Refills: 0 | Status: SHIPPED | OUTPATIENT
Start: 2023-05-26 | End: 2023-06-26

## 2023-05-26 NOTE — TELEPHONE ENCOUNTER
"Requested Prescriptions   Pending Prescriptions Disp Refills     atenolol (TENORMIN) 50 MG tablet 30 tablet 0     Sig: Take 1 tablet (50 mg) by mouth daily       Beta-Blockers Protocol Failed - 5/26/2023  8:22 AM        Failed - Blood pressure under 140/90 in past 12 months     BP Readings from Last 3 Encounters:   04/26/22 134/78   05/09/21 132/76   12/05/19 128/84                 Failed - Recent (12 mo) or future (30 days) visit within the authorizing provider's specialty     Patient has had an office visit with the authorizing provider or a provider within the authorizing providers department within the previous 12 mos or has a future within next 30 days. See \"Patient Info\" tab in inbasket, or \"Choose Columns\" in Meds & Orders section of the refill encounter.              Passed - Patient is age 6 or older        Passed - Medication is active on med list           Routing refill request to provider for review/approval because medication did not pass protocol.    Pt has a appointment on 07/25/23    Graciela Franco RN  [facility]   "

## 2023-05-30 ENCOUNTER — MYC REFILL (OUTPATIENT)
Dept: FAMILY MEDICINE | Facility: CLINIC | Age: 68
End: 2023-05-30
Payer: COMMERCIAL

## 2023-05-30 DIAGNOSIS — I10 BENIGN ESSENTIAL HYPERTENSION: ICD-10-CM

## 2023-05-30 DIAGNOSIS — F41.1 GAD (GENERALIZED ANXIETY DISORDER): ICD-10-CM

## 2023-05-30 RX ORDER — LOSARTAN POTASSIUM AND HYDROCHLOROTHIAZIDE 25; 100 MG/1; MG/1
1 TABLET ORAL DAILY
Qty: 30 TABLET | Refills: 0 | Status: SHIPPED | OUTPATIENT
Start: 2023-05-30 | End: 2023-06-26

## 2023-05-30 RX ORDER — CITALOPRAM HYDROBROMIDE 10 MG/1
10 TABLET ORAL DAILY
Qty: 30 TABLET | Refills: 0 | Status: SHIPPED | OUTPATIENT
Start: 2023-05-30 | End: 2023-06-26

## 2023-05-30 NOTE — TELEPHONE ENCOUNTER
"Requested Prescriptions   Pending Prescriptions Disp Refills     losartan-hydrochlorothiazide (HYZAAR) 100-25 MG tablet 30 tablet 0     Sig: Take 1 tablet by mouth daily       Angiotensin-II Receptors Failed - 5/30/2023  5:41 PM        Failed - Last blood pressure under 140/90 in past 12 months     BP Readings from Last 3 Encounters:   04/26/22 134/78   05/09/21 132/76   12/05/19 128/84                 Failed - Recent (12 mo) or future (30 days) visit within the authorizing provider's specialty     Patient has had an office visit with the authorizing provider or a provider within the authorizing providers department within the previous 12 mos or has a future within next 30 days. See \"Patient Info\" tab in inbasket, or \"Choose Columns\" in Meds & Orders section of the refill encounter.              Failed - Normal serum creatinine on file in past 12 months     Recent Labs   Lab Test 09/19/22  1502   CR 1.47*       Ok to refill medication if creatinine is low          Passed - Medication is active on med list        Passed - Patient is age 18 or older        Passed - Normal serum potassium on file in past 12 months     Recent Labs   Lab Test 09/19/22  1502   POTASSIUM 3.8                   Diuretics (Including Combos) Protocol Failed - 5/30/2023  5:41 PM        Failed - Blood pressure under 140/90 in past 12 months     BP Readings from Last 3 Encounters:   04/26/22 134/78   05/09/21 132/76   12/05/19 128/84                 Failed - Recent (12 mo) or future (30 days) visit within the authorizing provider's specialty     Patient has had an office visit with the authorizing provider or a provider within the authorizing providers department within the previous 12 mos or has a future within next 30 days. See \"Patient Info\" tab in inbasket, or \"Choose Columns\" in Meds & Orders section of the refill encounter.              Failed - Normal serum creatinine on file in past 12 months     Recent Labs   Lab Test 09/19/22  1502   CR " 1.47*              Passed - Medication is active on med list        Passed - Patient is age 18 or older        Passed - Normal serum potassium on file in past 12 months     Recent Labs   Lab Test 09/19/22  1502   POTASSIUM 3.8                    Passed - Normal serum sodium on file in past 12 months     Recent Labs   Lab Test 09/19/22  1502                    citalopram (CELEXA) 10 MG tablet 30 tablet 0     Sig: Take 1 tablet (10 mg) by mouth daily       There is no refill protocol information for this order        Last annual on 4/26/22, has upcoming OV on 7/25/23.    Thanks!  Miguel Almazan RN   [facility]

## 2023-06-01 ENCOUNTER — MYC MEDICAL ADVICE (OUTPATIENT)
Dept: FAMILY MEDICINE | Facility: CLINIC | Age: 68
End: 2023-06-01
Payer: COMMERCIAL

## 2023-06-01 DIAGNOSIS — G43.009 MIGRAINE WITHOUT AURA AND WITHOUT STATUS MIGRAINOSUS, NOT INTRACTABLE: ICD-10-CM

## 2023-06-01 NOTE — TELEPHONE ENCOUNTER
Per pt mychart requesting med refill. Has upcoming OV on 7/25/23 for annual.    Thanks!  Miguel Almazan RN   [facility]

## 2023-06-02 RX ORDER — RIZATRIPTAN BENZOATE 5 MG/1
5 TABLET ORAL
Qty: 18 TABLET | Refills: 1 | Status: SHIPPED | OUTPATIENT
Start: 2023-06-02 | End: 2023-07-25

## 2023-06-02 NOTE — TELEPHONE ENCOUNTER
"Requested Prescriptions   Pending Prescriptions Disp Refills     rizatriptan (MAXALT) 5 MG tablet 18 tablet 1     Sig: Take 1 tablet (5 mg) by mouth at onset of headache for migraine repeat after 2 hr if no relief; maximum: 30 mg/24 hours       Serotonin Agonists Failed - 6/1/2023  4:41 PM        Failed - Blood pressure under 140/90 in past 12 months     BP Readings from Last 3 Encounters:   04/26/22 134/78   05/09/21 132/76   12/05/19 128/84                 Failed - Serotonin Agonist request needs review.     Please review patient's record. If patient has had 8 or more treatments in the past month, please forward to provider.          Failed - Recent (12 mo) or future (30 days) visit within the authorizing provider's specialty     Patient has had an office visit with the authorizing provider or a provider within the authorizing providers department within the previous 12 mos or has a future within next 30 days. See \"Patient Info\" tab in inbasket, or \"Choose Columns\" in Meds & Orders section of the refill encounter.              Passed - Medication is active on med list        Passed - Patient is age 18 or older           Routing refill request to provider for review/approval because:  Drug not on the [facility] refill protocol   Patient needs to be seen because it has been more than 1 year since last office visit.next OV 7/25/23     Juan Kay RN  [facility]           "

## 2023-06-04 ENCOUNTER — HEALTH MAINTENANCE LETTER (OUTPATIENT)
Age: 68
End: 2023-06-04

## 2023-06-14 ENCOUNTER — MYC REFILL (OUTPATIENT)
Dept: FAMILY MEDICINE | Facility: CLINIC | Age: 68
End: 2023-06-14
Payer: COMMERCIAL

## 2023-06-14 DIAGNOSIS — R20.2 PARESTHESIA OF BOTH FEET: ICD-10-CM

## 2023-06-14 RX ORDER — GABAPENTIN 300 MG/1
300 CAPSULE ORAL 3 TIMES DAILY
Qty: 90 CAPSULE | Refills: 0 | Status: SHIPPED | OUTPATIENT
Start: 2023-06-14 | End: 2023-07-12

## 2023-06-14 NOTE — TELEPHONE ENCOUNTER
Requested Prescriptions   Pending Prescriptions Disp Refills     gabapentin (NEURONTIN) 300 MG capsule 90 capsule 0     Sig: Take 1 capsule (300 mg) by mouth 3 times daily Due for appt       There is no refill protocol information for this order        Routing refill request to provider for review/approval because:  Drug not on the [facility] refill protocol     Graciela Franco RN  [facility]

## 2023-06-26 ENCOUNTER — MYC REFILL (OUTPATIENT)
Dept: FAMILY MEDICINE | Facility: CLINIC | Age: 68
End: 2023-06-26
Payer: COMMERCIAL

## 2023-06-26 DIAGNOSIS — I10 BENIGN ESSENTIAL HYPERTENSION: ICD-10-CM

## 2023-06-26 DIAGNOSIS — F41.1 GAD (GENERALIZED ANXIETY DISORDER): ICD-10-CM

## 2023-06-26 RX ORDER — ATENOLOL 50 MG/1
50 TABLET ORAL DAILY
Qty: 30 TABLET | Refills: 0 | Status: SHIPPED | OUTPATIENT
Start: 2023-06-26 | End: 2023-07-20

## 2023-06-26 RX ORDER — CITALOPRAM HYDROBROMIDE 10 MG/1
10 TABLET ORAL DAILY
Qty: 30 TABLET | Refills: 0 | Status: SHIPPED | OUTPATIENT
Start: 2023-06-26 | End: 2023-07-25

## 2023-06-26 RX ORDER — LOSARTAN POTASSIUM AND HYDROCHLOROTHIAZIDE 25; 100 MG/1; MG/1
1 TABLET ORAL DAILY
Qty: 30 TABLET | Refills: 0 | Status: SHIPPED | OUTPATIENT
Start: 2023-06-26 | End: 2023-07-25

## 2023-06-26 NOTE — TELEPHONE ENCOUNTER
"Requested Prescriptions   Pending Prescriptions Disp Refills     atenolol (TENORMIN) 50 MG tablet 30 tablet 0     Sig: Take 1 tablet (50 mg) by mouth daily       Beta-Blockers Protocol Failed - 6/26/2023 11:11 AM        Failed - Blood pressure under 140/90 in past 12 months     BP Readings from Last 3 Encounters:   04/26/22 134/78   05/09/21 132/76   12/05/19 128/84                 Passed - Patient is age 6 or older        Passed - Recent (12 mo) or future (30 days) visit within the authorizing provider's specialty     Patient has had an office visit with the authorizing provider or a provider within the authorizing providers department within the previous 12 mos or has a future within next 30 days. See \"Patient Info\" tab in inbasket, or \"Choose Columns\" in Meds & Orders section of the refill encounter.              Passed - Medication is active on med list           Routing refill request to provider for review/approval because medication did not pass protocol.    Pt has a appointment on 07/25/23    Graciela Franco RN  [facility]   "

## 2023-06-26 NOTE — TELEPHONE ENCOUNTER
"Requested Prescriptions   Pending Prescriptions Disp Refills     losartan-hydrochlorothiazide (HYZAAR) 100-25 MG tablet 30 tablet 0     Sig: Take 1 tablet by mouth daily       Angiotensin-II Receptors Failed - 6/26/2023 11:11 AM        Failed - Last blood pressure under 140/90 in past 12 months     BP Readings from Last 3 Encounters:   04/26/22 134/78   05/09/21 132/76   12/05/19 128/84                 Failed - Normal serum creatinine on file in past 12 months     Recent Labs   Lab Test 09/19/22  1502   CR 1.47*       Ok to refill medication if creatinine is low          Passed - Recent (12 mo) or future (30 days) visit within the authorizing provider's specialty     Patient has had an office visit with the authorizing provider or a provider within the authorizing providers department within the previous 12 mos or has a future within next 30 days. See \"Patient Info\" tab in inbasket, or \"Choose Columns\" in Meds & Orders section of the refill encounter.              Passed - Medication is active on med list        Passed - Patient is age 18 or older        Passed - Normal serum potassium on file in past 12 months     Recent Labs   Lab Test 09/19/22  1502   POTASSIUM 3.8                   Diuretics (Including Combos) Protocol Failed - 6/26/2023 11:11 AM        Failed - Blood pressure under 140/90 in past 12 months     BP Readings from Last 3 Encounters:   04/26/22 134/78   05/09/21 132/76   12/05/19 128/84                 Failed - Normal serum creatinine on file in past 12 months     Recent Labs   Lab Test 09/19/22  1502   CR 1.47*              Passed - Recent (12 mo) or future (30 days) visit within the authorizing provider's specialty     Patient has had an office visit with the authorizing provider or a provider within the authorizing providers department within the previous 12 mos or has a future within next 30 days. See \"Patient Info\" tab in inbasket, or \"Choose Columns\" in Meds & Orders section of the refill " encounter.              Passed - Medication is active on med list        Passed - Patient is age 18 or older        Passed - Normal serum potassium on file in past 12 months     Recent Labs   Lab Test 09/19/22  1502   POTASSIUM 3.8                    Passed - Normal serum sodium on file in past 12 months     Recent Labs   Lab Test 09/19/22  1502                  Routing refill request to provider for review/approval because medication did not pass protocol.    Pt has a appointment on 07/25/23    Graciela Franco RN  [facility]

## 2023-06-26 NOTE — TELEPHONE ENCOUNTER
"Requested Prescriptions   Pending Prescriptions Disp Refills     citalopram (CELEXA) 10 MG tablet 30 tablet 0     Sig: Take 1 tablet (10 mg) by mouth daily       SSRIs Protocol Passed - 6/26/2023 11:12 AM        Passed - Recent (12 mo) or future (30 days) visit within the authorizing provider's specialty     Patient has had an office visit with the authorizing provider or a provider within the authorizing providers department within the previous 12 mos or has a future within next 30 days. See \"Patient Info\" tab in inbasket, or \"Choose Columns\" in Meds & Orders section of the refill encounter.              Passed - Medication is active on med list        Passed - Patient is age 18 or older           Prescription approved per [facility] Refill Protocol.    Pt has a appointment on 07/25/23    Graciela Franco RN  [facility]   "

## 2023-07-12 ENCOUNTER — MYC REFILL (OUTPATIENT)
Dept: FAMILY MEDICINE | Facility: CLINIC | Age: 68
End: 2023-07-12
Payer: COMMERCIAL

## 2023-07-12 DIAGNOSIS — R20.2 PARESTHESIA OF BOTH FEET: ICD-10-CM

## 2023-07-13 NOTE — TELEPHONE ENCOUNTER
Requested Prescriptions   Pending Prescriptions Disp Refills     gabapentin (NEURONTIN) 300 MG capsule 90 capsule 0     Sig: Take 1 capsule (300 mg) by mouth 3 times daily Due for appt       There is no refill protocol information for this order        Has upcoming appointment on 7/25/23.    Thanks!  Miguel Almazan RN   [facility]

## 2023-07-14 RX ORDER — GABAPENTIN 300 MG/1
300 CAPSULE ORAL 3 TIMES DAILY
Qty: 90 CAPSULE | Refills: 0 | Status: SHIPPED | OUTPATIENT
Start: 2023-07-14 | End: 2023-07-25

## 2023-07-20 ENCOUNTER — MYC REFILL (OUTPATIENT)
Dept: FAMILY MEDICINE | Facility: CLINIC | Age: 68
End: 2023-07-20
Payer: COMMERCIAL

## 2023-07-20 DIAGNOSIS — I10 BENIGN ESSENTIAL HYPERTENSION: ICD-10-CM

## 2023-07-20 RX ORDER — ATENOLOL 50 MG/1
50 TABLET ORAL DAILY
Qty: 30 TABLET | Refills: 0 | Status: SHIPPED | OUTPATIENT
Start: 2023-07-20 | End: 2023-07-25

## 2023-07-20 NOTE — TELEPHONE ENCOUNTER
"Requested Prescriptions   Pending Prescriptions Disp Refills     atenolol (TENORMIN) 50 MG tablet 30 tablet 0     Sig: Take 1 tablet (50 mg) by mouth daily       Beta-Blockers Protocol Failed - 7/20/2023  9:51 AM        Failed - Blood pressure under 140/90 in past 12 months     BP Readings from Last 3 Encounters:   04/26/22 134/78   05/09/21 132/76   12/05/19 128/84                 Passed - Patient is age 6 or older        Passed - Recent (12 mo) or future (30 days) visit within the authorizing provider's specialty     Patient has had an office visit with the authorizing provider or a provider within the authorizing providers department within the previous 12 mos or has a future within next 30 days. See \"Patient Info\" tab in inbasket, or \"Choose Columns\" in Meds & Orders section of the refill encounter.              Passed - Medication is active on med list           Routing refill request to provider for review/approval because medication did not pass protocol.    Pt has a appointment on 07/25/23    Graciela Franco RN  [facility]   "

## 2023-07-23 ASSESSMENT — ENCOUNTER SYMPTOMS
FREQUENCY: 0
NAUSEA: 0
MYALGIAS: 0
PARESTHESIAS: 1
EYE PAIN: 0
SHORTNESS OF BREATH: 0
JOINT SWELLING: 0
ABDOMINAL PAIN: 0
DIARRHEA: 0
DIZZINESS: 0
COUGH: 0
NERVOUS/ANXIOUS: 0
SORE THROAT: 0
HEARTBURN: 1
WEAKNESS: 0
DYSURIA: 0
HEMATURIA: 0
CONSTIPATION: 0
HEMATOCHEZIA: 0
FEVER: 0
PALPITATIONS: 0
ARTHRALGIAS: 1
HEADACHES: 1
CHILLS: 0

## 2023-07-23 ASSESSMENT — ACTIVITIES OF DAILY LIVING (ADL): CURRENT_FUNCTION: NO ASSISTANCE NEEDED

## 2023-07-25 ENCOUNTER — LAB (OUTPATIENT)
Dept: FAMILY MEDICINE | Facility: CLINIC | Age: 68
End: 2023-07-25

## 2023-07-25 ENCOUNTER — OFFICE VISIT (OUTPATIENT)
Dept: FAMILY MEDICINE | Facility: CLINIC | Age: 68
End: 2023-07-25
Payer: COMMERCIAL

## 2023-07-25 VITALS
HEART RATE: 71 BPM | HEIGHT: 70 IN | WEIGHT: 216 LBS | RESPIRATION RATE: 19 BRPM | TEMPERATURE: 98.9 F | SYSTOLIC BLOOD PRESSURE: 134 MMHG | OXYGEN SATURATION: 98 % | DIASTOLIC BLOOD PRESSURE: 85 MMHG | BODY MASS INDEX: 30.92 KG/M2

## 2023-07-25 DIAGNOSIS — Z00.00 MEDICARE ANNUAL WELLNESS VISIT, SUBSEQUENT: Primary | ICD-10-CM

## 2023-07-25 DIAGNOSIS — N18.30 TYPE 2 DIABETES MELLITUS WITH STAGE 3 CHRONIC KIDNEY DISEASE, WITHOUT LONG-TERM CURRENT USE OF INSULIN, UNSPECIFIED WHETHER STAGE 3A OR 3B CKD (H): ICD-10-CM

## 2023-07-25 DIAGNOSIS — E11.22 TYPE 2 DIABETES MELLITUS WITH STAGE 3A CHRONIC KIDNEY DISEASE, WITHOUT LONG-TERM CURRENT USE OF INSULIN (H): ICD-10-CM

## 2023-07-25 DIAGNOSIS — M25.552 HIP PAIN, LEFT: ICD-10-CM

## 2023-07-25 DIAGNOSIS — Z11.3 ROUTINE SCREENING FOR STI (SEXUALLY TRANSMITTED INFECTION): ICD-10-CM

## 2023-07-25 DIAGNOSIS — Z12.11 SCREEN FOR COLON CANCER: ICD-10-CM

## 2023-07-25 DIAGNOSIS — E78.5 HYPERLIPIDEMIA LDL GOAL <130: ICD-10-CM

## 2023-07-25 DIAGNOSIS — N18.31 STAGE 3A CHRONIC KIDNEY DISEASE (H): ICD-10-CM

## 2023-07-25 DIAGNOSIS — F41.1 GAD (GENERALIZED ANXIETY DISORDER): ICD-10-CM

## 2023-07-25 DIAGNOSIS — N18.31 TYPE 2 DIABETES MELLITUS WITH STAGE 3A CHRONIC KIDNEY DISEASE, WITHOUT LONG-TERM CURRENT USE OF INSULIN (H): ICD-10-CM

## 2023-07-25 DIAGNOSIS — E55.9 VITAMIN D DEFICIENCY: ICD-10-CM

## 2023-07-25 DIAGNOSIS — E11.22 TYPE 2 DIABETES MELLITUS WITH STAGE 3 CHRONIC KIDNEY DISEASE, WITHOUT LONG-TERM CURRENT USE OF INSULIN, UNSPECIFIED WHETHER STAGE 3A OR 3B CKD (H): ICD-10-CM

## 2023-07-25 DIAGNOSIS — R36.1 BLOOD IN SEMEN: ICD-10-CM

## 2023-07-25 DIAGNOSIS — I10 BENIGN ESSENTIAL HYPERTENSION: ICD-10-CM

## 2023-07-25 DIAGNOSIS — R20.2 PARESTHESIA OF BOTH FEET: ICD-10-CM

## 2023-07-25 DIAGNOSIS — G43.009 MIGRAINE WITHOUT AURA AND WITHOUT STATUS MIGRAINOSUS, NOT INTRACTABLE: ICD-10-CM

## 2023-07-25 LAB — HBA1C MFR BLD: 7.3 % (ref 0–5.6)

## 2023-07-25 PROCEDURE — 0124A COVID-19 BIVALENT 12+ (PFIZER): CPT | Performed by: NURSE PRACTITIONER

## 2023-07-25 PROCEDURE — 80061 LIPID PANEL: CPT | Performed by: NURSE PRACTITIONER

## 2023-07-25 PROCEDURE — 87389 HIV-1 AG W/HIV-1&-2 AB AG IA: CPT | Performed by: NURSE PRACTITIONER

## 2023-07-25 PROCEDURE — 87591 N.GONORRHOEAE DNA AMP PROB: CPT | Performed by: NURSE PRACTITIONER

## 2023-07-25 PROCEDURE — 91312 COVID-19 BIVALENT 12+ (PFIZER): CPT | Performed by: NURSE PRACTITIONER

## 2023-07-25 PROCEDURE — G0103 PSA SCREENING: HCPCS | Performed by: NURSE PRACTITIONER

## 2023-07-25 PROCEDURE — 83036 HEMOGLOBIN GLYCOSYLATED A1C: CPT | Performed by: NURSE PRACTITIONER

## 2023-07-25 PROCEDURE — 36415 COLL VENOUS BLD VENIPUNCTURE: CPT | Performed by: NURSE PRACTITIONER

## 2023-07-25 PROCEDURE — 82570 ASSAY OF URINE CREATININE: CPT | Performed by: NURSE PRACTITIONER

## 2023-07-25 PROCEDURE — 86780 TREPONEMA PALLIDUM: CPT | Performed by: NURSE PRACTITIONER

## 2023-07-25 PROCEDURE — 87491 CHLMYD TRACH DNA AMP PROBE: CPT | Performed by: NURSE PRACTITIONER

## 2023-07-25 PROCEDURE — 99214 OFFICE O/P EST MOD 30 MIN: CPT | Mod: 25 | Performed by: NURSE PRACTITIONER

## 2023-07-25 PROCEDURE — 80053 COMPREHEN METABOLIC PANEL: CPT | Performed by: NURSE PRACTITIONER

## 2023-07-25 PROCEDURE — 82043 UR ALBUMIN QUANTITATIVE: CPT | Performed by: NURSE PRACTITIONER

## 2023-07-25 PROCEDURE — G0402 INITIAL PREVENTIVE EXAM: HCPCS | Performed by: NURSE PRACTITIONER

## 2023-07-25 RX ORDER — LOSARTAN POTASSIUM AND HYDROCHLOROTHIAZIDE 25; 100 MG/1; MG/1
1 TABLET ORAL DAILY
Qty: 90 TABLET | Refills: 1 | Status: SHIPPED | OUTPATIENT
Start: 2023-07-25 | End: 2024-01-23

## 2023-07-25 RX ORDER — ATENOLOL 50 MG/1
50 TABLET ORAL DAILY
Qty: 90 TABLET | Refills: 3 | Status: SHIPPED | OUTPATIENT
Start: 2023-07-25 | End: 2024-07-24

## 2023-07-25 RX ORDER — GABAPENTIN 300 MG/1
CAPSULE ORAL
Qty: 360 CAPSULE | Refills: 3 | Status: SHIPPED | OUTPATIENT
Start: 2023-07-25 | End: 2024-07-30

## 2023-07-25 RX ORDER — ASPIRIN 81 MG/1
81 TABLET ORAL DAILY
Qty: 90 TABLET | Refills: 3 | Status: SHIPPED | OUTPATIENT
Start: 2023-07-25 | End: 2024-07-30

## 2023-07-25 RX ORDER — CITALOPRAM HYDROBROMIDE 10 MG/1
10 TABLET ORAL DAILY
Qty: 90 TABLET | Refills: 1 | Status: SHIPPED | OUTPATIENT
Start: 2023-07-25 | End: 2024-01-23

## 2023-07-25 RX ORDER — RIZATRIPTAN BENZOATE 5 MG/1
5 TABLET ORAL
Qty: 16 TABLET | Refills: 1 | Status: SHIPPED | OUTPATIENT
Start: 2023-07-25 | End: 2023-10-09

## 2023-07-25 ASSESSMENT — ENCOUNTER SYMPTOMS
HEARTBURN: 1
NAUSEA: 0
CONSTIPATION: 0
MYALGIAS: 0
JOINT SWELLING: 0
SORE THROAT: 0
ABDOMINAL PAIN: 0
HEADACHES: 1
DYSURIA: 0
HEMATURIA: 0
FREQUENCY: 0
PALPITATIONS: 0
DIZZINESS: 0
DIARRHEA: 0
WEAKNESS: 0
FEVER: 0
HEMATOCHEZIA: 0
COUGH: 0
EYE PAIN: 0
ARTHRALGIAS: 1
NERVOUS/ANXIOUS: 0
CHILLS: 0
SHORTNESS OF BREATH: 0
PARESTHESIAS: 1

## 2023-07-25 ASSESSMENT — ACTIVITIES OF DAILY LIVING (ADL): CURRENT_FUNCTION: NO ASSISTANCE NEEDED

## 2023-07-25 ASSESSMENT — PAIN SCALES - GENERAL: PAINLEVEL: MILD PAIN (2)

## 2023-07-25 NOTE — PROGRESS NOTES
"SUBJECTIVE:   Karan is a 68 year old who presents for Preventive Visit.      7/25/2023     1:50 PM   Additional Questions   Roomed by Osmany Munoz   Accompanied by N/A     Are you in the first 12 months of your Medicare coverage?  Yes,  Visual Acuity:  Right Eye: 20/32   Left Eye: 20/25  Both Eyes: 20/25    Healthy Habits:     In general, how would you rate your overall health?  Fair    Frequency of exercise:  4-5 days/week    Duration of exercise:  15-30 minutes    Do you usually eat at least 4 servings of fruit and vegetables a day, include whole grains    & fiber and avoid regularly eating high fat or \"junk\" foods?  Yes    Ability to successfully perform activities of daily living:  No assistance needed    Home Safety:  No safety concerns identified    Hearing Impairment:  No hearing concerns    In the past 6 months, have you been bothered by leaking of urine?  No    In general, how would you rate your overall mental or emotional health?  Fair    Additional concerns today:  Yes    Pt is having pain in his left hip, started a few months ago. Reports his pain is in his left hip tendon/joint/muscle and radiates into his groin. Characterizes is as shooting pain when he stands up and has to readjust. He has tried doing exercises he found online and they have not helped. Has taken some tylenol and ibuprofen for pain, some heat or ice sometimes help; potentially interested in PT. Denies numbness/tingling. Denies loss of urinary function.     Pt has been noticing blood in his semen. Has had the problem years ago, but has recently started again. Started a few days ago. Blood-tinged. Had been evaluated by urology in 2019 and 2021 and was diagnosed with kidney stones. Presently denies flank pain. Denies burning and pain on urination. Denies change in urine color.      Would like to address neuropathy/ burning in his feet getting worse. Taking gabapentin for this pain 300 mg three times a day. Burning is the worse at night " and when laying down.    Asking about COVID vaccination and monkeypox.     Not checking blood sugars at home and when he has they have been higher than typical. Blood pressures have been in the 130's/80's    Family history of colon cancer and colon polyps - Karan himself hasn't had colon polyps. No specific known genetic causes.    Taking esomeprazole 20 mg daily for the past 4-5 years. Hasn't had trial off. Heartburn is only moderately controlled and can have break through acid reflux when drinking alcohol or eating before laying down.    Migraines -  limit is 8 per month    HM -               Cancer screenings - discuss colon ca screening options; reports he has family members who were diagnosed with colon CA around his age; last colonoscopy was in 2006 but has done cologuard or fit testing since then               Chronic conditions screenings -               Immunizations - asking covid booster and monkeypox   Habits  -   Exercise - currently minimal exercising - just walking               Nutrition - regular diet, not as healthy               Mental health/mindfulness - lots of stress recently, lots of restructuring at his work              Alcohol/cigarettes - 2-3 cocktails/weeks  Sexual health - no STI concerns   Goals - stress management; BG control   Challenges - stress        Have you ever done Advance Care Planning? (For example, a Health Directive, POLST, or a discussion with a medical provider or your loved ones about your wishes): No, advance care planning information given to patient to review.  Patient plans to discuss their wishes with loved ones or provider.        Fall risk  Fallen 2 or more times in the past year?: No  Any fall with injury in the past year?: No    Cognitive Screening   1) Repeat 3 items (Leader, Season, Table)    2) Clock draw: NORMAL  3) 3 item recall: Recalls 3 objects  Results: 3 items recalled: COGNITIVE IMPAIRMENT LESS LIKELY    Mini-CogTM Copyright S Ankush.  Licensed by the author for use in [facility]; reprinted with permission (jakereinier@[email or web address]). All rights reserved.      Do you have sleep apnea, excessive snoring or daytime drowsiness? : no    Reviewed and updated as needed this visit by clinical staff   Tobacco  Allergies  Meds              Reviewed and updated as needed this visit by Provider                 Social History     Tobacco Use    Smoking status: Never    Smokeless tobacco: Never   Substance Use Topics    Alcohol use: Yes     Comment: 3 per week             7/23/2023    12:02 PM   Alcohol Use   Prescreen: >3 drinks/day or >7 drinks/week? No          No data to display              Do you have a current opioid prescription? No  Do you use any other controlled substances or medications that are not prescribed by a provider? Alcohol      Current providers sharing in care for this patient include:   Patient Care Team:  Romelia Judd APRN CNP as PCP - General (Nurse Practitioner - Family)  Tc Hester MD as MD (Family Medicine - Sports Medicine)  Savannah Burrows PA-C as Physician Assistant (Physician Assistant)  Shahrzad Cash MD as MD (Urology)  Naima Elise RN as Registered Nurse (Urology)  Romelia Judd APRN CNP as Referring Physician (Nurse Practitioner - Family)  Romelia Judd APRN CNP as Assigned PCP  Nena Aquino RPH as Pharmacist (Pharmacist)  Nena Aquino RPH as Assigned [place] Pharmacist    The following health maintenance items are reviewed in Epic and correct as of today:  Health Maintenance   Topic Date Due    EYE EXAM  Never done    Pneumococcal Vaccine: 65+ Years (1 - PCV) Never done    HEPATITIS A IMMUNIZATION (1 of 2 - Risk 2-dose series) Never done    HEPATITIS B IMMUNIZATION (1 of 3 - Risk 3-dose series) Never done    COLORECTAL CANCER SCREENING  02/07/2020    COVID-19 Vaccine (6 - Pfizer series) 01/19/2023    A1C  03/19/2023    MEDICARE ANNUAL WELLNESS VISIT   04/26/2023    LIPID  04/26/2023    MICROALBUMIN  04/26/2023    DIABETIC FOOT EXAM  04/26/2023    ANNUAL REVIEW OF HM ORDERS  04/26/2023    INFLUENZA VACCINE (1) 09/01/2023    BMP  09/19/2023    DTAP/TDAP/TD IMMUNIZATION (2 - Td or Tdap) 12/15/2023    FALL RISK ASSESSMENT  07/25/2024    ADVANCE CARE PLANNING  07/25/2028    PHQ-2 (once per calendar year)  Completed    URINALYSIS  Completed    ZOSTER IMMUNIZATION  Completed    AORTIC ANEURYSM SCREENING (SYSTEM ASSIGNED)  Completed    IPV IMMUNIZATION  Aged Out    MENINGITIS IMMUNIZATION  Aged Out    HEPATITIS C SCREENING  Discontinued    HEMOGLOBIN  Discontinued     Lab work is in process  Labs reviewed in EPIC  BP Readings from Last 3 Encounters:   07/25/23 134/85   04/26/22 134/78   05/09/21 132/76    Wt Readings from Last 3 Encounters:   07/25/23 98 kg (216 lb)   04/26/22 101.1 kg (222 lb 12.8 oz)   05/09/21 99.8 kg (220 lb)                  Patient Active Problem List   Diagnosis    Benign essential hypertension    Hyperlipidemia LDL goal <130    Migraine without aura and without status migrainosus, not intractable    Blood in semen    Shoulder dislocation, left, initial encounter    Aftercare following surgery of the musculoskeletal system    Microalbuminuria    Type 2 diabetes mellitus with stage 3 chronic kidney disease, without long-term current use of insulin (H)    CKD (chronic kidney disease) stage 3, GFR 30-59 ml/min (H)    Nephrolithiasis     Past Surgical History:   Procedure Laterality Date    GI SURGERY  2013    peptic ulcers stapled    OPEN REDUCTION INTERNAL FIXATION HUMERUS PROXIMAL Left 5/15/2018    Procedure: OPEN REDUCTION INTERNAL FIXATION HUMERUS PROXIMAL;  Left Glenoid Open Reduction Internal Fixation;  Surgeon: Rosa Joe MD;  Location:  OR       Social History     Tobacco Use    Smoking status: Never    Smokeless tobacco: Never   Substance Use Topics    Alcohol use: Yes     Comment: 3 per week     Family History   Problem  Relation Age of Onset    Colon Cancer Mother     Kidney Disease Mother     Coronary Artery Disease Father     Enlarged prostate Father     Down Syndrome Sister     Alzheimer Disease Sister     Colon Polyps Sister     Nephrolithiasis Sister     Colon Polyps Sister     Intellectual Disability (Mental Retardation) Sister         Down Syndrome    Colon Polyps Sister     Colon Polyps Sister     Testicular cancer Brother     Diabetes Brother          Current Outpatient Medications   Medication Sig Dispense Refill    aspirin 81 MG EC tablet Take 1 tablet (81 mg) by mouth daily 90 tablet 3    atenolol (TENORMIN) 50 MG tablet Take 1 tablet (50 mg) by mouth daily 90 tablet 3    atorvastatin (LIPITOR) 40 MG tablet Take 1 tablet (40 mg) by mouth daily 90 tablet 3    blood glucose (NO BRAND SPECIFIED) lancets standard Use to test blood sugar 4 times daily or as directed. 100 each 3    blood glucose (NO BRAND SPECIFIED) test strip Use to test blood sugar 4 times daily or as directed. 100 strip 3    cholecalciferol (VITAMIN D3) 25 mcg (1000 units) capsule Take 2 capsules (50 mcg) by mouth daily 60 capsule 11    citalopram (CELEXA) 10 MG tablet Take 1 tablet (10 mg) by mouth daily 90 tablet 1    diphenhydrAMINE (BENADRYL) 25 MG tablet Take 25 mg by mouth nightly as needed for itching or allergies      esomeprazole (NEXIUM) 20 MG DR capsule Take 1 capsule (20 mg) by mouth daily      gabapentin (NEURONTIN) 300 MG capsule Take 1 capsule (300 mg) by mouth 2 times daily AND 2 capsules (600 mg) At Bedtime. Due for appt 360 capsule 3    losartan-hydrochlorothiazide (HYZAAR) 100-25 MG tablet Take 1 tablet by mouth daily 90 tablet 1    metFORMIN (GLUCOPHAGE XR) 500 MG 24 hr tablet Take 2 tablets (1,000 mg) by mouth daily (with dinner) Take 1 tablet once a day with breakfast x5 days, then increase to 2 tablets once a day 180 tablet 3    rizatriptan (MAXALT) 5 MG tablet Take 1 tablet (5 mg) by mouth at onset of headache for migraine repeat  "after 2 hr if no relief; maximum: 30 mg/24 hours 16 tablet 1     No Known Allergies  Recent Labs   Lab Test 07/25/23  1620 09/19/22  1502 04/26/22  1551 02/27/21  0912 07/03/20  0848 07/02/20  1555 12/05/19  1458   A1C 7.3* 6.7* 10.4* 7.5*   < >  --  6.5*   LDL  --   --  75 89  --   --  94   HDL  --   --  39* 37*  --   --  42   TRIG  --   --  262* 252*  --   --  258*   ALT  --   --  47  --   --  38 40   CR  --  1.47* 1.16 1.41*  --  1.33* 1.47*   GFRESTIMATED  --  53* 70 52*  --  56* 50*   GFRESTBLACK  --   --   --  61  --  65 58*   POTASSIUM  --  3.8 3.8 4.0  --  3.7 3.7   TSH  --  0.69  --   --   --   --  0.94    < > = values in this interval not displayed.          Review of Systems   Constitutional:  Negative for chills and fever.   HENT:  Negative for congestion, ear pain, hearing loss and sore throat.    Eyes:  Negative for pain and visual disturbance.   Respiratory:  Negative for cough and shortness of breath.    Cardiovascular:  Negative for chest pain, palpitations and peripheral edema.   Gastrointestinal:  Positive for heartburn. Negative for abdominal pain, constipation, diarrhea, hematochezia and nausea.   Genitourinary:  Negative for dysuria, frequency, genital sores, hematuria, impotence, penile discharge and urgency.   Musculoskeletal:  Positive for arthralgias. Negative for joint swelling and myalgias.   Skin:  Negative for rash.   Neurological:  Positive for headaches and paresthesias. Negative for dizziness and weakness.   Psychiatric/Behavioral:  Negative for mood changes. The patient is not nervous/anxious.        OBJECTIVE:   /85 (BP Location: Right arm, Patient Position: Sitting, Cuff Size: Adult Large)   Pulse 71   Temp 98.9  F (37.2  C) (Temporal)   Resp 19   Ht 1.78 m (5' 10.08\")   Wt 98 kg (216 lb)   SpO2 98%   BMI 30.92 kg/m   Estimated body mass index is 30.92 kg/m  as calculated from the following:    Height as of this encounter: 1.78 m (5' 10.08\").    Weight as of this " encounter: 98 kg (216 lb).  Physical Exam  GENERAL: healthy, alert and no distress  EYES: Eyes grossly normal to inspection, PERRL and conjunctivae and sclerae normal  HENT: ear canals and TM's normal, nose and mouth without ulcers or lesions  NECK: no adenopathy, no asymmetry, masses, or scars and thyroid normal to palpation  RESP: lungs clear to auscultation - no rales, rhonchi or wheezes  CV: regular rate and rhythm, normal S1 S2, no S3 or S4, no murmur, click or rub, no peripheral edema and peripheral pulses strong  ABDOMEN: soft, nontender, no hepatosplenomegaly, no masses and bowel sounds normal  MS: no gross musculoskeletal defects noted, no edema  SKIN: no suspicious lesions or rashes  NEURO: Normal strength and tone, mentation intact and speech normal  PSYCH: mentation appears normal, affect normal/bright    Diagnostic Test Results:  Labs reviewed in Epic  Results for orders placed or performed in visit on 07/25/23 (from the past 24 hour(s))   HEMOGLOBIN A1C   Result Value Ref Range    Hemoglobin A1C 7.3 (H) 0.0 - 5.6 %       ASSESSMENT / PLAN:       (Z00.00) Medicare annual wellness visit, subsequent  Comment: Overall doing ok, counseling provided to pt on a handful of problems   Plan: return in one year for repeat annual physical   HM -               Cancer screenings - discuss colon ca screening options; reports he has family members who were diagnosed with colon CA around his age; last colonoscopy was in 2006 but has done cologuard or fit testing since then               Chronic conditions screenings -               Immunizations - asking covid booster and monkeypox   Habits  -   Exercise - currently minimal exercising - just walking               Nutrition - regular diet, not as healthy               Mental health/mindfulness - lots of stress recently, lots of restructuring at his work              Alcohol/cigarettes - 2-3 cocktails/weeks  Sexual health - no STI concerns   Goals - stress management; BG  control   Challenges - stress    (E11.22,  N18.30) Type 2 diabetes mellitus with stage 3 chronic kidney disease, without long-term current use of insulin, unspecified whether stage 3a or 3b CKD (H)  Comment: Not checking blood sugar as often; reports has been more lax in diet dt recent stressors   Plan: Adult Eye  Referral, HEMOGLOBIN A1C,         Lipid panel reflex to direct LDL Non-fasting,         Comprehensive metabolic panel (BMP + Alb, Alk         Phos, ALT, AST, Total. Bili, TP), Albumin         Random Urine Quantitative with Creat Ratio        Check A1C and adjust therapy as needed; has been on metformin for years and is tolerating well     (Z12.11) Screen for colon cancer  Comment: family hx of polyps   Plan: COLOGUARD([employer])        Placed order for screening test     (M25.552) Hip pain, left  Comment: intermittent symptoms that happen occasionally; denies change in bowel or bladder  Plan: Physical Therapy Referral        PT referral for treatment and strengthening exercises     (E78.5) Hyperlipidemia LDL goal <130  Comment: on lipitor  Plan: Obtain screening for lipids today     (I10) Benign essential hypertension  Comment: BP trending 130/80  Plan: Comprehensive metabolic panel (BMP + Alb, Alk         Phos, ALT, AST, Total. Bili, TP), Albumin         Random Urine Quantitative with Creat Ratio,         losartan-hydrochlorothiazide (HYZAAR) 100-25 MG        tablet, atenolol (TENORMIN) 50 MG tablet,         aspirin 81 MG EC tablet        Continue current medication regimen    (N18.31) Stage 3a chronic kidney disease (H)  Comment: Last creatinine 1.47 which is around his baseline of 1.3-1.4  Plan: Comprehensive metabolic panel (BMP + Alb, Alk         Phos, ALT, AST, Total. Bili, TP), Albumin         Random Urine Quantitative with Creat Ratio        Continue to monitor     (R36.1) Blood in semen  Comment: Previously had blood-tinged semen when passing kidney stones, however denies similar  symptoms now.   Plan: PSA, screen        Check PSA and continue from there.     (R20.2) Paresthesia of both feet  Comment: Diabetic foot exam with sensation intact in entire foot   Plan: gabapentin (NEURONTIN) 300 MG capsule        Pt-reported worsening paresthesias likely dt increased blood sugars, expect sensation may improve with better BG control     (F41.1) SIAC (generalized anxiety disorder)  Comment: Reports increased stress  Plan: citalopram (CELEXA) 10 MG tablet        Continue current dose of medication, encouraged support system     (E11.22,  N18.31) Type 2 diabetes mellitus with stage 3a chronic kidney disease, without long-term current use of insulin (H)  Comment: Has not been checking blood sugars as frequently as he should   Plan: aspirin 81 MG EC tablet, blood glucose (NO         BRAND SPECIFIED) test strip, blood glucose (NO         BRAND SPECIFIED) lancets standard        Encouraged pt to look after his health     (E55.9) Vitamin D deficiency  Comment: Reports some anxiety and depression   Plan: cholecalciferol (VITAMIN D3) 25 mcg (1000         units) capsule        Will check vitamin D levels     (G43.009) Migraine without aura and without status migrainosus, not intractable  Comment: Reports getting about 8 migraines a month for which he takes maxalt   Plan: rizatriptan (MAXALT) 5 MG tablet        Continue therapy for migraines     (Z11.3) Routine screening for STI (sexually transmitted infection)  (primary encounter diagnosis)  Comment: Denies active symptoms however will screen dt high risk and being sexually active  Plan: HIV Antigen Antibody Combo, Treponema Abs w         Reflex to RPR and Titer, NEISSERIA GONORRHOEA         PCR, CHLAMYDIA TRACHOMATIS PCR, Chlamydia         trachomatis/Neisseria gonorrhoeae by PCR        Screen for STIs today     Patient has been advised of split billing requirements and indicates understanding: Yes      COUNSELING:  Reviewed preventive health counseling, as  "reflected in patient instructions  Special attention given to:       Regular exercise       Healthy diet/nutrition       Vision screening       Hearing screening       Dental care       Bladder control       Alcohol Use        Safe sex practices/STD prevention       Hepatitis C screening       HIV screening for high risk patient       Colon cancer screening       Prostate cancer screening      BMI:   Estimated body mass index is 30.92 kg/m  as calculated from the following:    Height as of this encounter: 1.78 m (5' 10.08\").    Weight as of this encounter: 98 kg (216 lb).   Weight management plan: Discussed healthy diet and exercise guidelines      He reports that he has never smoked. He has never used smokeless tobacco.      Appropriate preventive services were discussed with this patient, including applicable screening as appropriate for cardiovascular disease, diabetes, osteopenia/osteoporosis, and glaucoma.  As appropriate for age/gender, discussed screening for colorectal cancer, prostate cancer, breast cancer, and cervical cancer. Checklist reviewing preventive services available has been given to the patient.    Reviewed patients plan of care and provided an AVS. The Intermediate Care Plan ( asthma action plan, low back pain action plan, and migraine action plan) for Karan meets the Care Plan requirement. This Care Plan has been established and reviewed with the Patient.          JEFFERSON Cook CNP  [facility]    Present and preformed physical exam with student nurse-family practice. The patient was evaluated and managed, by Brittney Simon RN, SNP in collaboration with JEFFERSON Isbell CNP supervising clinical preceptor.    Documentation written by JEFFERSON Isbell CNP    Identified Health Risks:  I have reviewed Opioid Use Disorder and Substance Use Disorder risk factors and made any needed referrals.   "

## 2023-07-26 LAB
ALBUMIN SERPL BCG-MCNC: 4.8 G/DL (ref 3.5–5.2)
ALP SERPL-CCNC: 91 U/L (ref 40–129)
ALT SERPL W P-5'-P-CCNC: 26 U/L (ref 0–70)
ANION GAP SERPL CALCULATED.3IONS-SCNC: 12 MMOL/L (ref 7–15)
AST SERPL W P-5'-P-CCNC: 27 U/L (ref 0–45)
BILIRUB SERPL-MCNC: 0.4 MG/DL
BUN SERPL-MCNC: 27 MG/DL (ref 8–23)
C TRACH DNA SPEC QL PROBE+SIG AMP: NEGATIVE
CALCIUM SERPL-MCNC: 9.6 MG/DL (ref 8.8–10.2)
CHLORIDE SERPL-SCNC: 99 MMOL/L (ref 98–107)
CHOLEST SERPL-MCNC: 152 MG/DL
CREAT SERPL-MCNC: 1.23 MG/DL (ref 0.67–1.17)
CREAT UR-MCNC: 27.4 MG/DL
DEPRECATED HCO3 PLAS-SCNC: 25 MMOL/L (ref 22–29)
GFR SERPL CREATININE-BSD FRML MDRD: 65 ML/MIN/1.73M2
GLUCOSE SERPL-MCNC: 116 MG/DL (ref 70–99)
HDLC SERPL-MCNC: 37 MG/DL
HIV 1+2 AB+HIV1 P24 AG SERPL QL IA: NONREACTIVE
LDLC SERPL CALC-MCNC: 84 MG/DL
MICROALBUMIN UR-MCNC: 17.9 MG/L
MICROALBUMIN/CREAT UR: 65.33 MG/G CR (ref 0–17)
N GONORRHOEA DNA SPEC QL NAA+PROBE: NEGATIVE
NONHDLC SERPL-MCNC: 115 MG/DL
POTASSIUM SERPL-SCNC: 4.2 MMOL/L (ref 3.4–5.3)
PROT SERPL-MCNC: 7.6 G/DL (ref 6.4–8.3)
PSA SERPL DL<=0.01 NG/ML-MCNC: 0.61 NG/ML (ref 0–4.5)
SODIUM SERPL-SCNC: 136 MMOL/L (ref 136–145)
T PALLIDUM AB SER QL: NONREACTIVE
TRIGL SERPL-MCNC: 154 MG/DL

## 2023-09-07 ENCOUNTER — OFFICE VISIT (OUTPATIENT)
Dept: FAMILY MEDICINE | Facility: CLINIC | Age: 68
End: 2023-09-07
Payer: COMMERCIAL

## 2023-09-07 VITALS
WEIGHT: 216 LBS | RESPIRATION RATE: 18 BRPM | TEMPERATURE: 98.3 F | BODY MASS INDEX: 30.92 KG/M2 | HEART RATE: 72 BPM | DIASTOLIC BLOOD PRESSURE: 84 MMHG | SYSTOLIC BLOOD PRESSURE: 148 MMHG | OXYGEN SATURATION: 96 %

## 2023-09-07 DIAGNOSIS — H61.23 BILATERAL IMPACTED CERUMEN: Primary | ICD-10-CM

## 2023-09-07 DIAGNOSIS — H65.93 MIDDLE EAR EFFUSION, BILATERAL: ICD-10-CM

## 2023-09-07 PROCEDURE — 99212 OFFICE O/P EST SF 10 MIN: CPT | Mod: 25 | Performed by: PHYSICIAN ASSISTANT

## 2023-09-07 PROCEDURE — 69209 REMOVE IMPACTED EAR WAX UNI: CPT | Mod: 50 | Performed by: PHYSICIAN ASSISTANT

## 2023-09-07 ASSESSMENT — ENCOUNTER SYMPTOMS
CHILLS: 0
RHINORRHEA: 0
FEVER: 0

## 2023-09-07 NOTE — PROGRESS NOTES
Assessment & Plan     Bilateral impacted cerumen    -Cerumen removal was done using the ear irrigation technique, with warm water.  Patient tolerated the procedure well.  Ear canal visualized bilaterally.  Middle ear effusion present bilaterally.  Tympanic membrane is normal.  - NC REMOVAL IMPACTED CERUMEN IRRIGATION/LVG UNILAT     Middle ear effusion, bilateral     -Patient advised that usually the ear effusion resolves in 1-3 weeks.  Patient to follow-up in the clinic if he experiences ear pain or worsening ear fullness     Patient Instructions   Follow up in the clinic as needed or with worsening symptoms.    Return if symptoms worsen or fail to improve, for Follow up.    At the end of the encounter, I discussed results, diagnosis, medications. Discussed red flags for immediate return to clinic/ER, as well as indications for follow up if no improvement. Patient understood and agreed to plan. Patient was stable for discharge.    Marion Russo is a 68 year old male who presents to clinic today for the following health issues:  Chief Complaint   Patient presents with    Ear Problem     Pt states started 3 weeks both ear ache,itching and plugged      HPI    Patient reports ear fullness, ringing and ear pain for 3 weeks. He notes trying to flush with water and using Q-tips which made the symptoms worse. Denies dizziness, congestion, runny nose.     Review of Systems   Constitutional:  Negative for chills and fever.   HENT:  Positive for ear pain and tinnitus. Negative for congestion and rhinorrhea.        Problem List:  2019-04: CKD (chronic kidney disease) stage 3, GFR 30-59 ml/min (H)  2019-04: Nephrolithiasis  2019-03: Acute pain of left knee  2019-01: Type 2 diabetes mellitus with stage 3 chronic kidney disease,   without long-term current use of insulin (H)  2018-06: Microalbuminuria  2018-06: Shoulder dislocation, left, initial encounter  2018-06: Aftercare following surgery of the musculoskeletal  system  2017-09: Benign essential hypertension  2017-09: Hyperlipidemia LDL goal <130  2017-09: Migraine without aura and without status migrainosus, not   intractable  2017-09: Blood in semen      Past Medical History:   Diagnosis Date    Blood in semen 2016    Symptoms came and went    GERD (gastroesophageal reflux disease)     Hyperlipidemia 2015    Hypertension 2014    Migraine     Nausea and vomiting    Peptic ulcer hemorrhage 2013       Social History     Tobacco Use    Smoking status: Never    Smokeless tobacco: Never   Substance Use Topics    Alcohol use: Yes     Comment: 3 per week           Objective    BP (!) 148/84 (BP Location: Right arm, Patient Position: Sitting, Cuff Size: Adult Regular)   Pulse 72   Temp 98.3  F (36.8  C) (Oral)   Resp 18   Wt 98 kg (216 lb)   SpO2 96%   BMI 30.92 kg/m    Physical Exam  Constitutional:       Appearance: Normal appearance.   HENT:      Head: Normocephalic.      Right Ear: There is impacted cerumen.      Left Ear: There is impacted cerumen.   Cardiovascular:      Rate and Rhythm: Normal rate and regular rhythm.   Pulmonary:      Effort: Pulmonary effort is normal.      Breath sounds: Normal breath sounds.   Lymphadenopathy:      Head:      Right side of head: No submental, submandibular, tonsillar, preauricular or posterior auricular adenopathy.      Left side of head: No submental, submandibular, tonsillar, preauricular or posterior auricular adenopathy.   Skin:     General: Skin is warm and dry.   Neurological:      Mental Status: He is alert.   Psychiatric:         Mood and Affect: Mood normal.         Behavior: Behavior normal.              Gabbie Krishnamurthy PA-C

## 2023-09-08 NOTE — RESULT ENCOUNTER NOTE
Karan,    Your A1C was increased from the last time we checked. It is still under the goal of 8. You are taking the maximum dose of metformin right now, so if we wanted to improve this, we'd need to add a medication.  I believe you did have room to improve exercise and diet if you prefer to do that instead.  There is a diabetes medication called Jardiance that might be a nice addition because it not only lowers blood sugar, but provides kidney and heart protection and this would be particularly good with your chronic kidney disease. If you are interested in this, I'd start a small dose of 10 mg daily.  Please let me know.    If you have any questions, please feel free to contact the clinic.    MARJ Rashid

## 2023-09-09 ENCOUNTER — OFFICE VISIT (OUTPATIENT)
Dept: FAMILY MEDICINE | Facility: CLINIC | Age: 68
End: 2023-09-09
Payer: COMMERCIAL

## 2023-09-09 VITALS
SYSTOLIC BLOOD PRESSURE: 131 MMHG | DIASTOLIC BLOOD PRESSURE: 85 MMHG | WEIGHT: 212 LBS | OXYGEN SATURATION: 97 % | BODY MASS INDEX: 30.35 KG/M2 | TEMPERATURE: 97 F | HEART RATE: 61 BPM

## 2023-09-09 DIAGNOSIS — H69.93 DYSFUNCTION OF BOTH EUSTACHIAN TUBES: Primary | ICD-10-CM

## 2023-09-09 PROCEDURE — 99213 OFFICE O/P EST LOW 20 MIN: CPT | Performed by: FAMILY MEDICINE

## 2023-09-09 RX ORDER — AMOXICILLIN 875 MG
875 TABLET ORAL 2 TIMES DAILY
Qty: 14 TABLET | Refills: 0 | Status: SHIPPED | OUTPATIENT
Start: 2023-09-09 | End: 2023-09-16

## 2023-09-09 RX ORDER — FLUTICASONE PROPIONATE 50 MCG
1 SPRAY, SUSPENSION (ML) NASAL DAILY
Qty: 18.2 ML | Refills: 0 | Status: SHIPPED | OUTPATIENT
Start: 2023-09-09

## 2023-09-09 NOTE — PROGRESS NOTES
Assessment & Plan     Dysfunction of both eustachian tubes  Patient is describing fluid in both eardrums very visible on the left side.  Because of ongoing issues I have agreed to try amoxicillin for possible smoldering infection.  We talked about allowing the tube to drain through the nose he has not had obvious allergies he is a non-smoker but Flonase for couple weeks may help him.  We also talked about Valsalva maneuvers  - amoxicillin (AMOXIL) 875 MG tablet; Take 1 tablet (875 mg) by mouth 2 times daily for 7 days  - fluticasone (FLONASE) 50 MCG/ACT nasal spray; Spray 1 spray into both nostrils daily                 No follow-ups on file.    Gerardo Hernandez MD  [facility]    [place] Russo is a 68 year old, presenting for the following health issues:  Ear Problem (Left ear ringing had ears flushed 2 days ago)      68-year-old here because ear fullness muffled hearing and an uncomfortable fullness feeling.  He was seen 2 days ago and noted to have middle ear effusion he also had cerumen impaction that was irrigated.  If anything he is having more discomfort today so he comes in.  He has had some migraines otherwise he feels okay                 Review of Systems         Objective    /85   Pulse 61   Temp 97  F (36.1  C) (Tympanic)   Wt 96.2 kg (212 lb)   SpO2 97%   BMI 30.35 kg/m    Body mass index is 30.35 kg/m .  Physical Exam  Vitals and nursing note reviewed.   Constitutional:       Appearance: Normal appearance.   HENT:      Ears:      Comments: Left ear canal is clear tympanic membrane has fluid behind it is white  Right ear canal and tympanic membrane are normal  Cardiovascular:      Rate and Rhythm: Normal rate.   Abdominal:      General: Abdomen is flat.   Skin:     General: Skin is warm.   Neurological:      Mental Status: He is alert.   Psychiatric:         Mood and Affect: Mood normal.         Behavior: Behavior normal.

## 2023-09-12 ENCOUNTER — MYC MEDICAL ADVICE (OUTPATIENT)
Dept: FAMILY MEDICINE | Facility: CLINIC | Age: 68
End: 2023-09-12
Payer: COMMERCIAL

## 2023-09-12 DIAGNOSIS — H69.93 DYSFUNCTION OF BOTH EUSTACHIAN TUBES: Primary | ICD-10-CM

## 2023-09-18 NOTE — TELEPHONE ENCOUNTER
FUTURE VISIT INFORMATION:      FUTURE VISIT INFORMATION:  Date: 1/4/2024  Time: 9:40 AM  [place] [facility] [place]  REFERRAL INFORMATION:  Referring provider:   Referring providers clinic:   Reason for visit/diagnosis:  H69.83 (ICD-10-CM) - Dysfunction of both eustachian tubes, Referral from Romelia Judd, Referral notes in EPIC. Pt made appt for [facility] Location     RECORDS REQUESTED FROM:       Clinic name Comments Records Status Imaging Status   [facility] 9/9/23 OV with Gerardo Hernandez MD  9/7/23 OV with Gabbie Krishnamurthy PA-C  [facility]  11/20/23 note visit w/ Ulises Barbosa MD    12/11/23 note visit w/ Ulises Barbosa MD      Audiogram - requested/ Received send to scan  12/11/23              December 13, 2023 6:39 PM - request for audiogram from [facility]  December 18, 2023 6:19 PM - Received 12/11 audiogram and send to [facility]

## 2023-09-20 ENCOUNTER — TELEPHONE (OUTPATIENT)
Dept: FAMILY MEDICINE | Facility: CLINIC | Age: 68
End: 2023-09-20
Payer: COMMERCIAL

## 2023-09-20 DIAGNOSIS — E11.22 TYPE 2 DIABETES MELLITUS WITH STAGE 3A CHRONIC KIDNEY DISEASE, WITHOUT LONG-TERM CURRENT USE OF INSULIN (H): ICD-10-CM

## 2023-09-20 DIAGNOSIS — N18.31 TYPE 2 DIABETES MELLITUS WITH STAGE 3A CHRONIC KIDNEY DISEASE, WITHOUT LONG-TERM CURRENT USE OF INSULIN (H): ICD-10-CM

## 2023-09-20 DIAGNOSIS — N18.31 STAGE 3A CHRONIC KIDNEY DISEASE (H): Primary | ICD-10-CM

## 2023-09-20 NOTE — TELEPHONE ENCOUNTER
Triage,   Patient called.   Received message from MS regarding his most recent labs about starting jardiance 10mg daily.  Patient is interested, but had no way to respond to the message.  Please send Rx to the [facility] in [place] on [address]   Thanks!  Mariangel BARDALES

## 2023-09-21 NOTE — TELEPHONE ENCOUNTER
Thank you! I don't know why it wouldn't let him message me, but I'm glad he wasn't able to get ahold of us and I went ahead and ordered that. He should talk through with the pharmacist the side effects when he picks it up. In particular be on the look out for increased need for urination, painful urination, itching in the groin, and acute scrotal pain, redness or swelling. If he has any of these, contact us for a same day evaluation.      I'd like to have a brief video check in with him in about 6 weeks to see how the medication is going. Can you get him scheduled for that?  Thanks!  MARJ Rashid

## 2023-10-06 NOTE — COMMUNITY RESOURCES LIST (ENGLISH)
10/06/2023   [facility]  N/A  For questions about this resource list or additional care needs, please contact your primary care clinic or care manager.  Phone: [phone]   Email: N/A   Address: [address] [place] [id number]   Hours: N/A        Transportation       Free or low-cost transportation  1  [facility] Distance: 1.13 miles      In-Person   [address] [place] [address]  Language: English, [place], [place]  Hours: Mon - Fri 9:00 AM - 5:00 PM  Fees: Self Pay   Phone: (566) 649-2283 Email: [email or web address]@[employer] Website: [email or web address]/     2  [facility] Distance: 1.26 miles      In-Person   [address] [place] [address]  Language: English  Hours: Mon - Fri 8:00 AM - 3:00 PM  Fees: Free   Phone: (291) 161-8208 Ext.14 Email: [email or web address] Website: [email or web address].[employer]     Transportation to medical appointments  3  [facility] Distance: 3.33 miles      In-Person   [address] [place] [address]  Language: English  Hours: Mon - Fri 9:00 AM - 4:00 PM  Fees: Free   Phone: (984) 645-9154 Email: Jewish Maternity [email or web address] Website: http://www.[facility] CenterSiriusDecisionsLong Island Community HospitalSmart Destinations.org/     4  [facility] Distance: 5.38 miles      In-Person   [address] [facility] [address] [place] [address]  Language: English  Hours: Mon - Sun Open 24 Hours  Fees: Insurance, Self Pay   Phone: (191) 406-7188 Email: diana@[employer] Website: [email or web address]://[email or web address]/midmetro/          Important Numbers & Websites       Emergency Services   911  [facility] Services   311  Poison Control   (550) 237-3535  Suicide Prevention Lifeline   (389) 758-1472 (TALK)  Child Abuse Hotline   (601) 509-6471 (4-A-Child)  Sexual Assault Hotline   (608) 340-4394 (HOPE)  [place] Runaway Safeline   (331) 380-2035  (RUNAWAY)  All-Options Talkline   (858) 369-8859  Substance Abuse Referral   (889) 818-9854 (HELP)

## 2023-10-09 ENCOUNTER — VIRTUAL VISIT (OUTPATIENT)
Dept: FAMILY MEDICINE | Facility: CLINIC | Age: 68
End: 2023-10-09
Payer: COMMERCIAL

## 2023-10-09 DIAGNOSIS — E11.22 TYPE 2 DIABETES MELLITUS WITH STAGE 3 CHRONIC KIDNEY DISEASE, WITHOUT LONG-TERM CURRENT USE OF INSULIN, UNSPECIFIED WHETHER STAGE 3A OR 3B CKD (H): ICD-10-CM

## 2023-10-09 DIAGNOSIS — J06.9 VIRAL URI WITH COUGH: ICD-10-CM

## 2023-10-09 DIAGNOSIS — N18.31 STAGE 3A CHRONIC KIDNEY DISEASE (H): ICD-10-CM

## 2023-10-09 DIAGNOSIS — G43.009 MIGRAINE WITHOUT AURA AND WITHOUT STATUS MIGRAINOSUS, NOT INTRACTABLE: ICD-10-CM

## 2023-10-09 DIAGNOSIS — N18.30 TYPE 2 DIABETES MELLITUS WITH STAGE 3 CHRONIC KIDNEY DISEASE, WITHOUT LONG-TERM CURRENT USE OF INSULIN, UNSPECIFIED WHETHER STAGE 3A OR 3B CKD (H): ICD-10-CM

## 2023-10-09 DIAGNOSIS — R80.9 MICROALBUMINURIA: ICD-10-CM

## 2023-10-09 DIAGNOSIS — I10 BENIGN ESSENTIAL HYPERTENSION: Primary | ICD-10-CM

## 2023-10-09 PROCEDURE — 99214 OFFICE O/P EST MOD 30 MIN: CPT | Mod: VID | Performed by: NURSE PRACTITIONER

## 2023-10-09 RX ORDER — METFORMIN HCL 500 MG
1000 TABLET, EXTENDED RELEASE 24 HR ORAL
Qty: 180 TABLET | Refills: 3 | Status: SHIPPED | OUTPATIENT
Start: 2023-10-09 | End: 2023-12-16

## 2023-10-09 RX ORDER — BENZONATATE 200 MG/1
200 CAPSULE ORAL 3 TIMES DAILY PRN
Qty: 30 CAPSULE | Refills: 0 | Status: SHIPPED | OUTPATIENT
Start: 2023-10-09 | End: 2024-07-30

## 2023-10-09 RX ORDER — RIZATRIPTAN BENZOATE 5 MG/1
5 TABLET ORAL
Qty: 8 TABLET | Refills: 3 | Status: SHIPPED | OUTPATIENT
Start: 2023-10-09 | End: 2024-01-23

## 2023-10-09 NOTE — PROGRESS NOTES
Karan is a 68 year old who is being evaluated via a billable video visit.      How would you like to obtain your AVS? NOTIK  If the video visit is dropped, the invitation should be resent by: Text to cell phone: [phone]  Will anyone else be joining your video visit? No        Assessment & Plan     Benign essential hypertension  Stable - continue losartan-hydrochlorothiazide and atenolol (has been on atenolol long-term).    Type 2 diabetes mellitus with stage 3 chronic kidney disease, without long-term current use of insulin, unspecified whether stage 3a or 3b CKD (H)  Fasting glucoses still above 120 and unchanged compared to prior to SGLTi start. Elevated from illness? Asked to continue to check and if not coming down over the next 1-2 month, let me know via NOTIK and I'll increase to empaglfiflozin 25 mg.   - metFORMIN (GLUCOPHAGE XR) 500 MG 24 hr tablet; Take 2 tablets (1,000 mg) by mouth daily (with dinner) Take 1 tablet once a day with breakfast x5 days, then increase to 2 tablets once a day    Stage 3a chronic kidney disease (H)  Started on SGLT2i    Microalbuminuria  Started on SGLT2i    Viral URI with cough  Early in illness. Lonnie for cough suppression with tessalon pearles. If worsening or fever returns, come into clinic for pneumonia eval  - benzonatate (TESSALON) 200 MG capsule; Take 1 capsule (200 mg) by mouth 3 times daily as needed for cough    Migraine without aura and without status migrainosus, not intractable  Only allowed #8 per fill so refilled with more refills  - rizatriptan (MAXALT) 5 MG tablet; Take 1 tablet (5 mg) by mouth at onset of headache for migraine repeat after 2 hr if no relief; maximum: 30 mg/24 hours    Prescription drug management  I spent a total of 34 minutes on the day of the visit.   Time spent by me doing chart review, history and exam, documentation and further activities per the note       Patient Instructions   Free covid test -  [email or web address]/diseases/coronavirus/testsites/athome.html    Glucoses should be coming down to below 120 fasting blood sugars. If you aren't seeing that over the next 1-2 months, please let me know. In that case, I'd likely increase the Jardiance to 25 mg a day.    Get flu shot and covid shot when you are feeling well    Schedule eye visit      Romelia Judd, JEFFERSON CNP  [facility]    [place] Russo is a 68 year old, presenting for the following health issues:  Diabetes and Hypertension        7/25/2023     1:50 PM   Additional Questions   Roomed by Osmany Munoz   Accompanied by N/A       HPI     Has a cold starting last Monday (7 days ago) - congestion, coughing, headaches. No sore throat. Low grade temp of highest 100. Doesn't feel like covid. Got most recent vaccine in July 2023. Feeling like symptoms are slightly improving, but cough has been really bad. Tried mucinex and it isn't helping. Coughing up clear phlegm. Nighttime and laying down are worse. No wheezing.    Eustachian tube dysfunction started well before this cold. Has ENT appt with [facility] in Nov (couldn't get in with [facility] until Jan). Has been using nasal fluticasone for about a month and has not seen benefit. Took cetirizine and didn't see a benefit.     HTN, DM, CKD - BP at home 130's/80's. Started taking empagliflozin a couple weeks ago. Fasting blood sugars are around 130-140's and haven't really decreased since started. Also taking metformin  mg once daily. No urinary changes - same frequency, volume and color. No irritation with urination. No peripheral edema. Diet has been the same.     Peripheral neuropathy - less burning with the gabapentin and no side effects. Checking feet daily.    Sleep  - wakes up several times a night, not always to go to the bathroom. Not pain or noise waking him. Does not share his bed with anyone. Has been told he snores, but is unsure about stopping breathing. Hasn't had a  "sleep study. Goes back to sleep in about 20 minutes. STOPBANG = 4. Not interested in sleep study - doesn't want CPAP, take time to get the test, knows people who has gotten the machine and just thrown it away.    BP Readings from Last 6 Encounters:   09/09/23 131/85   09/07/23 (!) 148/84   07/25/23 134/85   04/26/22 134/78   05/09/21 132/76   12/05/19 128/84     Lab Results   Component Value Date    A1C 7.3 07/25/2023    A1C 6.7 09/19/2022    A1C 10.4 04/26/2022    A1C 7.5 02/27/2021    A1C 7.7 07/03/2020    A1C 6.5 12/05/2019    A1C 6.8 03/08/2019    A1C 7.3 01/29/2019     Estimated body mass index is 30.35 kg/m  as calculated from the following:    Height as of 7/25/23: 1.78 m (5' 10.08\").    Weight as of 9/9/23: 96.2 kg (212 lb).     Review of Systems   Constitutional, HEENT, cardiovascular, pulmonary, gi and gu systems are negative, except as otherwise noted.      Objective       Vitals:  No vitals were obtained today due to virtual visit.    Physical Exam   GENERAL: Healthy, alert and no distress  EYES: Eyes grossly normal to inspection.  No discharge or erythema, or obvious scleral/conjunctival abnormalities.  RESP: No audible wheeze, cough, or visible cyanosis.  No visible retractions or increased work of breathing.    SKIN: Visible skin clear. No significant rash, abnormal pigmentation or lesions.  NEURO: Cranial nerves grossly intact.  Mentation and speech appropriate for age.  PSYCH: Mentation appears normal, affect normal/bright, judgement and insight intact, normal speech and appearance well-groomed.          Video-Visit Details  Type of service:  Video Visit   Distant Location (provider location):  On-site  Platform used for Video Visit: Julián      "

## 2023-10-09 NOTE — PATIENT INSTRUCTIONS
Free covid test - [email or web address].health.[facility].[place]./diseases/coronavirus/testsites/athome.html    Glucoses should be coming down to below 120 fasting blood sugars. If you aren't seeing that over the next 1-2 months, please let me know. In that case, I'd likely increase the Jardiance to 25 mg a day.    Get flu shot and covid shot when you are feeling well    Schedule eye visit

## 2023-12-11 ENCOUNTER — TRANSFERRED RECORDS (OUTPATIENT)
Dept: HEALTH INFORMATION MANAGEMENT | Facility: CLINIC | Age: 68
End: 2023-12-11
Payer: COMMERCIAL

## 2023-12-13 DIAGNOSIS — E78.5 HYPERLIPIDEMIA LDL GOAL <130: ICD-10-CM

## 2023-12-13 RX ORDER — ATORVASTATIN CALCIUM 40 MG/1
40 TABLET, FILM COATED ORAL DAILY
Qty: 90 TABLET | Refills: 0 | Status: SHIPPED | OUTPATIENT
Start: 2023-12-13 | End: 2024-01-23

## 2023-12-13 NOTE — TELEPHONE ENCOUNTER
Pending Prescriptions:                       Disp   Refills    atorvastatin (LIPITOR) 40 MG tablet       90 tab*3            Sig: Take 1 tablet (40 mg) by mouth daily

## 2023-12-16 ENCOUNTER — MYC REFILL (OUTPATIENT)
Dept: FAMILY MEDICINE | Facility: CLINIC | Age: 68
End: 2023-12-16
Payer: COMMERCIAL

## 2023-12-16 DIAGNOSIS — E11.22 TYPE 2 DIABETES MELLITUS WITH STAGE 3 CHRONIC KIDNEY DISEASE, WITHOUT LONG-TERM CURRENT USE OF INSULIN, UNSPECIFIED WHETHER STAGE 3A OR 3B CKD (H): ICD-10-CM

## 2023-12-16 DIAGNOSIS — N18.30 TYPE 2 DIABETES MELLITUS WITH STAGE 3 CHRONIC KIDNEY DISEASE, WITHOUT LONG-TERM CURRENT USE OF INSULIN, UNSPECIFIED WHETHER STAGE 3A OR 3B CKD (H): ICD-10-CM

## 2023-12-16 DIAGNOSIS — E78.5 HYPERLIPIDEMIA LDL GOAL <130: ICD-10-CM

## 2023-12-18 RX ORDER — METFORMIN HCL 500 MG
1000 TABLET, EXTENDED RELEASE 24 HR ORAL
Qty: 180 TABLET | Refills: 3 | Status: SHIPPED | OUTPATIENT
Start: 2023-12-18 | End: 2024-07-30

## 2023-12-18 RX ORDER — ATORVASTATIN CALCIUM 40 MG/1
40 TABLET, FILM COATED ORAL DAILY
Qty: 90 TABLET | Refills: 0 | OUTPATIENT
Start: 2023-12-18

## 2024-01-04 ENCOUNTER — PRE VISIT (OUTPATIENT)
Dept: OTOLARYNGOLOGY | Facility: CLINIC | Age: 69
End: 2024-01-04

## 2024-01-22 ENCOUNTER — MYC MEDICAL ADVICE (OUTPATIENT)
Dept: FAMILY MEDICINE | Facility: CLINIC | Age: 69
End: 2024-01-22
Payer: COMMERCIAL

## 2024-01-23 ENCOUNTER — VIRTUAL VISIT (OUTPATIENT)
Dept: FAMILY MEDICINE | Facility: CLINIC | Age: 69
End: 2024-01-23
Payer: COMMERCIAL

## 2024-01-23 ENCOUNTER — LAB (OUTPATIENT)
Dept: LAB | Facility: CLINIC | Age: 69
End: 2024-01-23
Payer: COMMERCIAL

## 2024-01-23 DIAGNOSIS — N18.31 STAGE 3A CHRONIC KIDNEY DISEASE (H): ICD-10-CM

## 2024-01-23 DIAGNOSIS — E78.5 HYPERLIPIDEMIA LDL GOAL <130: ICD-10-CM

## 2024-01-23 DIAGNOSIS — M25.552 HIP PAIN, LEFT: ICD-10-CM

## 2024-01-23 DIAGNOSIS — N18.30 TYPE 2 DIABETES MELLITUS WITH STAGE 3 CHRONIC KIDNEY DISEASE, WITHOUT LONG-TERM CURRENT USE OF INSULIN, UNSPECIFIED WHETHER STAGE 3A OR 3B CKD (H): Primary | ICD-10-CM

## 2024-01-23 DIAGNOSIS — F41.1 GAD (GENERALIZED ANXIETY DISORDER): ICD-10-CM

## 2024-01-23 DIAGNOSIS — E11.22 TYPE 2 DIABETES MELLITUS WITH STAGE 3 CHRONIC KIDNEY DISEASE, WITHOUT LONG-TERM CURRENT USE OF INSULIN, UNSPECIFIED WHETHER STAGE 3A OR 3B CKD (H): Primary | ICD-10-CM

## 2024-01-23 DIAGNOSIS — G43.009 MIGRAINE WITHOUT AURA AND WITHOUT STATUS MIGRAINOSUS, NOT INTRACTABLE: ICD-10-CM

## 2024-01-23 DIAGNOSIS — E83.52 HYPERCALCEMIA: ICD-10-CM

## 2024-01-23 DIAGNOSIS — E11.22 TYPE 2 DIABETES MELLITUS WITH STAGE 3 CHRONIC KIDNEY DISEASE, WITHOUT LONG-TERM CURRENT USE OF INSULIN, UNSPECIFIED WHETHER STAGE 3A OR 3B CKD (H): ICD-10-CM

## 2024-01-23 DIAGNOSIS — I10 BENIGN ESSENTIAL HYPERTENSION: ICD-10-CM

## 2024-01-23 DIAGNOSIS — N18.30 TYPE 2 DIABETES MELLITUS WITH STAGE 3 CHRONIC KIDNEY DISEASE, WITHOUT LONG-TERM CURRENT USE OF INSULIN, UNSPECIFIED WHETHER STAGE 3A OR 3B CKD (H): ICD-10-CM

## 2024-01-23 LAB
ANION GAP SERPL CALCULATED.3IONS-SCNC: 12 MMOL/L (ref 7–15)
BUN SERPL-MCNC: 30.1 MG/DL (ref 8–23)
CALCIUM SERPL-MCNC: 10.4 MG/DL (ref 8.8–10.2)
CHLORIDE SERPL-SCNC: 99 MMOL/L (ref 98–107)
CREAT SERPL-MCNC: 1.5 MG/DL (ref 0.67–1.17)
CREAT UR-MCNC: 51.7 MG/DL
DEPRECATED HCO3 PLAS-SCNC: 25 MMOL/L (ref 22–29)
EGFRCR SERPLBLD CKD-EPI 2021: 51 ML/MIN/1.73M2
GLUCOSE SERPL-MCNC: 130 MG/DL (ref 70–99)
HBA1C MFR BLD: 6.8 % (ref 0–5.6)
MICROALBUMIN UR-MCNC: 37 MG/L
MICROALBUMIN/CREAT UR: 71.57 MG/G CR (ref 0–17)
POTASSIUM SERPL-SCNC: 4.3 MMOL/L (ref 3.4–5.3)
SODIUM SERPL-SCNC: 136 MMOL/L (ref 135–145)

## 2024-01-23 PROCEDURE — 36415 COLL VENOUS BLD VENIPUNCTURE: CPT

## 2024-01-23 PROCEDURE — 99215 OFFICE O/P EST HI 40 MIN: CPT | Mod: 95 | Performed by: NURSE PRACTITIONER

## 2024-01-23 PROCEDURE — 80048 BASIC METABOLIC PNL TOTAL CA: CPT

## 2024-01-23 PROCEDURE — 83036 HEMOGLOBIN GLYCOSYLATED A1C: CPT

## 2024-01-23 PROCEDURE — 82043 UR ALBUMIN QUANTITATIVE: CPT

## 2024-01-23 PROCEDURE — 82570 ASSAY OF URINE CREATININE: CPT

## 2024-01-23 RX ORDER — ATORVASTATIN CALCIUM 40 MG/1
40 TABLET, FILM COATED ORAL DAILY
Qty: 90 TABLET | Refills: 3 | Status: SHIPPED | OUTPATIENT
Start: 2024-01-23 | End: 2024-07-30

## 2024-01-23 RX ORDER — RIZATRIPTAN BENZOATE 5 MG/1
5 TABLET ORAL
Qty: 8 TABLET | Refills: 3 | Status: SHIPPED | OUTPATIENT
Start: 2024-01-23 | End: 2024-04-22

## 2024-01-23 RX ORDER — CITALOPRAM HYDROBROMIDE 10 MG/1
10 TABLET ORAL DAILY
Qty: 90 TABLET | Refills: 1 | Status: SHIPPED | OUTPATIENT
Start: 2024-01-23 | End: 2024-01-23

## 2024-01-23 RX ORDER — LOSARTAN POTASSIUM AND HYDROCHLOROTHIAZIDE 25; 100 MG/1; MG/1
1 TABLET ORAL DAILY
Qty: 90 TABLET | Refills: 1 | Status: SHIPPED | OUTPATIENT
Start: 2024-01-23 | End: 2024-01-24

## 2024-01-23 RX ORDER — CITALOPRAM HYDROBROMIDE 20 MG/1
20 TABLET ORAL DAILY
Qty: 90 TABLET | Refills: 1 | Status: SHIPPED | OUTPATIENT
Start: 2024-01-23 | End: 2024-07-15

## 2024-01-23 NOTE — PROGRESS NOTES
Karan is a 68 year old who is being evaluated via a billable video visit.      How would you like to obtain your AVS? MyChart  If the video visit is dropped, the invitation should be resent by: Text to cell phone: [phone]  Will anyone else be joining your video visit? No          Assessment & Plan     Type 2 diabetes mellitus with stage 3 chronic kidney disease, without long-term current use of insulin, unspecified whether stage 3a or 3b CKD (H)  Home sugars higher. It would be unusual for the SGLT21 to be causing increased blood sugars. Wants to avoid any injectable. [place]-protection from SGLT2i warranted. Increase empagliflozin to 20 mg (2x 10 mg tabs) and watch blood sugars. If going up or not coming down, then we'll switch to canagliflozin or dapagliflozin. Do have some room to increase metformin.  - Hemoglobin A1c; Future  - Basic metabolic panel  (Ca, Cl, CO2, Creat, Gluc, K, Na, BUN); Future    Stage 3a chronic kidney disease (H)  Recheck labs - last done 7/2023 and renal labs off  BP controlled at that time and currently  Could be attributable to sugars, but Karan is also taking ibuprofen a couple times a week for hip pain. If renal panel still off, needs to stop ibuprofen entirely. At that point, I'd recommend a hip xray and if arthritis, consideration of a steroid injection  - Basic metabolic panel  (Ca, Cl, CO2, Creat, Gluc, K, Na, BUN); Future  - Albumin Random Urine Quantitative with Creat Ratio; Future    Hyperlipidemia LDL goal <130  Continue atorvastatin  - atorvastatin (LIPITOR) 40 MG tablet; Take 1 tablet (40 mg) by mouth daily    ISAC (generalized anxiety disorder)  Anxiety higher. Increase citalopram to 20 mg daily  - citalopram (CELEXA) 20 MG tablet; Take 1 tablet (20 mg) by mouth daily    Migraine without aura and without status migrainosus, not intractable  Refilled  - rizatriptan (MAXALT) 5 MG tablet; Take 1 tablet (5 mg) by mouth at onset of headache for migraine repeat after 2 hr if no  relief; maximum: 30 mg/24 hours    Benign essential hypertension  Well controlled - no changes  Noting again that atenolol started many years ago and patient has rebound tachycardia that he wants to avoid by discontinuing  - losartan-hydrochlorothiazide (HYZAAR) 100-25 MG tablet; Take 1 tablet by mouth daily    Hip pain, left  - XR Hip Left 2-3 Views; Future    Ordering of each unique test  Prescription drug management  I spent a total of 41 minutes on the day of the visit.   Time spent by me doing chart review, history and exam, documentation and further activities per the note      Patient Instructions   Get labs done  If kidney function looks off (GFR is decreased, creatinine increased or microalbumin increased), then we do need to stop the ibuprofen entirely    Increase the jardiance to taking two tablets per day - 20 mg total  Watch your fasting sugars - if glucoses going up, then we'll change to a different medication in this same category    Blood pressure - no changes    Anxiety - increase dose to citalopram 20 mg    [place] Russo is a 68 year old, presenting for the following health issues:  Hypertension, Diabetes, and Lipids    History of Present Illness       CKD: He uses over the counter pain medication, including Ibuprofen/ Tylenol, a few times a week.    Diabetes:   He presents for follow up of diabetes.  He is checking home blood glucose a few times a week.   He checks blood glucose before and after meals.  Blood glucose is never over 200 and never under 70. He is aware of hypoglycemia symptoms including none.    He has no concerns regarding his diabetes at this time.  He is having numbness in feet and burning in feet.  The patient has not had a diabetic eye exam in the last 12 months.          Hypertension: He presents for follow up of hypertension.  He does check blood pressure  regularly outside of the clinic. Outpatient blood pressures have not been over 140/90. He follows a low salt diet.  "    Headaches:   Since the patient's last clinic visit, headaches are: no change  The patient is getting headaches:  2-3 per week  He is not able to do normal daily activities when he has a migraine.  The patient is taking the following rescue/relief medications:  Maxalt   Patient states \"The relief is inconsistent\" from the rescue/relief medications.   The patient is taking the following medications to prevent migraines:  No medications to prevent migraines  In the past 4 weeks, the patient has gone to an Urgent Care or Emergency Room 0 times times due to headaches.    He eats 2-3 servings of fruits and vegetables daily.He consumes 0 sweetened beverage(s) daily.He exercises with enough effort to increase his heart rate 30 to 60 minutes per day.  He exercises with enough effort to increase his heart rate 5 days per week.   He is taking medications regularly.     Blood pressure - 120-130's/80s  Taking atenolol and losartan-hydrochlorothiazide - no medication side effects. Occasional orthostatic hypotension that is not significant. No peripheral edema. Urination has been normal. No vision changes, headaches, chest pain, cough, SOB, abdominal pain.     CKD - takes ibuprofen estimated 2-3 times per week for joint. Tylenol 1000 mg does cover the pain as well. Has tried topical volatren for joint pain and not had benefit. Most of joint pain originates in the hip.     Hip pain - let side starting in groin and moving toward outside, pain can radiate down the outer part of the leg to the knee. Does not experience low back pain now or historically    Diabetes - fasting sugars haven't gone down since October. Currently fasting can be up to 140-160 when previously were in the 120s. None of the side effects that were discussed.    Current plan:  01/23/24    Medications:  Metformin: metformin XR 1000 mg once daily  SGLTi: empagliflozin 10 mg  GLP1a:    Atorvastatin: 40 mg    Labs:  Lab Results   Component Value Date    A1C 7.3 " 07/25/2023    A1C 6.7 09/19/2022    A1C 10.4 04/26/2022    A1C 7.5 02/27/2021    A1C 7.7 07/03/2020    A1C 6.5 12/05/2019    A1C 6.8 03/08/2019    A1C 7.3 01/29/2019     Lab Results   Component Value Date    MICROL 17.9 07/25/2023    MICROL 147 04/26/2022    MICROL 22 02/27/2021       HM up date date as of 01/23/24    A1C <8: yes - due  BP controlled: yes  On statin: yes  Non-smoker: yes  Eye exam: due    Lab Results   Component Value Date    A1C 7.3 07/25/2023    A1C 6.7 09/19/2022    A1C 10.4 04/26/2022    A1C 7.5 02/27/2021    A1C 7.7 07/03/2020    A1C 6.5 12/05/2019    A1C 6.8 03/08/2019    A1C 7.3 01/29/2019       Left ear - 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 Has trouble going out in public. No problem with balance. Has been pre-occupied with that.    Mood - anxiety with work.       Review of Systems  Constitutional, neuro, ENT, endocrine, pulmonary, cardiac, gastrointestinal, genitourinary, musculoskeletal, integument and psychiatric systems are negative, except as otherwise noted.      Objective           Vitals:  No vitals were obtained today due to virtual visit.    Physical Exam   GENERAL: alert and no distress  EYES: Eyes grossly normal to inspection.  No discharge or erythema, or obvious scleral/conjunctival abnormalities.  RESP: No audible wheeze, cough, or visible cyanosis.    SKIN: Visible skin clear. No significant rash, abnormal pigmentation or lesions.  NEURO: Cranial nerves grossly intact.  Mentation and speech appropriate for age.  PSYCH: Appropriate affect, tone, and pace of words        Video-Visit Details    Type of service:  Video Visit   Originating Location (pt. Location): Home  Distant Location (provider location):  On-site  Platform used for Video Visit: Julián  Signed Electronically by: JEFFERSON Cook CNP

## 2024-01-23 NOTE — PATIENT INSTRUCTIONS
Get labs done  If kidney function looks off (GFR is decreased, creatinine increased or microalbumin increased), then we do need to stop the ibuprofen entirely    Increase the jardiance to taking two tablets per day - 20 mg total  Watch your fasting sugars - if glucoses going up, then we'll change to a different medication in this same category    Blood pressure - no changes    Anxiety - increase dose to citalopram 20 mg

## 2024-01-24 RX ORDER — LOSARTAN POTASSIUM 100 MG/1
100 TABLET ORAL DAILY
Qty: 90 TABLET | Refills: 0 | Status: SHIPPED | OUTPATIENT
Start: 2024-01-24 | End: 2024-04-19

## 2024-01-25 NOTE — RESULT ENCOUNTER NOTE
Karan,    It looks like kidney function has worsened since last check. I'd like you stop taking ibuprofen entirely. I also want to you to take only losartan so that we can stop the hydrochlorothiazide.  I will write a new prescription for losartan only. I do also want you to keep the Jardiance at the 10 mg rather than increase it.  Then let's repeat the labs in one month. If they are looking improved, we'll stick with the current clinic follow-up plan. If not, then I will want to see you. The follow-up labs in a month are ordered.  If you have any questions, please feel free to contact the clinic.    MARJ Rashid

## 2024-03-11 DIAGNOSIS — E11.22 TYPE 2 DIABETES MELLITUS WITH STAGE 3A CHRONIC KIDNEY DISEASE, WITHOUT LONG-TERM CURRENT USE OF INSULIN (H): ICD-10-CM

## 2024-03-11 DIAGNOSIS — N18.31 STAGE 3A CHRONIC KIDNEY DISEASE (H): ICD-10-CM

## 2024-03-11 DIAGNOSIS — N18.31 TYPE 2 DIABETES MELLITUS WITH STAGE 3A CHRONIC KIDNEY DISEASE, WITHOUT LONG-TERM CURRENT USE OF INSULIN (H): ICD-10-CM

## 2024-04-19 DIAGNOSIS — I10 BENIGN ESSENTIAL HYPERTENSION: ICD-10-CM

## 2024-04-19 RX ORDER — LOSARTAN POTASSIUM 100 MG/1
100 TABLET ORAL DAILY
Qty: 90 TABLET | Refills: 0 | Status: SHIPPED | OUTPATIENT
Start: 2024-04-19 | End: 2024-07-15

## 2024-04-22 ENCOUNTER — OFFICE VISIT (OUTPATIENT)
Dept: FAMILY MEDICINE | Facility: CLINIC | Age: 69
End: 2024-04-22
Payer: COMMERCIAL

## 2024-04-22 DIAGNOSIS — Z87.19 HISTORY OF NSAID-ASSOCIATED GASTROPATHY: ICD-10-CM

## 2024-04-22 DIAGNOSIS — E11.22 TYPE 2 DIABETES MELLITUS WITH STAGE 3 CHRONIC KIDNEY DISEASE, WITHOUT LONG-TERM CURRENT USE OF INSULIN, UNSPECIFIED WHETHER STAGE 3A OR 3B CKD (H): Primary | ICD-10-CM

## 2024-04-22 DIAGNOSIS — N18.30 TYPE 2 DIABETES MELLITUS WITH STAGE 3 CHRONIC KIDNEY DISEASE, WITHOUT LONG-TERM CURRENT USE OF INSULIN, UNSPECIFIED WHETHER STAGE 3A OR 3B CKD (H): Primary | ICD-10-CM

## 2024-04-22 DIAGNOSIS — J30.2 SEASONAL ALLERGIC RHINITIS, UNSPECIFIED TRIGGER: ICD-10-CM

## 2024-04-22 DIAGNOSIS — G43.009 MIGRAINE WITHOUT AURA AND WITHOUT STATUS MIGRAINOSUS, NOT INTRACTABLE: ICD-10-CM

## 2024-04-22 DIAGNOSIS — I10 BENIGN ESSENTIAL HYPERTENSION: ICD-10-CM

## 2024-04-22 LAB — HBA1C MFR BLD: 6.5 % (ref 0–5.6)

## 2024-04-22 PROCEDURE — 83036 HEMOGLOBIN GLYCOSYLATED A1C: CPT | Performed by: NURSE PRACTITIONER

## 2024-04-22 PROCEDURE — 36415 COLL VENOUS BLD VENIPUNCTURE: CPT | Performed by: NURSE PRACTITIONER

## 2024-04-22 PROCEDURE — 80048 BASIC METABOLIC PNL TOTAL CA: CPT | Performed by: NURSE PRACTITIONER

## 2024-04-22 PROCEDURE — 99215 OFFICE O/P EST HI 40 MIN: CPT | Mod: 25 | Performed by: NURSE PRACTITIONER

## 2024-04-22 RX ORDER — RIZATRIPTAN BENZOATE 5 MG/1
5 TABLET ORAL
Qty: 8 TABLET | Refills: 3 | Status: SHIPPED | OUTPATIENT
Start: 2024-04-22 | End: 2024-07-30

## 2024-04-22 RX ORDER — AMLODIPINE BESYLATE 5 MG/1
5 TABLET ORAL DAILY
Qty: 90 TABLET | Refills: 0 | Status: SHIPPED | OUTPATIENT
Start: 2024-04-22 | End: 2024-07-26

## 2024-04-22 RX ORDER — AMLODIPINE BESYLATE 2.5 MG/1
2.5 TABLET ORAL DAILY
Qty: 90 TABLET | Refills: 1 | Status: CANCELLED | OUTPATIENT
Start: 2024-04-22

## 2024-04-22 RX ORDER — CITALOPRAM HYDROBROMIDE 20 MG/1
20 TABLET ORAL DAILY
Qty: 90 TABLET | Refills: 1 | Status: CANCELLED | OUTPATIENT
Start: 2024-04-22

## 2024-04-22 ASSESSMENT — PAIN SCALES - GENERAL: PAINLEVEL: NO PAIN (0)

## 2024-04-22 NOTE — PROGRESS NOTES
Assessment & Plan     Type 2 diabetes mellitus with stage 3 chronic kidney disease, without long-term current use of insulin, unspecified whether stage 3a or 3b CKD (H)  A1c 6.5 today, well controlled. Continue with metfromin and Jardiance. Monitor renal function. Follow-up for physical in July.  - Adult Eye  Referral  - TDAP 10-64Y (ADACEL,BOOSTRIX)  - COVID-19 12+ (2023-24) (PFIZER)  - Basic metabolic panel  (Ca, Cl, CO2, Creat, Gluc, K, Na, BUN)  - Hemoglobin A1c  - amLODIPine (NORVASC) 5 MG tablet  Dispense: 90 tablet; Refill: 0      Migraine without aura and without status migrainosus, not intractable  Near daily frequency with multiple failed medication trials. Pursuing PA for Nurtec as controller medication to be taken every other day.  - rizatriptan (MAXALT) 5 MG tablet  Dispense: 8 tablet; Refill: 3  - rimegepant (NURTEC) 75 MG ODT tablet  Dispense: 16 tablet; Refill: 5    Previous prophylaxis trials - per notes  Topiramate - up to 100 mg (unclear if split dose)  Atenolol - 50 mg  Propranolol  Venlafaxine 150 mg daily    Previous rescue trials - per notes  Rizatriptan 5 and 10 mg  Butalbital-acetaminophen    Benign essential hypertension  Medications:   amlodipine 5 mg (new today)  atenolol 50 mg: started 2014 for migraines, kept for HTN  losartan 100 mg: started 2018  Previous trials include lisinopril 20 mg (at  - stopped 2014 for unclear reasons), hydrochlorothiazide (at )  Monitor renal function. Start amlodipine 5 mg daily for BP. Continue losartan. Followup in July for physical and med checks.  - Basic metabolic panel  (Ca, Cl, CO2, Creat, Gluc, K, Na, BUN)  - amLODIPine (NORVASC) 5 MG tablet  Dispense: 90 tablet; Refill: 0    Seasonal allergic rhinitis  Start cetirizine daily. Use OTC flonase daily for nasal congestion.     History of NSAID-induced GI hemorrhage   Per  CE records dated 4/4/14 and 2/19/2013    Ordering of each unique test  Prescription drug management  Review of   "records related to migraine, HTN  I spent a total of 40 minutes on the day of the visit.   Time spent by me doing chart review, history and exam, documentation and further activities per the note      BMI  Estimated body mass index is 32.33 kg/m  as calculated from the following:    Height as of this encounter: 1.751 m (5' 8.94\").    Weight as of this encounter: 99.1 kg (218 lb 8 oz).   Weight management plan: Discussed healthy diet and exercise guidelines      Marion Russo is a 69 year old, presenting for the following health issues:  Follow Up        4/22/2024     3:24 PM   Additional Questions   Roomed by Leydi Mcwilliams     DM2: Taking jardiance and metformin daily. Takes BG once weekly- 130s-150s fasting, drops after breakfast. No hypoglycemic episodes. No dysuria or signs of UTI.     Migraines: Diagnosed in mid-20s. For the past month having migraines close to every day. Pain worse when touches gums- dentist didn't know what this was.      Historical medications: Initially, prescribed atenolol for preventative. Continues atenolol to this day. Has also tried amitriptyline and this caused night terrors. Trial of paroxetine that was not helpful. Fiorinal for rescue initially. Switched from sumitriptan tab to rizatriptan ODT. Rizatriptan helps, but has limited number he can take.     Using nasal saline. Took fluticasone for ear problem (ringing and sound distortion) and it didn't help with that. Congestion, runny nose, itchy eyes.     Hearing loss: tried hearing aids, they didn't work. Has issues hearing at higher frequencies. Had MRI which was normal in     BP: 120s-130s/ 70-90s at home    History of Present Illness       CKD: He uses over the counter pain medication, including Excedrin, two times daily.    Diabetes:   He presents for follow up of diabetes.  He is checking home blood glucose a few times a week.   He checks blood glucose before and after meals.  Blood glucose is sometimes over 200 and never " "under 70.     He has no concerns regarding his diabetes at this time.  He is having numbness in feet and burning in feet.  The patient has not had a diabetic eye exam in the last 12 months.          Hypertension: He presents for follow up of hypertension.  He does check blood pressure  regularly outside of the clinic. Outpatient blood pressures have not been over 140/90. He follows a low salt diet.     Headaches:   Since the patient's last clinic visit, headaches are: worsened  The patient is getting headaches:  Daily  He is not able to do normal daily activities when he has a migraine.  The patient is taking the following rescue/relief medications:  Excedrin and Maxalt   Patient states \"I get some relief\" from the rescue/relief medications.   The patient is taking the following medications to prevent migraines:  Other  In the past 4 weeks, the patient has gone to an Urgent Care or Emergency Room 0 times times due to headaches.He consumes 2 sweetened beverage(s) daily.He exercises with enough effort to increase his heart rate 20 to 29 minutes per day.  He exercises with enough effort to increase his heart rate 5 days per week.   He is taking medications regularly.         Review of Systems  Constitutional, HEENT, cardiovascular, pulmonary, gi and gu systems are negative, except as otherwise noted.      Objective    BP (!) 142/84   Pulse 73   Temp 97.7  F (36.5  C) (Temporal)   Resp 18   Ht 1.751 m (5' 8.94\")   Wt 99.1 kg (218 lb 8 oz)   SpO2 96%   BMI 32.33 kg/m    Body mass index is 32.33 kg/m .    Physical Exam   GENERAL: alert and no distress  EYES: Eyes grossly normal to inspection, PERRL and conjunctivae and sclerae normal  HENT: normal cephalic/atraumatic, both ears: clear effusion, nasal mucosa edematous , oropharynx clear and oral mucous membranes moist  NECK: no adenopathy, no asymmetry, masses, or scars  RESP: lungs clear to auscultation - no rales, rhonchi or wheezes  CV: regular rate and rhythm, " normal S1 S2, no S3 or S4, no murmur, click or rub, no peripheral edema  MS: no gross musculoskeletal defects noted, no edema    Results for orders placed or performed in visit on 04/22/24   Hemoglobin A1c     Status: Abnormal   Result Value Ref Range    Hemoglobin A1C 6.5 (H) 0.0 - 5.6 %           Exam and note completed by DNP student Marisela Segal with oversight from JEFFERSON Cook CNP      Signed Electronically by: JEFFERSON Cook CNP

## 2024-04-22 NOTE — PATIENT INSTRUCTIONS
"Migraines - try to get nurtec covered - prescription entered  Use as preventative by taking 75 mg every other day    Blood pressure - add amlodipine 2.5 mg  If still getting 130's on top or 90's on bottom, increase to full tablet    Allergies - You can take any anti-histamine as long a it doesn't have \"D\" after it.  Look for loratidine, cetirizine or fenofexadine - doesn't matter which.  Also add the nasal steroid fluticasone.  I usually find that adding a steroid nasal spray helps a lot with all symptoms.  One trick with using these is to only insert the nozzle slightly and then tip to point toward your eye rather than straight back.  Do not take a deep breath with administration.  These measures keep it from going back into your throat where it doesn't work, can irritate your throat and tastes awful.   For the eye you can also add patanol (olopatadine) eye drops.    "

## 2024-04-23 ENCOUNTER — TELEPHONE (OUTPATIENT)
Dept: FAMILY MEDICINE | Facility: CLINIC | Age: 69
End: 2024-04-23
Payer: COMMERCIAL

## 2024-04-23 VITALS
BODY MASS INDEX: 32.36 KG/M2 | OXYGEN SATURATION: 96 % | WEIGHT: 218.5 LBS | TEMPERATURE: 97.7 F | HEART RATE: 73 BPM | HEIGHT: 69 IN | RESPIRATION RATE: 18 BRPM | SYSTOLIC BLOOD PRESSURE: 126 MMHG | DIASTOLIC BLOOD PRESSURE: 82 MMHG

## 2024-04-23 DIAGNOSIS — G43.009 MIGRAINE WITHOUT AURA AND WITHOUT STATUS MIGRAINOSUS, NOT INTRACTABLE: Primary | ICD-10-CM

## 2024-04-23 PROCEDURE — 90471 IMMUNIZATION ADMIN: CPT | Performed by: NURSE PRACTITIONER

## 2024-04-23 PROCEDURE — 90480 ADMN SARSCOV2 VAC 1/ONLY CMP: CPT | Performed by: NURSE PRACTITIONER

## 2024-04-23 PROCEDURE — 90715 TDAP VACCINE 7 YRS/> IM: CPT | Performed by: NURSE PRACTITIONER

## 2024-04-23 PROCEDURE — 91320 SARSCV2 VAC 30MCG TRS-SUC IM: CPT | Performed by: NURSE PRACTITIONER

## 2024-04-24 LAB
ANION GAP SERPL CALCULATED.3IONS-SCNC: 11 MMOL/L (ref 7–15)
BUN SERPL-MCNC: 22.3 MG/DL (ref 8–23)
CALCIUM SERPL-MCNC: 10.1 MG/DL (ref 8.8–10.2)
CHLORIDE SERPL-SCNC: 103 MMOL/L (ref 98–107)
CREAT SERPL-MCNC: 1.23 MG/DL (ref 0.67–1.17)
DEPRECATED HCO3 PLAS-SCNC: 23 MMOL/L (ref 22–29)
EGFRCR SERPLBLD CKD-EPI 2021: 64 ML/MIN/1.73M2
GLUCOSE SERPL-MCNC: 101 MG/DL (ref 70–99)
POTASSIUM SERPL-SCNC: 4.4 MMOL/L (ref 3.4–5.3)
SODIUM SERPL-SCNC: 137 MMOL/L (ref 135–145)

## 2024-04-29 NOTE — RESULT ENCOUNTER NOTE
Karan,    Your labs were all normal and stable.  If you have any questions, please feel free to contact the clinic.    MARJ Rashid

## 2024-05-02 PROBLEM — I10 BENIGN ESSENTIAL HYPERTENSION: Status: ACTIVE | Noted: 2017-09-20

## 2024-05-02 PROBLEM — Z87.19 HISTORY OF NSAID-ASSOCIATED GASTROPATHY: Status: ACTIVE | Noted: 2024-05-02

## 2024-05-02 PROBLEM — G43.009 MIGRAINE WITHOUT AURA AND WITHOUT STATUS MIGRAINOSUS, NOT INTRACTABLE: Status: ACTIVE | Noted: 2017-09-20

## 2024-05-02 PROBLEM — J30.2 SEASONAL ALLERGIC RHINITIS, UNSPECIFIED TRIGGER: Status: ACTIVE | Noted: 2024-05-02

## 2024-05-07 NOTE — TELEPHONE ENCOUNTER
PA Initiation    Medication: NURTEC 75 MG PO TBDP  Insurance Company: [employer] - Phone [phone] Fax [phone]  Pharmacy Filling the Rx: [id number] - [place], [place] - [address]  Filling Pharmacy Phone: [phone]  Filling Pharmacy Fax: [phone]  Start Date: 5/7/2024        Note: Due to record-high volumes, our turn-around time is taking longer than usual . We are currently 10 business days behind in the pools.   We are working diligently to submit all requests in a timely manner and in the order they are received. Please only flag TRUE URGENT requests as high priority to the pool at this time.   If you have questions - please send a note/message in the active PA encounter and send back to the [facility] PA pool [[phone]].    If you have more specific questions about our process please reach out to our supervisor Flower Arriaga.   Thank you!   RPPA (Retail Pharmacy Prior Authorization) team

## 2024-05-09 NOTE — TELEPHONE ENCOUNTER
Please let patient know that Nurtec for migraine was denied due to requirement to trial Emgality or Ajovy first. Those are prescribed by neurology so I've put in a referral for neurology. The  neurology may be a long wait time. If this is the case and patient wishes, he can request a referral to a different neurology clinic in his network. Some patients work with [facility], but coverage needs to be determined by patient.  GUILLERMO Judd, MARJ

## 2024-05-09 NOTE — TELEPHONE ENCOUNTER
Called pt and LMOM to callback clinic.    Miguel Almazan RN   [place]

## 2024-05-09 NOTE — TELEPHONE ENCOUNTER
PRIOR AUTHORIZATION DENIED    Medication: NURTEC 75 MG PO TBDP  Insurance Company: [employer] - Phone [phone] Fax [phone]  Denial Date: 5/7/2024  Denial Reason(s):       Appeal Information:       Patient Notified: NO

## 2024-05-15 NOTE — TELEPHONE ENCOUNTER
Sent pt a My chart message to relay message. Thanks    Graciela Franco RN  [facility]

## 2024-07-15 DIAGNOSIS — I10 BENIGN ESSENTIAL HYPERTENSION: ICD-10-CM

## 2024-07-15 DIAGNOSIS — F41.1 GAD (GENERALIZED ANXIETY DISORDER): ICD-10-CM

## 2024-07-15 RX ORDER — LOSARTAN POTASSIUM 100 MG/1
100 TABLET ORAL DAILY
Qty: 90 TABLET | Refills: 0 | Status: SHIPPED | OUTPATIENT
Start: 2024-07-15 | End: 2024-07-30

## 2024-07-15 RX ORDER — CITALOPRAM HYDROBROMIDE 20 MG/1
20 TABLET ORAL DAILY
Qty: 90 TABLET | Refills: 1 | Status: SHIPPED | OUTPATIENT
Start: 2024-07-15 | End: 2024-07-30

## 2024-07-15 NOTE — TELEPHONE ENCOUNTER
Pending Prescriptions:                       Disp   Refills    losartan (COZAAR) 100 MG tablet           90 tab*0            Sig: Take 1 tablet (100 mg) by mouth daily    citalopram (CELEXA) 20 MG tablet          90 tab*1            Sig: Take 1 tablet (20 mg) by mouth daily

## 2024-07-16 ENCOUNTER — LAB (OUTPATIENT)
Dept: LAB | Facility: CLINIC | Age: 69
End: 2024-07-16
Payer: COMMERCIAL

## 2024-07-16 DIAGNOSIS — N18.30 TYPE 2 DIABETES MELLITUS WITH STAGE 3 CHRONIC KIDNEY DISEASE, WITHOUT LONG-TERM CURRENT USE OF INSULIN (H): Primary | ICD-10-CM

## 2024-07-16 DIAGNOSIS — E11.22 TYPE 2 DIABETES MELLITUS WITH STAGE 3 CHRONIC KIDNEY DISEASE, WITHOUT LONG-TERM CURRENT USE OF INSULIN (H): Primary | ICD-10-CM

## 2024-07-16 DIAGNOSIS — N18.31 STAGE 3A CHRONIC KIDNEY DISEASE (H): ICD-10-CM

## 2024-07-16 DIAGNOSIS — E83.52 HYPERCALCEMIA: ICD-10-CM

## 2024-07-16 LAB
ALBUMIN SERPL BCG-MCNC: 4.4 G/DL (ref 3.5–5.2)
ALP SERPL-CCNC: 89 U/L (ref 40–150)
ALT SERPL W P-5'-P-CCNC: 22 U/L (ref 0–70)
ANION GAP SERPL CALCULATED.3IONS-SCNC: 10 MMOL/L (ref 7–15)
AST SERPL W P-5'-P-CCNC: 27 U/L (ref 0–45)
BILIRUB SERPL-MCNC: 0.3 MG/DL
BUN SERPL-MCNC: 23.2 MG/DL (ref 8–23)
CALCIUM SERPL-MCNC: 9.4 MG/DL (ref 8.8–10.4)
CHLORIDE SERPL-SCNC: 100 MMOL/L (ref 98–107)
CHOLEST SERPL-MCNC: 139 MG/DL
CREAT SERPL-MCNC: 1.22 MG/DL (ref 0.67–1.17)
EGFRCR SERPLBLD CKD-EPI 2021: 65 ML/MIN/1.73M2
FASTING STATUS PATIENT QL REPORTED: YES
FASTING STATUS PATIENT QL REPORTED: YES
GLUCOSE SERPL-MCNC: 123 MG/DL (ref 70–99)
HCO3 SERPL-SCNC: 23 MMOL/L (ref 22–29)
HDLC SERPL-MCNC: 37 MG/DL
HOLD SPECIMEN: NORMAL
LDLC SERPL CALC-MCNC: 67 MG/DL
NONHDLC SERPL-MCNC: 102 MG/DL
POTASSIUM SERPL-SCNC: 4.1 MMOL/L (ref 3.4–5.3)
PROT SERPL-MCNC: 7 G/DL (ref 6.4–8.3)
SODIUM SERPL-SCNC: 133 MMOL/L (ref 135–145)
TRIGL SERPL-MCNC: 174 MG/DL

## 2024-07-16 PROCEDURE — 80053 COMPREHEN METABOLIC PANEL: CPT

## 2024-07-16 PROCEDURE — 80061 LIPID PANEL: CPT

## 2024-07-16 PROCEDURE — 36415 COLL VENOUS BLD VENIPUNCTURE: CPT

## 2024-07-24 DIAGNOSIS — I10 BENIGN ESSENTIAL HYPERTENSION: ICD-10-CM

## 2024-07-24 RX ORDER — ATENOLOL 50 MG/1
50 TABLET ORAL DAILY
Qty: 90 TABLET | Refills: 0 | Status: SHIPPED | OUTPATIENT
Start: 2024-07-24 | End: 2024-07-30

## 2024-07-24 NOTE — TELEPHONE ENCOUNTER
Pending Prescriptions:                       Disp   Refills    atenolol (TENORMIN) 50 MG tablet          90 tab*3            Sig: Take 1 tablet (50 mg) by mouth daily

## 2024-07-26 ENCOUNTER — MYC REFILL (OUTPATIENT)
Dept: FAMILY MEDICINE | Facility: CLINIC | Age: 69
End: 2024-07-26
Payer: COMMERCIAL

## 2024-07-26 DIAGNOSIS — I10 BENIGN ESSENTIAL HYPERTENSION: ICD-10-CM

## 2024-07-26 DIAGNOSIS — E11.22 TYPE 2 DIABETES MELLITUS WITH STAGE 3 CHRONIC KIDNEY DISEASE, WITHOUT LONG-TERM CURRENT USE OF INSULIN, UNSPECIFIED WHETHER STAGE 3A OR 3B CKD (H): ICD-10-CM

## 2024-07-26 DIAGNOSIS — N18.30 TYPE 2 DIABETES MELLITUS WITH STAGE 3 CHRONIC KIDNEY DISEASE, WITHOUT LONG-TERM CURRENT USE OF INSULIN, UNSPECIFIED WHETHER STAGE 3A OR 3B CKD (H): ICD-10-CM

## 2024-07-26 RX ORDER — AMLODIPINE BESYLATE 5 MG/1
5 TABLET ORAL DAILY
Qty: 90 TABLET | Refills: 0 | Status: SHIPPED | OUTPATIENT
Start: 2024-07-26 | End: 2024-07-30

## 2024-07-26 RX ORDER — ATENOLOL 50 MG/1
50 TABLET ORAL DAILY
Qty: 90 TABLET | Refills: 0 | OUTPATIENT
Start: 2024-07-26

## 2024-07-30 ENCOUNTER — OFFICE VISIT (OUTPATIENT)
Dept: FAMILY MEDICINE | Facility: CLINIC | Age: 69
End: 2024-07-30
Payer: COMMERCIAL

## 2024-07-30 ENCOUNTER — TELEPHONE (OUTPATIENT)
Dept: FAMILY MEDICINE | Facility: CLINIC | Age: 69
End: 2024-07-30

## 2024-07-30 VITALS
RESPIRATION RATE: 15 BRPM | TEMPERATURE: 97.3 F | DIASTOLIC BLOOD PRESSURE: 76 MMHG | HEIGHT: 70 IN | BODY MASS INDEX: 31.78 KG/M2 | SYSTOLIC BLOOD PRESSURE: 122 MMHG | WEIGHT: 222 LBS | OXYGEN SATURATION: 96 % | HEART RATE: 66 BPM

## 2024-07-30 DIAGNOSIS — Z12.11 SCREEN FOR COLON CANCER: ICD-10-CM

## 2024-07-30 DIAGNOSIS — R20.2 PARESTHESIA OF BOTH FEET: ICD-10-CM

## 2024-07-30 DIAGNOSIS — Z80.0 FAMILY HISTORY OF COLON CANCER: ICD-10-CM

## 2024-07-30 DIAGNOSIS — E87.1 HYPONATREMIA: ICD-10-CM

## 2024-07-30 DIAGNOSIS — M25.552 HIP PAIN, LEFT: ICD-10-CM

## 2024-07-30 DIAGNOSIS — Z12.5 SCREENING FOR PROSTATE CANCER: ICD-10-CM

## 2024-07-30 DIAGNOSIS — F41.1 GAD (GENERALIZED ANXIETY DISORDER): ICD-10-CM

## 2024-07-30 DIAGNOSIS — G43.009 MIGRAINE WITHOUT AURA AND WITHOUT STATUS MIGRAINOSUS, NOT INTRACTABLE: ICD-10-CM

## 2024-07-30 DIAGNOSIS — Z00.00 ROUTINE GENERAL MEDICAL EXAMINATION AT A HEALTH CARE FACILITY: Primary | ICD-10-CM

## 2024-07-30 DIAGNOSIS — I10 BENIGN ESSENTIAL HYPERTENSION: ICD-10-CM

## 2024-07-30 DIAGNOSIS — N18.31 STAGE 3A CHRONIC KIDNEY DISEASE (H): ICD-10-CM

## 2024-07-30 DIAGNOSIS — E11.22 TYPE 2 DIABETES MELLITUS WITH STAGE 3 CHRONIC KIDNEY DISEASE, WITHOUT LONG-TERM CURRENT USE OF INSULIN, UNSPECIFIED WHETHER STAGE 3A OR 3B CKD (H): ICD-10-CM

## 2024-07-30 DIAGNOSIS — N18.31 TYPE 2 DIABETES MELLITUS WITH STAGE 3A CHRONIC KIDNEY DISEASE, WITHOUT LONG-TERM CURRENT USE OF INSULIN (H): ICD-10-CM

## 2024-07-30 DIAGNOSIS — E78.5 HYPERLIPIDEMIA LDL GOAL <130: ICD-10-CM

## 2024-07-30 DIAGNOSIS — E87.1 HYPONATREMIA: Primary | ICD-10-CM

## 2024-07-30 DIAGNOSIS — Z83.719 FAMILY HISTORY OF COLONIC POLYPS: ICD-10-CM

## 2024-07-30 DIAGNOSIS — N18.30 TYPE 2 DIABETES MELLITUS WITH STAGE 3 CHRONIC KIDNEY DISEASE, WITHOUT LONG-TERM CURRENT USE OF INSULIN, UNSPECIFIED WHETHER STAGE 3A OR 3B CKD (H): ICD-10-CM

## 2024-07-30 DIAGNOSIS — E11.22 TYPE 2 DIABETES MELLITUS WITH STAGE 3A CHRONIC KIDNEY DISEASE, WITHOUT LONG-TERM CURRENT USE OF INSULIN (H): ICD-10-CM

## 2024-07-30 LAB
ANION GAP SERPL CALCULATED.3IONS-SCNC: 11 MMOL/L (ref 7–15)
BUN SERPL-MCNC: 20.9 MG/DL (ref 8–23)
CALCIUM SERPL-MCNC: 10 MG/DL (ref 8.8–10.4)
CHLORIDE SERPL-SCNC: 105 MMOL/L (ref 98–107)
CREAT SERPL-MCNC: 1.35 MG/DL (ref 0.67–1.17)
CREAT UR-MCNC: 47.7 MG/DL
EGFRCR SERPLBLD CKD-EPI 2021: 58 ML/MIN/1.73M2
GLUCOSE SERPL-MCNC: 137 MG/DL (ref 70–99)
HBA1C MFR BLD: 7 % (ref 0–5.6)
HCO3 SERPL-SCNC: 23 MMOL/L (ref 22–29)
HOLD SPECIMEN: NORMAL
MICROALBUMIN UR-MCNC: 60.3 MG/L
MICROALBUMIN/CREAT UR: 126.42 MG/G CR (ref 0–17)
POTASSIUM SERPL-SCNC: 4.9 MMOL/L (ref 3.4–5.3)
PSA SERPL DL<=0.01 NG/ML-MCNC: 0.63 NG/ML (ref 0–4.5)
SODIUM SERPL-SCNC: 139 MMOL/L (ref 135–145)

## 2024-07-30 PROCEDURE — 82043 UR ALBUMIN QUANTITATIVE: CPT | Performed by: NURSE PRACTITIONER

## 2024-07-30 PROCEDURE — 99207 PR FOOT EXAM NO CHARGE: CPT | Performed by: NURSE PRACTITIONER

## 2024-07-30 PROCEDURE — 90677 PCV20 VACCINE IM: CPT | Performed by: NURSE PRACTITIONER

## 2024-07-30 PROCEDURE — 36415 COLL VENOUS BLD VENIPUNCTURE: CPT | Performed by: NURSE PRACTITIONER

## 2024-07-30 PROCEDURE — 90471 IMMUNIZATION ADMIN: CPT | Performed by: NURSE PRACTITIONER

## 2024-07-30 PROCEDURE — 80048 BASIC METABOLIC PNL TOTAL CA: CPT | Performed by: NURSE PRACTITIONER

## 2024-07-30 PROCEDURE — 99214 OFFICE O/P EST MOD 30 MIN: CPT | Mod: 25 | Performed by: NURSE PRACTITIONER

## 2024-07-30 PROCEDURE — 83036 HEMOGLOBIN GLYCOSYLATED A1C: CPT | Performed by: NURSE PRACTITIONER

## 2024-07-30 PROCEDURE — 82570 ASSAY OF URINE CREATININE: CPT | Performed by: NURSE PRACTITIONER

## 2024-07-30 PROCEDURE — 99397 PER PM REEVAL EST PAT 65+ YR: CPT | Mod: 25 | Performed by: NURSE PRACTITIONER

## 2024-07-30 PROCEDURE — G0103 PSA SCREENING: HCPCS | Performed by: NURSE PRACTITIONER

## 2024-07-30 RX ORDER — GABAPENTIN 300 MG/1
CAPSULE ORAL
Qty: 360 CAPSULE | Refills: 3 | Status: SHIPPED | OUTPATIENT
Start: 2024-07-30

## 2024-07-30 RX ORDER — RIZATRIPTAN BENZOATE 5 MG/1
5 TABLET ORAL
Qty: 8 TABLET | Refills: 3 | Status: SHIPPED | OUTPATIENT
Start: 2024-07-30 | End: 2024-09-25

## 2024-07-30 RX ORDER — ASPIRIN 81 MG/1
81 TABLET ORAL DAILY
Qty: 90 TABLET | Refills: 3 | Status: SHIPPED | OUTPATIENT
Start: 2024-07-30

## 2024-07-30 RX ORDER — LOSARTAN POTASSIUM 100 MG/1
100 TABLET ORAL DAILY
Qty: 90 TABLET | Refills: 1 | Status: SHIPPED | OUTPATIENT
Start: 2024-07-30

## 2024-07-30 RX ORDER — ATORVASTATIN CALCIUM 40 MG/1
40 TABLET, FILM COATED ORAL DAILY
Qty: 90 TABLET | Refills: 3 | Status: SHIPPED | OUTPATIENT
Start: 2024-07-30

## 2024-07-30 RX ORDER — CITALOPRAM HYDROBROMIDE 20 MG/1
20 TABLET ORAL DAILY
Qty: 90 TABLET | Refills: 1 | Status: SHIPPED | OUTPATIENT
Start: 2024-07-30

## 2024-07-30 RX ORDER — METFORMIN HCL 500 MG
1000 TABLET, EXTENDED RELEASE 24 HR ORAL
Qty: 180 TABLET | Refills: 3 | Status: SHIPPED | OUTPATIENT
Start: 2024-07-30

## 2024-07-30 RX ORDER — ATENOLOL 50 MG/1
50 TABLET ORAL DAILY
Qty: 90 TABLET | Refills: 1 | Status: SHIPPED | OUTPATIENT
Start: 2024-07-30

## 2024-07-30 RX ORDER — AMLODIPINE BESYLATE 5 MG/1
5 TABLET ORAL DAILY
Qty: 90 TABLET | Refills: 1 | Status: SHIPPED | OUTPATIENT
Start: 2024-07-30

## 2024-07-30 SDOH — HEALTH STABILITY: PHYSICAL HEALTH: ON AVERAGE, HOW MANY MINUTES DO YOU ENGAGE IN EXERCISE AT THIS LEVEL?: 30 MIN

## 2024-07-30 SDOH — HEALTH STABILITY: PHYSICAL HEALTH: ON AVERAGE, HOW MANY DAYS PER WEEK DO YOU ENGAGE IN MODERATE TO STRENUOUS EXERCISE (LIKE A BRISK WALK)?: 5 DAYS

## 2024-07-30 ASSESSMENT — PAIN SCALES - GENERAL: PAINLEVEL: MILD PAIN (3)

## 2024-07-30 ASSESSMENT — SOCIAL DETERMINANTS OF HEALTH (SDOH): HOW OFTEN DO YOU GET TOGETHER WITH FRIENDS OR RELATIVES?: ONCE A WEEK

## 2024-07-30 NOTE — TELEPHONE ENCOUNTER
Can you please call and speak with triage RN for Dr. Barbosa - [facility]. 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 I wanted to make sure that he is aware that patient has had a very slightly low sodium (134 corrected for hyperglycemia). We are rechecking today. But I wanted to know what his threshold is for either not starting (patient hasn't taken yet) or stopping the triamterene if patient has continued hyponatremia. I'd be on in the 130s and we can recheck after patient starts. But I'd like Dr. Barbosa's input. Thanks!    Ulises Barbosa MD   [address] [place] [address]   Phone: [phone]   Fax: [phone]
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
## 2024-07-30 NOTE — PATIENT INSTRUCTIONS
"Advanced Care Directive resources  Consider making an advanced care directive - this is a good site for assistance and resources   [email or web address]    Schedule hip xray by calling [phone]  Schedule sports medicine consult with Dr. Whitlock for a time AFTER you've had the xray    I'll reach out to ENT about the diuretic and the low sodium       1.\"Eat food.  Not too much.  Mostly plants\" - Jd Meraz - see link below  - Aim for 5-7 servings of vegetables (raw vegetables - 1 serving = 1/2 cup; raw greens - 1 serving = 1 cup)   - Eat grains, but don't make them the superstar of your meal and DO make them whole grains  2. AVOID sugar.  There is no nutritional benefit to sugar.  3. Drink lots of water (avoid juice and soda)  4. MOVE your body daily - even if some days this means 5 minutes of movement. Walking is great for your bones and brain.  Do aim for 150 minutes per week of activity that raises your heart rate, but \"don't let the ideal get in the way of the good enough\"  5. Get good sleep (6-8 hours/night- less sleep is associated with disease/illness/obesity)   6. Smile and laugh every day - even in the face of tragedy  7. Start a meditation or mindfulness practice    CALCIUM: The average recommended intake for women is 7169-9770 mg calcium daily. It is best to get this from your diet (Milk, yogurt, and cheese, vegetables such as Chinese cabbage, kale, spinach and broccoli). If you are not eating enough calcium, then I recommend Calcium Citrate/vitD supplements daily.     Vitamin D is an essential nutritient that many [place] lack, especially in the winter. It is vital for healthy immune system functioning and can be linked to diseases such as obesity, diabetes, body aches/pain, etc. It is super safe and highly beneficial for a [place] to take a vitamin D3 2000 IU once daily during winter months. Daily vitamin D for life is recommended for anyone who has ever been found " "deficient.    Other things to consider: do not drive while under the influence of alcohol, do not drive while texting, always wear a seatbelt, wear a helmet when biking, wear sunscreen to help prevent skin cancer, use DEET mosquito spray when outdoors to prevent mosquito and tick bites, & avoid smoking. If you do get bit by a DEER tick, please contact my office immediately to consider lyme prophylaxis. Dental visits recommended every 6-12 months.    Jd Meraz's 7 Rules for Eating  [email or web address]://[email or web address]/food-recipes/[email or web address]/7-rules-for-eating#1    My typical clinic hours are as follows:  Monday 12-5 pm  Tuesday 8am-3pm  Wednesday 7:20am-1pm  Thursday virtual appts 8:20am-12:20 pm  Friday 8-11 am  If you need an appointment urgently and do not find one available on [employer] or with Central scheduling, please send me a [employer] message and I will work to get you scheduled with myself or a colleague here     Clinic notes  Lab and imaging results are now available for you to view immediately on completion.  Please know that I often have not seen the result before you see results.  I will interpret and discuss the results and meaning of the results as soon as I am able to review them.   [employer] is the best way to reach the clinic directly.  When you send a [employer] message this will be read by our clinic RNs.  Please know that when you call the clinic number, you are not speaking to a staff member from our local clinic.  You can ask that staff to speak to a clinic staff directly if you wish.  You will be able to read my documentation (\"provider notes\") when I finish up and close your chart.  I encourage you to read through to understand your diagnosis and the plan and how we arrived there.  It is also an opportunity to make sure I fully understood your concerns.    "

## 2024-07-30 NOTE — PROGRESS NOTES
Preventive Care Visit  [facility] INTEGRATED PRIMARY CARE  JEFFERSON Cook CNP, Nurse Practitioner - Family  Jul 30, 2024      Assessment & Plan     Routine general medical examination at a health care facility  Reviewed and updated health maintenance and recommendations  Has employer based insurance, not medicare primary    Hip pain, left  Arthritis vs trochanteric bursitis. No SI pain, no low back pain, no history of injury. Xray of hip and follow-up with Dr. Whitlock in sports med  - XR Hip Left 2-3 Views; Future  - Orthopedic  Referral; Future    Type 2 diabetes mellitus with stage 3a chronic kidney disease, without long-term current use of insulin (H)  A1C today is higher than in recent, but still well controlled and I'd not make changes to empagliflozin today  - Hemoglobin A1c; Future  - Adult Eye  Referral; Future  - FOOT EXAM  - aspirin 81 MG EC tablet; Take 1 tablet (81 mg) by mouth daily  - empagliflozin (JARDIANCE) 10 MG TABS tablet; Take 1 tablet (10 mg) by mouth daily  - Hemoglobin A1c    Benign essential hypertension  Well controlled. Has been on atenolol long-term and has side effects when stopping  - Albumin Random Urine Quantitative with Creat Ratio; Future  - losartan (COZAAR) 100 MG tablet; Take 1 tablet (100 mg) by mouth daily  - amLODIPine (NORVASC) 5 MG tablet; Take 1 tablet (5 mg) by mouth daily  - atenolol (TENORMIN) 50 MG tablet; Take 1 tablet (50 mg) by mouth daily  - aspirin 81 MG EC tablet; Take 1 tablet (81 mg) by mouth daily  - Albumin Random Urine Quantitative with Creat Ratio    Hyperlipidemia LDL goal <130  Stable - continue statin  - atorvastatin (LIPITOR) 40 MG tablet; Take 1 tablet (40 mg) by mouth daily    Hyponatremia  134 corrected for hyperglycemia. Recheck today. Notify ENT and get input about triamterene in this contecxt. I am ok with 130s for sodium and patient could star the triamterene and recheck in 4 weeks after starting.  - Basic  metabolic panel  (Ca, Cl, CO2, Creat, Gluc, K, Na, BUN); Future  - Basic metabolic panel  (Ca, Cl, CO2, Creat, Gluc, K, Na, BUN)    Screen for colon cancer  Discussed need to do colonoscopy instead of stool tests due to family history  - Colonoscopy Screening  Referral; Future    Family history of colonic polyps  As above  - Colonoscopy Screening  Referral; Future    Family history of colon cancer  As above  - Colonoscopy Screening  Referral; Future    Screening for prostate cancer  - PSA, screen; Future  - PSA, screen    Paresthesia of both feet  Refilled  - gabapentin (NEURONTIN) 300 MG capsule; Take 1 capsule (300 mg) by mouth 2 times daily AND 2 capsules (600 mg) at bedtime. Due for appt    Type 2 diabetes mellitus with stage 3 chronic kidney disease, without long-term current use of insulin, unspecified whether stage 3a or 3b CKD (H)  As above  - amLODIPine (NORVASC) 5 MG tablet; Take 1 tablet (5 mg) by mouth daily  - metFORMIN (GLUCOPHAGE XR) 500 MG 24 hr tablet; Take 2 tablets (1,000 mg) by mouth daily (with dinner)  - rizatriptan (MAXALT) 5 MG tablet; Take 1 tablet (5 mg) by mouth at onset of headache for migraine repeat after 2 hr if no relief; maximum: 30 mg/24 hours    ISAC (generalized anxiety disorder)  Stable - no changes  - citalopram (CELEXA) 20 MG tablet; Take 1 tablet (20 mg) by mouth daily    Stage 3a chronic kidney disease (H)  Some improvement in kidney function.  - empagliflozin (JARDIANCE) 10 MG TABS tablet; Take 1 tablet (10 mg) by mouth daily    Migraine without aura and without status migrainosus, not intractable  Stable  - rizatriptan (MAXALT) 5 MG tablet; Take 1 tablet (5 mg) by mouth at onset of headache for migraine repeat after 2 hr if no relief; maximum: 30 mg/24 hours    Patient has been advised of split billing requirements and indicates understanding: Yes        Counseling  Appropriate preventive services were addressed with this patient via screening,  "questionnaire, or discussion as appropriate for fall prevention, nutrition, physical activity, Tobacco-use cessation, weight loss and cognition.  Checklist reviewing preventive services available has been given to the patient.  Reviewed patient's diet, addressing concerns and/or questions.   Discussed possible causes of fatigue. The patient was provided with written information regarding signs of hearing loss.       Patient Instructions   Advanced Care Directive resources  Consider making an advanced care directive - this is a good site for assistance and resources   [email or web address]    Schedule hip xray by calling [phone]  Schedule sports medicine consult with Dr. Whitlock for a time AFTER you've had the xray    I'll reach out to ENT about the diuretic and the low sodium       1.\"Eat food.  Not too much.  Mostly plants\" - Jd Pollen - see link below  - Aim for 5-7 servings of vegetables (raw vegetables - 1 serving = 1/2 cup; raw greens - 1 serving = 1 cup)   - Eat grains, but don't make them the superstar of your meal and DO make them whole grains  2. AVOID sugar.  There is no nutritional benefit to sugar.  3. Drink lots of water (avoid juice and soda)  4. MOVE your body daily - even if some days this means 5 minutes of movement. Walking is great for your bones and brain.  Do aim for 150 minutes per week of activity that raises your heart rate, but \"don't let the ideal get in the way of the good enough\"  5. Get good sleep (6-8 hours/night- less sleep is associated with disease/illness/obesity)   6. Smile and laugh every day - even in the face of tragedy  7. Start a meditation or mindfulness practice    CALCIUM: The average recommended intake for women is 5395-9516 mg calcium daily. It is best to get this from your diet (Milk, yogurt, and cheese, vegetables such as Chinese cabbage, kale, spinach and broccoli). If you are not eating enough calcium, then I recommend Calcium Citrate/vitD " supplements daily.     Vitamin D is an essential nutritient that many [place] lack, especially in the winter. It is vital for healthy immune system functioning and can be linked to diseases such as obesity, diabetes, body aches/pain, etc. It is super safe and highly beneficial for a [place] to take a vitamin D3 2000 IU once daily during winter months. Daily vitamin D for life is recommended for anyone who has ever been found deficient.    Other things to consider: do not drive while under the influence of alcohol, do not drive while texting, always wear a seatbelt, wear a helmet when biking, wear sunscreen to help prevent skin cancer, use DEET mosquito spray when outdoors to prevent mosquito and tick bites, & avoid smoking. If you do get bit by a DEER tick, please contact my office immediately to consider lyme prophylaxis. Dental visits recommended every 6-12 months.    Jd Meraz's 7 Rules for Eating  [email or web address]-recipes/[email or web address]/7-rules-for-eating#1    My typical clinic hours are as follows:  Monday 12-5 pm  Tuesday 8am-3pm  Wednesday 7:20am-1pm  Thursday virtual appts 8:20am-12:20 pm  Friday 8-11 am  If you need an appointment urgently and do not find one available on [employer] or with Central scheduling, please send me a [employer] message and I will work to get you scheduled with myself or a colleague here     Clinic notes  Lab and imaging results are now available for you to view immediately on completion.  Please know that I often have not seen the result before you see results.  I will interpret and discuss the results and meaning of the results as soon as I am able to review them.   [employer] is the best way to reach the clinic directly.  When you send a [employer] message this will be read by our clinic RNs.  Please know that when you call the clinic number, you are not speaking to a staff member from our local clinic.  You can ask that staff to speak to a clinic staff directly if you  "wish.  You will be able to read my documentation (\"provider notes\") when I finish up and close your chart.  I encourage you to read through to understand your diagnosis and the plan and how we arrived there.  It is also an opportunity to make sure I fully understood your concerns.      Marion Russo is a 69 year old, presenting for the following:  Wellness Visit        7/30/2024    10:02 AM   Additional Questions   Roomed by Osmany TRUONG        Health Care Directive  Patient does not have a Health Care Directive or Living Will: Discussed advance care planning with patient; information given to patient to review.    History of Present Illness       Reason for visit:  Annual Physical    He eats 2-3 servings of fruits and vegetables daily.He consumes 0 sweetened beverage(s) daily.He exercises with enough effort to increase his heart rate 20 to 29 minutes per day.  He exercises with enough effort to increase his heart rate 5 days per week.   He is taking medications regularly.      Left hip - had gotten better and now much worse about a month ago to the point of giving out. Movement causes the pain. Pain in the groin and radiates laterally. Even just turning the ankle, crossing leg or getting up from a chair can cause a pain. Pain is not worse in the morning. No knee or ankle or foot pain on same side, but pain can radiate down leg. Mild occasionally low back pain.    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 110's in daytime and sometimes high normal      Medications:  Metformin: XR 1000 mg daily  SGLT2i: Jardiance 10 mg  Atorvastatin: 40 mg    Labs:  Lab Results   Component Value Date    A1C 7.0 07/30/2024    A1C 6.5 04/22/2024    A1C 6.8 01/23/2024    A1C 7.3 07/25/2023    A1C 6.7 09/19/2022    A1C 7.5 02/27/2021    A1C 7.7 07/03/2020    A1C 6.5 12/05/2019    A1C 6.8 03/08/2019    A1C 7.3 01/29/2019        up date date as of 07/30/24    A1C <8: yes  BP controlled: yes  On statin: yes  Non-smoker: yes  Eye exam: no      HTN -   BP Readings from Last 6 Encounters:   07/30/24 122/76   04/22/24 126/82   09/09/23 131/85   09/07/23 (!) 148/84   07/25/23 134/85   04/26/22 134/78     Left ear ringing - saw ENT who prescribed triamterene-hydrochlorothiazide 37.5-25 mg Stopped after noticing the low sodium so only took for a few days. Was NOT taking when sodium last measured at 133 on 7/16 - glucose corrected is 134.        7/30/2024   General Health   How would you rate your overall physical health? (!) FAIR   Feel stress (tense, anxious, or unable to sleep) Very much      (!) STRESS CONCERN      7/30/2024   Nutrition   Diet: Regular (no restrictions)            7/30/2024   Exercise   Days per week of moderate/strenous exercise 5 days   Average minutes spent exercising at this level 30 min            7/30/2024   Social Factors   Frequency of gathering with friends or relatives Once a week   Worry food won't last until get money to buy more No   Food not last or not have enough money for food? No   Do you have housing? (Housing is defined as stable permanent housing and does not include staying ouside in a car, in a tent, in an abandoned building, in an overnight shelter, or couch-surfing.) Yes   Are you worried about losing your housing? No   Lack of transportation? No   Unable to get utilities  (heat,electricity)? No            7/30/2024   Fall Risk   Fallen 2 or more times in the past year? No   Trouble with walking or balance? No             7/30/2024   Activities of Daily Living- Home Safety   Needs help with the following daily activites None of the above   Safety concerns in the home None of the above            7/30/2024   Dental   Dentist two times every year? Yes            7/30/2024   Hearing Screening   Hearing concerns? (!) IT'S HARDER TO UNDERSTAND WOMEN'S VOICES THAN MEN'S VOICES.    (!) IT'S HARD TO FOLLOW A CONVERSATION IN A NOISY RESTAURANT OR CROWDED ROOM.    (!) TROUBLE UNDESTANDING A SPEAKER IN A PUBLIC MEETING OR [facility] SERVICE.    (!) TROUBLE FOLLOWING DIALOGUE IN THE THEATHER.    (!) TROUBLE UNDERSTANDING SOFT OR WHISPERED SPEECH.       Multiple values from one day are sorted in reverse-chronological order         7/30/2024   Driving Risk Screening   Patient/family members have concerns about driving No            7/30/2024   General Alertness/Fatigue Screening   Have you been more tired than usual lately? (!) YES            7/30/2024   Urinary Incontinence Screening   Bothered by leaking urine in past 6 months No            7/30/2024   TB Screening   Were you born outside of the [place]? No              Today's PHQ-2 Score:       1/22/2024    10:41 PM   PHQ-2 ( 1999 Pfizer)   Q1: Little interest or pleasure in doing things 0   Q2: Feeling down, depressed or hopeless 0   PHQ-2 Score 0   Q1: Little interest or pleasure in doing things Not at all   Q2: Feeling down, depressed or hopeless Not at all   PHQ-2 Score 0         7/30/2024   Substance Use   Alcohol more than 3/day or more than 7/wk No   Do you have a current opioid prescription? No   How severe/bad is pain from 1 to 10? 4/10   Do you use any other substances recreationally? No        Social History     Tobacco Use    Smoking status: Never    Smokeless tobacco: Never   Vaping Use    Vaping status: Never Used   Substance Use Topics     Alcohol use: Yes     Comment: 3 per week    Drug use: No           2024   AAA Screening   Family history of Abdominal Aortic Aneurysm (AAA)? No      Last PSA:   PSA   Date Value Ref Range Status   2021 0.76 0 - 4 ug/L Final     Comment:     Assay Method:  Chemiluminescence using Siemens Vista analyzer     Prostate Specific Antigen Screen   Date Value Ref Range Status   2023 0.61 0.00 - 4.50 ng/mL Final   2022 0.70 0.00 - 4.00 ug/L Final     ASCVD Risk   The 10-year ASCVD risk score (Aristeo ALMEIDA, et al., 2019) is: 26.5%    Values used to calculate the score:      Age: 69 years      Sex: Male      Is Non- : No      Diabetic: Yes      Tobacco smoker: No      Systolic Blood Pressure: 122 mmHg      Is BP treated: Yes      HDL Cholesterol: 37 mg/dL      Total Cholesterol: 139 mg/dL    Fracture Risk Assessment Tool  Link to Frax Calculator  Use the information below to complete the Frax calculator  : 1955  Sex: male  Weight (kg): 100.7 kg (actual weight)  Height (cm): 177.8 cm  Previous Fragility Fracture:  No  History of parent with fractured hip:  Yes - mother at 70s  Current Smoking:  No  Patient has been on glucocorticoids for more than 3 months (5mg/day or more): No  Rheumatoid Arthritis on Problem List:  No  Secondary Osteoporosis on Problem List:  No  Consumes 3 or more units of alcohol per day: No  Femoral Neck BMD (g/cm2)            Reviewed and updated as needed this visit by Provider   Tobacco   Meds   Med Hx  Surg Hx  Fam Hx  Soc Hx Sexual Activity          Past Medical History:   Diagnosis Date    Blood in semen 2016    Symptoms came and went    GERD (gastroesophageal reflux disease)     Hyperlipidemia 2015    Hypertension 2014    Migraine     Nausea and vomiting    Peptic ulcer hemorrhage 2013     Past Surgical History:   Procedure Laterality Date    GI SURGERY  2013    peptic ulcers stapled    OPEN REDUCTION INTERNAL FIXATION HUMERUS  PROXIMAL Left 5/15/2018    Procedure: OPEN REDUCTION INTERNAL FIXATION HUMERUS PROXIMAL;  Left Glenoid Open Reduction Internal Fixation;  Surgeon: Rosa Joe MD;  Location:  OR     Current providers sharing in care for this patient include:  Patient Care Team:  Romelia Judd APRN CNP as PCP - General (Nurse Practitioner - Family)  Tc Hester MD as MD (Family Medicine - Sports Medicine)  Savannah Burrows PA-C as Physician Assistant (Physician Assistant)  Shahrzad Cash MD as MD (Urology)  Naima Elise, TALIA as Registered Nurse (Urology)  Romelia Judd APRN CNP as Referring Physician (Nurse Practitioner - Family)  Romelia Judd APRN CNP as Assigned PCP  Nena Aquino [facility] as Pharmacist (Pharmacist)  Maria L Miller AuD as Audiologist (Audiology)  Josefina Cuevas PA-C as Physician Assistant (Otolaryngology)    The following health maintenance items are reviewed in Epic and correct as of today:  Health Maintenance   Topic Date Due    EYE EXAM  Never done    Pneumococcal Vaccine: 65+ Years (1 of 2 - PCV) Never done    RSV VACCINE (Pregnancy & 60+) (1 - 1-dose 60+ series) Never done    COLORECTAL CANCER SCREENING  02/07/2020    DIABETIC FOOT EXAM  04/26/2023    MEDICARE ANNUAL WELLNESS VISIT  07/25/2024    INFLUENZA VACCINE (1) 09/01/2024    A1C  10/22/2024    MICROALBUMIN  01/23/2025    BMP  07/16/2025    LIPID  07/16/2025    ANNUAL REVIEW OF HM ORDERS  07/30/2025    FALL RISK ASSESSMENT  07/30/2025    ADVANCE CARE PLANNING  08/29/2028    DTAP/TDAP/TD IMMUNIZATION (3 - Td or Tdap) 04/23/2034    PHQ-2 (once per calendar year)  Completed    URINALYSIS  Completed    ZOSTER IMMUNIZATION  Completed    COVID-19 Vaccine  Completed    IPV IMMUNIZATION  Aged Out    HPV IMMUNIZATION  Aged Out    MENINGITIS IMMUNIZATION  Aged Out    RSV MONOCLONAL ANTIBODY  Aged Out    HEPATITIS C SCREENING  Discontinued    HEMOGLOBIN  Discontinued    HEPATITIS A IMMUNIZATION   "Discontinued    HEPATITIS B IMMUNIZATION  Discontinued         Review of Systems  Constitutional, neuro, ENT, endocrine, pulmonary, cardiac, gastrointestinal, genitourinary, musculoskeletal, integument and psychiatric systems are negative, except as otherwise noted.     Objective    Exam  /76 (BP Location: Right arm, Patient Position: Sitting, Cuff Size: Adult Large)   Pulse 66   Temp 97.3  F (36.3  C) (Temporal)   Resp 15   Ht 1.778 m (5' 10\")   Wt 100.7 kg (222 lb)   SpO2 96%   BMI 31.85 kg/m     Estimated body mass index is 31.85 kg/m  as calculated from the following:    Height as of this encounter: 1.778 m (5' 10\").    Weight as of this encounter: 100.7 kg (222 lb).    Physical Exam  GENERAL: alert and no distress  EYES: Eyes grossly normal to inspection, PERRL and conjunctivae and sclerae normal  HENT: ear canals and TM's normal, nose and mouth without ulcers or lesions  NECK: no adenopathy, no asymmetry, masses, or scars  RESP: lungs clear to auscultation - no rales, rhonchi or wheezes  CV: regular rate and rhythm, normal S1 S2, no S3 or S4, no murmur, click or rub, no peripheral edema  ABDOMEN: soft, nontender, no hepatosplenomegaly, no masses and bowel sounds normal   (male): normal male genitalia without lesions or urethral discharge, no hernia  MS: no gross musculoskeletal defects noted, no edema, left hip mild pain on range of motion with abduction most painful; FROM, no pain on palpation of lateral hip, SI joint, no spinal tenderness or paralumbar pain  SKIN: no suspicious lesions or rashes  NEURO: Normal strength and tone, mentation intact and speech normal  PSYCH: mentation appears normal, affect normal/bright  LYMPH: no cervical, supraclavicular, axillary, or inguinal adenopathy  Diabetic foot exam: normal DP and PT pulses, no trophic changes or ulcerative lesions, normal sensory exam, and normal monofilament exam    Squat without pain and full strength        7/30/2024   Mini Cog "   Clock Draw Score 2 Normal   3 Item Recall 3 objects recalled   Mini Cog Total Score 5                Signed Electronically by: JEFFERSON Cook CNP

## 2024-07-31 NOTE — TELEPHONE ENCOUNTER
Called Dr. Barbosa team and relayed message, also sent pt [employer] message relaying to get lab done in one week.    Miguel Almazan RN   [facility]

## 2024-07-31 NOTE — TELEPHONE ENCOUNTER
Sodium on recheck was 139 and so this looks ok to start the triamterene. We can let Dr. Barbosa know and let the patient know about starting and recheck sodium in one week. I've put in a sodium level for this to be lab only.  GUILLERMO Judd, MARJ

## 2024-07-31 NOTE — TELEPHONE ENCOUNTER
Mira Lopez RN from  ENT calling back   Dr. Barbosa recommends not starting triamterene if sodium is still low. They can try and start Aldactone 25mg daily and recheck sodium levels 1 week later.   If patients sodium has corrected and WNL patient can start triamterene. Let them know what you would like     7/30 labs- sodium: 139mmol/L    Thanks   Karla HENRY RN

## 2024-09-12 ENCOUNTER — ANCILLARY PROCEDURE (OUTPATIENT)
Dept: GENERAL RADIOLOGY | Facility: CLINIC | Age: 69
End: 2024-09-12
Attending: NURSE PRACTITIONER
Payer: COMMERCIAL

## 2024-09-12 DIAGNOSIS — M25.552 HIP PAIN, LEFT: ICD-10-CM

## 2024-09-12 PROCEDURE — 73502 X-RAY EXAM HIP UNI 2-3 VIEWS: CPT | Performed by: RADIOLOGY

## 2024-09-20 NOTE — RESULT ENCOUNTER NOTE
Karan,    The xray showed arthritis and no other problem in the left hip. If you wanted to pursue a hip steroid injection, you can set that yo with either sports med or one of the docs at this clinic - Dr. Kay, Dr. Elizondo or Dr. Celestin.  If you have any questions, please feel free to contact the clinic.    MARJ Rashid

## 2024-09-20 NOTE — RESULT ENCOUNTER NOTE
Karan,    Labs were stable and you don't need to increase your empagliflozine, but with the microalbumin creeping up, we could definitely consider going up to empagliflozin 25 mg. This could help slow the kidney changes and it would give you a bit more glucose control.  Let me know if you want to do that. If you have any questions, please feel free to contact the clinic.    MARJ Rashid

## 2024-09-24 NOTE — TELEPHONE ENCOUNTER
DIAGNOSIS: Romelia Judd APRN CNP   Hip pain, left  xrays done at [facility]    APPOINTMENT DATE: 10/1/24   NOTES STATUS DETAILS   OFFICE NOTE from referring provider Internal 7/30/24 - Romelia Judd APRN CNP - Family Med    MEDICATION LIST Internal    XRAYS (IMAGES & REPORTS) Internal 9/12/24 - XR Hip Left

## 2024-09-25 ENCOUNTER — MYC REFILL (OUTPATIENT)
Dept: FAMILY MEDICINE | Facility: CLINIC | Age: 69
End: 2024-09-25
Payer: COMMERCIAL

## 2024-09-25 DIAGNOSIS — G43.009 MIGRAINE WITHOUT AURA AND WITHOUT STATUS MIGRAINOSUS, NOT INTRACTABLE: ICD-10-CM

## 2024-09-25 DIAGNOSIS — N18.30 TYPE 2 DIABETES MELLITUS WITH STAGE 3 CHRONIC KIDNEY DISEASE, WITHOUT LONG-TERM CURRENT USE OF INSULIN, UNSPECIFIED WHETHER STAGE 3A OR 3B CKD (H): ICD-10-CM

## 2024-09-25 DIAGNOSIS — E11.22 TYPE 2 DIABETES MELLITUS WITH STAGE 3 CHRONIC KIDNEY DISEASE, WITHOUT LONG-TERM CURRENT USE OF INSULIN, UNSPECIFIED WHETHER STAGE 3A OR 3B CKD (H): ICD-10-CM

## 2024-09-27 RX ORDER — RIZATRIPTAN BENZOATE 5 MG/1
5 TABLET ORAL
Qty: 8 TABLET | Refills: 3 | Status: SHIPPED | OUTPATIENT
Start: 2024-09-27

## 2024-10-01 ENCOUNTER — OFFICE VISIT (OUTPATIENT)
Dept: ORTHOPEDICS | Facility: CLINIC | Age: 69
End: 2024-10-01
Attending: NURSE PRACTITIONER
Payer: COMMERCIAL

## 2024-10-01 ENCOUNTER — PRE VISIT (OUTPATIENT)
Dept: ORTHOPEDICS | Facility: CLINIC | Age: 69
End: 2024-10-01

## 2024-10-01 VITALS — BODY MASS INDEX: 31.78 KG/M2 | HEIGHT: 70 IN | WEIGHT: 222 LBS

## 2024-10-01 DIAGNOSIS — M70.62 TROCHANTERIC BURSITIS OF LEFT HIP: ICD-10-CM

## 2024-10-01 DIAGNOSIS — M16.12 PRIMARY OSTEOARTHRITIS OF LEFT HIP: Primary | ICD-10-CM

## 2024-10-01 PROCEDURE — 99204 OFFICE O/P NEW MOD 45 MIN: CPT | Performed by: FAMILY MEDICINE

## 2024-10-01 NOTE — LETTER
10/1/2024      RE: Karan Anglin  [address]  [facility] [address]     Dear Colleague,    Thank you for referring your patient, Karan Anglin, to the [facility] [place]. Please see a copy of my visit note below.    CHIEF COMPLAINT:  Consult (Left hip )       HISTORY OF PRESENT ILLNESS  Mr. Anglin is a pleasant 69 year old year old male who presents to clinic today with left hip pain.  Karan explains that he had insidious onset of left hip pain that has gotten worse in the last few months.     Onset: gradual  Location: left anterior hip  Quality:  sharp  Duration: Several years, worse in the last few months    Severity: 10/10 at worst  Timing:intermittent episodes weight bearing and twisting, seated to standing  Modifying factors:  resting and non-use makes it better, movement and use makes it worse  Associated signs & symptoms: pain, instability   Previous similar pain: No  Treatments to date: Ice, heat, ibuprofen     Additional history: as documented    Review of Systems:  Have you recently had a a fever, chills, weight loss? No  Do you have any vision problems? No  Do you have any chest pain or edema? No  Do you have any shortness of breath or wheezing?  No  Do you have stomach problems? No  Do you have any numbness or focal weakness? No  Do you have diabetes? Yes, type 2   Do you have problems with bleeding or clotting? No  Do you have an rashes or other skin lesions? No    MEDICAL HISTORY  Patient Active Problem List   Diagnosis     Benign essential hypertension     Hyperlipidemia LDL goal <130     Migraine without aura and without status migrainosus, not intractable     Blood in semen     Shoulder dislocation, left, initial encounter     Aftercare following surgery of the musculoskeletal system     Microalbuminuria     Type 2 diabetes mellitus with stage 3 chronic kidney disease, without long-term current use of insulin (H)     CKD (chronic kidney disease) stage 3, GFR  30-59 ml/min (H)     Nephrolithiasis     History of NSAID-associated gastropathy     Seasonal allergic rhinitis, unspecified trigger     Family history of colonic polyps     Family history of colon cancer       Current Outpatient Medications   Medication Sig Dispense Refill     amLODIPine (NORVASC) 5 MG tablet Take 1 tablet (5 mg) by mouth daily 90 tablet 1     aspirin 81 MG EC tablet Take 1 tablet (81 mg) by mouth daily 90 tablet 3     atenolol (TENORMIN) 50 MG tablet Take 1 tablet (50 mg) by mouth daily 90 tablet 1     atorvastatin (LIPITOR) 40 MG tablet Take 1 tablet (40 mg) by mouth daily 90 tablet 3     blood glucose (NO BRAND SPECIFIED) lancets standard Use to test blood sugar 4 times daily or as directed. 100 each 3     blood glucose (NO BRAND SPECIFIED) test strip Use to test blood sugar 4 times daily or as directed. 100 strip 3     cholecalciferol (VITAMIN D3) 25 mcg (1000 units) capsule Take 2 capsules (50 mcg) by mouth daily 60 capsule 11     citalopram (CELEXA) 20 MG tablet Take 1 tablet (20 mg) by mouth daily 90 tablet 1     diphenhydrAMINE (BENADRYL) 25 MG tablet Take 25 mg by mouth nightly as needed for itching or allergies       empagliflozin (JARDIANCE) 10 MG TABS tablet Take 1 tablet (10 mg) by mouth daily 90 tablet 1     esomeprazole (NEXIUM) 20 MG DR capsule Take 1 capsule (20 mg) by mouth daily       fluticasone (FLONASE) 50 MCG/ACT nasal spray Spray 1 spray into both nostrils daily (Patient not taking: Reported on 7/30/2024) 18.2 mL 0     gabapentin (NEURONTIN) 300 MG capsule Take 1 capsule (300 mg) by mouth 2 times daily AND 2 capsules (600 mg) at bedtime. Due for appt 360 capsule 3     losartan (COZAAR) 100 MG tablet Take 1 tablet (100 mg) by mouth daily 90 tablet 1     metFORMIN (GLUCOPHAGE XR) 500 MG 24 hr tablet Take 2 tablets (1,000 mg) by mouth daily (with dinner) 180 tablet 3     rimegepant (NURTEC) 75 MG ODT tablet Place 1 tablet (75 mg) under the tongue every 48 hours (Patient not  "taking: Reported on 7/30/2024) 16 tablet 5     rizatriptan (MAXALT) 5 MG tablet Take 1 tablet (5 mg) by mouth at onset of headache for migraine. repeat after 2 hr if no relief; maximum: 30 mg/24 hours 8 tablet 3       No Known Allergies    Family History   Problem Relation Age of Onset     Colon Cancer Mother      Kidney Disease Mother      Coronary Artery Disease Father      Enlarged prostate Father      Down Syndrome Sister      Alzheimer Disease Sister      Colon Polyps Sister      Nephrolithiasis Sister      Colon Polyps Sister      Intellectual Disability Sister         Down Syndrome     Colon Polyps Sister      Colon Polyps Sister      Testicular cancer Brother      Diabetes Brother        Additional medical/Social/Surgical histories reviewed in [facility] and updated as appropriate.       PHYSICAL EXAM  Ht 1.778 m (5' 10\")   Wt 100.7 kg (222 lb)   BMI 31.85 kg/m      General  - normal appearance, in no obvious distress  Musculoskeletal - left hip  - stance: gait slightly favors affected side  - inspection: no swelling or effusion,  normal bone and joint alignment, no obvious deformity  - palpation: Tenderness to palpation at lateral hip overlying greater trochanter.  Nontender anterior over hip flexor.  Nontender posterior hip.  - ROM: limited flexion and internal rotation secondary to pain, pain with passive and active ROM   - strength: 5/5 in all planes  - special tests:  (-) ANIA  (-) FADIR  Neuro  - no sensory or motor deficit, grossly normal coordination, normal muscle tone      IMAGING : Left hip x-ray 3 views. Final results and radiologist's interpretation, available in the [facility] health record. Images were reviewed with the patient/family members in the office today. My personal interpretation of the performed imaging is mild osteoarthritic changes of left hip with small femoral head spurring.     ASSESSMENT & PLAN  Mr. Anglin is a 69 year old year old male who presents to clinic today with anterior hip " pain as well as lateral hip pain with specific motions or prolonged sitting.    Clinical examination today reveals findings consistent with trochanteric bursitis as well as intra-articular pathology, exacerbation of mild osteoarthritis of left hip.    Diagnosis:   1.  Trochanteric bursitis of left hip  2.  Primary arthritis of left hip    Treatment options discussed for both bursitis as well as hip OA with exacerbation.  On exam today, trochanteric bursitis is more symptomatic, however based on his history and pointing towards groin pain is his greatest symptom, I have recommended starting with a hip injection.  Additionally he would benefit from hip home exercise program and even physical therapy to work on gluteal strengthening, hip stability.  I would like him to start this after the injection is completed.  We do have the option to consider trochanteric injection in the future.  He does have type 2 diabetes mellitus but it is well-controlled on Jardiance as well as metformin.    Follow-up at next available timeframe for intra-articular hip injection under ultrasound guidance.    It was a pleasure seeing Karan today.    Brandin Murguia DO, [facility]  Primary Care Sports Medicine      Again, thank you for allowing me to participate in the care of your patient.      Sincerely,    Brandin Murguia DO

## 2024-10-01 NOTE — PROGRESS NOTES
CHIEF COMPLAINT:  Consult (Left hip )       HISTORY OF PRESENT ILLNESS  Mr. Angiln is a pleasant 69 year old year old male who presents to clinic today with left hip pain.  Karan explains that he had insidious onset of left hip pain that has gotten worse in the last few months.     Onset: gradual  Location: left anterior hip  Quality:  sharp  Duration: Several years, worse in the last few months    Severity: 10/10 at worst  Timing:intermittent episodes weight bearing and twisting, seated to standing  Modifying factors:  resting and non-use makes it better, movement and use makes it worse  Associated signs & symptoms: pain, instability   Previous similar pain: No  Treatments to date: Ice, heat, ibuprofen     Additional history: as documented    Review of Systems:  Have you recently had a a fever, chills, weight loss? No  Do you have any vision problems? No  Do you have any chest pain or edema? No  Do you have any shortness of breath or wheezing?  No  Do you have stomach problems? No  Do you have any numbness or focal weakness? No  Do you have diabetes? Yes, type 2   Do you have problems with bleeding or clotting? No  Do you have an rashes or other skin lesions? No    MEDICAL HISTORY  Patient Active Problem List   Diagnosis    Benign essential hypertension    Hyperlipidemia LDL goal <130    Migraine without aura and without status migrainosus, not intractable    Blood in semen    Shoulder dislocation, left, initial encounter    Aftercare following surgery of the musculoskeletal system    Microalbuminuria    Type 2 diabetes mellitus with stage 3 chronic kidney disease, without long-term current use of insulin (H)    CKD (chronic kidney disease) stage 3, GFR 30-59 ml/min (H)    Nephrolithiasis    History of NSAID-associated gastropathy    Seasonal allergic rhinitis, unspecified trigger    Family history of colonic polyps    Family history of colon cancer       Current Outpatient Medications   Medication Sig Dispense  Refill    amLODIPine (NORVASC) 5 MG tablet Take 1 tablet (5 mg) by mouth daily 90 tablet 1    aspirin 81 MG EC tablet Take 1 tablet (81 mg) by mouth daily 90 tablet 3    atenolol (TENORMIN) 50 MG tablet Take 1 tablet (50 mg) by mouth daily 90 tablet 1    atorvastatin (LIPITOR) 40 MG tablet Take 1 tablet (40 mg) by mouth daily 90 tablet 3    blood glucose (NO BRAND SPECIFIED) lancets standard Use to test blood sugar 4 times daily or as directed. 100 each 3    blood glucose (NO BRAND SPECIFIED) test strip Use to test blood sugar 4 times daily or as directed. 100 strip 3    cholecalciferol (VITAMIN D3) 25 mcg (1000 units) capsule Take 2 capsules (50 mcg) by mouth daily 60 capsule 11    citalopram (CELEXA) 20 MG tablet Take 1 tablet (20 mg) by mouth daily 90 tablet 1    diphenhydrAMINE (BENADRYL) 25 MG tablet Take 25 mg by mouth nightly as needed for itching or allergies      empagliflozin (JARDIANCE) 10 MG TABS tablet Take 1 tablet (10 mg) by mouth daily 90 tablet 1    esomeprazole (NEXIUM) 20 MG DR capsule Take 1 capsule (20 mg) by mouth daily      fluticasone (FLONASE) 50 MCG/ACT nasal spray Spray 1 spray into both nostrils daily (Patient not taking: Reported on 7/30/2024) 18.2 mL 0    gabapentin (NEURONTIN) 300 MG capsule Take 1 capsule (300 mg) by mouth 2 times daily AND 2 capsules (600 mg) at bedtime. Due for appt 360 capsule 3    losartan (COZAAR) 100 MG tablet Take 1 tablet (100 mg) by mouth daily 90 tablet 1    metFORMIN (GLUCOPHAGE XR) 500 MG 24 hr tablet Take 2 tablets (1,000 mg) by mouth daily (with dinner) 180 tablet 3    rimegepant (NURTEC) 75 MG ODT tablet Place 1 tablet (75 mg) under the tongue every 48 hours (Patient not taking: Reported on 7/30/2024) 16 tablet 5    rizatriptan (MAXALT) 5 MG tablet Take 1 tablet (5 mg) by mouth at onset of headache for migraine. repeat after 2 hr if no relief; maximum: 30 mg/24 hours 8 tablet 3       No Known Allergies    Family History   Problem Relation Age of Onset  "   Colon Cancer Mother     Kidney Disease Mother     Coronary Artery Disease Father     Enlarged prostate Father     Down Syndrome Sister     Alzheimer Disease Sister     Colon Polyps Sister     Nephrolithiasis Sister     Colon Polyps Sister     Intellectual Disability Sister         Down Syndrome    Colon Polyps Sister     Colon Polyps Sister     Testicular cancer Brother     Diabetes Brother        Additional medical/Social/Surgical histories reviewed in [facility] and updated as appropriate.       PHYSICAL EXAM  Ht 1.778 m (5' 10\")   Wt 100.7 kg (222 lb)   BMI 31.85 kg/m      General  - normal appearance, in no obvious distress  Musculoskeletal - left hip  - stance: gait slightly favors affected side  - inspection: no swelling or effusion,  normal bone and joint alignment, no obvious deformity  - palpation: Tenderness to palpation at lateral hip overlying greater trochanter.  Nontender anterior over hip flexor.  Nontender posterior hip.  - ROM: limited flexion and internal rotation secondary to pain, pain with passive and active ROM   - strength: 5/5 in all planes  - special tests:  (-) ANIA  (-) FADIR  Neuro  - no sensory or motor deficit, grossly normal coordination, normal muscle tone      IMAGING : Left hip x-ray 3 views. Final results and radiologist's interpretation, available in the [facility] health record. Images were reviewed with the patient/family members in the office today. My personal interpretation of the performed imaging is mild osteoarthritic changes of left hip with small femoral head spurring.     ASSESSMENT & PLAN  Mr. Anglin is a 69 year old year old male who presents to clinic today with anterior hip pain as well as lateral hip pain with specific motions or prolonged sitting.    Clinical examination today reveals findings consistent with trochanteric bursitis as well as intra-articular pathology, exacerbation of mild osteoarthritis of left hip.    Diagnosis:   1.  Trochanteric bursitis of left " hip  2.  Primary arthritis of left hip    Treatment options discussed for both bursitis as well as hip OA with exacerbation.  On exam today, trochanteric bursitis is more symptomatic, however based on his history and pointing towards groin pain is his greatest symptom, I have recommended starting with a hip injection.  Additionally he would benefit from hip home exercise program and even physical therapy to work on gluteal strengthening, hip stability.  I would like him to start this after the injection is completed.  We do have the option to consider trochanteric injection in the future.  He does have type 2 diabetes mellitus but it is well-controlled on Jardiance as well as metformin.    Follow-up at next available timeframe for intra-articular hip injection under ultrasound guidance.    It was a pleasure seeing Karan today.    Brandin Murguia DO, CAQSM  Primary Care Sports Medicine

## 2024-10-01 NOTE — PATIENT INSTRUCTIONS
Hip Osteoarthritis    KEY FACTS ABOUT ARTHRITIS OF THE HIP  Click to enlarge Arthritis of the hip may have many causes.  Aging, injury, infection, heavy lifting or contact sports that put pressure on joints that damage cartilage can contribute to arthritis. Obesity, bad posture, and overuse can also cause extra wear on shoulder joints.  The 2 most common kinds of arthritis are osteoarthritis and rheumatoid arthritis.  Arthritis cannot be cured, but it is possible to alleviate pain and stiffness.    WHAT IS ARTHRITIS OF THE HIP?  Arthritis of the hip is pain and stiffness of your joints. Sometimes you may have redness, swelling, and warmth around painful joints. In severe cases, the shape of your joints may change.    Osteoarthritis is a disease in which the cartilage in your joints breaks down. If cartilage gets rough or wears away, the roughened cartilage or bone surfaces grind against each other. The joint becomes irritated and swollen (inflamed). Sometimes the irritation causes abnormal bone growths, called spurs. Osteoarthritis normally affects the feet, knees, neck, lower back, hips, and fingers. Symptoms of the disease often start to appear by middle age.    HOW IS ARTHRITIS OF THE HIP DIAGNOSED AND TREATED?  A hip specialist will examine the hip and joints to make an initial assessment. Typically, he or she will recommend a combination of X-rays, CT scans, and MRIs to determine the exact extent of the damage. Other tests may include blood tests, or joint aspiration, which uses a needle to take fluid from a joint for testing. Our [facility] offers all of these services under one roof.     There are many ways to treat arthritis. While arthritis cannot be cured, the goals of treatment are to relieve pain and stiffness, reduce swelling, stop or slow down damage to your joints, and keep your joints working properly.    Several kinds of medicines may be used, such as:    Prescription or  nonprescription pain medicines  Medicine patches put on painful joints  Steroids or other medicine injected into a painful joint  Tylenol Arthritis strength  Ibuprofen (short durations)    Three types of exercise are best for people with arthritis:    Range-of-motion exercises are gentle stretching exercises that help you move each joint as far as possible. Examples include low-speed bike riding, lidya chi, and yoga. Range-of-motion exercises help you keep or improve your flexibility and relieve stiffness.    Strengthening exercise, such as weight training, makes muscles and tendons stronger. Strong muscles and tendons support joints better. You will be able to move more easily and with less pain.  Aerobic or endurance exercise at a moderate pace, such as walking or bicycle riding, improves your overall health and helps control your weight.    Talk with your healthcare team before you start an exercise program. Too much exercise too soon or even at the wrong time of day may make arthritis worse. Your care team may refer you to a physical therapist to design a program that is right for you.  Your care team may advise arthroscopy, which is a type of surgery done with a small scope inserted into your joint. Our team can look directly at your joint and repair it without having to cut open the joint. If you have a joint that is severely damaged, we may recommend a joint replacement.    Other treatments:    Your healthcare team may recommend physical or occupational therapy to treat pain and help you have better use of your joints.  Your care team may suggest using heat or cold therapy, depending on the type of arthritis you have.    Sometimes it may help to use a splint or brace to rest a joint and protect it from injury.  Transcutaneous electrical nerve stimulation (TENS) may relieve some types of arthritis pain. TENS directs mild electric pulses through the skin to nerves in the painful area.  Acupuncture and massage are  other possible treatments.    HOW CAN I MANAGE ARTHRITIS OF THE HIP?  Follow the full course of treatment prescribed by your healthcare team.  Rest your joints when they are warm, swollen, or painful.  Learn how to move in ways that are easier on your joints. Be open to using devices to help you. These devices include canes and walkers and bath seats and grab bars for the bathtub.    Join a support group or take classes on how to manage your arthritis.  Eat a healthy diet. Ask your care team about the benefits of talking to a dietician to learn what you need in a healthy diet.  Try to keep a healthy weight. If you are overweight, lose weight. Losing some weight can lower the stress on your joints.  If you smoke, try to quit. Talk to your healthcare team about ways to quit smoking.  Try to get at least 7 to 9 hours of sleep each night.    Trochanteric Bursitis / Gluteal Tendinopathy    WHAT IS TROCHANTERIC BURSITIS?    Bursitis is irritation or inflammation of the bursa. A bursa is a fluid-filled sac that acts as a cushion between tendons, bones, and skin. There is a bump on the outer side of the upper part of the thigh bone (femur) called the greater trochanter. The trochanteric bursa is located over the greater trochanter. When this bursa is inflamed it is called trochanteric bursitis.          WHAT IS THE CAUSE?     The trochanteric bursa may be inflamed by a group of muscles or tendons rubbing over the bursa and causing friction against the thigh bone. Your iliotibial band goes from the iliac crest of your pelvis down the outer side of your thigh and attaches just below the knee. A tight iliotibial band can lead to trochanteric bursitis. This injury can occur with running, walking, or bicycling, especially when the bicycle seat is too high.    Trochanteric bursitis may also be caused by a fall, by a spine disorder, by differences in the length of your legs, or as a complication of hip surgery.    WHAT ARE THE  SYMPTOMS?    You have pain on the upper outer area of your thigh or on the side of your hip. The pain is worse when you walk, bicycle, or go up or down stairs. You have pain when you move your thigh bone and feel tenderness in the area over the greater trochanter.    HOW IS IT DIAGNOSED?    Your healthcare provider will ask about your symptoms and examine your hip and thigh.    HOW IS IT TREATED?    To treat this condition:    Put an ice pack, gel pack, or package of frozen vegetables wrapped in a cloth on the painful area every 3 to 4 hours for up to 20 minutes at a time until the pain goes away.  Take an anti-inflammatory medicine, such as ibuprofen, as directed by your provider. Nonsteroidal anti-inflammatory medicines (NSAIDs) may cause stomach bleeding and other problems. These risks increase with age. Read the label and take as directed. Unless recommended by your healthcare provider, do not take for more than 10 days.  Follow your provider s instructions for doing exercises to help you recover.    While you are recovering from your injury you will need to change your sport or activity to one that does not make your condition worse. For example, you may need to swim instead of running or bicycling. If you are bicycling, you may need to lower your bicycle seat.    A bursa that is only mildly inflamed and has just started to hurt may improve within a few weeks. A bursa that is significantly inflamed and has been painful for a long time may take up to a few months to improve. You need to stop doing the activities that cause pain until your bursa has healed.    Follow your healthcare provider's instructions. Ask your provider:    How and when you will hear your test results  How long it will take to recover  What activities you should avoid and when you can return to your normal activities  How to take care of yourself at home  What symptoms or problems you should watch for and what to do if you have them  Make  sure you know when you should come back for a checkup.    HOW CAN I HELP PREVENT TROCHANTERIC BURSITIS?    Trochanteric bursitis is best prevented by warming up properly and stretching the muscles on the outer side of your upper thigh.    Developed by [employer].  Published by [employer].  Copyright  2014 [employer] and/or one of its subsidiaries. All rights reserved.    You can do the first 3 stretches to begin stretching the muscles that run along the outside of your hip. You can do the strengthening exercises when the sharp pain lessens.    STRETCHING EXERCISES    Gluteal stretch: Lie on your back with both knees bent. Rest the ankle on your injured side over the knee of your other leg. Grasp the thigh of the leg on the uninjured side and pull toward your chest. You will feel a stretch along the buttocks on the injured side and possibly along the outside of your hip. Hold the stretch for 15 to 30 seconds. Repeat 3 times.    Iliotibial band stretch, standing: Cross your uninjured leg in front of the other leg and bend down and reach toward the inside of your back foot. Do not bend your knees. Hold this position for 15 to 30 seconds. Return to the starting position. Repeat 3 times.  Iliotibial band stretch, side-leaning: Stand sideways near a wall with your injured side closest to the wall. Place a hand on the wall for support. Cross the leg farther from the wall over the other leg. Keep the foot closest to the wall flat on the floor. Lean your hips into the wall. Hold the stretch for 15 to 30 seconds. Repeat 3 times.    STRENGTHENING EXERCISES    Straight leg raise: Lie on your back with your legs straight out in front of you. Bend the knee on your uninjured side and place the foot flat on the floor. Tighten the thigh muscle on your injured side and lift your leg about 8 inches off the floor. Keep your leg straight and your thigh muscle tight. Slowly lower your leg back down to the floor. Do 2 sets of  15.    Prone hip extension: Lie on your stomach with your legs straight out behind you. Fold your arms under your head and rest your head on your arms. Draw your belly button in towards your spine and tighten your abdominal muscles. Tighten the buttocks and thigh muscles of the leg on your injured side and lift the leg off the floor about 8 inches. Keep your leg straight. Hold for 5 seconds. Then lower your leg and relax. Do 2 sets of 15.    Side-lying leg lift: Lie on your uninjured side. Tighten the front thigh muscles on your injured leg and lift that leg 8 to 10 inches (20 to 25 centimeters) away from the other leg. Keep the leg straight and lower it slowly. Do 2 sets of 15.    Wall squat with a ball: Stand with your back, shoulders, and head against a wall. Look straight ahead. Keep your shoulders relaxed and your feet 3 feet (90 centimeters) from the wall and shoulder's width apart. Place a soccer or basketball-sized ball behind your back. Keeping your back against the wall, slowly squat down to a 45-degree angle. Your thighs will not yet be parallel to the floor. Hold this position for 10 seconds and then slowly slide back up the wall. Repeat 10 times. Build up to 2 sets of 15.    Clam exercise: Lie on your uninjured side with your hips and knees bent and feet together. Slowly raise your top leg toward the ceiling while keeping your heels touching each other. Hold for 2 seconds and lower slowly. Do 2 sets of 15 repetitions.    Side plank: Lie on your side with your legs, hips, and shoulders in a straight line. Prop yourself up onto your forearm with your elbow directly under your shoulder. Lift your hips off the floor and balance on your forearm and the outside of your foot. Try to hold this position for 15 seconds and then slowly lower your hip to the ground. Switch sides and repeat. Work up to holding for 1 minute. This exercise can be made easier by starting with your knees and hips flexed toward your  chest.    The plank: Lie on your stomach resting on our forearms. With your legs straight, lift your hips off the floor until they are in line with your shoulders. Support yourself on your forearms and toes. Hold this position for 15 seconds. (If this is too difficult, you can modify it by placing your knees on the floor.) Repeat 3 times. Work up to increasing your hold time to 30 to 60 seconds.    Developed by [employer].  Published by [employer].  Copyright  2014 [employer] and/or one of its subsidiaries. All rights reserved.

## 2024-10-25 ENCOUNTER — OFFICE VISIT (OUTPATIENT)
Dept: ORTHOPEDICS | Facility: CLINIC | Age: 69
End: 2024-10-25
Payer: COMMERCIAL

## 2024-10-25 DIAGNOSIS — M70.62 TROCHANTERIC BURSITIS OF LEFT HIP: Primary | ICD-10-CM

## 2024-10-25 DIAGNOSIS — M16.12 PRIMARY OSTEOARTHRITIS OF LEFT HIP: ICD-10-CM

## 2024-10-25 PROCEDURE — 99207 PR DROP WITH A PROCEDURE: CPT | Performed by: FAMILY MEDICINE

## 2024-10-25 PROCEDURE — 20611 DRAIN/INJ JOINT/BURSA W/US: CPT | Mod: LT | Performed by: FAMILY MEDICINE

## 2024-10-25 RX ORDER — LIDOCAINE HYDROCHLORIDE 10 MG/ML
7 INJECTION, SOLUTION EPIDURAL; INFILTRATION; INTRACAUDAL; PERINEURAL
Status: COMPLETED | OUTPATIENT
Start: 2024-10-25 | End: 2024-10-25

## 2024-10-25 RX ORDER — TRIAMCINOLONE ACETONIDE 40 MG/ML
40 INJECTION, SUSPENSION INTRA-ARTICULAR; INTRAMUSCULAR
Status: COMPLETED | OUTPATIENT
Start: 2024-10-25 | End: 2024-10-25

## 2024-10-25 RX ADMIN — LIDOCAINE HYDROCHLORIDE 7 ML: 10 INJECTION, SOLUTION EPIDURAL; INFILTRATION; INTRACAUDAL; PERINEURAL at 16:58

## 2024-10-25 RX ADMIN — TRIAMCINOLONE ACETONIDE 40 MG: 40 INJECTION, SUSPENSION INTRA-ARTICULAR; INTRAMUSCULAR at 16:58

## 2024-10-25 NOTE — PROGRESS NOTES
PROCEDURE ENCOUNTER    [facility]  Orthopedics  Brandin Murguia DO  2024     Name: Karan Anglin  MRN: [id number]  Age: 69 year old  : 1955    Referring provider:   Diagnosis:  Primary osteoarthritis of left hip  Type 2 Diabetes Mellitus    Intraarticular Hip Injection - Ultrasound Guided  The patient was informed of the risks and the benefits of the procedure and a written consent was signed.  The patient s left hip was prepped with chlorhexidine in sterile fashion.   Local anesthesia was performed using a 27-gauge 1.5-inch needle to administer 3 mL of 1% lidocaine without epinephrine.  40 mg of triamcinolone suspension was drawn up into a 5 mL syringe with 4 mL of 1% lidocaine.  Injection was performed using sterile technique.  Under ultrasound guidance a 3.5-inch 22-gauge needle was used to enter the left femoracetabular joint.  Anterior approach was used, needle placement was visualized and documented with ultrasound.  Ultrasound visualization was necessary due to decreased joint space in the setting of osteoarthritis.  Injection performed long axis to the probe.  Injection solution visualized within the joint space.  Images were permanently stored for the patient's record.  There were no complications. The patient tolerated the procedure well. There was negligible bleeding.   The patient was instructed to ice the hip upon leaving clinic and refrain from overuse over the next 3 days.   The patient was instructed to call or go to the emergency room with any unusual pain, swelling, redness, or if otherwise concerned.  Possible monitor his blood sugars closely over the next week.  Discussed PT versus HEP and he would like to pursue HEP at this time.  This was printed and demonstrated by me without a  today.  I will see Karan back in 8 weeks.  Brandin Murguia DO [facility]  Primary Care Sports Medicine  [facility]

## 2024-10-25 NOTE — LETTER
10/25/2024      RE: Karan Anglin  [address]  [place] [facility] [address]     Dear Colleague,    Thank you for referring your patient, Karan Anglin, to the [facility] [place]. Please see a copy of my visit note below.    PROCEDURE ENCOUNTER    [facility]  Orthopedics  Brandin Murguia, DO  2024     Name: Karan Anglin  MRN: [id number]  Age: 69 year old  : 1955    Referring provider:   Diagnosis:  Primary osteoarthritis of left hip  Type 2 Diabetes Mellitus    Intraarticular Hip Injection - Ultrasound Guided  The patient was informed of the risks and the benefits of the procedure and a written consent was signed.  The patient s left hip was prepped with chlorhexidine in sterile fashion.   Local anesthesia was performed using a 27-gauge 1.5-inch needle to administer 3 mL of 1% lidocaine without epinephrine.  40 mg of triamcinolone suspension was drawn up into a 5 mL syringe with 4 mL of 1% lidocaine.  Injection was performed using sterile technique.  Under ultrasound guidance a 3.5-inch 22-gauge needle was used to enter the left femoracetabular joint.  Anterior approach was used, needle placement was visualized and documented with ultrasound.  Ultrasound visualization was necessary due to decreased joint space in the setting of osteoarthritis.  Injection performed long axis to the probe.  Injection solution visualized within the joint space.  Images were permanently stored for the patient's record.  There were no complications. The patient tolerated the procedure well. There was negligible bleeding.   The patient was instructed to ice the hip upon leaving clinic and refrain from overuse over the next 3 days.   The patient was instructed to call or go to the emergency room with any unusual pain, swelling, redness, or if otherwise concerned.  Possible monitor his blood sugars closely over the next week.  Discussed PT versus HEP and he would like to pursue HEP at this  time.  This was printed and demonstrated by me without a  today.  I will see Karan dumont in 8 weeks.  Brandin Murguia DO CAQSM  Primary Care Sports Medicine  [facility]      Large Joint Injection: L hip joint    Date/Time: 10/25/2024 4:58 PM    Performed by: Brandin Murguia DO  Authorized by: Brandin Murguia DO    Indications:  Pain and osteoarthritis  Needle Size:  22 G  Guidance: ultrasound    Approach:  Anterior  Location:  Hip      Site:  L hip joint  Medications:  40 mg triamcinolone 40 MG/ML; 7 mL lidocaine (PF) 1 %  Outcome:  Tolerated well, no immediate complications  Procedure discussed: discussed risks, benefits, and alternatives    Consent Given by:  Patient  Timeout: timeout called immediately prior to procedure    Prep: patient was prepped and draped in usual sterile fashion       Glenn Walsh M.A., LAT, ATC  Certified Athletic Trainer            Again, thank you for allowing me to participate in the care of your patient.      Sincerely,    Brandin Murguia DO

## 2024-10-25 NOTE — PATIENT INSTRUCTIONS
Hip exercises:    You can begin stretching your hip muscles right away by doing the first 2 exercises. Make sure you feel just a mild discomfort during the stretches and not sharp pain. You may do the last 3 exercises when the pain is gone.    Hip flexor stretch: Kneel and then put one leg forward. Keep your foot flat on the floor. Flatten your lower back and lean your hips forward slightly until you feel a stretch at the front of your hip. Try to keep your body upright as you do this. Hold this position for 15 to 30 seconds. Repeat 3 times with each leg.    Quadriceps stretch: Stand at an arm's length away from the wall with your injured side farthest from the wall. Facing straight ahead, brace yourself by keeping one hand against the wall. With your other hand, grasp the ankle on your injured side and pull your heel toward your buttocks. Don't arch or twist your back. Keep your knees together. Hold this stretch for 15 to 30 seconds.  Heel slide: Sit on a firm surface with your legs straight in front of you. Slowly slide the heel of the foot on your injured side toward your buttock by pulling your knee toward your chest as you slide the heel. Return to the starting position. Do 2 sets of 15.    Straight leg raise: Lie on your back with your legs straight out in front of you. Bend the knee on your uninjured side and place the foot flat on the floor. Tighten the thigh muscle on your injured side and lift your leg about 8 inches off the floor. Keep your leg straight and your thigh muscle tight. Slowly lower your leg back down to the floor. Do 2 sets of 15.    Resisted hip flexion: Stand facing away from a door. Tie a loop in one end of a piece of elastic tubing and put it around the ankle on your injured side. Tie a knot in the other end of the tubing and shut the knot in the door near the floor. Tighten the front of your thigh muscle and bring the leg with the tubing forward, keeping your leg straight. Return to the  starting position. Do 2 sets of 15.

## 2024-10-25 NOTE — PROGRESS NOTES
Large Joint Injection: L hip joint    Date/Time: 10/25/2024 4:58 PM    Performed by: Brandin Murguia DO  Authorized by: Brandin Murguia DO    Indications:  Pain and osteoarthritis  Needle Size:  22 G  Guidance: ultrasound    Approach:  Anterior  Location:  Hip      Site:  L hip joint  Medications:  40 mg triamcinolone 40 MG/ML; 7 mL lidocaine (PF) 1 %  Outcome:  Tolerated well, no immediate complications  Procedure discussed: discussed risks, benefits, and alternatives    Consent Given by:  Patient  Timeout: timeout called immediately prior to procedure    Prep: patient was prepped and draped in usual sterile fashion       Glenn Walsh M.A., LAT, ATC  Certified Athletic Trainer

## 2024-10-25 NOTE — NURSING NOTE
[address] [address]  Dept: [phone]  ______________________________________________________________________________    Patient: Karan Anglin   : 1955   MRN: [id number]   2024    INVASIVE PROCEDURE SAFETY CHECKLIST    Date: 10/25/24   Procedure: Left IA USG Hip CSI  Patient Name: Karan Anglin  MRN: [id number]  YOB: 1955    Action: Complete sections as appropriate. Any discrepancy results in a HARD COPY until resolved.     PRE PROCEDURE:  Patient ID verified with 2 identifiers (name and  or MRN): Yes  Procedure and site verified with patient/designee (when able): Yes  Accurate consent documentation in medical record: Yes  H&P (or appropriate assessment) documented in medical record: Yes  H&P must be up to 20 days prior to procedure and updates within 24 hours of procedure as applicable: NA  Relevant diagnostic and radiology test results appropriately labeled and displayed as applicable: Yes  Procedure site(s) marked with provider initials: NA    TIMEOUT:  Time-Out performed immediately prior to starting procedure, including verbal and active participation of all team members addressing the following:Yes  * Correct patient identify  * Confirmed that the correct side and site are marked  * An accurate procedure consent form  * Agreement on the procedure to be done  * Correct patient position  * Relevant images and results are properly labeled and appropriately displayed  * The need to administer antibiotics or fluids for irrigation purposes during the procedure as applicable   * Safety precautions based on patient history or medication use    DURING PROCEDURE: Verification of correct person, site, and procedures any time the responsibility for care of the patient is transferred to another member of the care team.       Prior to injection, verified patient identity using patient's name and date of birth.  Due to  injection administration, patient instructed to remain in clinic for 15 minutes  afterwards, and to report any adverse reaction to me immediately.    Joint injection was performed.      Drug Amount Wasted:  Yes: 3 mg/ml   Vial/Syringe: Multi dose vial  Expiration Date:  4/1/28      Glenn Walsh, ATC  October 25, 2024

## 2024-11-12 ENCOUNTER — TELEPHONE (OUTPATIENT)
Dept: ORTHOPEDICS | Facility: CLINIC | Age: 69
End: 2024-11-12
Payer: COMMERCIAL

## 2024-11-12 NOTE — TELEPHONE ENCOUNTER
Disregard this message     Health Call Center    Phone Message    May a detailed message be left on voicemail: yes     Reason for Call: Other: Pt just had a cortisone shot on 10/25/24 and it did not work. He wants to make an appt for another one. Can you check with Dr. Murguia and see if this is ok and call the pt back to let him know?     Action Taken: Other: [id number]    Travel Screening: Not Applicable     Date of Service:

## 2024-11-29 ENCOUNTER — OFFICE VISIT (OUTPATIENT)
Dept: ORTHOPEDICS | Facility: CLINIC | Age: 69
End: 2024-11-29
Payer: COMMERCIAL

## 2024-11-29 DIAGNOSIS — E11.22 TYPE 2 DIABETES MELLITUS WITH STAGE 3A CHRONIC KIDNEY DISEASE, WITHOUT LONG-TERM CURRENT USE OF INSULIN (H): ICD-10-CM

## 2024-11-29 DIAGNOSIS — N18.31 TYPE 2 DIABETES MELLITUS WITH STAGE 3A CHRONIC KIDNEY DISEASE, WITHOUT LONG-TERM CURRENT USE OF INSULIN (H): ICD-10-CM

## 2024-11-29 DIAGNOSIS — M25.552 CHRONIC HIP PAIN, LEFT: ICD-10-CM

## 2024-11-29 DIAGNOSIS — G89.29 CHRONIC HIP PAIN, LEFT: ICD-10-CM

## 2024-11-29 DIAGNOSIS — M79.605 DIFFUSE PAIN IN LEFT LOWER EXTREMITY: Primary | ICD-10-CM

## 2024-11-29 PROCEDURE — 99214 OFFICE O/P EST MOD 30 MIN: CPT | Performed by: FAMILY MEDICINE

## 2024-11-29 RX ORDER — METHYLPREDNISOLONE 4 MG/1
TABLET ORAL
Qty: 21 TABLET | Refills: 0 | Status: SHIPPED | OUTPATIENT
Start: 2024-11-29

## 2024-11-29 RX ORDER — TRAMADOL HYDROCHLORIDE 50 MG/1
50 TABLET ORAL EVERY 6 HOURS PRN
Qty: 15 TABLET | Refills: 0 | Status: SHIPPED | OUTPATIENT
Start: 2024-11-29 | End: 2024-12-02

## 2024-11-29 NOTE — PROGRESS NOTES
ESTABLISHED PATIENT FOLLOW-UP:  Follow Up of the Left Hip     HISTORY OF PRESENT ILLNESS  Mr. Anglin is a pleasant 69 year old year old male who presents to clinic today for follow-up of left hip pain.    Date of injury: Chronic  Date last seen: 10/25/24  Following Therapeutic Plan: Yes  Pain: 4/10  Function: Pain/burning experienced when getting up from a laying or sitting position  Interval History: Pain unchanged following injection. Notes pain over anterior hip and pain in the entire leg, also noting burning down leg. He is utilizing ibuprofen as needed. Denies pain of lumbar spine.     Additional medical/Social/Surgical histories reviewed in [facility] and updated as appropriate.    REVIEW OF SYSTEMS (11/29/2024)  CONSTITUTIONAL: Denies fever and weight loss  GASTROINTESTINAL: Denies abdominal pain, nausea, vomiting  MUSCULOSKELETAL: See HPI  SKIN: Denies any recent rash or lesion  NEUROLOGICAL: Denies numbness or focal weakness     PHYSICAL EXAM  There were no vitals taken for this visit.    General  - normal appearance, in no obvious distress  General  - normal appearance, in no obvious distress  CV  - normal peripheral perfusion  Pulm  - normal respiratory pattern, non-labored  Musculoskeletal - lumbar spine  - stance: normal gait without limp, no obvious leg length discrepancy, normal heel and toe walk  - inspection: normal bone and joint alignment, no obvious scoliosis  - palpation: no paravertebral or bony tenderness  - ROM: normal flexion, normal extension, sidebending, rotation  - strength: lower extremities 5/5 in all planes  - special tests:  (-) straight leg raise  (-) slump test  Musculoskeletal - left hip  - stance: Mildly antalgic  - inspection: no swelling or effusion,  normal bone and joint alignment, no obvious deformity  - palpation: Tenderness to palpation at lateral hip overlying greater trochanter.  Nontender anterior over hip flexor.  Nontender posterior hip.  - ROM: Decreased left hip range of  motion, mild groin pain with flexion and IR  - strength: 5/5 in all planes  - special tests:  (-) ANIA  (-) FADIR  Neuro  - no sensory or motor deficit, grossly normal coordination, normal muscle tone      IMAGING : Left hip x-ray 3 views. Final results and radiologist's interpretation, available in the [facility] health record. Images were reviewed with the patient/family members in the office today. My personal interpretation of the performed imaging is mild osteoarthritic changes of left hip with small femoral head spurring.     ASSESSMENT & PLAN  Mr. Anglin is a 69 year old year old male who presents to clinic today with Follow Up of the Left Hip    No significant improvement of symptoms with intraarticular left hip injection performed on 10/25/24.  Mild hip osteoarthritis on XR.  Concern at this time for lumbar radiculitis affecting left lower extremity.      Diagnosis:  Pain of left lower extremity  Chronic pain left hip    MRI lumbar and hip discussed and ordered given lack of improvement to date. Start formal physical therapy. Medrol pack prescribed for neurologic pain relief. Hx of mild T2DM but well controlled.  CKD stage 3 and we will avoid NSAIDs.  Tramadol prescribed for breakthrough pain.     Follow up after MRI lumbar and hip are completed, in-person or virtually.    It was a pleasure seeing Kay Murguia DO, [facility]  Primary Care Sports Medicine

## 2024-11-29 NOTE — LETTER
11/29/2024      RE: Karan Anglin  [address]  [place] [address]     Dear Colleague,    Thank you for referring your patient, Karan Anglin, to the [facility]IN[facility] [place]. Please see a copy of my visit note below.    ESTABLISHED PATIENT FOLLOW-UP:  Follow Up of the Left Hip     HISTORY OF PRESENT ILLNESS  Mr. Anglin is a pleasant 69 year old year old male who presents to clinic today for follow-up of left hip pain.    Date of injury: Chronic  Date last seen: 10/25/24  Following Therapeutic Plan: Yes  Pain: 4/10  Function: Pain/burning experienced when getting up from a laying or sitting position  Interval History: Pain unchanged following injection. Notes pain over anterior hip and pain in the entire leg, also noting burning down leg. He is utilizing ibuprofen as needed. Denies pain of lumbar spine.     Additional medical/Social/Surgical histories reviewed in [facility] and updated as appropriate.    REVIEW OF SYSTEMS (11/29/2024)  CONSTITUTIONAL: Denies fever and weight loss  GASTROINTESTINAL: Denies abdominal pain, nausea, vomiting  MUSCULOSKELETAL: See HPI  SKIN: Denies any recent rash or lesion  NEUROLOGICAL: Denies numbness or focal weakness     PHYSICAL EXAM  There were no vitals taken for this visit.    General  - normal appearance, in no obvious distress  General  - normal appearance, in no obvious distress  CV  - normal peripheral perfusion  Pulm  - normal respiratory pattern, non-labored  Musculoskeletal - lumbar spine  - stance: normal gait without limp, no obvious leg length discrepancy, normal heel and toe walk  - inspection: normal bone and joint alignment, no obvious scoliosis  - palpation: no paravertebral or bony tenderness  - ROM: normal flexion, normal extension, sidebending, rotation  - strength: lower extremities 5/5 in all planes  - special tests:  (-) straight leg raise  (-) slump test  Musculoskeletal - left hip  - stance: Mildly antalgic  - inspection: no  swelling or effusion,  normal bone and joint alignment, no obvious deformity  - palpation: Tenderness to palpation at lateral hip overlying greater trochanter.  Nontender anterior over hip flexor.  Nontender posterior hip.  - ROM: Decreased left hip range of motion, mild groin pain with flexion and IR  - strength: 5/5 in all planes  - special tests:  (-) ANIA  (-) FADIR  Neuro  - no sensory or motor deficit, grossly normal coordination, normal muscle tone      IMAGING : Left hip x-ray 3 views. Final results and radiologist's interpretation, available in the [facility] health record. Images were reviewed with the patient/family members in the office today. My personal interpretation of the performed imaging is mild osteoarthritic changes of left hip with small femoral head spurring.     ASSESSMENT & PLAN  Mr. Anglin is a 69 year old year old male who presents to clinic today with Follow Up of the Left Hip    No significant improvement of symptoms with intraarticular left hip injection performed on 10/25/24.  Mild hip osteoarthritis on XR.  Concern at this time for lumbar radiculitis affecting left lower extremity.      Diagnosis:  Pain of left lower extremity  Chronic pain left hip    MRI lumbar and hip discussed and ordered given lack of improvement to date. Start formal physical therapy. Medrol pack prescribed for neurologic pain relief. Hx of mild T2DM but well controlled.  CKD stage 3 and we will avoid NSAIDs.  Tramadol prescribed for breakthrough pain.     Follow up after MRI lumbar and hip are completed, in-person or virtually.    It was a pleasure seeing Kay Murguia DO, [facility]  Primary Care Sports Medicine      Again, thank you for allowing me to participate in the care of your patient.      Sincerely,    Brandin Murguia DO

## 2024-12-23 DIAGNOSIS — M79.605 DIFFUSE PAIN IN LEFT LOWER EXTREMITY: Primary | ICD-10-CM

## 2024-12-24 RX ORDER — TRAMADOL HYDROCHLORIDE 50 MG/1
50 TABLET ORAL EVERY 6 HOURS PRN
Qty: 15 TABLET | Refills: 0 | Status: SHIPPED | OUTPATIENT
Start: 2024-12-24 | End: 2024-12-27

## 2024-12-27 ENCOUNTER — ANCILLARY PROCEDURE (OUTPATIENT)
Dept: MRI IMAGING | Facility: CLINIC | Age: 69
End: 2024-12-27
Attending: FAMILY MEDICINE
Payer: COMMERCIAL

## 2024-12-27 DIAGNOSIS — G89.29 CHRONIC HIP PAIN, LEFT: ICD-10-CM

## 2024-12-27 DIAGNOSIS — M79.605 DIFFUSE PAIN IN LEFT LOWER EXTREMITY: ICD-10-CM

## 2024-12-27 DIAGNOSIS — M25.552 CHRONIC HIP PAIN, LEFT: ICD-10-CM

## 2024-12-27 PROCEDURE — 73721 MRI JNT OF LWR EXTRE W/O DYE: CPT | Mod: LT | Performed by: RADIOLOGY

## 2024-12-27 PROCEDURE — 72148 MRI LUMBAR SPINE W/O DYE: CPT | Mod: GC | Performed by: STUDENT IN AN ORGANIZED HEALTH CARE EDUCATION/TRAINING PROGRAM

## 2024-12-31 ENCOUNTER — OFFICE VISIT (OUTPATIENT)
Dept: ORTHOPEDICS | Facility: CLINIC | Age: 69
End: 2024-12-31
Attending: FAMILY MEDICINE
Payer: COMMERCIAL

## 2024-12-31 DIAGNOSIS — M16.12 PRIMARY OSTEOARTHRITIS OF LEFT HIP: Primary | ICD-10-CM

## 2024-12-31 PROCEDURE — 99214 OFFICE O/P EST MOD 30 MIN: CPT | Performed by: FAMILY MEDICINE

## 2024-12-31 RX ORDER — TRAMADOL HYDROCHLORIDE 50 MG/1
50 TABLET ORAL EVERY 6 HOURS PRN
Qty: 15 TABLET | Refills: 0 | Status: SHIPPED | OUTPATIENT
Start: 2024-12-31

## 2024-12-31 RX ORDER — MELOXICAM 15 MG/1
15 TABLET ORAL DAILY
Qty: 30 TABLET | Refills: 1 | Status: SHIPPED | OUTPATIENT
Start: 2024-12-31

## 2024-12-31 NOTE — LETTER
12/31/2024      RE: Karan Anglin  [address]  [place] [address]     Dear Colleague,    Thank you for referring your patient, Karan Anglin, to the [facility]INE [facility] [place]. Please see a copy of my visit note below.    ESTABLISHED PATIENT FOLLOW-UP:  Follow Up of the Lower Back     HISTORY OF PRESENT ILLNESS    Mr. Anglin is a pleasant 69 year old year old male who presents to clinic today for follow-up of Left hip and Lumbar spine MRI results    Date of injury/onset: Chronic  Date last seen: 11/29/24  Following Therapeutic Plan: MRIs  Pain: Worsening  Function: Worsening  Interval History: Notes he is no longer able to cross his left leg over his right pain persist at the left groin region but does radiate down towards the knee.  He has pain when he is walking as well as sitting.    Treatment to date: Physical therapy, left hip joint injection under ultrasound guidance, tramadol, NSAIDs    Additional medical/Social/Surgical histories reviewed in [facility] and updated as appropriate.    REVIEW OF SYSTEMS (12/31/2024)  CONSTITUTIONAL: Denies fever and weight loss  GASTROINTESTINAL: Denies abdominal pain, nausea, vomiting  MUSCULOSKELETAL: See HPI  SKIN: Denies any recent rash or lesion  NEUROLOGICAL: Denies numbness or focal weakness     PHYSICAL EXAM  There were no vitals taken for this visit.    General  - normal appearance, in no obvious distress  Musculoskeletal - left hip  - stance: Mildly antalgic gait  - inspection: no swelling or effusion,  normal bone and joint alignment, no obvious deformity  - palpation: Tenderness to palpation at lateral hip overlying greater trochanter.  Non-tender posterior hip.  - ROM: Decreased left hip range of motion, mild groin pain with flexion and IR.Painful adduction.  - strength: 5/5 in all planes  - special tests:  (-) ANIA  (-) FADIR  Neuro  - no sensory or motor deficit, grossly normal coordination, normal muscle tone    IMAGING :   Exam:  MRI of the left hip dated 12/27/2024.     COMPARISON: Radiographs dated 9/12/2024.     CLINICAL HISTORY: Chronic hip pain.     TECHNIQUE: Multiplanar, multisequence MR imaging of the left hip was  obtained using standard sequences in 3 orthogonal planes without the  use of intravenous or intra-articular gadolinium contrast.     FINDINGS:     Small joint effusion in the left hip joint.     No marrow signal abnormalities to suggest fracture, osteonecrosis, or  marrow infiltration.     Spurring at the femoral head and neck junction. Diffuse areas of  full-thickness cartilage loss, greatest along the superior and  posterior hip with subchondral bone marrow edema along the femoral  head. Degenerative tearing of the labrum.     Insertional tendinopathy of the gluteus minimus tendon. No  full-thickness tendon tear or tendon retraction. The muscle bulk is  intact without significant atrophy or soft tissue edema. The  visualized femoral vasculature is unremarkable. No lymphadenopathy.  The visualized sciatic nerve is unremarkable.                                                                  IMPRESSION:  1. Moderate to severe osteoarthrosis in the left hip joint with  diffuse full-thickness cartilage loss with subchondral edema along the  femoral head.     2. Small to moderate sized left hip joint effusion.     3. No marrow signal abnormalities to suggest fracture, osteonecrosis,  or marrow infiltration.     MARY JO DICK MD     MR LUMBAR 12/27/24    FINDINGS:  There are 5 type lumbar vertebrae, counting from the level of C2.  Conus tip at the level of L1. Normal lumbar vertebral body alignment.  Disc space height loss is present, most prominent at L1-L2 and. Normal  marrow signal. Normal cauda equina.     On a level by level basis, the findings are as follows:     L1-2: Circumferential disc bulge. No significant spinal canal or  neural foraminal stenosis.     L2-3: Circumferential disc bulge. No significant spinal  canal or  neural foraminal stenosis. Increased T2 signal within the bilateral  articular facet space.     L3-4: Circumferential disc bulge. No significant spinal canal  stenosis. Increased T2 signal within the right-sided articular facet  space. Bilateral mild foraminal narrowing.     L4-5: Circumferential disc bulge.  Increased T2 signal within the  right greater than left articular facet space. No spinal canal  stenosis. Bilateral mild neural foramina narrowing.     L5-S1: No significant spinal canal or neural foraminal stenosis.     The visualized paraspinous soft tissues are within normal limits.                                                                       IMPRESSION: Multilevel degenerative disc disease and facet joint  arthropathy. No high-grade spinal canal or neural foraminal stenosis.  Most notably, there is bilateral mild neural foraminal stenosis at  L3-4 and L4-5.     I have personally reviewed the examination and initial interpretation  and I agree with the findings.     TIM SOLIS MD      ASSESSMENT & PLAN  Mr. Anglin is a 69 year old year old male who presents to clinic today for reevaluation of left hip and lower extremity pain and review MRI.    MRI of the lumbar spine reveals no high-grade spinal stenosis or neuroforaminal stenosis.  Mild at L3-L4 and L4-L5.  More significant findings in the left hip revealing moderate to severe osteoarthrosis-with diffuse areas of full-thickness cartilage loss, certainly higher-grade than suggested by mild degenerative changes on x-ray.  I suspect pain pattern is emanating from this moderate to high-grade osteoarthrosis, despite lack of improvement with hip injection and hip focused PT.    Diagnosis: Moderately severe osteoarthritis of left hip    Surgical nonsurgical measures discussed for treatment of moderately severe hip osteoarthritis.  We did broach the subject of hip arthroplasty and he would like to continue to optimize all nonsurgical measures.   I would like him to continue on with physical therapy/home exercise program, starting meloxicam 15 mg daily, as well as use of tramadol only for breakthrough pain.  I discussed consideration for a cane for temporary use to offload his hip until we can make tangible improvements.    I would consider repeating injection 1 additional time and we can perform this around 1/25/2025.  If no improvement after these measures above, we both agree that he would be best served meeting with one of our hip surgeons.    It was a pleasure seeing Karan.    Brandin Murguia DO, [facility]  Primary Care Sports Medicine      Again, thank you for allowing me to participate in the care of your patient.      Sincerely,    Brandin Murguia DO

## 2024-12-31 NOTE — PROGRESS NOTES
ESTABLISHED PATIENT FOLLOW-UP:  Follow Up of the Lower Back     HISTORY OF PRESENT ILLNESS    Mr. Anglin is a pleasant 69 year old year old male who presents to clinic today for follow-up of Left hip and Lumbar spine MRI results    Date of injury/onset: Chronic  Date last seen: 11/29/24  Following Therapeutic Plan: MRIs  Pain: Worsening  Function: Worsening  Interval History: Notes he is no longer able to cross his left leg over his right pain persist at the left groin region but does radiate down towards the knee.  He has pain when he is walking as well as sitting.    Treatment to date: Physical therapy, left hip joint injection under ultrasound guidance, tramadol, NSAIDs    Additional medical/Social/Surgical histories reviewed in [facility] and updated as appropriate.    REVIEW OF SYSTEMS (12/31/2024)  CONSTITUTIONAL: Denies fever and weight loss  GASTROINTESTINAL: Denies abdominal pain, nausea, vomiting  MUSCULOSKELETAL: See HPI  SKIN: Denies any recent rash or lesion  NEUROLOGICAL: Denies numbness or focal weakness     PHYSICAL EXAM  There were no vitals taken for this visit.    General  - normal appearance, in no obvious distress  Musculoskeletal - left hip  - stance: Mildly antalgic gait  - inspection: no swelling or effusion,  normal bone and joint alignment, no obvious deformity  - palpation: Tenderness to palpation at lateral hip overlying greater trochanter.  Non-tender posterior hip.  - ROM: Decreased left hip range of motion, mild groin pain with flexion and IR.Painful adduction.  - strength: 5/5 in all planes  - special tests:  (-) ANIA  (-) FADIR  Neuro  - no sensory or motor deficit, grossly normal coordination, normal muscle tone    IMAGING :   Exam: MRI of the left hip dated 12/27/2024.     COMPARISON: Radiographs dated 9/12/2024.     CLINICAL HISTORY: Chronic hip pain.     TECHNIQUE: Multiplanar, multisequence MR imaging of the left hip was  obtained using standard sequences in 3 orthogonal planes  without the  use of intravenous or intra-articular gadolinium contrast.     FINDINGS:     Small joint effusion in the left hip joint.     No marrow signal abnormalities to suggest fracture, osteonecrosis, or  marrow infiltration.     Spurring at the femoral head and neck junction. Diffuse areas of  full-thickness cartilage loss, greatest along the superior and  posterior hip with subchondral bone marrow edema along the femoral  head. Degenerative tearing of the labrum.     Insertional tendinopathy of the gluteus minimus tendon. No  full-thickness tendon tear or tendon retraction. The muscle bulk is  intact without significant atrophy or soft tissue edema. The  visualized femoral vasculature is unremarkable. No lymphadenopathy.  The visualized sciatic nerve is unremarkable.                                                                  IMPRESSION:  1. Moderate to severe osteoarthrosis in the left hip joint with  diffuse full-thickness cartilage loss with subchondral edema along the  femoral head.     2. Small to moderate sized left hip joint effusion.     3. No marrow signal abnormalities to suggest fracture, osteonecrosis,  or marrow infiltration.     MARY JO DICK MD     MR LUMBAR 12/27/24    FINDINGS:  There are 5 type lumbar vertebrae, counting from the level of C2.  Conus tip at the level of L1. Normal lumbar vertebral body alignment.  Disc space height loss is present, most prominent at L1-L2 and. Normal  marrow signal. Normal cauda equina.     On a level by level basis, the findings are as follows:     L1-2: Circumferential disc bulge. No significant spinal canal or  neural foraminal stenosis.     L2-3: Circumferential disc bulge. No significant spinal canal or  neural foraminal stenosis. Increased T2 signal within the bilateral  articular facet space.     L3-4: Circumferential disc bulge. No significant spinal canal  stenosis. Increased T2 signal within the right-sided articular facet  space. Bilateral  mild foraminal narrowing.     L4-5: Circumferential disc bulge.  Increased T2 signal within the  right greater than left articular facet space. No spinal canal  stenosis. Bilateral mild neural foramina narrowing.     L5-S1: No significant spinal canal or neural foraminal stenosis.     The visualized paraspinous soft tissues are within normal limits.                                                                       IMPRESSION: Multilevel degenerative disc disease and facet joint  arthropathy. No high-grade spinal canal or neural foraminal stenosis.  Most notably, there is bilateral mild neural foraminal stenosis at  L3-4 and L4-5.     I have personally reviewed the examination and initial interpretation  and I agree with the findings.     TIM SOLIS MD      ASSESSMENT & PLAN  Mr. Anglin is a 69 year old year old male who presents to clinic today for reevaluation of left hip and lower extremity pain and review MRI.    MRI of the lumbar spine reveals no high-grade spinal stenosis or neuroforaminal stenosis.  Mild at L3-L4 and L4-L5.  More significant findings in the left hip revealing moderate to severe osteoarthrosis-with diffuse areas of full-thickness cartilage loss, certainly higher-grade than suggested by mild degenerative changes on x-ray.  I suspect pain pattern is emanating from this moderate to high-grade osteoarthrosis, despite lack of improvement with hip injection and hip focused PT.    Diagnosis: Moderately severe osteoarthritis of left hip    Surgical nonsurgical measures discussed for treatment of moderately severe hip osteoarthritis.  We did broach the subject of hip arthroplasty and he would like to continue to optimize all nonsurgical measures.  I would like him to continue on with physical therapy/home exercise program, starting meloxicam 15 mg daily, as well as use of tramadol only for breakthrough pain.  I discussed consideration for a cane for temporary use to offload his hip until we can  make tangible improvements.    I would consider repeating injection 1 additional time and we can perform this around 1/25/2025.  If no improvement after these measures above, we both agree that he would be best served meeting with one of our hip surgeons.    It was a pleasure seeing Kay Murguia DO, [facility]  Primary Care Sports Medicine

## 2025-01-02 ENCOUNTER — MYC MEDICAL ADVICE (OUTPATIENT)
Dept: ORTHOPEDICS | Facility: CLINIC | Age: 70
End: 2025-01-02
Payer: COMMERCIAL

## 2025-01-02 NOTE — TELEPHONE ENCOUNTER
Left Voicemail (1st Attempt) and Sent Mychart (1st Attempt) for the patient to call back and schedule the following:    Appointment type: Return MSK  Provider:   Return date: February 2025  Can be in person or phone appt

## 2025-01-05 ASSESSMENT — ACTIVITIES OF DAILY LIVING (ADL)
GOING DOWN 1 FLIGHT OF STAIRS: SLIGHT DIFFICULTY
SPORTS_TOTAL_ITEM_SCORE: 0
ADL_TOTAL_ITEM_SCORE: 0
PUTTING ON SOCKS AND SHOES: EXTREME DIFFICULTY
DEEP_SQUATTING: MODERATE DIFFICULTY
WALKING_INITIALLY: SLIGHT DIFFICULTY
SITTING_FOR_15_MINUTES: EXTREME DIFFICULTY
LOW_IMPACT_ACTIVITIES_LIKE_FAST_WALKING: SLIGHT DIFFICULTY
PUTTING_ON_SOCKS_AND_SHOES: EXTREME DIFFICULTY
SPORTS_COUNT: 9
GETTING_INTO_AND_OUT_OF_A_BATHTUB: SLIGHT DIFFICULTY
WALKING_15_MINUTES_OR_GREATER: SLIGHT DIFFICULTY
HEAVY_WORK: MODERATE DIFFICULTY
ADL_HIGHEST_POTENTIAL_SCORE: 68
GETTING INTO AND OUT OF AN AVERAGE CAR: MODERATE DIFFICULTY
ROLLING_OVER_IN_BED: EXTREME DIFFICULTY
ADL_SCORE(%): 0
ABILITY_TO_PERFORM_ACTIVITY_WITH_YOUR_NORMAL_TECHNIQUE: MODERATE DIFFICULTY
GETTING_INTO_AND_OUT_OF_AN_AVERAGE_CAR: MODERATE DIFFICULTY
WALKING_FOR_APPROXIMATELY_10_MINUTES: SLIGHT DIFFICULTY
WALKING_UP_STEEP_HILLS: MODERATE DIFFICULTY
HOS_ADL_HIGHEST_POTENTIAL_SCORE: 64
STANDING_FOR_15_MINUTES: MODERATE DIFFICULTY
SPORTS_HIGHEST_POTENTIAL_SCORE: 36
SPORTS_SCORE(%): 0
WALKING_APPROXIMATELY_10_MINUTES: SLIGHT DIFFICULTY
WALKING_15_MINUTES_OR_GREATER: SLIGHT DIFFICULTY
SITTING FOR 15 MINUTES: EXTREME DIFFICULTY
GETTING_INTO_AND_OUT_OF_A_BATHTUB: SLIGHT DIFFICULTY
TWISTING/PIVOTING_ON_INVOLVED_LEG: EXTREME DIFFICULTY
LIGHT_TO_MODERATE_WORK: MODERATE DIFFICULTY
PLEASE_INDICATE_YOR_PRIMARY_REASON_FOR_REFERRAL_TO_THERAPY:: HIP
STEPPING UP AND DOWN CURBS: SLIGHT DIFFICULTY
ADL_COUNT: 17
HEAVY_WORK: MODERATE DIFFICULTY
HOS_ADL_ITEM_SCORE_TOTAL: 37
WALKING_DOWN_STEEP_HILLS: SLIGHT DIFFICULTY
TWISTING/PIVOTING ON INVOLVED LEG: EXTREME DIFFICULTY
DEEP SQUATTING: MODERATE DIFFICULTY
GOING_UP_1_FLIGHT_OF_STAIRS: MODERATE DIFFICULTY
GOING_DOWN_1_FLIGHT_OF_STAIRS: SLIGHT DIFFICULTY
ROLLING OVER IN BED: EXTREME DIFFICULTY
LIGHT_TO_MODERATE_WORK: MODERATE DIFFICULTY
HOW_WOULD_YOU_RATE_YOUR_CURRENT_LEVEL_OF_FUNCTION?: ABNORMAL
WALKING_DOWN_STEEP_HILLS: SLIGHT DIFFICULTY
STEPPING_UP_AND_DOWN_CURBS: SLIGHT DIFFICULTY
WALKING_INITIALLY: SLIGHT DIFFICULTY
STANDING FOR 15 MINUTES: MODERATE DIFFICULTY
HOS_ADL_SCORE(%): 57.81
WALKING_UP_STEEP_HILLS: MODERATE DIFFICULTY
GOING UP 1 FLIGHT OF STAIRS: MODERATE DIFFICULTY

## 2025-01-06 ENCOUNTER — MYC MEDICAL ADVICE (OUTPATIENT)
Dept: ORTHOPEDICS | Facility: CLINIC | Age: 70
End: 2025-01-06
Payer: COMMERCIAL

## 2025-01-06 NOTE — TELEPHONE ENCOUNTER
Left Voicemail (2nd Attempt) and Sent Mychart (2nd Attempt) for the patient to call back and schedule the following:    Appointment type: Return MSK  Provider:   Return date: February 2025  Can be in person or phone.MAX attempts made to schedule

## 2025-01-07 ENCOUNTER — THERAPY VISIT (OUTPATIENT)
Dept: PHYSICAL THERAPY | Facility: CLINIC | Age: 70
End: 2025-01-07
Payer: COMMERCIAL

## 2025-01-07 DIAGNOSIS — M25.552 HIP PAIN, LEFT: Primary | ICD-10-CM

## 2025-01-07 PROCEDURE — 97110 THERAPEUTIC EXERCISES: CPT | Mod: GP | Performed by: PHYSICAL THERAPIST

## 2025-01-07 PROCEDURE — 97161 PT EVAL LOW COMPLEX 20 MIN: CPT | Mod: GP | Performed by: PHYSICAL THERAPIST

## 2025-01-07 NOTE — PROGRESS NOTES
PHYSICAL THERAPY EVALUATION  Type of Visit: Evaluation  DOI: July 1/24  NAKITA: insidious onset  Pain is at the left groin  Aggrav: sitting or lying-hurts after a few minutes at a point causes to stand  Allev:changing position, sometimes meds  Pain is constant at the lateral hip and groin ranges from 1-10/10 pain  Constant achy and becomes sharp                Subjective         Presenting condition or subjective complaint: (Patient-Rptd) Left hip pain  Date of onset:      Relevant medical history:     Dates & types of surgery:      Prior diagnostic imaging/testing results: (Patient-Rptd) MRI; X-ray     Prior therapy history for the same diagnosis, illness or injury: (Patient-Rptd) No          Living Environment  Social support: (Patient-Rptd) Alone   Type of home: (Patient-Rptd) Apartment/condo; 1 level   Stairs to enter the home: (Patient-Rptd) No       Ramp: (Patient-Rptd) No   Stairs inside the home: (Patient-Rptd) No       Help at home: (Patient-Rptd) None  Equipment owned:       Employment: (Patient-Rptd) Yes (Patient-Rptd) Healthcare administrative  Hobbies/Interests:      Patient goals for therapy: (Patient-Rptd) No hip pain sitting or lying down. No pain doing ADLs      Objective   HIP EVALUATION  Obs: overweight male    Sit to stand increased sxs and turning out of chair  GAIT:   Weightbearing Status: WBAT  Assistive Device(s):  not interested in a cane  BALANCE/PROPRIOCEPTION:  3 seconds TOSHIA     ROM:   (Degrees) Left AROM Left PROM  Right AROM Right PROM   Hip Flexion 110  110    Hip Extension       Hip Abduction 40  40    Hip Adduction       Hip Internal Rotation 35  35    Hip External Rotation 40 and pain  40    Knee Flexion       Knee Extension       Lumbar Side glide     Lumbar Flexion    Lumbar Extension    Glut med 4/5 TOSHIA  TA poor control  PALPATION: WNL  Assessment & Plan   CLINICAL IMPRESSIONS  Medical Diagnosis: left hip OA    Treatment Diagnosis: left hip OA   Impression/Assessment: Patient is a  69 year old male with left hip pain complaints.  The following significant findings have been identified: Pain, Decreased ROM/flexibility, Decreased strength, and Impaired balance. These impairments interfere with their ability to perform self care tasks, work tasks, and recreational activities as compared to previous level of function.     Clinical Decision Making (Complexity):  Clinical Presentation: Stable/Uncomplicated  Clinical Presentation Rationale: based on medical and personal factors listed in PT evaluation  Clinical Decision Making (Complexity): Low complexity    PLAN OF CARE  Treatment Interventions:  Interventions: Manual Therapy, Neuromuscular Re-education, Therapeutic Activity, Therapeutic Exercise    Long Term Goals            Frequency of Treatment:    Duration of Treatment:      Recommended Referrals to Other Professionals:  none  Education Assessment:        Risks and benefits of evaluation/treatment have been explained.   Patient/Family/caregiver agrees with Plan of Care.     Evaluation Time:             Signing Clinician: Chinmay Willingham, PT

## 2025-01-16 ENCOUNTER — MYC REFILL (OUTPATIENT)
Dept: ORTHOPEDICS | Facility: CLINIC | Age: 70
End: 2025-01-16
Payer: COMMERCIAL

## 2025-01-16 DIAGNOSIS — M16.12 PRIMARY OSTEOARTHRITIS OF LEFT HIP: ICD-10-CM

## 2025-01-17 PROBLEM — M16.12 PRIMARY OSTEOARTHRITIS OF LEFT HIP: Status: ACTIVE | Noted: 2025-01-17

## 2025-01-17 PROBLEM — N18.30 CKD (CHRONIC KIDNEY DISEASE) STAGE 3, GFR 30-59 ML/MIN (H): Status: ACTIVE | Noted: 2019-04-29

## 2025-01-17 PROBLEM — F41.1 GAD (GENERALIZED ANXIETY DISORDER): Status: ACTIVE | Noted: 2025-01-17

## 2025-01-17 PROBLEM — E11.42 TYPE 2 DIABETES MELLITUS WITH DIABETIC POLYNEUROPATHY, WITHOUT LONG-TERM CURRENT USE OF INSULIN (H): Status: ACTIVE | Noted: 2025-01-17

## 2025-01-17 PROBLEM — E78.5 HYPERLIPIDEMIA LDL GOAL <130: Status: ACTIVE | Noted: 2017-09-20

## 2025-01-17 PROBLEM — I10 BENIGN ESSENTIAL HYPERTENSION: Status: ACTIVE | Noted: 2017-09-20

## 2025-01-17 PROBLEM — G43.009 MIGRAINE WITHOUT AURA AND WITHOUT STATUS MIGRAINOSUS, NOT INTRACTABLE: Status: ACTIVE | Noted: 2017-09-20

## 2025-01-17 PROBLEM — N18.31 TYPE 2 DIABETES MELLITUS WITH STAGE 3A CHRONIC KIDNEY DISEASE, WITHOUT LONG-TERM CURRENT USE OF INSULIN (H): Status: ACTIVE | Noted: 2025-01-17

## 2025-01-17 PROBLEM — E11.22 TYPE 2 DIABETES MELLITUS WITH STAGE 3A CHRONIC KIDNEY DISEASE, WITHOUT LONG-TERM CURRENT USE OF INSULIN (H): Status: ACTIVE | Noted: 2025-01-17

## 2025-01-18 RX ORDER — TRAMADOL HYDROCHLORIDE 50 MG/1
50 TABLET ORAL EVERY 6 HOURS PRN
Qty: 15 TABLET | Refills: 0 | Status: SHIPPED | OUTPATIENT
Start: 2025-01-18

## 2025-01-20 ENCOUNTER — MYC REFILL (OUTPATIENT)
Dept: FAMILY MEDICINE | Facility: CLINIC | Age: 70
End: 2025-01-20
Payer: COMMERCIAL

## 2025-01-20 DIAGNOSIS — N18.31 TYPE 2 DIABETES MELLITUS WITH STAGE 3A CHRONIC KIDNEY DISEASE, WITHOUT LONG-TERM CURRENT USE OF INSULIN (H): ICD-10-CM

## 2025-01-20 DIAGNOSIS — E11.22 TYPE 2 DIABETES MELLITUS WITH STAGE 3A CHRONIC KIDNEY DISEASE, WITHOUT LONG-TERM CURRENT USE OF INSULIN (H): ICD-10-CM

## 2025-01-20 DIAGNOSIS — I10 BENIGN ESSENTIAL HYPERTENSION: ICD-10-CM

## 2025-01-21 RX ORDER — AMLODIPINE BESYLATE 5 MG/1
5 TABLET ORAL DAILY
Qty: 90 TABLET | Refills: 1 | OUTPATIENT
Start: 2025-01-21

## 2025-01-29 ENCOUNTER — THERAPY VISIT (OUTPATIENT)
Dept: PHYSICAL THERAPY | Facility: CLINIC | Age: 70
End: 2025-01-29
Attending: FAMILY MEDICINE
Payer: COMMERCIAL

## 2025-01-29 DIAGNOSIS — M16.12 PRIMARY OSTEOARTHRITIS OF LEFT HIP: ICD-10-CM

## 2025-01-29 DIAGNOSIS — M25.552 HIP PAIN, LEFT: Primary | ICD-10-CM

## 2025-01-29 PROCEDURE — 97110 THERAPEUTIC EXERCISES: CPT | Mod: GP

## 2025-01-29 PROCEDURE — 97530 THERAPEUTIC ACTIVITIES: CPT | Mod: GP

## 2025-02-04 ENCOUNTER — THERAPY VISIT (OUTPATIENT)
Dept: PHYSICAL THERAPY | Facility: CLINIC | Age: 70
End: 2025-02-04
Payer: COMMERCIAL

## 2025-02-04 DIAGNOSIS — M25.552 HIP PAIN, LEFT: Primary | ICD-10-CM

## 2025-02-04 PROCEDURE — 97110 THERAPEUTIC EXERCISES: CPT | Mod: GP | Performed by: PHYSICAL THERAPIST

## 2025-02-04 PROCEDURE — 97112 NEUROMUSCULAR REEDUCATION: CPT | Mod: GP | Performed by: PHYSICAL THERAPIST

## 2025-02-04 PROCEDURE — 97140 MANUAL THERAPY 1/> REGIONS: CPT | Mod: GP | Performed by: PHYSICAL THERAPIST

## 2025-02-06 NOTE — PATIENT INSTRUCTIONS
1. Goal blood pressures for you would be < 140/90 mm of Hg.   2. Check your blood pressures daily for 2 weeks and call our clinic to report the readings at [phone] ([facility] coordinator)  3. Avoid ibuprofen/Naproxen, NSAIDs.     -Drink at least 3 quarts (12 cups or 2.8 liters) of fluid per day  -Low sodium/salt diet (<2 grams of sodium/day)  -Avoid foods with oxalate such as spinach, beets, rhubarb, strawberries, nuts, chocolate, tea, wheat bran and soy foods (soy milk, tofu, soy nuts).  Limit to 1 serving/week.    -Avoid large dose of vitamin C from pills as this may increase the oxalate in your urine  -Get three servings of calcium daily.  Eat or drink your calcium with meals throughout the day as this can reduce the amount of oxalate in your urine.  Recommended calcium intake is 800-1200 mg/day.  -Eat more foods with vitamin B-6 and magnesium.  These include bananas, avocados, artichokes, mangos, halibut, shrimp, meat, dry beans and peas, oatmeal, brown rice, whole grains and fortified ready-to-eat cereals.  -Consume foods high phytates (e.g. whole grains, brown rice, vegetables, beans) with meals to reduce urinary calcium   -Low animal protein (<8 oz/day); small amounts of animal protein with individual meals      -Add citrate to diet in the form of lemon or lime juice (1/2 cup) which can be added to your increased fluid consumption       contact guard

## 2025-02-13 ENCOUNTER — THERAPY VISIT (OUTPATIENT)
Dept: PHYSICAL THERAPY | Facility: CLINIC | Age: 70
End: 2025-02-13
Payer: COMMERCIAL

## 2025-02-13 DIAGNOSIS — M25.552 HIP PAIN, LEFT: Primary | ICD-10-CM

## 2025-02-14 ENCOUNTER — OFFICE VISIT (OUTPATIENT)
Dept: ORTHOPEDICS | Facility: CLINIC | Age: 70
End: 2025-02-14
Attending: FAMILY MEDICINE
Payer: COMMERCIAL

## 2025-02-14 DIAGNOSIS — M51.369 DEGENERATION OF INTERVERTEBRAL DISC OF LUMBAR REGION WITHOUT DISCOGENIC BACK PAIN OR LOWER EXTREMITY PAIN: Primary | ICD-10-CM

## 2025-02-14 DIAGNOSIS — M16.12 PRIMARY OSTEOARTHRITIS OF LEFT HIP: ICD-10-CM

## 2025-02-14 PROCEDURE — 99214 OFFICE O/P EST MOD 30 MIN: CPT | Performed by: FAMILY MEDICINE

## 2025-02-14 RX ORDER — TRAMADOL HYDROCHLORIDE 50 MG/1
50 TABLET ORAL EVERY 6 HOURS PRN
Qty: 15 TABLET | Refills: 0 | Status: SHIPPED | OUTPATIENT
Start: 2025-02-14

## 2025-02-14 NOTE — PROGRESS NOTES
ESTABLISHED PATIENT FOLLOW-UP:  Follow up hip pain    HISTORY OF PRESENT ILLNESS  Mr. Anglin is a pleasant 70 year old year old male who presents to clinic today for follow-up of left hip/low back pain. Patient states that he did attend physical therapy and this did improve his mobility, but his pain has persisted. He does feel most of the pain is coming from his hip osteoarthritis.     Date of injury/onset: Chronic  Date last seen: 12/31/24  Following Therapeutic Plan: Yes, physical therapy  Pain: 3/10  Function: worsening pain at anterior hip region.  Feels radiation down thigh at times.    Karan continues to utilize tramadol for breakthrough pain.  As discussed at our last visit, previous hip injection performed on 10/25/24 provided no relief.      Review of Systems:  Do you have fever, chills, weight loss? No  Do you have any vision problems? No  Do you have any chest pain or edema? No  Do you have any shortness of breath or wheezing?  No  Do you have stomach problems? No  Do you have any numbness or focal weakness? No  Do you have diabetes? No  Do you have problems with bleeding or clotting? No  Do you have an rashes or other skin lesions? No    MEDICAL HISTORY  Patient Active Problem List   Diagnosis    Benign essential hypertension    Hyperlipidemia LDL goal <130    Migraine without aura and without status migrainosus, not intractable    Blood in semen    Shoulder dislocation, left, initial encounter    Microalbuminuria    CKD (chronic kidney disease) stage 3, GFR 30-59 ml/min (H)    Nephrolithiasis    History of NSAID-associated gastropathy    Seasonal allergic rhinitis, unspecified trigger    Family history of colonic polyps    Family history of colon cancer    Hip pain, left    Type 2 diabetes mellitus with stage 3a chronic kidney disease, without long-term current use of insulin (H)    ISAC (generalized anxiety disorder)    Primary osteoarthritis of left hip    Type 2 diabetes mellitus with diabetic  polyneuropathy, without long-term current use of insulin (H)       Current Outpatient Medications   Medication Sig Dispense Refill    amLODIPine (NORVASC) 5 MG tablet Take 1 tablet (5 mg) by mouth daily. 90 tablet 1    aspirin 81 MG EC tablet Take 1 tablet (81 mg) by mouth daily. 90 tablet 3    atenolol (TENORMIN) 50 MG tablet Take 1 tablet (50 mg) by mouth daily. 90 tablet 1    atorvastatin (LIPITOR) 40 MG tablet Take 1 tablet (40 mg) by mouth daily. 90 tablet 3    blood glucose (NO BRAND SPECIFIED) lancets standard Use to test blood sugar 4 times daily or as directed. 100 each 3    blood glucose (NO BRAND SPECIFIED) test strip Use to test blood sugar 4 times daily or as directed. 100 strip 2    cholecalciferol (VITAMIN D3) 25 mcg (1000 units) capsule Take 2 capsules (50 mcg) by mouth daily. 60 capsule 11    citalopram (CELEXA) 20 MG tablet Take 1 tablet (20 mg) by mouth daily. 90 tablet 1    diphenhydrAMINE (BENADRYL) 25 MG tablet Take 25 mg by mouth nightly as needed for itching or allergies      empagliflozin (JARDIANCE) 10 MG TABS tablet Take 1 tablet (10 mg) by mouth daily. 90 tablet 1    esomeprazole (NEXIUM) 20 MG DR capsule Take 1 capsule (20 mg) by mouth daily      gabapentin (NEURONTIN) 300 MG capsule Take 1 capsule (300 mg) by mouth 2 times daily AND 2 capsules (600 mg) at bedtime. Due for appt. 360 capsule 3    losartan (COZAAR) 100 MG tablet Take 1 tablet (100 mg) by mouth daily. 90 tablet 1    meloxicam (MOBIC) 15 MG tablet Take 1 tablet (15 mg) by mouth daily. 30 tablet 1    metFORMIN (GLUCOPHAGE XR) 500 MG 24 hr tablet Take 2 tablets (1,000 mg) by mouth daily (with dinner). 180 tablet 3    rizatriptan (MAXALT) 5 MG tablet Take 1 tablet (5 mg) by mouth at onset of headache for migraine. repeat after 2 hr if no relief; maximum: 30 mg/24 hours 8 tablet 3    traMADol (ULTRAM) 50 MG tablet Take 1 tablet (50 mg) by mouth every 6 hours as needed for severe pain. 15 tablet 0    traMADol (ULTRAM) 50 MG  tablet Take 1 tablet (50 mg) by mouth every 6 hours as needed for severe pain. 15 tablet 0       No Known Allergies    Family History   Problem Relation Age of Onset    Colon Cancer Mother     Kidney Disease Mother     Coronary Artery Disease Father     Enlarged prostate Father     Down Syndrome Sister     Alzheimer Disease Sister     Colon Polyps Sister     Nephrolithiasis Sister     Colon Polyps Sister     Intellectual Disability Sister         Down Syndrome    Colon Polyps Sister     Colon Polyps Sister     Testicular cancer Brother     Diabetes Brother        Additional medical/Social/Surgical histories reviewed in [facility] and updated as appropriate.       PHYSICAL EXAM  There were no vitals taken for this visit.    General  - normal appearance, in no obvious distress  Musculoskeletal - left hip  - stance: Mildly antalgic gait  - inspection: no swelling or effusion,  normal bone and joint alignment, no obvious deformity  - palpation: Tenderness to palpation at lateral hip overlying greater trochanter.  Non-tender posterior hip.  - ROM: Decreased left hip range of motion, mild groin pain with flexion and IR.Painful adduction.  - strength: 5/5 in all planes  - special tests:  (-) ANIA  (-) FADIR  Neuro  - no sensory or motor deficit, grossly normal coordination, normal muscle tone     IMAGING :   Exam: MRI of the left hip dated 12/27/2024.     COMPARISON: Radiographs dated 9/12/2024.     CLINICAL HISTORY: Chronic hip pain.     TECHNIQUE: Multiplanar, multisequence MR imaging of the left hip was  obtained using standard sequences in 3 orthogonal planes without the  use of intravenous or intra-articular gadolinium contrast.     FINDINGS:     Small joint effusion in the left hip joint.     No marrow signal abnormalities to suggest fracture, osteonecrosis, or  marrow infiltration.     Spurring at the femoral head and neck junction. Diffuse areas of  full-thickness cartilage loss, greatest along the superior  and  posterior hip with subchondral bone marrow edema along the femoral  head. Degenerative tearing of the labrum.     Insertional tendinopathy of the gluteus minimus tendon. No  full-thickness tendon tear or tendon retraction. The muscle bulk is  intact without significant atrophy or soft tissue edema. The  visualized femoral vasculature is unremarkable. No lymphadenopathy.  The visualized sciatic nerve is unremarkable.                                                                  IMPRESSION:  1. Moderate to severe osteoarthrosis in the left hip joint with  diffuse full-thickness cartilage loss with subchondral edema along the  femoral head.     2. Small to moderate sized left hip joint effusion.     3. No marrow signal abnormalities to suggest fracture, osteonecrosis,  or marrow infiltration.     MARY JO DICK MD      MR LUMBAR 12/27/24     FINDINGS:  There are 5 type lumbar vertebrae, counting from the level of C2.  Conus tip at the level of L1. Normal lumbar vertebral body alignment.  Disc space height loss is present, most prominent at L1-L2 and. Normal  marrow signal. Normal cauda equina.     On a level by level basis, the findings are as follows:     L1-2: Circumferential disc bulge. No significant spinal canal or  neural foraminal stenosis.     L2-3: Circumferential disc bulge. No significant spinal canal or  neural foraminal stenosis. Increased T2 signal within the bilateral  articular facet space.     L3-4: Circumferential disc bulge. No significant spinal canal  stenosis. Increased T2 signal within the right-sided articular facet  space. Bilateral mild foraminal narrowing.     L4-5: Circumferential disc bulge.  Increased T2 signal within the  right greater than left articular facet space. No spinal canal  stenosis. Bilateral mild neural foramina narrowing.     L5-S1: No significant spinal canal or neural foraminal stenosis.     The visualized paraspinous soft tissues are within normal limits.                                                                        IMPRESSION: Multilevel degenerative disc disease and facet joint  arthropathy. No high-grade spinal canal or neural foraminal stenosis.  Most notably, there is bilateral mild neural foraminal stenosis at  L3-4 and L4-5.     I have personally reviewed the examination and initial interpretation  and I agree with the findings.     TIM SOLIS MD      ASSESSMENT & PLAN  Mr. Anglin is a 70 year old year old male who presents to clinic today for follow up of left hip pain.    As noted above, MRI revealing moderately severe DJD of right hip. NO significant central or high grade neuroforaminal stenosis, narrowing at L3-L4, and L4-5 which would not explain severity of left hip area pain.    Diagnosis:  Severe osteoarthritis of right hip  Lumbar degenerative disc disease, mild    Treatment options discussed in detail today.  Karan and I reviewed option to pursue corticosteroid injection of left hip today, utilizing 80 mg of corticosteroid rather than 40.  We reviewed lack of significant relief with previous injection in October 2024.  Because of this, I do think Karan should be seen and evaluated, undergo consultation for total hip arthroplasty.   We discussed that if we did provide injection today, but he achieved absolutely no relief that he would need to wait at least 90 days before surgery could be considered.  Because Karan is experiencing significant pain, utilizing narcotic analgesic tramadol, he would be best served determining candidacy for hip surgery before additional nonsurgical options.    I did refill his tramadol today.  He can use this and I encouraged him to use it more sparingly.  Tylenol 1000 mg up to 4 times daily.  Follow-up with me as needed.    It was a pleasure seeing Karan.    Brandin Murguia DO, [facility]  Primary Care Sports Medicine

## 2025-02-14 NOTE — LETTER
2/14/2025      RE: Karan Anglin  [address]  [place] [address]     Dear Colleague,    Thank you for referring your patient, Karan Anglin, to the [facility] [place]. Please see a copy of my visit note below.    ESTABLISHED PATIENT FOLLOW-UP:  Follow up hip pain    HISTORY OF PRESENT ILLNESS  Mr. Anglin is a pleasant 70 year old year old male who presents to clinic today for follow-up of left hip/low back pain. Patient states that he did attend physical therapy and this did improve his mobility, but his pain has persisted. He does feel most of the pain is coming from his hip osteoarthritis.     Date of injury/onset: Chronic  Date last seen: 12/31/24  Following Therapeutic Plan: Yes, physical therapy  Pain: 3/10  Function: worsening pain at anterior hip region.  Feels radiation down thigh at times.    Karan continues to utilize tramadol for breakthrough pain.  As discussed at our last visit, previous hip injection performed on 10/25/24 provided no relief.      Review of Systems:  Do you have fever, chills, weight loss? No  Do you have any vision problems? No  Do you have any chest pain or edema? No  Do you have any shortness of breath or wheezing?  No  Do you have stomach problems? No  Do you have any numbness or focal weakness? No  Do you have diabetes? No  Do you have problems with bleeding or clotting? No  Do you have an rashes or other skin lesions? No    MEDICAL HISTORY  Patient Active Problem List   Diagnosis     Benign essential hypertension     Hyperlipidemia LDL goal <130     Migraine without aura and without status migrainosus, not intractable     Blood in semen     Shoulder dislocation, left, initial encounter     Microalbuminuria     CKD (chronic kidney disease) stage 3, GFR 30-59 ml/min (H)     Nephrolithiasis     History of NSAID-associated gastropathy     Seasonal allergic rhinitis, unspecified trigger     Family history of colonic polyps     Family history  of colon cancer     Hip pain, left     Type 2 diabetes mellitus with stage 3a chronic kidney disease, without long-term current use of insulin (H)     ISAC (generalized anxiety disorder)     Primary osteoarthritis of left hip     Type 2 diabetes mellitus with diabetic polyneuropathy, without long-term current use of insulin (H)       Current Outpatient Medications   Medication Sig Dispense Refill     amLODIPine (NORVASC) 5 MG tablet Take 1 tablet (5 mg) by mouth daily. 90 tablet 1     aspirin 81 MG EC tablet Take 1 tablet (81 mg) by mouth daily. 90 tablet 3     atenolol (TENORMIN) 50 MG tablet Take 1 tablet (50 mg) by mouth daily. 90 tablet 1     atorvastatin (LIPITOR) 40 MG tablet Take 1 tablet (40 mg) by mouth daily. 90 tablet 3     blood glucose (NO BRAND SPECIFIED) lancets standard Use to test blood sugar 4 times daily or as directed. 100 each 3     blood glucose (NO BRAND SPECIFIED) test strip Use to test blood sugar 4 times daily or as directed. 100 strip 2     cholecalciferol (VITAMIN D3) 25 mcg (1000 units) capsule Take 2 capsules (50 mcg) by mouth daily. 60 capsule 11     citalopram (CELEXA) 20 MG tablet Take 1 tablet (20 mg) by mouth daily. 90 tablet 1     diphenhydrAMINE (BENADRYL) 25 MG tablet Take 25 mg by mouth nightly as needed for itching or allergies       empagliflozin (JARDIANCE) 10 MG TABS tablet Take 1 tablet (10 mg) by mouth daily. 90 tablet 1     esomeprazole (NEXIUM) 20 MG DR capsule Take 1 capsule (20 mg) by mouth daily       gabapentin (NEURONTIN) 300 MG capsule Take 1 capsule (300 mg) by mouth 2 times daily AND 2 capsules (600 mg) at bedtime. Due for appt. 360 capsule 3     losartan (COZAAR) 100 MG tablet Take 1 tablet (100 mg) by mouth daily. 90 tablet 1     meloxicam (MOBIC) 15 MG tablet Take 1 tablet (15 mg) by mouth daily. 30 tablet 1     metFORMIN (GLUCOPHAGE XR) 500 MG 24 hr tablet Take 2 tablets (1,000 mg) by mouth daily (with dinner). 180 tablet 3     rizatriptan (MAXALT) 5 MG  tablet Take 1 tablet (5 mg) by mouth at onset of headache for migraine. repeat after 2 hr if no relief; maximum: 30 mg/24 hours 8 tablet 3     traMADol (ULTRAM) 50 MG tablet Take 1 tablet (50 mg) by mouth every 6 hours as needed for severe pain. 15 tablet 0     traMADol (ULTRAM) 50 MG tablet Take 1 tablet (50 mg) by mouth every 6 hours as needed for severe pain. 15 tablet 0       No Known Allergies    Family History   Problem Relation Age of Onset     Colon Cancer Mother      Kidney Disease Mother      Coronary Artery Disease Father      Enlarged prostate Father      Down Syndrome Sister      Alzheimer Disease Sister      Colon Polyps Sister      Nephrolithiasis Sister      Colon Polyps Sister      Intellectual Disability Sister         Down Syndrome     Colon Polyps Sister      Colon Polyps Sister      Testicular cancer Brother      Diabetes Brother        Additional medical/Social/Surgical histories reviewed in [facility] and updated as appropriate.       PHYSICAL EXAM  There were no vitals taken for this visit.    General  - normal appearance, in no obvious distress  Musculoskeletal - left hip  - stance: Mildly antalgic gait  - inspection: no swelling or effusion,  normal bone and joint alignment, no obvious deformity  - palpation: Tenderness to palpation at lateral hip overlying greater trochanter.  Non-tender posterior hip.  - ROM: Decreased left hip range of motion, mild groin pain with flexion and IR.Painful adduction.  - strength: 5/5 in all planes  - special tests:  (-) ANIA  (-) FADIR  Neuro  - no sensory or motor deficit, grossly normal coordination, normal muscle tone     IMAGING :   Exam: MRI of the left hip dated 12/27/2024.     COMPARISON: Radiographs dated 9/12/2024.     CLINICAL HISTORY: Chronic hip pain.     TECHNIQUE: Multiplanar, multisequence MR imaging of the left hip was  obtained using standard sequences in 3 orthogonal planes without the  use of intravenous or intra-articular gadolinium  contrast.     FINDINGS:     Small joint effusion in the left hip joint.     No marrow signal abnormalities to suggest fracture, osteonecrosis, or  marrow infiltration.     Spurring at the femoral head and neck junction. Diffuse areas of  full-thickness cartilage loss, greatest along the superior and  posterior hip with subchondral bone marrow edema along the femoral  head. Degenerative tearing of the labrum.     Insertional tendinopathy of the gluteus minimus tendon. No  full-thickness tendon tear or tendon retraction. The muscle bulk is  intact without significant atrophy or soft tissue edema. The  visualized femoral vasculature is unremarkable. No lymphadenopathy.  The visualized sciatic nerve is unremarkable.                                                                  IMPRESSION:  1. Moderate to severe osteoarthrosis in the left hip joint with  diffuse full-thickness cartilage loss with subchondral edema along the  femoral head.     2. Small to moderate sized left hip joint effusion.     3. No marrow signal abnormalities to suggest fracture, osteonecrosis,  or marrow infiltration.     MARY JO DICK MD      MR LUMBAR 12/27/24     FINDINGS:  There are 5 type lumbar vertebrae, counting from the level of C2.  Conus tip at the level of L1. Normal lumbar vertebral body alignment.  Disc space height loss is present, most prominent at L1-L2 and. Normal  marrow signal. Normal cauda equina.     On a level by level basis, the findings are as follows:     L1-2: Circumferential disc bulge. No significant spinal canal or  neural foraminal stenosis.     L2-3: Circumferential disc bulge. No significant spinal canal or  neural foraminal stenosis. Increased T2 signal within the bilateral  articular facet space.     L3-4: Circumferential disc bulge. No significant spinal canal  stenosis. Increased T2 signal within the right-sided articular facet  space. Bilateral mild foraminal narrowing.     L4-5: Circumferential disc  bulge.  Increased T2 signal within the  right greater than left articular facet space. No spinal canal  stenosis. Bilateral mild neural foramina narrowing.     L5-S1: No significant spinal canal or neural foraminal stenosis.     The visualized paraspinous soft tissues are within normal limits.                                                                       IMPRESSION: Multilevel degenerative disc disease and facet joint  arthropathy. No high-grade spinal canal or neural foraminal stenosis.  Most notably, there is bilateral mild neural foraminal stenosis at  L3-4 and L4-5.     I have personally reviewed the examination and initial interpretation  and I agree with the findings.     TIM SOLIS MD      ASSESSMENT & PLAN  Mr. Anglin is a 70 year old year old male who presents to clinic today for follow up of left hip pain.    As noted above, MRI revealing moderately severe DJD of right hip. NO significant central or high grade neuroforaminal stenosis, narrowing at L3-L4, and L4-5 which would not explain severity of left hip area pain.    Diagnosis:  Severe osteoarthritis of right hip  Lumbar degenerative disc disease, mild    Treatment options discussed in detail today.  Karan and I reviewed option to pursue corticosteroid injection of left hip today, utilizing 80 mg of corticosteroid rather than 40.  We reviewed lack of significant relief with previous injection in October 2024.  Because of this, I do think Karan should be seen and evaluated, undergo consultation for total hip arthroplasty.   We discussed that if we did provide injection today, but he achieved absolutely no relief that he would need to wait at least 90 days before surgery could be considered.  Because Karan is experiencing significant pain, utilizing narcotic analgesic tramadol, he would be best served determining candidacy for hip surgery before additional nonsurgical options.    I did refill his tramadol today.  He can use this and I encouraged  him to use it more sparingly.  Tylenol 1000 mg up to 4 times daily.  Follow-up with me as needed.    It was a pleasure seeing Karan.    Brandin Murguia DO, [facility]  Primary Care Sports Medicine        Again, thank you for allowing me to participate in the care of your patient.      Sincerely,    Brandin Murguia DO

## 2025-02-17 ENCOUNTER — TELEPHONE (OUTPATIENT)
Dept: ORTHOPEDICS | Facility: CLINIC | Age: 70
End: 2025-02-17
Payer: COMMERCIAL

## 2025-02-17 NOTE — TELEPHONE ENCOUNTER
Left Voicemail (1st Attempt) and Sent Mychart (1st Attempt) for the patient to call back and schedule the following:    Appointment type: New Hip  Provider: Dr. Karan Lynn  Visit mode: In Person  Return date: Approx. 2/28/25  Specialty phone number: [phone]    Additional Notes:

## 2025-02-18 NOTE — TELEPHONE ENCOUNTER
DIAGNOSIS: LEFT hip, Osteoarthritis / Brandin Murguia,  in [facility] /  Open / Imaging ON FILE    APPOINTMENT DATE: 4/9/25   NOTES STATUS DETAILS   OFFICE NOTE from referring provider Internal 2/14/25 OV -Brandin Murguia DO    OFFICE NOTE from other specialist Internal 2/13/25 OV- Chinmay Willingham, PT   1/29/25 OV- Manolo Hopkins, PT    MEDICATION LIST Internal    MRI PACS 12/27/24 MR HIP LT   XRAYS (IMAGES & REPORTS) PACS 9/12/24 XR HIP LT

## 2025-03-17 DIAGNOSIS — M16.12 PRIMARY OSTEOARTHRITIS OF LEFT HIP: ICD-10-CM

## 2025-03-17 RX ORDER — TRAMADOL HYDROCHLORIDE 50 MG/1
50 TABLET ORAL EVERY 6 HOURS PRN
Qty: 15 TABLET | Refills: 0 | Status: SHIPPED | OUTPATIENT
Start: 2025-03-17

## 2025-03-17 NOTE — TELEPHONE ENCOUNTER
Last Written Prescription:  traMADol (ULTRAM) 50 MG tablet 15 tablet 0 2/14/2025 -- No   Sig - Route: Take 1 tablet (50 mg) by mouth every 6 hours as needed for severe pain. - Oral     ----------------------  Last Visit Date: 2-14-25  Future Visit Date: none  ----------------------      Refill decision: Medication unable to be refilled by RN due to:   Controlled medication        Request from pharmacy:  Requested Prescriptions   Pending Prescriptions Disp Refills    traMADol (ULTRAM) 50 MG tablet [Pharmacy Med Name: traMADol HCl Oral Tablet 50 MG] 15 tablet 0     Sig: Take 1 tablet (50 mg) by mouth every 6 hours as needed for severe pain.       There is no refill protocol information for this order

## 2025-04-04 NOTE — PROGRESS NOTES
"    [facility]  Orthopaedic Surgery Consultation by Karan Lynn M.D.    Karan Anglin MRN# [id number]   Age: 70 year old YOB: 1955     Requesting physician: No ref. provider found  Romelia Judd     Background history:  DX:  Migraine  Type 2 diabetes mellitus latest A1c 6.9  Hyperlipidemia  Hypertension on aspirin 81 mg  Chronic kidney disease stage III  Nephrolithiasis  Gastric ulcer in setting of NSAID use    TREATMENTS:  2018, left glenoid open reduction internal fixation           History of Present Illness:   70 year old male who presents our clinic for the evaluation of left hip/groin pain.  This pain has been present for multiple years but has been increasingly more painful since June of last year.  No clear antecedent traumatic event.  The pain sometimes radiates down his leg but does not appear to go past his knee.  Patient does not report significant low back pain.  No motor or sensory deficits of left lower extremity.  Patient reports occasional night pain.  He reports initiation stiffness and soreness.  The pain exacerbates with prolonged sitting and standing.  To mitigate the pain is tried over-the-counter analgesics and meloxicam.  He has done physical therapy which provided some relief.  He has had an injection intra-articularly which did not provide any relief.  Upon questioning he is uncertain whether it provided relief in the first hour.      Social:   Occupation: [employer]   Living situation: lives in apartment alone with people nearby to help  Hobbies / Sports: N/A    Smoking: No  Alcohol: Yes  Illicit drug use: No         Physical Exam:     EXAMINATION pertinent findings:   PSYCH: Pleasant, healthy-appearing, alert, oriented x3, cooperative. Normal mood and affect.  VITAL SIGNS: Height 1.778 m (5' 10\"), weight 102.1 kg (225 lb).  Reviewed nursing intake notes.   Body mass index is 32.28 kg/m .  RESP: non labored breathing   ABD: benign, soft, non-tender, no acute peritoneal " findings  SKIN: grossly normal   LYMPHATIC: grossly normal, no adenopathy, no extremity edema  NEURO: grossly normal , no motor deficits  VASCULAR: satisfactory perfusion of all extremities   MUSCULOSKELETAL:   Alignment: Neutral  Gait: Normal     L hip: ROM   , Extension 0 , IRF 5 , ERF 20 , ABD 30 , ADD 20 .  Upon stressing the joint the rotations appear to be recognizably painful.  Lasegue's test is negative.  No tenderness to palpation over greater trochanteric region.     Left LE:   Thigh and leg compartments soft and compressible   +Quad/TA/GSC/FHL/EHL   SILT DP/SP/Shae/Saph/Tib nerve distributions   Palpable dorsalis pedis pulse          Data:   All laboratory data reviewed  All imaging studies reviewed by me personally.    XR pelvis/hip left 4/9/2025:  My interpretation: Mild to moderate osteoarthritic changes with mostly centrally based joint space narrowing.    MRI left hip 12/27/2024:  My interpretation: Moderate to severe osteoarthritic changes of left femoral acetabular joint with diffuse full-thickness cartilage loss and subchondral bone marrow edema femoral head.            Assessment and Plan:   Assessment:  70-year-old male presenting with chronic left hip/groin pain most likely due to osteoarthritic changes of the femoral acetabular joint.     Plan:  We had a long discussion with the patient regarding etiology and ongoing management options.  Reviewed surgical and nonsurgical treatments.  The non-operative management options consist of activity modification, pain medication, PT and injection therapy.  We briefly discussed the possibility of hip replacement surgery.  We extensively discussed the fact that although it is likely that his pain is caused by his hip joint it is unclear to me what his exact response was to the injection.  Therefore I believe it is prudent to repeat the injection with lidocaine only in order to evaluate whether this is indeed the origin of his pain.  Patient was  advised to maintain a pain diary and to do things that would usually make his hip hurt in the first hour after injection.  Patient understands and agrees to the treatment plan as set forth.  This injection will be scheduled in the near future.  We will follow-up in the week after to evaluate its efficacy.      For additional information and frequently asked questions regarding joint replacements, scan the QR code image below on your phone camera or go to: [email or web address]         Thank you for your referral.    Karan Lynn MD, PhD     Adult Reconstruction  [facility] Department of Orthopaedic Surgery    This note was created using dictation software and may contain errors.  Please contact the creator for any clarifications that are needed.    DATA for DOCUMENTATION:         Past Medical History:     Patient Active Problem List   Diagnosis    Benign essential hypertension    Hyperlipidemia LDL goal <130    Migraine without aura and without status migrainosus, not intractable    Blood in semen    Shoulder dislocation, left, initial encounter    Microalbuminuria    CKD (chronic kidney disease) stage 3, GFR 30-59 ml/min (H)    Nephrolithiasis    History of NSAID-associated gastropathy    Seasonal allergic rhinitis, unspecified trigger    Family history of colonic polyps    Family history of colon cancer    Hip pain, left    Type 2 diabetes mellitus with stage 3a chronic kidney disease, without long-term current use of insulin (H)    ISAC (generalized anxiety disorder)    Primary osteoarthritis of left hip    Type 2 diabetes mellitus with diabetic polyneuropathy, without long-term current use of insulin (H)     Past Medical History:   Diagnosis Date    Peptic ulcer hemorrhage 2013       Also see scanned health assessment forms.       Past Surgical History:     Past Surgical History:   Procedure Laterality Date    GI SURGERY  2013     peptic ulcers stapled    OPEN REDUCTION INTERNAL FIXATION HUMERUS PROXIMAL Left 5/15/2018    Procedure: OPEN REDUCTION INTERNAL FIXATION HUMERUS PROXIMAL;  Left Glenoid Open Reduction Internal Fixation;  Surgeon: Rosa Joe MD;  Location:  OR            Social History:     Social History     Socioeconomic History    Marital status: Single     Spouse name: Not on file    Number of children: Not on file    Years of education: Not on file    Highest education level: Not on file   Occupational History    Not on file   Tobacco Use    Smoking status: Never    Smokeless tobacco: Never   Vaping Use    Vaping status: Never Used   Substance and Sexual Activity    Alcohol use: Yes     Comment: once/ twice per week    Drug use: No    Sexual activity: Not Currently     Partners: Male     Birth control/protection: None   Other Topics Concern    Parent/sibling w/ CABG, MI or angioplasty before 65F 55M? No   Social History Narrative    Not on file     Social Drivers of Health     Financial Resource Strain: Low Risk  (1/17/2025)    Financial Resource Strain     Within the past 12 months, have you or your family members you live with been unable to get utilities (heat, electricity) when it was really needed?: No   Food Insecurity: Low Risk  (1/17/2025)    Food Insecurity     Within the past 12 months, did you worry that your food would run out before you got money to buy more?: No     Within the past 12 months, did the food you bought just not last and you didn t have money to get more?: Patient declined   Transportation Needs: Low Risk  (1/17/2025)    Transportation Needs     Within the past 12 months, has lack of transportation kept you from medical appointments, getting your medicines, non-medical meetings or appointments, work, or from getting things that you need?: No   Physical Activity: Insufficiently Active (1/17/2025)    Exercise Vital Sign     Days of Exercise per Week: 5 days     Minutes of Exercise per  Session: 20 min   Stress: Stress Concern Present (1/17/2025)    [employer] [place] [employer] - Occupational Stress Questionnaire     Feeling of Stress : Rather much   Social Connections: Unknown (1/17/2025)    Social Connection and Isolation Panel [NHANES]     Frequency of Communication with Friends and Family: Not on file     Frequency of Social Gatherings with Friends and Family: Once a week     Attends Taoism Services: Not on file     Active Member of Clubs or Organizations: Not on file     Attends Club or Organization Meetings: Not on file     Marital Status: Not on file   Interpersonal Safety: Low Risk  (1/17/2025)    Interpersonal Safety     Do you feel physically and emotionally safe where you currently live?: Yes     Within the past 12 months, have you been hit, slapped, kicked or otherwise physically hurt by someone?: No     Within the past 12 months, have you been humiliated or emotionally abused in other ways by your partner or ex-partner?: No   Housing Stability: Low Risk  (1/17/2025)    Housing Stability     Do you have housing? : Yes     Are you worried about losing your housing?: No            Family History:       Family History   Problem Relation Age of Onset    Colon Cancer Mother     Kidney Disease Mother     Coronary Artery Disease Father     Enlarged prostate Father     Down Syndrome Sister     Alzheimer Disease Sister     Colon Polyps Sister     Nephrolithiasis Sister     Colon Polyps Sister     Intellectual Disability Sister         Down Syndrome    Colon Polyps Sister     Colon Polyps Sister     Testicular cancer Brother     Diabetes Brother             Medications:     Current Outpatient Medications   Medication Sig Dispense Refill    amLODIPine (NORVASC) 5 MG tablet Take 1 tablet (5 mg) by mouth daily. 90 tablet 1    aspirin 81 MG EC tablet Take 1 tablet (81 mg) by mouth daily. 90 tablet 3    atenolol (TENORMIN) 50 MG tablet Take 1 tablet (50 mg) by mouth daily. 90 tablet 1     atorvastatin (LIPITOR) 40 MG tablet Take 1 tablet (40 mg) by mouth daily. 90 tablet 3    blood glucose (NO BRAND SPECIFIED) lancets standard Use to test blood sugar 4 times daily or as directed. 100 each 3    blood glucose (NO BRAND SPECIFIED) test strip Use to test blood sugar 4 times daily or as directed. 100 strip 2    cholecalciferol (VITAMIN D3) 25 mcg (1000 units) capsule Take 2 capsules (50 mcg) by mouth daily. 60 capsule 11    citalopram (CELEXA) 20 MG tablet Take 1 tablet (20 mg) by mouth daily. 90 tablet 1    diphenhydrAMINE (BENADRYL) 25 MG tablet Take 25 mg by mouth nightly as needed for itching or allergies      empagliflozin (JARDIANCE) 10 MG TABS tablet Take 1 tablet (10 mg) by mouth daily. 90 tablet 1    esomeprazole (NEXIUM) 20 MG DR capsule Take 1 capsule (20 mg) by mouth daily      gabapentin (NEURONTIN) 300 MG capsule Take 1 capsule (300 mg) by mouth 2 times daily AND 2 capsules (600 mg) at bedtime. Due for appt. 360 capsule 3    losartan (COZAAR) 100 MG tablet Take 1 tablet (100 mg) by mouth daily. 90 tablet 1    meloxicam (MOBIC) 15 MG tablet Take 1 tablet (15 mg) by mouth daily. 30 tablet 1    metFORMIN (GLUCOPHAGE XR) 500 MG 24 hr tablet Take 2 tablets (1,000 mg) by mouth daily (with dinner). 180 tablet 3    rizatriptan (MAXALT) 5 MG tablet Take 1 tablet (5 mg) by mouth at onset of headache for migraine. repeat after 2 hr if no relief; maximum: 30 mg/24 hours 8 tablet 3    traMADol (ULTRAM) 50 MG tablet Take 1 tablet (50 mg) by mouth every 6 hours as needed for severe pain. 15 tablet 0    traMADol (ULTRAM) 50 MG tablet Take 1 tablet (50 mg) by mouth every 6 hours as needed for severe pain. 15 tablet 0     No current facility-administered medications for this visit.              Review of Systems:   A comprehensive 10 point review of systems (constitutional, ENT, cardiac, peripheral vascular, lymphatic, respiratory, GI, , Musculoskeletal, skin, Neurological) was performed and found to be  negative except as described in this note.     See intake form completed by patient

## 2025-04-05 ASSESSMENT — HOOS JR
BENDING TO THE FLOOR TO PICK UP OBJECT: SEVERE
SITTING: SEVERE
LYING IN BED (TURNING OVER, MAINTAINING HIP POSITION): SEVERE
GOING UP OR DOWN STAIRS: MODERATE
HOOS JR TOTAL INTERVAL SCORE: 39.9
RISING FROM SITTING: SEVERE
WALKING ON UNEVEN SURFACE: MODERATE

## 2025-04-09 ENCOUNTER — ANCILLARY PROCEDURE (OUTPATIENT)
Dept: GENERAL RADIOLOGY | Facility: CLINIC | Age: 70
End: 2025-04-09
Attending: STUDENT IN AN ORGANIZED HEALTH CARE EDUCATION/TRAINING PROGRAM
Payer: COMMERCIAL

## 2025-04-09 ENCOUNTER — PRE VISIT (OUTPATIENT)
Dept: ORTHOPEDICS | Facility: CLINIC | Age: 70
End: 2025-04-09

## 2025-04-09 ENCOUNTER — OFFICE VISIT (OUTPATIENT)
Dept: ORTHOPEDICS | Facility: CLINIC | Age: 70
End: 2025-04-09
Payer: COMMERCIAL

## 2025-04-09 VITALS — WEIGHT: 225 LBS | HEIGHT: 70 IN | BODY MASS INDEX: 32.21 KG/M2

## 2025-04-09 DIAGNOSIS — M16.12 PRIMARY OSTEOARTHRITIS OF LEFT HIP: ICD-10-CM

## 2025-04-09 DIAGNOSIS — M25.552 HIP PAIN, LEFT: ICD-10-CM

## 2025-04-09 PROCEDURE — 99203 OFFICE O/P NEW LOW 30 MIN: CPT | Performed by: STUDENT IN AN ORGANIZED HEALTH CARE EDUCATION/TRAINING PROGRAM

## 2025-04-09 PROCEDURE — 73502 X-RAY EXAM HIP UNI 2-3 VIEWS: CPT | Mod: LT | Performed by: RADIOLOGY

## 2025-04-09 NOTE — LETTER
4/9/2025      Karan Anglin  [address]  [facility] [address]      Dear Colleague,    Thank you for referring your patient, Karan Anglin, to the [facility] [place]. Please see a copy of my visit note below.        [facility]  Orthopaedic Surgery Consultation by Karan Lynn M.D.    Karan Anglin MRN# [id number]   Age: 70 year old YOB: 1955     Requesting physician: No ref. provider found  Romelia Judd     Background history:  DX:  Migraine  Type 2 diabetes mellitus latest A1c 6.9  Hyperlipidemia  Hypertension on aspirin 81 mg  Chronic kidney disease stage III  Nephrolithiasis  Gastric ulcer in setting of NSAID use    TREATMENTS:  2018, left glenoid open reduction internal fixation           History of Present Illness:   70 year old male who presents our clinic for the evaluation of left hip/groin pain.  This pain has been present for multiple years but has been increasingly more painful since June of last year.  No clear antecedent traumatic event.  The pain sometimes radiates down his leg but does not appear to go past his knee.  Patient does not report significant low back pain.  No motor or sensory deficits of left lower extremity.  Patient reports occasional night pain.  He reports initiation stiffness and soreness.  The pain exacerbates with prolonged sitting and standing.  To mitigate the pain is tried over-the-counter analgesics and meloxicam.  He has done physical therapy which provided some relief.  He has had an injection intra-articularly which did not provide any relief.  Upon questioning he is uncertain whether it provided relief in the first hour.      Social:   Occupation: [employer]   Living situation: lives in apartment alone with people nearby to help  Hobbies / Sports: N/A    Smoking: No  Alcohol: Yes  Illicit drug use: No         Physical Exam:     EXAMINATION pertinent findings:   PSYCH: Pleasant, healthy-appearing, alert, oriented x3,  "cooperative. Normal mood and affect.  VITAL SIGNS: Height 1.778 m (5' 10\"), weight 102.1 kg (225 lb).  Reviewed nursing intake notes.   Body mass index is 32.28 kg/m .  RESP: non labored breathing   ABD: benign, soft, non-tender, no acute peritoneal findings  SKIN: grossly normal   LYMPHATIC: grossly normal, no adenopathy, no extremity edema  NEURO: grossly normal , no motor deficits  VASCULAR: satisfactory perfusion of all extremities   MUSCULOSKELETAL:   Alignment: Neutral  Gait: Normal     L hip: ROM   , Extension 0 , IRF 5 , ERF 20 , ABD 30 , ADD 20 .  Upon stressing the joint the rotations appear to be recognizably painful.  Lasegue's test is negative.  No tenderness to palpation over greater trochanteric region.     Left LE:   Thigh and leg compartments soft and compressible   +Quad/TA/GSC/FHL/EHL   SILT DP/SP/Shae/Saph/Tib nerve distributions   Palpable dorsalis pedis pulse          Data:   All laboratory data reviewed  All imaging studies reviewed by me personally.    XR pelvis/hip left 4/9/2025:  My interpretation: Mild to moderate osteoarthritic changes with mostly centrally based joint space narrowing.    MRI left hip 12/27/2024:  My interpretation: Moderate to severe osteoarthritic changes of left femoral acetabular joint with diffuse full-thickness cartilage loss and subchondral bone marrow edema femoral head.            Assessment and Plan:   Assessment:  70-year-old male presenting with chronic left hip/groin pain most likely due to osteoarthritic changes of the femoral acetabular joint.     Plan:  We had a long discussion with the patient regarding etiology and ongoing management options.  Reviewed surgical and nonsurgical treatments.  The non-operative management options consist of activity modification, pain medication, PT and injection therapy.  We briefly discussed the possibility of hip replacement surgery.  We extensively discussed the fact that although it is likely that his pain is " caused by his hip joint it is unclear to me what his exact response was to the injection.  Therefore I believe it is prudent to repeat the injection with lidocaine only in order to evaluate whether this is indeed the origin of his pain.  Patient was advised to maintain a pain diary and to do things that would usually make his hip hurt in the first hour after injection.  Patient understands and agrees to the treatment plan as set forth.  This injection will be scheduled in the near future.  We will follow-up in the week after to evaluate its efficacy.      For additional information and frequently asked questions regarding joint replacements, scan the QR code image below on your phone camera or go to: [email or web address]         Thank you for your referral.    Karan Lynn MD, PhD     Adult Reconstruction  [facility] Department of Orthopaedic Surgery    This note was created using dictation software and may contain errors.  Please contact the creator for any clarifications that are needed.    DATA for DOCUMENTATION:         Past Medical History:     Patient Active Problem List   Diagnosis     Benign essential hypertension     Hyperlipidemia LDL goal <130     Migraine without aura and without status migrainosus, not intractable     Blood in semen     Shoulder dislocation, left, initial encounter     Microalbuminuria     CKD (chronic kidney disease) stage 3, GFR 30-59 ml/min (H)     Nephrolithiasis     History of NSAID-associated gastropathy     Seasonal allergic rhinitis, unspecified trigger     Family history of colonic polyps     Family history of colon cancer     Hip pain, left     Type 2 diabetes mellitus with stage 3a chronic kidney disease, without long-term current use of insulin (H)     ISAC (generalized anxiety disorder)     Primary osteoarthritis of left hip     Type 2 diabetes mellitus with diabetic polyneuropathy, without  long-term current use of insulin (H)     Past Medical History:   Diagnosis Date     Peptic ulcer hemorrhage 2013       Also see scanned health assessment forms.       Past Surgical History:     Past Surgical History:   Procedure Laterality Date     GI SURGERY  2013    peptic ulcers stapled     OPEN REDUCTION INTERNAL FIXATION HUMERUS PROXIMAL Left 5/15/2018    Procedure: OPEN REDUCTION INTERNAL FIXATION HUMERUS PROXIMAL;  Left Glenoid Open Reduction Internal Fixation;  Surgeon: Rosa Joe MD;  Location:  OR            Social History:     Social History     Socioeconomic History     Marital status: Single     Spouse name: Not on file     Number of children: Not on file     Years of education: Not on file     Highest education level: Not on file   Occupational History     Not on file   Tobacco Use     Smoking status: Never     Smokeless tobacco: Never   Vaping Use     Vaping status: Never Used   Substance and Sexual Activity     Alcohol use: Yes     Comment: once/ twice per week     Drug use: No     Sexual activity: Not Currently     Partners: Male     Birth control/protection: None   Other Topics Concern     Parent/sibling w/ CABG, MI or angioplasty before 65F 55M? No   Social History Narrative     Not on file     Social Drivers of Health     Financial Resource Strain: Low Risk  (1/17/2025)    Financial Resource Strain      Within the past 12 months, have you or your family members you live with been unable to get utilities (heat, electricity) when it was really needed?: No   Food Insecurity: Low Risk  (1/17/2025)    Food Insecurity      Within the past 12 months, did you worry that your food would run out before you got money to buy more?: No      Within the past 12 months, did the food you bought just not last and you didn t have money to get more?: Patient declined   Transportation Needs: Low Risk  (1/17/2025)    Transportation Needs      Within the past 12 months, has lack of transportation kept  you from medical appointments, getting your medicines, non-medical meetings or appointments, work, or from getting things that you need?: No   Physical Activity: Insufficiently Active (1/17/2025)    Exercise Vital Sign      Days of Exercise per Week: 5 days      Minutes of Exercise per Session: 20 min   Stress: Stress Concern Present (1/17/2025)    [employer] [place] [employer] - Occupational Stress Questionnaire      Feeling of Stress : Rather much   Social Connections: Unknown (1/17/2025)    Social Connection and Isolation Panel [NHANES]      Frequency of Communication with Friends and Family: Not on file      Frequency of Social Gatherings with Friends and Family: Once a week      Attends Pentec[employer]al Services: Not on file      Active Member of Clubs or Organizations: Not on file      Attends Club or Organization Meetings: Not on file      Marital Status: Not on file   Interpersonal Safety: Low Risk  (1/17/2025)    Interpersonal Safety      Do you feel physically and emotionally safe where you currently live?: Yes      Within the past 12 months, have you been hit, slapped, kicked or otherwise physically hurt by someone?: No      Within the past 12 months, have you been humiliated or emotionally abused in other ways by your partner or ex-partner?: No   Housing Stability: Low Risk  (1/17/2025)    Housing Stability      Do you have housing? : Yes      Are you worried about losing your housing?: No            Family History:       Family History   Problem Relation Age of Onset     Colon Cancer Mother      Kidney Disease Mother      Coronary Artery Disease Father      Enlarged prostate Father      Down Syndrome Sister      Alzheimer Disease Sister      Colon Polyps Sister      Nephrolithiasis Sister      Colon Polyps Sister      Intellectual Disability Sister         Down Syndrome     Colon Polyps Sister      Colon Polyps Sister      Testicular cancer Brother      Diabetes Brother             Medications:      Current Outpatient Medications   Medication Sig Dispense Refill     amLODIPine (NORVASC) 5 MG tablet Take 1 tablet (5 mg) by mouth daily. 90 tablet 1     aspirin 81 MG EC tablet Take 1 tablet (81 mg) by mouth daily. 90 tablet 3     atenolol (TENORMIN) 50 MG tablet Take 1 tablet (50 mg) by mouth daily. 90 tablet 1     atorvastatin (LIPITOR) 40 MG tablet Take 1 tablet (40 mg) by mouth daily. 90 tablet 3     blood glucose (NO BRAND SPECIFIED) lancets standard Use to test blood sugar 4 times daily or as directed. 100 each 3     blood glucose (NO BRAND SPECIFIED) test strip Use to test blood sugar 4 times daily or as directed. 100 strip 2     cholecalciferol (VITAMIN D3) 25 mcg (1000 units) capsule Take 2 capsules (50 mcg) by mouth daily. 60 capsule 11     citalopram (CELEXA) 20 MG tablet Take 1 tablet (20 mg) by mouth daily. 90 tablet 1     diphenhydrAMINE (BENADRYL) 25 MG tablet Take 25 mg by mouth nightly as needed for itching or allergies       empagliflozin (JARDIANCE) 10 MG TABS tablet Take 1 tablet (10 mg) by mouth daily. 90 tablet 1     esomeprazole (NEXIUM) 20 MG DR capsule Take 1 capsule (20 mg) by mouth daily       gabapentin (NEURONTIN) 300 MG capsule Take 1 capsule (300 mg) by mouth 2 times daily AND 2 capsules (600 mg) at bedtime. Due for appt. 360 capsule 3     losartan (COZAAR) 100 MG tablet Take 1 tablet (100 mg) by mouth daily. 90 tablet 1     meloxicam (MOBIC) 15 MG tablet Take 1 tablet (15 mg) by mouth daily. 30 tablet 1     metFORMIN (GLUCOPHAGE XR) 500 MG 24 hr tablet Take 2 tablets (1,000 mg) by mouth daily (with dinner). 180 tablet 3     rizatriptan (MAXALT) 5 MG tablet Take 1 tablet (5 mg) by mouth at onset of headache for migraine. repeat after 2 hr if no relief; maximum: 30 mg/24 hours 8 tablet 3     traMADol (ULTRAM) 50 MG tablet Take 1 tablet (50 mg) by mouth every 6 hours as needed for severe pain. 15 tablet 0     traMADol (ULTRAM) 50 MG tablet Take 1 tablet (50 mg) by mouth every 6  hours as needed for severe pain. 15 tablet 0     No current facility-administered medications for this visit.              Review of Systems:   A comprehensive 10 point review of systems (constitutional, ENT, cardiac, peripheral vascular, lymphatic, respiratory, GI, , Musculoskeletal, skin, Neurological) was performed and found to be negative except as described in this note.     See intake form completed by patient      Again, thank you for allowing me to participate in the care of your patient.        Sincerely,        Karan Lynn MD    Electronically signed

## 2025-04-15 DIAGNOSIS — M16.12 PRIMARY OSTEOARTHRITIS OF LEFT HIP: ICD-10-CM

## 2025-04-15 RX ORDER — TRAMADOL HYDROCHLORIDE 50 MG/1
50 TABLET ORAL EVERY 6 HOURS PRN
Qty: 15 TABLET | Refills: 0 | Status: SHIPPED | OUTPATIENT
Start: 2025-04-15 | End: 2025-04-19

## 2025-04-15 RX ORDER — TRAMADOL HYDROCHLORIDE 50 MG/1
50 TABLET ORAL EVERY 6 HOURS PRN
Qty: 15 TABLET | Refills: 0 | OUTPATIENT
Start: 2025-04-15

## 2025-04-15 NOTE — TELEPHONE ENCOUNTER
Last Written Prescription:  traMADol (ULTRAM) 50 MG tablet 15 tablet 0 3/17/2025     ----------------------  Last Visit Date: 2/14/25 4/11/25-injection  Future Visit Date: none  ----------------------    Refill decision: Medication unable to be refilled by RN due to: Controlled medication    Tamar Rubin RN  [facility]/Red Flag Triage & Med Refill Team     Request from pharmacy:  Requested Prescriptions   Pending Prescriptions Disp Refills    traMADol (ULTRAM) 50 MG tablet [Pharmacy Med Name: traMADol HCl Oral Tablet 50 MG] 15 tablet 0     Sig: Take 1 tablet (50 mg) by mouth every 6 hours as needed for severe pain.       Rx Protocol Controlled Substance Failed - 4/15/2025  9:53 AM        Failed - Urine drug screeen results on file in past 12 months     [unfilled]           Failed - Controlled Substance Agreement on file in last 12 months     Please review last Controlled Substance Pain agreement document.   CSA -- Encounter Level:    CSA: None found at the encounter level.       CSA -- Patient Level:    CSA: None found at the patient level.               Failed - Auto Fail - Please forward to Provider        Failed - PHQ9 done in past year     Please review last PHQ-9 score.       9/8/2022     2:59 PM   PHQ-9 SCORE   PHQ-9 Total Score MyChart 7 (Mild depression)   PHQ-9 Total Score 7             Failed - Naloxone on active med list        Failed - ISAC-7 done in past year     Please review last ISAC-7 score.       9/8/2022     2:59 PM   ISAC-7 SCORE   Total Score 5 (mild anxiety)   Total Score 5             Passed - Visit with relevant provider in past 3 months or upcoming 3 months        Passed - Medication is active on med list and the sig matches        Passed - Medication not refilled in past 28 days     Invalid Medication Grouper          Passed - No Benzodiazepines on acive med list

## 2025-05-06 ENCOUNTER — VIRTUAL VISIT (OUTPATIENT)
Dept: ORTHOPEDICS | Facility: CLINIC | Age: 70
End: 2025-05-06
Payer: COMMERCIAL

## 2025-05-06 DIAGNOSIS — Z96.642 S/P TOTAL LEFT HIP ARTHROPLASTY: ICD-10-CM

## 2025-05-06 DIAGNOSIS — M16.12 PRIMARY OSTEOARTHRITIS OF LEFT HIP: Primary | ICD-10-CM

## 2025-05-06 NOTE — PROGRESS NOTES
Teaching Flowsheet     Visit Type: Telephone    Time Start: 9:30am  Time End: 9:46am  Total Time Spent: 16 min.    Surgeon: Dr. Lynn  Location of Surgery (known or anticipated): [facility]  Type of Anesthesia: Choice  Worker's Compensation Procedure: No    Pertinent Medical History: PAST MEDICAL HISTORY:   Past Medical History:   Diagnosis Date    Peptic ulcer hemorrhage 2013       PAST SURGICAL HISTORY:   Past Surgical History:   Procedure Laterality Date    GI SURGERY  2013    peptic ulcers stapled    OPEN REDUCTION INTERNAL FIXATION HUMERUS PROXIMAL Left 5/15/2018    Procedure: OPEN REDUCTION INTERNAL FIXATION HUMERUS PROXIMAL;  Left Glenoid Open Reduction Internal Fixation;  Surgeon: Rosa Joe MD;  Location:  OR       FAMILY HISTORY:   Family History   Problem Relation Age of Onset    Colon Cancer Mother     Kidney Disease Mother     Coronary Artery Disease Father     Enlarged prostate Father     Down Syndrome Sister     Alzheimer Disease Sister     Colon Polyps Sister     Nephrolithiasis Sister     Colon Polyps Sister     Intellectual Disability Sister         Down Syndrome    Colon Polyps Sister     Colon Polyps Sister     Testicular cancer Brother     Diabetes Brother        SOCIAL HISTORY:   Social History     Tobacco Use    Smoking status: Never    Smokeless tobacco: Never   Substance Use Topics    Alcohol use: Yes     Comment: once/ twice per week       Were medical conditions reviewed and appropriate for location? Yes, DM - Jardiance, last A1c 6.9 on 1/17/25  BMI: Obesity Grade I BMI 30-34.9    Relevant Diagnosis: left hip osteoarthritis  Teaching Topic: left total hip arthroplasty    Discussed total joint online class.  Patient will call if they require help for signing up for the total joint zoom class. Patient understands they will need a preop exam within 30 days of date of surgery. Patient understands the expected length of stay and the expectation of outpatient physical  therapy. Patient understands the need for an at home  after surgery and need for transportation home.  Discussed dental/non-sterile procedure prophylaxis. Discussed the [facility] Companion service.       Person(s) involved in teaching:   Patient  : No.   Verified Patient's Phone Number: YES: [phone]    Caregiver//  Name: Marisa Gipson  Phone Number: [phone]   Relationship: sister  Consent to Communicate on file: No       Motivation Level:  Asks Questions: Yes  Eager to Learn: Yes  Cooperative: Yes  Receptive (willing/able to accept information): Yes  Any cultural factors/Mu-ism beliefs that may influence understanding or compliance? No     Patient demonstrates understanding of the following:  Reason for the appointment, diagnosis and treatment plan: Yes  Knowledge of proper use of medications and conditions for which they are ordered (with special attention to potential side effects or drug interactions): Yes  Which situations necessitate calling provider and whom to contact: Yes     Teaching Concerns Addressed:   Proper use and care of medical equip, care aids, etc.: Yes  Nutritional needs and diet plan: Yes  Pain management techniques: Yes  Wound Care: Yes  How and/when to access community resources: Yes  Need for pre-op with in 30 days: YES, will be done with PCP. I asked them to ensure they go over their daily medications during this visit and discuss what medications need to be stopped before surgery and when. If you are doing a pre-op with your PCP and they are not within the [facility], I ask them to fax it to our pre-op office. Patient verbalized understanding.       Does patient have difficulty getting a ride to appointments (post-ops, PT/OT): No  Patient's plan after discharge: home with family or spouse     Instructional Materials Used/Given:  two bottles of chlorhexidine soap, a surgery packet, and a joint booklet given to patient in clinic.   - Important  Contact Info/Phone Numbers: emphasizing clinic number [phone] and after hours number [phone]  - Map/location of surgery and follow-up appointments  - Showering instructions  - Stoplight Tool     -Next step: schedule post surgical physical therapy.    Shayna Torres RN

## 2025-05-13 ENCOUNTER — OFFICE VISIT (OUTPATIENT)
Dept: FAMILY MEDICINE | Facility: CLINIC | Age: 70
End: 2025-05-13
Payer: COMMERCIAL

## 2025-05-13 VITALS
TEMPERATURE: 97.5 F | WEIGHT: 228.5 LBS | HEART RATE: 67 BPM | OXYGEN SATURATION: 94 % | BODY MASS INDEX: 32.79 KG/M2 | DIASTOLIC BLOOD PRESSURE: 78 MMHG | SYSTOLIC BLOOD PRESSURE: 142 MMHG

## 2025-05-13 DIAGNOSIS — N18.31 TYPE 2 DIABETES MELLITUS WITH STAGE 3A CHRONIC KIDNEY DISEASE, WITHOUT LONG-TERM CURRENT USE OF INSULIN (H): ICD-10-CM

## 2025-05-13 DIAGNOSIS — E11.22 TYPE 2 DIABETES MELLITUS WITH STAGE 3A CHRONIC KIDNEY DISEASE, WITHOUT LONG-TERM CURRENT USE OF INSULIN (H): ICD-10-CM

## 2025-05-13 DIAGNOSIS — E11.42 TYPE 2 DIABETES MELLITUS WITH DIABETIC POLYNEUROPATHY, WITHOUT LONG-TERM CURRENT USE OF INSULIN (H): ICD-10-CM

## 2025-05-13 DIAGNOSIS — I10 BENIGN ESSENTIAL HYPERTENSION: ICD-10-CM

## 2025-05-13 DIAGNOSIS — M16.12 PRIMARY OSTEOARTHRITIS OF LEFT HIP: ICD-10-CM

## 2025-05-13 DIAGNOSIS — Z01.818 PREOP GENERAL PHYSICAL EXAM: Primary | ICD-10-CM

## 2025-05-13 DIAGNOSIS — G43.009 MIGRAINE WITHOUT AURA AND WITHOUT STATUS MIGRAINOSUS, NOT INTRACTABLE: ICD-10-CM

## 2025-05-13 DIAGNOSIS — I44.0 FIRST DEGREE AV BLOCK: ICD-10-CM

## 2025-05-13 DIAGNOSIS — E87.1 HYPONATREMIA: ICD-10-CM

## 2025-05-13 LAB
AMPHETAMINES UR QL SCN: NORMAL
BARBITURATES UR QL SCN: NORMAL
BASOPHILS # BLD AUTO: 0 10E3/UL (ref 0–0.2)
BASOPHILS NFR BLD AUTO: 0 %
BENZODIAZ UR QL SCN: NORMAL
BZE UR QL SCN: NORMAL
CANNABINOIDS UR QL SCN: NORMAL
EOSINOPHIL # BLD AUTO: 0.1 10E3/UL (ref 0–0.7)
EOSINOPHIL NFR BLD AUTO: 2 %
ERYTHROCYTE [DISTWIDTH] IN BLOOD BY AUTOMATED COUNT: 12 % (ref 10–15)
EST. AVERAGE GLUCOSE BLD GHB EST-MCNC: 154 MG/DL
FENTANYL UR QL: NORMAL
HBA1C MFR BLD: 7 % (ref 0–5.6)
HCT VFR BLD AUTO: 42.7 % (ref 40–53)
HGB BLD-MCNC: 14.1 G/DL (ref 13.3–17.7)
IMM GRANULOCYTES # BLD: 0 10E3/UL
IMM GRANULOCYTES NFR BLD: 0 %
LYMPHOCYTES # BLD AUTO: 3.1 10E3/UL (ref 0.8–5.3)
LYMPHOCYTES NFR BLD AUTO: 40 %
MCH RBC QN AUTO: 28.4 PG (ref 26.5–33)
MCHC RBC AUTO-ENTMCNC: 33 G/DL (ref 31.5–36.5)
MCV RBC AUTO: 86 FL (ref 78–100)
MONOCYTES # BLD AUTO: 0.7 10E3/UL (ref 0–1.3)
MONOCYTES NFR BLD AUTO: 10 %
NEUTROPHILS # BLD AUTO: 3.7 10E3/UL (ref 1.6–8.3)
NEUTROPHILS NFR BLD AUTO: 48 %
OPIATES UR QL SCN: NORMAL
PCP QUAL URINE (ROCHE): NORMAL
PLATELET # BLD AUTO: 243 10E3/UL (ref 150–450)
RBC # BLD AUTO: 4.97 10E6/UL (ref 4.4–5.9)
WBC # BLD AUTO: 7.7 10E3/UL (ref 4–11)

## 2025-05-13 PROCEDURE — 85025 COMPLETE CBC W/AUTO DIFF WBC: CPT | Performed by: NURSE PRACTITIONER

## 2025-05-13 PROCEDURE — 3078F DIAST BP <80 MM HG: CPT | Performed by: NURSE PRACTITIONER

## 2025-05-13 PROCEDURE — 3077F SYST BP >= 140 MM HG: CPT | Performed by: NURSE PRACTITIONER

## 2025-05-13 PROCEDURE — 36415 COLL VENOUS BLD VENIPUNCTURE: CPT | Performed by: NURSE PRACTITIONER

## 2025-05-13 PROCEDURE — 1125F AMNT PAIN NOTED PAIN PRSNT: CPT | Performed by: NURSE PRACTITIONER

## 2025-05-13 PROCEDURE — 83036 HEMOGLOBIN GLYCOSYLATED A1C: CPT | Performed by: NURSE PRACTITIONER

## 2025-05-13 PROCEDURE — 80048 BASIC METABOLIC PNL TOTAL CA: CPT | Performed by: NURSE PRACTITIONER

## 2025-05-13 PROCEDURE — 99215 OFFICE O/P EST HI 40 MIN: CPT | Performed by: NURSE PRACTITIONER

## 2025-05-13 PROCEDURE — 93000 ELECTROCARDIOGRAM COMPLETE: CPT | Performed by: NURSE PRACTITIONER

## 2025-05-13 PROCEDURE — 80307 DRUG TEST PRSMV CHEM ANLYZR: CPT | Performed by: NURSE PRACTITIONER

## 2025-05-13 PROCEDURE — G2211 COMPLEX E/M VISIT ADD ON: HCPCS | Performed by: NURSE PRACTITIONER

## 2025-05-13 RX ORDER — RIZATRIPTAN BENZOATE 5 MG/1
5 TABLET ORAL
Qty: 8 TABLET | Refills: 3 | Status: SHIPPED | OUTPATIENT
Start: 2025-05-13

## 2025-05-13 ASSESSMENT — PAIN SCALES - GENERAL: PAINLEVEL_OUTOF10: MODERATE PAIN (5)

## 2025-05-13 NOTE — PROGRESS NOTES
Preoperative Evaluation  [facility]  [address]  [address]  [facility] [address]  Phone: [phone]  Fax: [phone]  Primary Provider: JEFFERSON Cook CNP  Pre-op Performing Provider: JEFFERSON Cook CNP  May 13, 2025             5/8/2025   Surgical Information   What procedure is being done? Hip replacement   Facility or Hospital where procedure/surgery will be performed: [facility]   Who is doing the procedure / surgery? Dr valerio   Date of surgery / procedure: 06/03/2026   Time of surgery / procedure: Unknown   Where do you plan to recover after surgery? at home with family     Fax number for surgical facility: Note does not need to be faxed, will be available electronically in Epic.    Assessment & Plan     The proposed surgical procedure is considered INTERMEDIATE risk.    Preop general physical exam  Optimized for surgery  BP in clinic slightly high, but home reported as normal. Not excluding procedure.   - Basic metabolic panel  (Ca, Cl, CO2, Creat, Gluc, K, Na, BUN); Future  - CBC with platelets and differential; Future  - EKG 12-lead complete w/read - Clinics    Primary osteoarthritis of left hip  Did not have benefit from conservative measures  Prescribed tramadol by ortho - completed CSA and UDS today  Has taken left over vicodin occasionally with breakthrough pain  - Urine Drug Screen; Future    Type 2 diabetes mellitus with stage 3a chronic kidney disease, without long-term current use of insulin (H)  Historically well controlled  - Hemoglobin A1c; Future    Type 2 diabetes mellitus with diabetic polyneuropathy, without long-term current use of insulin (H)  As above - on gabapentin for polyneuropathy  - Hemoglobin A1c; Future    Benign essential hypertension  Has been controlled <140/90 at home and historically. Today in clinic was just over 140 systolically. Asympomtatic  BP Readings from Last 6 Encounters:   05/13/25 (!) 142/78   01/17/25  135/87   07/30/24 122/76   04/22/24 126/82   09/09/23 131/85   09/07/23 (!) 148/84       Migraine without aura and without status migrainosus, not intractable  No changes to features and frequency  - rizatriptan (MAXALT) 5 MG tablet; Take 1 tablet (5 mg) by mouth at onset of headache for migraine. repeat after 2 hr if no relief; maximum: 30 mg/24 hours           Risks and Recommendations  The patient has the following additional risks and recommendations for perioperative complications:  Social and Substance:    - Patient is taking medications for chronic pain - tramadol prescribed and up to every other day vicodin    Preoperative Medication Instructions  Antiplatelet or Anticoagulation Medication Instructions   - aspirin: Discontinue aspirin 7 days prior to procedure to reduce bleeding risk. It should be resumed postoperatively.     Additional Medication Instructions   - ACE/ARB/ARNI (losartan) : DO NOT TAKE on day of surgery (minimum 11 hours for general anesthesia).   - calcium channel blocker (amlodipine): Continue taking on the day of surgery.   - Beta Blockers (atenolol) : Continue taking on the day of surgery.   - Statins (atorvastatin) : Continue taking on the day of surgery (night before).    - metformin: DO NOT TAKE day of surgery.   - SGLT2 Inhibitor (empagliflozin): DO NOT TAKE 3 days before surgery.    - Opioids (tramadol): Continue without modification.   956}   - gabapentin: Continue without modification.   - triptans migraine abortives: DO NOT TAKE on day of surgery   - ibuprofen (Advil, Motrin): DO NOT TAKE 1 day before surgery.    - SSRIs, SNRIs, TCAs, Antipsychotics (citalopram): Continue without modification.   - esomeprazole: Continue without modification  - antihistamine (benadryl): ok to take the night before. Do not take the morning of surgery   - vitamin D: continue without modifications    Recommendation  Approval given to proceed with proposed procedure, without further diagnostic  evaluation.    Follow-up       The longitudinal plan of care for the diagnosis(es)/condition(s) as documented were addressed during this visit. Due to the added complexity in care, I will continue to support Karan in the subsequent management and with ongoing continuity of care.Ordering of each unique test  Prescription drug management  I spent a total of 48 minutes on the day of the visit.   Time spent by me today doing chart review, history and exam, documentation and further activities per the note    Subjective   Karan is a 70 year old, presenting for the following:  Pre-Op Exam          5/13/2025     2:50 PM   Additional Questions   Roomed by Sonya VALDOVINOS     HPI: Left hip arthritis with no response to physical therapy and steroid injection.           5/8/2025   Pre-Op Questionnaire   Have you ever had a heart attack or stroke? No   Have you ever had surgery on your heart or blood vessels, such as a stent placement, a coronary artery bypass, or surgery on an artery in your head, neck, heart, or legs? No   Do you have chest pain with activity? No   Do you have a history of heart failure? No   Do you currently have a cold, bronchitis or symptoms of other infection? No   Do you have a cough, shortness of breath, or wheezing? No   Do you or anyone in your family have previous history of blood clots? No   Do you or does anyone in your family have a serious bleeding problem such as prolonged bleeding following surgeries or cuts? No   Have you ever had problems with anemia or been told to take iron pills? No   Have you had any abnormal blood loss such as black, tarry or bloody stools? No   Have you ever had a blood transfusion? No   Are you willing to have a blood transfusion if it is medically needed before, during, or after your surgery? Yes   Have you or any of your relatives ever had problems with anesthesia? No   Do you have sleep apnea, excessive snoring or daytime drowsiness? No   Do you have any artifical heart  valves or other implanted medical devices like a pacemaker, defibrillator, or continuous glucose monitor? No   Do you have artificial joints? No   Are you allergic to latex? No     Advance Care Planning    Discussed advance care planning with patient; informed AVS has link to [employer].    Preoperative Review of    reviewed - controlled substances reflected in medication list.      Status of Chronic Conditions:  DIABETES - Patient has a longstanding history of DiabetesType Type II . Patient is being treated with oral agents and denies significant side effects. Control has been good. Complicating factors include but are not limited to: hypertension, hyperlipidemia, chronic kidney disease, and morbid obesity .     HYPERLIPIDEMIA - Patient has a history of hyperlipidemia requiring medication for treatment with recent good control. Patient reports no problems or side effects with the medication.     HYPERTENSION - Patient has longstanding history of HTN , currently denies any symptoms referable to elevated blood pressure. Specifically denies chest pain, palpitations, dyspnea, orthopnea, PND or peripheral edema. Blood pressure readings have been in normal range. Current medication regimen is as listed below. Patient denies any side effects of medication. Home blood pressures have been 120-130's/80-90s.    RENAL INSUFFICIENCY - Patient has a history of CKD and treated with SGLT2i     Patient Active Problem List    Diagnosis Date Noted    First degree AV block 05/13/2025     Priority: Medium    Type 2 diabetes mellitus with stage 3a chronic kidney disease, without long-term current use of insulin (H) 01/17/2025     Priority: Medium     Diagnosed usnure  Current plan:  01/17/25 - no change, continue medications, referral for eye exam  07/30/24 - A1C up to 7 - no changes, continue empagliflozin 10 mg. Hyponatremia recheck. Emphasized eye exam  Medications:  Metformin: XR 1000 mg daily  SGLT2i: Jardiance 10  mg  Atorvastatin: 40 mg    Labs:  Lab Results   Component Value Date    A1C 6.9 01/17/2025    A1C 7.0 07/30/2024    A1C 6.5 04/22/2024    A1C 6.8 01/23/2024    A1C 7.3 07/25/2023    A1C 7.5 02/27/2021    A1C 7.7 07/03/2020    A1C 6.5 12/05/2019    A1C 6.8 03/08/2019    A1C 7.3 01/29/2019       up date date as of 01/17/25   A1C <8: yes  BP controlled: yes  On statin: yes  Non-smoker: yes  Eye exam: no - referred again      ISAC (generalized anxiety disorder) 01/17/2025     Priority: Medium     Diagnosed unsure  Current plan  01/17/25 - no changes, refill meds  Medication   Current: citalopram 20 mg  Previous: ?      Primary osteoarthritis of left hip 01/17/2025     Priority: Medium    Type 2 diabetes mellitus with diabetic polyneuropathy, without long-term current use of insulin (H) 01/17/2025     Priority: Medium     Diagnosed 2022?  Current plan  01/17/25 - no worsening, continue gabapentin      Hip pain, left 01/07/2025     Priority: Medium    Family history of colonic polyps 07/30/2024     Priority: Medium    Family history of colon cancer 07/30/2024     Priority: Medium    History of NSAID-associated gastropathy 05/02/2024     Priority: Medium    Seasonal allergic rhinitis, unspecified trigger 05/02/2024     Priority: Medium    CKD (chronic kidney disease) stage 3, GFR 30-59 ml/min (H) 04/29/2019     Priority: Medium     Diagnosed 2019  Current plan  01/17/25 - emphasized caution with meloxicam, support surgical repair for hip to avoid NSAIDs  Medications  empagliflozin 10 mg  emphasized no NSAIDs  Comorbidities   +DM  +HTN      Nephrolithiasis 04/29/2019     Priority: Medium    Microalbuminuria 06/27/2018     Priority: Medium    Shoulder dislocation, left, initial encounter 06/08/2018     Priority: Medium    Benign essential hypertension 09/20/2017     Priority: Medium     Diagnosed unsure, before 2017  Current plan  01/17/25 - no change, refill meds, BMP  Medication trials  Current  amlodipine 5 mg   atenolol  50 mg: started 2014 for migraines, kept for HTN  losartan 100 mg: started 2018    Previous trials include lisinopril 20 mg (at [facility] - stopped 2014 for unclear reasons), hydrochlorothiazide (at [facility])      Hyperlipidemia LDL goal <130 09/20/2017     Priority: Medium     Diagnosed 2017  Current plan  01/17/25 - continue atorvastatin 40 mg  Comorbidities   +DM  +HTN  -CVD      Migraine without aura and without status migrainosus, not intractable 09/20/2017     Priority: Medium     Current plan  01/17/25 - rimegepant not covered last year, did not get to neurology, add magnesium, continue rizatriptan, initiate preventative if migraines exceed 4/mo  Previous prophylaxis trials - per notes  Topiramate - up to 100 mg (unclear if split dose)  Atenolol - 50 mg  Propranolol  Venlafaxine 150 mg daily    Previous rescue trials - per notes  Rizatriptan 5 and 10 mg  Butalbital-acetaminophen      Blood in semen 09/20/2017     Priority: Medium      Past Medical History:   Diagnosis Date    Peptic ulcer hemorrhage 2013     Past Surgical History:   Procedure Laterality Date    GI SURGERY  2013    peptic ulcers stapled    OPEN REDUCTION INTERNAL FIXATION HUMERUS PROXIMAL Left 5/15/2018    Procedure: OPEN REDUCTION INTERNAL FIXATION HUMERUS PROXIMAL;  Left Glenoid Open Reduction Internal Fixation;  Surgeon: Rosa Joe MD;  Location: [facility] OR     Current Outpatient Medications   Medication Sig Dispense Refill    amLODIPine (NORVASC) 5 MG tablet Take 1 tablet (5 mg) by mouth daily. 90 tablet 1    aspirin 81 MG EC tablet Take 1 tablet (81 mg) by mouth daily. 90 tablet 3    atenolol (TENORMIN) 50 MG tablet Take 1 tablet (50 mg) by mouth daily. 90 tablet 1    atorvastatin (LIPITOR) 40 MG tablet Take 1 tablet (40 mg) by mouth daily. 90 tablet 3    blood glucose (NO BRAND SPECIFIED) lancets standard Use to test blood sugar 4 times daily or as directed. 100 each 3    blood glucose (NO BRAND SPECIFIED) test strip Use to test blood sugar 4  times daily or as directed. 100 strip 2    cholecalciferol (VITAMIN D3) 25 mcg (1000 units) capsule Take 2 capsules (50 mcg) by mouth daily. 60 capsule 11    citalopram (CELEXA) 20 MG tablet Take 1 tablet (20 mg) by mouth daily. 90 tablet 1    diphenhydrAMINE (BENADRYL) 25 MG tablet Take 25 mg by mouth nightly as needed for itching or allergies      empagliflozin (JARDIANCE) 10 MG TABS tablet Take 1 tablet (10 mg) by mouth daily. 90 tablet 1    esomeprazole (NEXIUM) 20 MG DR capsule Take 1 capsule (20 mg) by mouth daily      gabapentin (NEURONTIN) 300 MG capsule Take 1 capsule (300 mg) by mouth 2 times daily AND 2 capsules (600 mg) at bedtime. Due for appt. 360 capsule 3    losartan (COZAAR) 100 MG tablet Take 1 tablet (100 mg) by mouth daily. 90 tablet 1    metFORMIN (GLUCOPHAGE XR) 500 MG 24 hr tablet Take 2 tablets (1,000 mg) by mouth daily (with dinner). 180 tablet 3    rizatriptan (MAXALT) 5 MG tablet Take 1 tablet (5 mg) by mouth at onset of headache for migraine. repeat after 2 hr if no relief; maximum: 30 mg/24 hours 8 tablet 3    traMADol (ULTRAM) 50 MG tablet Take 1 tablet (50 mg) by mouth every 6 hours as needed for severe pain. 15 tablet 0    traMADol (ULTRAM) 50 MG tablet Take 1 tablet (50 mg) by mouth every 6 hours as needed for severe pain. 15 tablet 0       No Known Allergies     Social History     Tobacco Use    Smoking status: Never    Smokeless tobacco: Never   Substance Use Topics    Alcohol use: Yes     Comment: once/ twice per week     Family History   Problem Relation Age of Onset    Colon Cancer Mother     Kidney Disease Mother     Coronary Artery Disease Father     Enlarged prostate Father     Down Syndrome Sister     Alzheimer Disease Sister     Colon Polyps Sister     Nephrolithiasis Sister     Colon Polyps Sister     Intellectual Disability Sister         Down Syndrome    Colon Polyps Sister     Colon Polyps Sister     Testicular cancer Brother     Diabetes Brother      History   Drug Use  "No             Review of Systems  Constitutional, HEENT, cardiovascular, pulmonary, gi and gu systems are negative, except as otherwise noted.    Objective    BP (!) 142/78   Pulse 67   Temp 97.5  F (36.4  C) (Temporal)   Wt 103.6 kg (228 lb 8 oz)   SpO2 94%   BMI 32.79 kg/m     Estimated body mass index is 32.79 kg/m  as calculated from the following:    Height as of 4/9/25: 1.778 m (5' 10\").    Weight as of this encounter: 103.6 kg (228 lb 8 oz).  Physical Exam  GENERAL: alert and no distress  EYES: Eyes grossly normal to inspection, PERRL and conjunctivae and sclerae normal  HENT: ear canals and TM's normal, nose and mouth without ulcers or lesions  NECK: no adenopathy, no asymmetry, masses, or scars  RESP: lungs clear to auscultation - no rales, rhonchi or wheezes  CV: regular rate and rhythm, normal S1 S2, no S3 or S4, no murmur, click or rub, no peripheral edema  ABDOMEN: soft, nontender, no hepatosplenomegaly, no masses and bowel sounds normal  MS: no gross musculoskeletal defects noted, no edema  SKIN: no suspicious lesions or rashes  NEURO: Normal strength and tone, mentation intact and speech normal  PSYCH: mentation appears normal, affect normal/bright    Recent Labs   Lab Test 01/17/25  1105 07/30/24  1139    139   POTASSIUM 4.5 4.9   CR 1.35* 1.35*   A1C 6.9* 7.0*        Diagnostics  Labs pending at this time.  Results will be reviewed when available.   EKG required for HTN and not completed in the last 90 days.   First degree AV block. Otherwise normal EKG    Revised Cardiac Risk Index (RCRI)  The patient has the following serious cardiovascular risks for perioperative complications:   - No serious cardiac risks = 0 points     RCRI Interpretation: 1 point: Class II (low risk - 0.9% complication rate)         Signed Electronically by: JEFFERSON Cook CNP  A copy of this evaluation report is provided to the requesting physician.         "

## 2025-05-13 NOTE — PATIENT INSTRUCTIONS
Advanced Care Directive resources  Consider making an advanced care directive - this is a good site for assistance and resources   [email or web address]      How to Take Your Medication Before Surgery  Preoperative Medication Instructions   Antiplatelet or Anticoagulation Medication Instructions   - aspirin: Discontinue aspirin 7 days prior to procedure to reduce bleeding risk. It should be resumed postoperatively.     Additional Medication Instructions   - ACE/ARB/ARNI (losartan) : DO NOT TAKE on day of surgery (minimum 11 hours for general anesthesia).   - calcium channel blocker (amlodipine): Continue taking on the day of surgery.   - Beta Blockers (atenolol) : Continue taking on the day of surgery.   - Statins (atorvastatin) : Continue taking on the day of surgery (night before).    - metformin: DO NOT TAKE day of surgery.   - SGLT2 Inhibitor (empagliflozin): DO NOT TAKE 3 days before surgery.    - Opioids (tramadol): Continue without modification.   956}   - gabapentin: Continue without modification.   - triptans migraine abortives: DO NOT TAKE on day of surgery   - ibuprofen (Advil, Motrin): DO NOT TAKE 1 day before surgery.    - SSRIs, SNRIs, TCAs, Antipsychotics (citalopram): Continue without modification.   - esomeprazole: Continue without modification  - antihistamine (benadryl): ok to take the night before. Do not take the morning of surgery   - vitamin D: continue without modifications       Patient Education   Preparing for Your Surgery  For Adults  Getting started  In most cases, a nurse will call to review your health history and instructions. They will give you an arrival time based on your scheduled surgery time. Please be ready to share:  Your doctor's clinic name and phone number  Your medical, surgical, and anesthesia history  A list of allergies and sensitivities  A list of medicines, including herbal treatments and over-the-counter drugs  Whether the patient has a legal guardian (ask  how to send us the papers in advance)  Note: You may not receive a call if you were seen at our PAC (Preoperative Assessment Center).  Please tell us if you're pregnant--or if there's any chance you might be pregnant. Some surgeries may injure a fetus (unborn baby), so they require a pregnancy test. Surgeries that are safe for a fetus don't always need a test, and you can choose whether to have one.   Preparing for surgery  Within 10 to 30 days of surgery: Have a pre-op exam (sometimes called an H&P, or History and Physical). This can be done at a clinic or pre-operative center.  If you're having a , you may not need this exam. Talk to your care team.  At your pre-op exam, talk to your care team about all medicines you take. (This includes CBD oil and any drugs, such as THC, marijuana, and other forms of cannabis.) If you need to stop any medicine before surgery, ask when to start taking it again.  This is for your safety. Many medicines and drugs can make you bleed too much during surgery. Some change how well surgery (anesthesia) drugs work.  Call your insurance company to let them know you're having surgery. (If you don't have insurance, call [phone].)  Call your clinic if there's any change in your health. This includes a scrape or scratch near the surgery site, or any signs of a cold (sore throat, runny nose, cough, rash, fever).  Eating and drinking guidelines  For your safety: Unless your surgeon tells you otherwise, follow the guidelines below.  Eat and drink as normal until 8 hours before you arrive for surgery. After that, no food or milk. You can spit out gum when you arrive.  Drink clear liquids until 2 hours before you arrive. These are liquids you can see through, like water, Gatorade, and Propel Water. They also include plain black coffee and tea (no cream or milk).  No alcohol for 24 hours before you arrive. The night before surgery, stop any drinks that contain THC.  If your care team  tells you to take medicine on the morning of surgery, it's okay to take it with a sip of water. No other medicines or drugs are allowed (including CBD oil)--follow your care team's instructions.  If you have questions the day of surgery, call your hospital or surgery center.   Preventing infection  Shower or bathe the night before and the morning of surgery. Follow the instructions your clinic gave you. (If no instructions, use regular soap.)  Don't shave or clip hair near your surgery site. We'll remove the hair if needed.  Don't smoke or vape the morning of surgery. No chewing tobacco for 6 hours before you arrive. A nicotine patch is okay. You may spit out nicotine gum when you arrive.  For some surgeries, the surgeon will tell you to fully quit smoking and nicotine.  We will make every effort to keep you safe from infection. We will:  Clean our hands often with soap and water (or an alcohol-based hand rub).  Clean the skin at your surgery site with a special soap that kills germs.  Give you a special gown to keep you warm. (Cold raises the risk of infection.)  Wear hair covers, masks, gowns, and gloves during surgery.  Give antibiotic medicine, if prescribed. Not all surgeries need this medicine.  What to bring on the day of surgery  Photo ID and insurance card  Copy of your health care directive, if you have one  Glasses and hearing aids (bring cases)  You can't wear contacts during surgery  Inhaler and eye drops, if you use them (tell us about these when you arrive)  CPAP machine or breathing device, if you use them  A few personal items, if spending the night  If you have . . .  A pacemaker, ICD (cardiac defibrillator), or other implant: Bring the ID card.  An implanted stimulator: Bring the remote control.  A legal guardian: Bring a copy of the certified (court-stamped) guardianship papers.  Please remove any jewelry, including body piercings. Leave jewelry and other valuables at home.  If you're going home  the day of surgery  You must have a responsible adult drive you home. They should stay with you overnight as well.  If you don't have someone to stay with you, and you aren't safe to go home alone, we may keep you overnight. Insurance often won't pay for this.  After surgery  If it's hard to control your pain or you need more pain medicine, please call your surgeon's office.  Questions?   If you have any questions for your care team, list them here:   ____________________________________________________________________________________________________________________________________________________________________________________________________________________________________________________________  For informational purposes only. Not to replace the advice of your health care provider. Copyright   2003, 2019 [employer]. All rights reserved. Clinically reviewed by Rajiv Diane MD. [employer] [id number] - REV 08/24.

## 2025-05-13 NOTE — LETTER
Opioid / Opioid Plus Controlled Substance Agreement    This is an agreement between you and your provider about the safe and appropriate use of controlled substance/opioids prescribed by your care team. Controlled substances are medicines that can cause physical and mental dependence (abuse).    There are strict laws about having and using these medicines. We here at [facility] are committing to working with you in your efforts to get better. To support you in this work, we ll help you schedule regular office appointments for medicine refills. If we must cancel or change your appointment for any reason, we ll make sure you have enough medicine to last until your next appointment.     As a Provider, I will:  Listen carefully to your concerns and treat you with respect.   Recommend a treatment plan that I believe is in your best interest. This plan may involve therapies other than opioid pain medication.   Talk with you often about the possible benefits, and the risk of harm of any medicine that we prescribe for you.   Provide a plan on how to taper (discontinue or go off) using this medicine if the decision is made to stop its use.    As a Patient, I understand that opioid(s):   Are a controlled substance prescribed by my care team to help me function or work and manage my condition(s).   Are strong medicines and can cause serious side effects such as:  Drowsiness, which can seriously affect my driving ability  A lower breathing rate, enough to cause death  Harm to my thinking ability   Depression   Abuse of and addiction to this medicine  Need to be taken exactly as prescribed. Combining opioids with certain medicines or chemicals (such as illegal drugs, sedatives, sleeping pills, and benzodiazepines) can be dangerous or even fatal. If I stop opioids suddenly, I may have severe withdrawal symptoms.  Do not work for all types of pain nor for all patients. If they re not helpful, I may be asked to stop  them.      The risks, benefits and side effects of these medicine(s) were explained to me. I agree that:  I will take part in other treatments as advised by my care team. This may be psychiatry or counseling, physical therapy, behavioral therapy, group treatment or a referral to a specialist.     I will keep all my appointments. I understand that this is part of the monitoring of opioids. My care team may require an office visit for EVERY opioid/controlled substance refill. If I miss appointments or don t follow instructions, my care team may stop my medicine.    I will take my medicines as prescribed. I will not change the dose or schedule unless my care team tells me to. There will be no refills if I run out early.     I may be asked to come to the clinic and complete a urine drug test or complete a pill count at any time. If I don t give a urine sample or participate in a pill count, the care team may stop my medicine.    I will only receive prescriptions from this clinic for chronic pain. If I am treated by another provider for acute pain issues, I will tell them that I am taking opioid pain medication for chronic pain and that I have a treatment agreement with this provider. I will inform my [facility] care team within one business day if I am given a prescription for any pain medication by another healthcare provider. My [facility] care team can contact other providers and pharmacists about my use of any medicines.    It is up to me to make sure that I don t run out of my medicines on weekends or holidays. If my care team is willing to refill my opioid prescription without a visit, I must request refills only during office hours. Refills may take up to 3 business days to process. I will use one pharmacy to fill all my opioid and other controlled substance prescriptions. I will notify the clinic about any changes to my insurance or medication availability.    I am responsible for my prescriptions.  If the medicine/prescription is lost, stolen or destroyed, it will not be replaced. I also agree not to share controlled substance medicines with anyone.    I am aware I should not use any illegal or recreational drugs. I agree not to drink alcohol unless my care team says I can.       If I enroll in the [place] Medical Cannabis program, I will tell my care team prior to my next refill.     I will tell my care team right away if I become pregnant, have a new medical problem treated outside of my regular clinic, or have a change in my medications.    I understand that this medicine can affect my thinking, judgment and reaction time. Alcohol and drugs affect the brain and body, which can affect the safety of my driving. Being under the influence of alcohol or drugs can affect my decision-making, behaviors, personal safety, and the safety of others. Driving while impaired (DWI) can occur if a person is driving, operating, or in physical control of a car, motorcycle, boat, snowmobile, ATV, motorbike, off-road vehicle, or any other motor vehicle ([place] Statute 169A.20). I understand the risk if I choose to drive or operate any vehicle or machinery.    I understand that if I do not follow any of the conditions above, my prescriptions or treatment may be stopped or changed.          Opioids  What You Need to Know    What are opioids?   Opioids are pain medicines that must be prescribed by a doctor. They are also known as narcotics.     Examples are:   morphine (MS Contin, Jeny)  oxycodone (Oxycontin)  oxycodone and acetaminophen (Percocet)  hydrocodone and acetaminophen (Vicodin, Norco)   fentanyl patch (Duragesic)   hydromorphone (Dilaudid)   methadone  codeine (Tylenol #3)     What do opioids do well?   Opioids are best for severe short-term pain such as after a surgery or injury. They may work well for cancer pain. They may help some people with long-lasting (chronic) pain.     What do opioids NOT do well?   Opioids  never get rid of pain entirely, and they don t work well for most patients with chronic pain. Opioids don t reduce swelling, one of the causes of pain.                                    Other ways to manage chronic pain and improve function include:     Treat the health problem that may be causing pain  Anti-inflammation medicines, which reduce swelling and tenderness, such as ibuprofen (Advil, Motrin) or naproxen (Aleve)  Acetaminophen (Tylenol)  Antidepressants and anti-seizure medicines, especially for nerve pain  Topical treatments such as patches or creams  Injections or nerve blocks  Chiropractic or osteopathic treatment  Acupuncture, massage, deep breathing, meditation, visual imagery, aromatherapy  Use heat or ice at the pain site  Physical therapy   Exercise  Stop smoking  Take part in therapy       Risks and side effects     Talk to your doctor before you start or decide to keep taking opioids. Possible side effects include:    Lowering your breathing rate enough to cause death  Overdose, including death, especially if taking higher than prescribed doses  Worse depression symptoms; less pleasure in things you usually enjoy  Feeling tired or sluggish  Slower thoughts or cloudy thinking  Being more sensitive to pain over time; pain is harder to control  Trouble sleeping or restless sleep  Changes in hormone levels (for example, less testosterone)  Changes in sex drive or ability to have sex  Constipation  Unsafe driving  Itching and sweating  Dizziness  Nausea, throwing up and dry mouth    What else should I know about opioids?    Opioids may lead to dependence, tolerance, or addiction.    Dependence means that if you stop or reduce the medicine too quickly, you will have withdrawal symptoms. These include loose poop (diarrhea), jitters, flu-like symptoms, nervousness and tremors. Dependence is not the same as addiction.                     Tolerance means needing higher doses over time to get the same  effect. This may increase the chance of serious side effects.    Addiction is when people improperly use a substance that harms their body, their mind or their relations with others. Use of opiates can cause a relapse of addiction if you have a history of drug or alcohol abuse.    People who have used opioids for a long time may have a lower quality of life, worse depression, higher levels of pain and more visits to doctors.    You can overdose on opioids. Take these steps to lower your risk of overdose:    Recognize the signs:  Signs of overdose include decrease or loss of consciousness (blackout), slowed breathing, trouble waking up and blue lips. If someone is worried about overdose, they should call 911.    Talk to your doctor about Narcan (naloxone).   If you are at risk for overdose, you may be given a prescription for Narcan. This medicine very quickly reverses the effects of opioids.   If you overdose, a friend or family member can give you Narcan while waiting for the ambulance. They need to know the signs of overdose and how to give Narcan.     Don't use alcohol or street drugs.   Taking them with opioids can cause death.    Do not take any of these medicines unless your doctor says it s OK. Taking these with opioids can cause death:  Benzodiazepines, such as lorazepam (Ativan), alprazolam (Xanax) or diazepam (Valium)  Muscle relaxers, such as cyclobenzaprine (Flexeril)  Sleeping pills like zolpidem (Ambien)   Other opioids      How to keep you and other people safe while taking opioids:    Never share your opioids with others.  Opioid medicines are regulated by the Drug Enforcement Agency (SAIDA). Selling or sharing medications is a criminal act.    2. Be sure to store opioids in a secure place, locked up if possible. Young children can easily swallow them and overdose.    3. When you are traveling with your medicines, keep them in the original bottles. If you use a pill box, be sure you also carry a copy  of your medicine list from your clinic or pharmacy.    4. Safe disposal of opioids    Most pharmacies have places to get rid of medicine, called disposal kiosks. Medicine disposal options are also available in every [facility]. Search your county and  medication disposal  to find more options. You can find more details at:  https://[email or web address].[email or web address]./living-green/managing-unwanted-medications     I agree that my provider, clinic care team, and pharmacy may work with any city, state or federal law enforcement agency that investigates the misuse, sale, or other diversion of my controlled medicine. I will allow my provider to discuss my care with, or share a copy of, this agreement with any other treating provider, pharmacy or emergency room where I receive care.    I have read this agreement and have asked questions about anything I did not understand.    _______________________________________________________  Patient Signature - Karan Anglin _____________________                   Date     _______________________________________________________  Provider Signature - JEFFERSNO Cook CNP   _____________________                   Date     _______________________________________________________  Witness Signature (required if provider not present while patient signing)   _____________________                   Date

## 2025-05-14 ENCOUNTER — RESULTS FOLLOW-UP (OUTPATIENT)
Dept: FAMILY MEDICINE | Facility: CLINIC | Age: 70
End: 2025-05-14

## 2025-05-14 LAB
ANION GAP SERPL CALCULATED.3IONS-SCNC: 13 MMOL/L (ref 7–15)
BUN SERPL-MCNC: 15.1 MG/DL (ref 8–23)
CALCIUM SERPL-MCNC: 10.5 MG/DL (ref 8.8–10.4)
CHLORIDE SERPL-SCNC: 104 MMOL/L (ref 98–107)
CREAT SERPL-MCNC: 1.17 MG/DL (ref 0.67–1.17)
EGFRCR SERPLBLD CKD-EPI 2021: 68 ML/MIN/1.73M2
GLUCOSE SERPL-MCNC: 110 MG/DL (ref 70–99)
HCO3 SERPL-SCNC: 24 MMOL/L (ref 22–29)
POTASSIUM SERPL-SCNC: 4.5 MMOL/L (ref 3.4–5.3)
SODIUM SERPL-SCNC: 141 MMOL/L (ref 135–145)

## 2025-05-14 NOTE — TELEPHONE ENCOUNTER
Last Written Prescription:     traMADol (ULTRAM) 50 MG tablet 15 tablet 0 3/17/2025 -- No   Sig - Route: Take 1 tablet (50 mg) by mouth every 6 hours as needed for severe pain. - Oral     ----------------------  Last Visit Date:  Last Visit Date: 4/25/25 [facility] [place]  Future Visit Date: Postop ortho> surgery planned 6/3/25  ----------------------    Refill decision: Medication unable to be refilled by RN due to: Controlled medication        Request from pharmacy:  Requested Prescriptions   Pending Prescriptions Disp Refills    traMADol (ULTRAM) 50 MG tablet [Pharmacy Med Name: traMADol HCl Oral Tablet 50 MG] 15 tablet 0     Sig: Take 1 tablet (50 mg) by mouth every 6 hours as needed for severe pain.       Rx Protocol Controlled Substance Failed - 5/14/2025  2:01 PM        Failed - Urine drug screeen results on file in past 12 months     [unfilled]           Failed - Medication not refilled in past 28 days     Invalid Medication Grouper          Failed - Controlled Substance Agreement on file in last 12 months     Please review last Controlled Substance Pain agreement document.   CSA -- Encounter Level:    CSA: None found at the encounter level.       CSA -- Patient Level:    CSA: None found at the patient level.               Failed - Auto Fail - Please forward to Provider        Failed - PHQ9 done in past year     Please review last PHQ-9 score.       9/8/2022     2:59 PM   PHQ-9 SCORE   PHQ-9 Total Score MyChart 7 (Mild depression)   PHQ-9 Total Score 7             Failed - Naloxone on active med list        Failed - ISAC-7 done in past year     Please review last ISAC-7 score.       9/8/2022     2:59 PM   ISAC-7 SCORE   Total Score 5 (mild anxiety)   Total Score 5             Passed - Visit with relevant provider in past 3 months or upcoming 3 months        Passed - Medication is active on med list and the sig matches        Passed - No Benzodiazepines on acive med list

## 2025-05-14 NOTE — RESULT ENCOUNTER NOTE
Karan,    Hemoglobin A1C measure of diabetes control looks good. Your hemoglobin is also normal. The urine drug screening is normal. Still waiting on kidney function.     Could you send me a blood pressure from today at home?    If you have any questions, please feel free to contact the clinic.    MARJ Rashid

## 2025-05-15 ENCOUNTER — TELEPHONE (OUTPATIENT)
Dept: FAMILY MEDICINE | Facility: CLINIC | Age: 70
End: 2025-05-15
Payer: COMMERCIAL

## 2025-05-20 RX ORDER — TRAMADOL HYDROCHLORIDE 50 MG/1
50 TABLET ORAL EVERY 6 HOURS PRN
Qty: 15 TABLET | Refills: 0 | OUTPATIENT
Start: 2025-05-20 | End: 2025-05-24

## 2025-05-22 NOTE — PROGRESS NOTES
Ortho Navigator Note      Pre-op Date 5/13/25     Medical Clearance  Cleared      Labs Stable      COVID Test Date No longer indicated      Skin  Intact      Activity: Ambulates independently without assistive      Equipment Need Patient will likely need a walker for discharge. Defer to PT/OT for recs.       Meds to Hold Held all supplements 14 days prior to surgery  Antiplatelet or Anticoagulation Medication Instructions   - aspirin: Discontinue aspirin 7 days prior to procedure to reduce bleeding risk. It should be resumed postoperatively.      Additional Medication Instructions   - ACE/ARB/ARNI (losartan) : DO NOT TAKE on day of surgery (minimum 11 hours for general anesthesia).   - calcium channel blocker (amlodipine): Continue taking on the day of surgery.   - Beta Blockers (atenolol) : Continue taking on the day of surgery.   - Statins (atorvastatin) : Continue taking on the day of surgery (night before).    - metformin: DO NOT TAKE day of surgery.   - SGLT2 Inhibitor (empagliflozin): DO NOT TAKE 3 days before surgery.    - Opioids (tramadol): Continue without modification.   956}   - gabapentin: Continue without modification.   - triptans migraine abortives: DO NOT TAKE on day of surgery   - ibuprofen (Advil, Motrin): DO NOT TAKE 1 day before surgery.    - SSRIs, SNRIs, TCAs, Antipsychotics (citalopram): Continue without modification.   - esomeprazole: Continue without modification  - antihistamine (benadryl): ok to take the night before. Do not take the morning of surgery   - vitamin D: continue without modifications  * Medication recommendations are not intended to be exhaustive; they are limited to common medications that are potentially dangerous if incorrectly managed   NPO Instructions  Instructed to stop solids 8 hr prior to arrival and clears 2 hr prior to arrival.       Pre-op Joint Education Complete? Complete    Discharge Plan Patient has plan to discharge home on morning of POD 1.   Sister Marisa  will arrive at hospital at 0830 to participate in therapy and discharge education. They will then transport patient home    /Transportation Marisa will be  and transportation.  is physically able to care for patient.      Barriers to Discharge No barriers to discharge.      Additional Info/   Special Needs : Patient had no unanswered questions or concerns.           05/16/25 1007   Discharge Planning   Patient/Family Anticipates Transition to home with family   Concerns to be Addressed all concerns addressed in this encounter   Living Arrangements   People in Home alone   Type of Residence Private Residence   Is your private residence a single family home or apartment? Apartment   Number of Stairs, Within Home, Primary none   Stair Railings, Within Home, Primary none   Once home, are you able to live on one level? Yes   Which level? Main Level   Bathroom Shower/Tub Tub/Shower unit   Equipment Currently Used at Home shower chair;grab bar, toilet   Support System   Do you have someone available to stay with you one or two nights once you are home? Yes   Medical Clearance   Date of Physical 05/13/25   Clinic Name [facility] [place]   It is recommended that you call and check with any specialty providers before surgery to see if you need surgical clearance.  Do you see any specialty providers outside of your primary care provider? No   Blood   Known Bleeding Disorder or Coagulopathy No   Does the patient have any Oriental orthodox/cultural preferences related to blood products? No   Education   Has the patient scheduled or completed pre-op total joint education, either in class or online, in the last 12 months? Yes   What day did the patient complete, or plan to complete, pre-op total joint education? 04/28/25   Patient attended total joint pre-op class/received pre-op teaching  online   Falls Risk   Has the patient been identified as a high falls risk before surgery? (fallen in the last 6 months, history of falls,  unsteady gait, or balance issues) No   Did an order get placed to attend outpatient pre-surgery balance evaluation? No

## 2025-06-02 RX ORDER — GABAPENTIN 300 MG/1
600 CAPSULE ORAL AT BEDTIME
COMMUNITY

## 2025-06-02 RX ORDER — GABAPENTIN 300 MG/1
300 CAPSULE ORAL 2 TIMES DAILY
COMMUNITY

## 2025-06-02 NOTE — PHARMACY-ADMISSION MEDICATION HISTORY
Med rec completed by preadmitting rn    Reviewed by Cecil Moran RN (Registered Nurse) on 05/16/25 at 1000     Changed gabapentin - split entries for different doses   Medication History Completed By: Adriana Roman [facility] 6/2/2025 11:37 AM    PTA Med List   Medication Sig Last Dose/Taking    amLODIPine (NORVASC) 5 MG tablet Take 1 tablet (5 mg) by mouth daily. Taking    aspirin 81 MG EC tablet Take 1 tablet (81 mg) by mouth daily. Taking    atenolol (TENORMIN) 50 MG tablet Take 1 tablet (50 mg) by mouth daily. Taking    atorvastatin (LIPITOR) 40 MG tablet Take 1 tablet (40 mg) by mouth daily. Taking    cholecalciferol (VITAMIN D3) 25 mcg (1000 units) capsule Take 2 capsules (50 mcg) by mouth daily. Taking    citalopram (CELEXA) 20 MG tablet Take 1 tablet (20 mg) by mouth daily. Taking    diphenhydrAMINE (BENADRYL) 25 MG tablet Take 25 mg by mouth nightly as needed for itching or allergies Taking As Needed    empagliflozin (JARDIANCE) 10 MG TABS tablet Take 1 tablet (10 mg) by mouth daily. Taking    esomeprazole (NEXIUM) 20 MG DR capsule Take 1 capsule (20 mg) by mouth daily Taking    gabapentin (NEURONTIN) 300 MG capsule Take 300 mg by mouth 2 times daily. Taking    gabapentin (NEURONTIN) 300 MG capsule Take 600 mg by mouth at bedtime. Taking    losartan (COZAAR) 100 MG tablet Take 1 tablet (100 mg) by mouth daily. Taking    metFORMIN (GLUCOPHAGE XR) 500 MG 24 hr tablet Take 2 tablets (1,000 mg) by mouth daily (with dinner). Taking    rizatriptan (MAXALT) 5 MG tablet Take 1 tablet (5 mg) by mouth at onset of headache for migraine. repeat after 2 hr if no relief; maximum: 30 mg/24 hours Taking As Needed    traMADol (ULTRAM) 50 MG tablet Take 1 tablet (50 mg) by mouth every 6 hours as needed for severe pain. Taking As Needed

## 2025-06-03 ENCOUNTER — HOSPITAL ENCOUNTER (OUTPATIENT)
Facility: CLINIC | Age: 70
Discharge: HOME | End: 2025-06-04
Attending: STUDENT IN AN ORGANIZED HEALTH CARE EDUCATION/TRAINING PROGRAM | Admitting: STUDENT IN AN ORGANIZED HEALTH CARE EDUCATION/TRAINING PROGRAM
Payer: COMMERCIAL

## 2025-06-03 ENCOUNTER — APPOINTMENT (OUTPATIENT)
Dept: GENERAL RADIOLOGY | Facility: CLINIC | Age: 70
End: 2025-06-03
Attending: STUDENT IN AN ORGANIZED HEALTH CARE EDUCATION/TRAINING PROGRAM
Payer: COMMERCIAL

## 2025-06-03 ENCOUNTER — ANESTHESIA EVENT (OUTPATIENT)
Dept: SURGERY | Facility: CLINIC | Age: 70
End: 2025-06-03
Payer: COMMERCIAL

## 2025-06-03 ENCOUNTER — ANESTHESIA (OUTPATIENT)
Dept: SURGERY | Facility: CLINIC | Age: 70
End: 2025-06-03
Payer: COMMERCIAL

## 2025-06-03 DIAGNOSIS — Z96.642 STATUS POST TOTAL REPLACEMENT OF LEFT HIP: ICD-10-CM

## 2025-06-03 DIAGNOSIS — M16.12 PRIMARY OSTEOARTHRITIS OF LEFT HIP: Primary | ICD-10-CM

## 2025-06-03 PROBLEM — Z96.649 S/P TOTAL HIP ARTHROPLASTY: Status: ACTIVE | Noted: 2025-06-03

## 2025-06-03 LAB
GLUCOSE BLDC GLUCOMTR-MCNC: 142 MG/DL (ref 70–99)
GLUCOSE BLDC GLUCOMTR-MCNC: 174 MG/DL (ref 70–99)

## 2025-06-03 PROCEDURE — 82962 GLUCOSE BLOOD TEST: CPT

## 2025-06-03 PROCEDURE — 27130 TOTAL HIP ARTHROPLASTY: CPT | Mod: LT | Performed by: STUDENT IN AN ORGANIZED HEALTH CARE EDUCATION/TRAINING PROGRAM

## 2025-06-03 PROCEDURE — 250N000009 HC RX 250: Performed by: STUDENT IN AN ORGANIZED HEALTH CARE EDUCATION/TRAINING PROGRAM

## 2025-06-03 PROCEDURE — 97530 THERAPEUTIC ACTIVITIES: CPT | Mod: GP

## 2025-06-03 PROCEDURE — 250N000011 HC RX IP 250 OP 636: Mod: JZ | Performed by: ANESTHESIOLOGY

## 2025-06-03 PROCEDURE — 250N000011 HC RX IP 250 OP 636: Performed by: STUDENT IN AN ORGANIZED HEALTH CARE EDUCATION/TRAINING PROGRAM

## 2025-06-03 PROCEDURE — 999N000065 XR PELVIS AND HIP PORTABLE LEFT 1 VIEW

## 2025-06-03 PROCEDURE — 999N000141 HC STATISTIC PRE-PROCEDURE NURSING ASSESSMENT: Performed by: STUDENT IN AN ORGANIZED HEALTH CARE EDUCATION/TRAINING PROGRAM

## 2025-06-03 PROCEDURE — 250N000013 HC RX MED GY IP 250 OP 250 PS 637: Performed by: STUDENT IN AN ORGANIZED HEALTH CARE EDUCATION/TRAINING PROGRAM

## 2025-06-03 PROCEDURE — 250N000025 HC SEVOFLURANE, PER MIN: Performed by: STUDENT IN AN ORGANIZED HEALTH CARE EDUCATION/TRAINING PROGRAM

## 2025-06-03 PROCEDURE — 250N000011 HC RX IP 250 OP 636: Performed by: NURSE ANESTHETIST, CERTIFIED REGISTERED

## 2025-06-03 PROCEDURE — 258N000003 HC RX IP 258 OP 636: Performed by: NURSE ANESTHETIST, CERTIFIED REGISTERED

## 2025-06-03 PROCEDURE — 97161 PT EVAL LOW COMPLEX 20 MIN: CPT | Mod: GP

## 2025-06-03 PROCEDURE — C1776 JOINT DEVICE (IMPLANTABLE): HCPCS | Performed by: STUDENT IN AN ORGANIZED HEALTH CARE EDUCATION/TRAINING PROGRAM

## 2025-06-03 PROCEDURE — 258N000001 HC RX 258: Performed by: STUDENT IN AN ORGANIZED HEALTH CARE EDUCATION/TRAINING PROGRAM

## 2025-06-03 PROCEDURE — 258N000003 HC RX IP 258 OP 636: Performed by: ANESTHESIOLOGY

## 2025-06-03 PROCEDURE — 272N000001 HC OR GENERAL SUPPLY STERILE: Performed by: STUDENT IN AN ORGANIZED HEALTH CARE EDUCATION/TRAINING PROGRAM

## 2025-06-03 PROCEDURE — 710N000009 HC RECOVERY PHASE 1, LEVEL 1, PER MIN: Performed by: STUDENT IN AN ORGANIZED HEALTH CARE EDUCATION/TRAINING PROGRAM

## 2025-06-03 PROCEDURE — 97116 GAIT TRAINING THERAPY: CPT | Mod: GP

## 2025-06-03 PROCEDURE — 370N000017 HC ANESTHESIA TECHNICAL FEE, PER MIN: Performed by: STUDENT IN AN ORGANIZED HEALTH CARE EDUCATION/TRAINING PROGRAM

## 2025-06-03 PROCEDURE — 250N000009 HC RX 250: Performed by: NURSE ANESTHETIST, CERTIFIED REGISTERED

## 2025-06-03 PROCEDURE — 360N000077 HC SURGERY LEVEL 4, PER MIN: Performed by: STUDENT IN AN ORGANIZED HEALTH CARE EDUCATION/TRAINING PROGRAM

## 2025-06-03 PROCEDURE — 258N000003 HC RX IP 258 OP 636: Performed by: STUDENT IN AN ORGANIZED HEALTH CARE EDUCATION/TRAINING PROGRAM

## 2025-06-03 DEVICE — IMPLANTABLE DEVICE
Type: IMPLANTABLE DEVICE | Site: HIP | Status: FUNCTIONAL
Brand: BIOLOX® DELTA MODULAR CERAMIC HEAD

## 2025-06-03 DEVICE — IMPLANTABLE DEVICE
Type: IMPLANTABLE DEVICE | Site: HIP | Status: FUNCTIONAL
Brand: TAPERLOC COMPLETE PRIMARY FEMORAL

## 2025-06-03 DEVICE — IMPLANTABLE DEVICE
Type: IMPLANTABLE DEVICE | Site: HIP | Status: FUNCTIONAL
Brand: G7® ACETABULAR SYSTEM

## 2025-06-03 DEVICE — IMPLANTABLE DEVICE
Type: IMPLANTABLE DEVICE | Site: HIP | Status: FUNCTIONAL
Brand: G7® LONGEVITY®

## 2025-06-03 RX ORDER — ATENOLOL 25 MG/1
50 TABLET ORAL DAILY
Status: DISCONTINUED | OUTPATIENT
Start: 2025-06-04 | End: 2025-06-04 | Stop reason: HOSPADM

## 2025-06-03 RX ORDER — PROCHLORPERAZINE MALEATE 5 MG/1
5 TABLET ORAL EVERY 6 HOURS PRN
Status: DISCONTINUED | OUTPATIENT
Start: 2025-06-03 | End: 2025-06-04 | Stop reason: HOSPADM

## 2025-06-03 RX ORDER — BISACODYL 10 MG
10 SUPPOSITORY, RECTAL RECTAL DAILY PRN
Status: DISCONTINUED | OUTPATIENT
Start: 2025-06-03 | End: 2025-06-04 | Stop reason: HOSPADM

## 2025-06-03 RX ORDER — SODIUM CHLORIDE, SODIUM LACTATE, POTASSIUM CHLORIDE, CALCIUM CHLORIDE 600; 310; 30; 20 MG/100ML; MG/100ML; MG/100ML; MG/100ML
INJECTION, SOLUTION INTRAVENOUS CONTINUOUS PRN
Status: DISCONTINUED | OUTPATIENT
Start: 2025-06-03 | End: 2025-06-03

## 2025-06-03 RX ORDER — ASPIRIN 81 MG/1
81 TABLET ORAL 2 TIMES DAILY
Qty: 60 TABLET | Refills: 0 | Status: SHIPPED | OUTPATIENT
Start: 2025-06-03

## 2025-06-03 RX ORDER — CITALOPRAM HYDROBROMIDE 20 MG/1
20 TABLET ORAL DAILY
Status: DISCONTINUED | OUTPATIENT
Start: 2025-06-04 | End: 2025-06-04 | Stop reason: HOSPADM

## 2025-06-03 RX ORDER — HYDROMORPHONE HCL IN WATER/PF 6 MG/30 ML
0.4 PATIENT CONTROLLED ANALGESIA SYRINGE INTRAVENOUS
Status: DISCONTINUED | OUTPATIENT
Start: 2025-06-03 | End: 2025-06-04 | Stop reason: HOSPADM

## 2025-06-03 RX ORDER — OXYCODONE HYDROCHLORIDE 5 MG/1
5-10 TABLET ORAL EVERY 4 HOURS PRN
Qty: 26 TABLET | Refills: 0 | Status: SHIPPED | OUTPATIENT
Start: 2025-06-03

## 2025-06-03 RX ORDER — LIDOCAINE 40 MG/G
CREAM TOPICAL
Status: DISCONTINUED | OUTPATIENT
Start: 2025-06-03 | End: 2025-06-04 | Stop reason: HOSPADM

## 2025-06-03 RX ORDER — POLYETHYLENE GLYCOL 3350 17 G/17G
17 POWDER, FOR SOLUTION ORAL DAILY
Status: DISCONTINUED | OUTPATIENT
Start: 2025-06-04 | End: 2025-06-04 | Stop reason: HOSPADM

## 2025-06-03 RX ORDER — SODIUM CHLORIDE, SODIUM LACTATE, POTASSIUM CHLORIDE, CALCIUM CHLORIDE 600; 310; 30; 20 MG/100ML; MG/100ML; MG/100ML; MG/100ML
INJECTION, SOLUTION INTRAVENOUS CONTINUOUS
Status: DISCONTINUED | OUTPATIENT
Start: 2025-06-03 | End: 2025-06-03 | Stop reason: HOSPADM

## 2025-06-03 RX ORDER — CEFAZOLIN SODIUM/WATER 2 G/20 ML
2 SYRINGE (ML) INTRAVENOUS SEE ADMIN INSTRUCTIONS
Status: DISCONTINUED | OUTPATIENT
Start: 2025-06-03 | End: 2025-06-03 | Stop reason: HOSPADM

## 2025-06-03 RX ORDER — LIDOCAINE HYDROCHLORIDE 20 MG/ML
INJECTION, SOLUTION INFILTRATION; PERINEURAL PRN
Status: DISCONTINUED | OUTPATIENT
Start: 2025-06-03 | End: 2025-06-03

## 2025-06-03 RX ORDER — ATORVASTATIN CALCIUM 40 MG/1
40 TABLET, FILM COATED ORAL DAILY
Status: DISCONTINUED | OUTPATIENT
Start: 2025-06-04 | End: 2025-06-04 | Stop reason: HOSPADM

## 2025-06-03 RX ORDER — TRANEXAMIC ACID 650 MG/1
1950 TABLET ORAL ONCE
Status: COMPLETED | OUTPATIENT
Start: 2025-06-03 | End: 2025-06-03

## 2025-06-03 RX ORDER — HYDRALAZINE HYDROCHLORIDE 20 MG/ML
2.5-5 INJECTION INTRAMUSCULAR; INTRAVENOUS EVERY 10 MIN PRN
Status: DISCONTINUED | OUTPATIENT
Start: 2025-06-03 | End: 2025-06-03 | Stop reason: HOSPADM

## 2025-06-03 RX ORDER — DEXAMETHASONE SODIUM PHOSPHATE 4 MG/ML
4 INJECTION, SOLUTION INTRA-ARTICULAR; INTRALESIONAL; INTRAMUSCULAR; INTRAVENOUS; SOFT TISSUE
Status: DISCONTINUED | OUTPATIENT
Start: 2025-06-03 | End: 2025-06-03 | Stop reason: HOSPADM

## 2025-06-03 RX ORDER — NALOXONE HYDROCHLORIDE 0.4 MG/ML
0.4 INJECTION, SOLUTION INTRAMUSCULAR; INTRAVENOUS; SUBCUTANEOUS
Status: DISCONTINUED | OUTPATIENT
Start: 2025-06-03 | End: 2025-06-04 | Stop reason: HOSPADM

## 2025-06-03 RX ORDER — GABAPENTIN 300 MG/1
300 CAPSULE ORAL 2 TIMES DAILY
Status: DISCONTINUED | OUTPATIENT
Start: 2025-06-03 | End: 2025-06-04 | Stop reason: HOSPADM

## 2025-06-03 RX ORDER — IBUPROFEN 600 MG/1
600 TABLET, FILM COATED ORAL EVERY 6 HOURS PRN
Qty: 30 TABLET | Refills: 0 | Status: SHIPPED | OUTPATIENT
Start: 2025-06-03 | End: 2025-06-04

## 2025-06-03 RX ORDER — OXYCODONE HYDROCHLORIDE 5 MG/1
5 TABLET ORAL EVERY 4 HOURS PRN
Status: DISCONTINUED | OUTPATIENT
Start: 2025-06-03 | End: 2025-06-04 | Stop reason: HOSPADM

## 2025-06-03 RX ORDER — HYDROMORPHONE HCL IN WATER/PF 6 MG/30 ML
0.2 PATIENT CONTROLLED ANALGESIA SYRINGE INTRAVENOUS EVERY 5 MIN PRN
Status: DISCONTINUED | OUTPATIENT
Start: 2025-06-03 | End: 2025-06-03 | Stop reason: HOSPADM

## 2025-06-03 RX ORDER — LOSARTAN POTASSIUM 25 MG/1
100 TABLET ORAL DAILY
Status: DISCONTINUED | OUTPATIENT
Start: 2025-06-03 | End: 2025-06-04 | Stop reason: HOSPADM

## 2025-06-03 RX ORDER — ONDANSETRON 2 MG/ML
4 INJECTION INTRAMUSCULAR; INTRAVENOUS EVERY 6 HOURS PRN
Status: DISCONTINUED | OUTPATIENT
Start: 2025-06-03 | End: 2025-06-04 | Stop reason: HOSPADM

## 2025-06-03 RX ORDER — ONDANSETRON 2 MG/ML
4 INJECTION INTRAMUSCULAR; INTRAVENOUS EVERY 30 MIN PRN
Status: DISCONTINUED | OUTPATIENT
Start: 2025-06-03 | End: 2025-06-03 | Stop reason: HOSPADM

## 2025-06-03 RX ORDER — HYDROMORPHONE HCL IN WATER/PF 6 MG/30 ML
0.2 PATIENT CONTROLLED ANALGESIA SYRINGE INTRAVENOUS
Status: DISCONTINUED | OUTPATIENT
Start: 2025-06-03 | End: 2025-06-04 | Stop reason: HOSPADM

## 2025-06-03 RX ORDER — OXYCODONE HYDROCHLORIDE 10 MG/1
10 TABLET ORAL EVERY 4 HOURS PRN
Status: DISCONTINUED | OUTPATIENT
Start: 2025-06-03 | End: 2025-06-04 | Stop reason: HOSPADM

## 2025-06-03 RX ORDER — PROPOFOL 10 MG/ML
INJECTION, EMULSION INTRAVENOUS PRN
Status: DISCONTINUED | OUTPATIENT
Start: 2025-06-03 | End: 2025-06-03

## 2025-06-03 RX ORDER — ACETAMINOPHEN 325 MG/1
975 TABLET ORAL ONCE
Status: DISCONTINUED | OUTPATIENT
Start: 2025-06-03 | End: 2025-06-03 | Stop reason: HOSPADM

## 2025-06-03 RX ORDER — ACETAMINOPHEN 325 MG/1
975 TABLET ORAL EVERY 8 HOURS
Status: DISCONTINUED | OUTPATIENT
Start: 2025-06-03 | End: 2025-06-04 | Stop reason: HOSPADM

## 2025-06-03 RX ORDER — GLYCOPYRROLATE 0.2 MG/ML
INJECTION, SOLUTION INTRAMUSCULAR; INTRAVENOUS PRN
Status: DISCONTINUED | OUTPATIENT
Start: 2025-06-03 | End: 2025-06-03

## 2025-06-03 RX ORDER — RIZATRIPTAN BENZOATE 5 MG/1
5 TABLET ORAL
Status: DISCONTINUED | OUTPATIENT
Start: 2025-06-03 | End: 2025-06-04 | Stop reason: HOSPADM

## 2025-06-03 RX ORDER — AMOXICILLIN 250 MG
1 CAPSULE ORAL 2 TIMES DAILY
Status: DISCONTINUED | OUTPATIENT
Start: 2025-06-03 | End: 2025-06-04 | Stop reason: HOSPADM

## 2025-06-03 RX ORDER — NALOXONE HYDROCHLORIDE 0.4 MG/ML
0.1 INJECTION, SOLUTION INTRAMUSCULAR; INTRAVENOUS; SUBCUTANEOUS
Status: DISCONTINUED | OUTPATIENT
Start: 2025-06-03 | End: 2025-06-03 | Stop reason: HOSPADM

## 2025-06-03 RX ORDER — ONDANSETRON 2 MG/ML
INJECTION INTRAMUSCULAR; INTRAVENOUS PRN
Status: DISCONTINUED | OUTPATIENT
Start: 2025-06-03 | End: 2025-06-03

## 2025-06-03 RX ORDER — GABAPENTIN 300 MG/1
600 CAPSULE ORAL AT BEDTIME
Status: DISCONTINUED | OUTPATIENT
Start: 2025-06-03 | End: 2025-06-04 | Stop reason: HOSPADM

## 2025-06-03 RX ORDER — SODIUM CHLORIDE, SODIUM LACTATE, POTASSIUM CHLORIDE, CALCIUM CHLORIDE 600; 310; 30; 20 MG/100ML; MG/100ML; MG/100ML; MG/100ML
INJECTION, SOLUTION INTRAVENOUS CONTINUOUS
Status: DISCONTINUED | OUTPATIENT
Start: 2025-06-03 | End: 2025-06-04 | Stop reason: HOSPADM

## 2025-06-03 RX ORDER — DIPHENHYDRAMINE HCL 25 MG
25 CAPSULE ORAL
Status: DISCONTINUED | OUTPATIENT
Start: 2025-06-03 | End: 2025-06-04 | Stop reason: HOSPADM

## 2025-06-03 RX ORDER — AMLODIPINE BESYLATE 5 MG/1
5 TABLET ORAL DAILY
Status: DISCONTINUED | OUTPATIENT
Start: 2025-06-04 | End: 2025-06-04 | Stop reason: HOSPADM

## 2025-06-03 RX ORDER — NALOXONE HYDROCHLORIDE 0.4 MG/ML
0.2 INJECTION, SOLUTION INTRAMUSCULAR; INTRAVENOUS; SUBCUTANEOUS
Status: DISCONTINUED | OUTPATIENT
Start: 2025-06-03 | End: 2025-06-04 | Stop reason: HOSPADM

## 2025-06-03 RX ORDER — CEFAZOLIN SODIUM 2 G/50ML
2 SOLUTION INTRAVENOUS EVERY 8 HOURS
Status: COMPLETED | OUTPATIENT
Start: 2025-06-03 | End: 2025-06-04

## 2025-06-03 RX ORDER — CEFAZOLIN SODIUM/WATER 2 G/20 ML
2 SYRINGE (ML) INTRAVENOUS
Status: COMPLETED | OUTPATIENT
Start: 2025-06-03 | End: 2025-06-03

## 2025-06-03 RX ORDER — KETOROLAC TROMETHAMINE 15 MG/ML
15 INJECTION, SOLUTION INTRAMUSCULAR; INTRAVENOUS EVERY 6 HOURS
Status: DISCONTINUED | OUTPATIENT
Start: 2025-06-03 | End: 2025-06-04 | Stop reason: HOSPADM

## 2025-06-03 RX ORDER — POLYETHYLENE GLYCOL 3350 17 G/17G
1 POWDER, FOR SOLUTION ORAL DAILY
Qty: 7 PACKET | Refills: 0 | Status: SHIPPED | OUTPATIENT
Start: 2025-06-03

## 2025-06-03 RX ORDER — AMOXICILLIN 250 MG
1-2 CAPSULE ORAL 2 TIMES DAILY
Qty: 30 TABLET | Refills: 0 | Status: SHIPPED | OUTPATIENT
Start: 2025-06-03

## 2025-06-03 RX ORDER — METOPROLOL TARTRATE 1 MG/ML
1-2 INJECTION, SOLUTION INTRAVENOUS EVERY 5 MIN PRN
Status: DISCONTINUED | OUTPATIENT
Start: 2025-06-03 | End: 2025-06-03 | Stop reason: HOSPADM

## 2025-06-03 RX ORDER — ONDANSETRON 4 MG/1
4 TABLET, ORALLY DISINTEGRATING ORAL EVERY 30 MIN PRN
Status: DISCONTINUED | OUTPATIENT
Start: 2025-06-03 | End: 2025-06-03 | Stop reason: HOSPADM

## 2025-06-03 RX ORDER — FENTANYL CITRATE 50 UG/ML
25 INJECTION, SOLUTION INTRAMUSCULAR; INTRAVENOUS EVERY 5 MIN PRN
Status: DISCONTINUED | OUTPATIENT
Start: 2025-06-03 | End: 2025-06-03 | Stop reason: HOSPADM

## 2025-06-03 RX ORDER — ONDANSETRON 4 MG/1
4 TABLET, ORALLY DISINTEGRATING ORAL EVERY 6 HOURS PRN
Status: DISCONTINUED | OUTPATIENT
Start: 2025-06-03 | End: 2025-06-04 | Stop reason: HOSPADM

## 2025-06-03 RX ORDER — ACETAMINOPHEN 325 MG/1
650 TABLET ORAL EVERY 4 HOURS PRN
Qty: 100 TABLET | Refills: 0 | Status: SHIPPED | OUTPATIENT
Start: 2025-06-03

## 2025-06-03 RX ORDER — KETOROLAC TROMETHAMINE 15 MG/ML
15 INJECTION, SOLUTION INTRAMUSCULAR; INTRAVENOUS
Status: DISCONTINUED | OUTPATIENT
Start: 2025-06-03 | End: 2025-06-03 | Stop reason: HOSPADM

## 2025-06-03 RX ORDER — FENTANYL CITRATE 50 UG/ML
50 INJECTION, SOLUTION INTRAMUSCULAR; INTRAVENOUS EVERY 5 MIN PRN
Status: DISCONTINUED | OUTPATIENT
Start: 2025-06-03 | End: 2025-06-03 | Stop reason: HOSPADM

## 2025-06-03 RX ORDER — FENTANYL CITRATE 50 UG/ML
INJECTION, SOLUTION INTRAMUSCULAR; INTRAVENOUS PRN
Status: DISCONTINUED | OUTPATIENT
Start: 2025-06-03 | End: 2025-06-03

## 2025-06-03 RX ORDER — HYDROMORPHONE HCL IN WATER/PF 6 MG/30 ML
0.4 PATIENT CONTROLLED ANALGESIA SYRINGE INTRAVENOUS EVERY 5 MIN PRN
Status: DISCONTINUED | OUTPATIENT
Start: 2025-06-03 | End: 2025-06-03 | Stop reason: HOSPADM

## 2025-06-03 RX ORDER — PANTOPRAZOLE SODIUM 40 MG/1
40 TABLET, DELAYED RELEASE ORAL
Status: DISCONTINUED | OUTPATIENT
Start: 2025-06-04 | End: 2025-06-04 | Stop reason: HOSPADM

## 2025-06-03 RX ORDER — ASPIRIN 81 MG/1
81 TABLET ORAL 2 TIMES DAILY
Status: DISCONTINUED | OUTPATIENT
Start: 2025-06-03 | End: 2025-06-04 | Stop reason: HOSPADM

## 2025-06-03 RX ORDER — DEXAMETHASONE SODIUM PHOSPHATE 4 MG/ML
INJECTION, SOLUTION INTRA-ARTICULAR; INTRALESIONAL; INTRAMUSCULAR; INTRAVENOUS; SOFT TISSUE PRN
Status: DISCONTINUED | OUTPATIENT
Start: 2025-06-03 | End: 2025-06-03

## 2025-06-03 RX ORDER — METFORMIN HYDROCHLORIDE 500 MG/1
1000 TABLET, EXTENDED RELEASE ORAL
Status: DISCONTINUED | OUTPATIENT
Start: 2025-06-03 | End: 2025-06-04 | Stop reason: HOSPADM

## 2025-06-03 RX ADMIN — PHENYLEPHRINE HYDROCHLORIDE 100 MCG: 10 INJECTION INTRAVENOUS at 13:29

## 2025-06-03 RX ADMIN — ROCURONIUM BROMIDE 50 MG: 50 INJECTION, SOLUTION INTRAVENOUS at 12:42

## 2025-06-03 RX ADMIN — HYDROMORPHONE HYDROCHLORIDE 1 MG: 1 INJECTION, SOLUTION INTRAMUSCULAR; INTRAVENOUS; SUBCUTANEOUS at 13:00

## 2025-06-03 RX ADMIN — OXYCODONE HYDROCHLORIDE 5 MG: 5 TABLET ORAL at 15:20

## 2025-06-03 RX ADMIN — METFORMIN HYDROCHLORIDE 1000 MG: 500 TABLET, EXTENDED RELEASE ORAL at 17:57

## 2025-06-03 RX ADMIN — CEFAZOLIN SODIUM 2 G: 2 SOLUTION INTRAVENOUS at 19:59

## 2025-06-03 RX ADMIN — SUGAMMADEX 200 MG: 100 INJECTION, SOLUTION INTRAVENOUS at 14:10

## 2025-06-03 RX ADMIN — GLYCOPYRROLATE 0.2 MG: 0.2 INJECTION, SOLUTION INTRAMUSCULAR; INTRAVENOUS at 12:42

## 2025-06-03 RX ADMIN — SODIUM CHLORIDE, SODIUM LACTATE, POTASSIUM CHLORIDE, AND CALCIUM CHLORIDE: .6; .31; .03; .02 INJECTION, SOLUTION INTRAVENOUS at 10:50

## 2025-06-03 RX ADMIN — DEXAMETHASONE SODIUM PHOSPHATE 8 MG: 4 INJECTION, SOLUTION INTRA-ARTICULAR; INTRALESIONAL; INTRAMUSCULAR; INTRAVENOUS; SOFT TISSUE at 12:42

## 2025-06-03 RX ADMIN — FENTANYL CITRATE 50 MCG: 50 INJECTION, SOLUTION INTRAMUSCULAR; INTRAVENOUS at 14:51

## 2025-06-03 RX ADMIN — SENNOSIDES AND DOCUSATE SODIUM 1 TABLET: 50; 8.6 TABLET ORAL at 20:26

## 2025-06-03 RX ADMIN — SODIUM CHLORIDE, SODIUM LACTATE, POTASSIUM CHLORIDE, AND CALCIUM CHLORIDE: .6; .31; .03; .02 INJECTION, SOLUTION INTRAVENOUS at 15:18

## 2025-06-03 RX ADMIN — HYDROMORPHONE HYDROCHLORIDE 0.4 MG: 0.2 INJECTION, SOLUTION INTRAMUSCULAR; INTRAVENOUS; SUBCUTANEOUS at 14:56

## 2025-06-03 RX ADMIN — PHENYLEPHRINE HYDROCHLORIDE 100 MCG: 10 INJECTION INTRAVENOUS at 12:52

## 2025-06-03 RX ADMIN — FENTANYL CITRATE 100 MCG: 50 INJECTION INTRAMUSCULAR; INTRAVENOUS at 12:42

## 2025-06-03 RX ADMIN — FENTANYL CITRATE 50 MCG: 50 INJECTION, SOLUTION INTRAMUSCULAR; INTRAVENOUS at 14:46

## 2025-06-03 RX ADMIN — Medication 2 G: at 12:36

## 2025-06-03 RX ADMIN — SODIUM CHLORIDE, SODIUM LACTATE, POTASSIUM CHLORIDE, AND CALCIUM CHLORIDE: .6; .31; .03; .02 INJECTION, SOLUTION INTRAVENOUS at 12:37

## 2025-06-03 RX ADMIN — LIDOCAINE HYDROCHLORIDE 30 MG: 20 INJECTION, SOLUTION INFILTRATION; PERINEURAL at 12:42

## 2025-06-03 RX ADMIN — PHENYLEPHRINE HYDROCHLORIDE 100 MCG: 10 INJECTION INTRAVENOUS at 13:03

## 2025-06-03 RX ADMIN — PROPOFOL 200 MG: 10 INJECTION, EMULSION INTRAVENOUS at 12:42

## 2025-06-03 RX ADMIN — KETOROLAC TROMETHAMINE 15 MG: 15 INJECTION, SOLUTION INTRAMUSCULAR; INTRAVENOUS at 19:52

## 2025-06-03 RX ADMIN — HYDROMORPHONE HYDROCHLORIDE 0.4 MG: 0.2 INJECTION, SOLUTION INTRAMUSCULAR; INTRAVENOUS; SUBCUTANEOUS at 15:02

## 2025-06-03 RX ADMIN — ACETAMINOPHEN 975 MG: 325 TABLET, FILM COATED ORAL at 17:58

## 2025-06-03 RX ADMIN — ASPIRIN 81 MG: 81 TABLET, DELAYED RELEASE ORAL at 20:26

## 2025-06-03 RX ADMIN — MIDAZOLAM 2 MG: 1 INJECTION INTRAMUSCULAR; INTRAVENOUS at 12:37

## 2025-06-03 RX ADMIN — TRANEXAMIC ACID 1950 MG: 650 TABLET ORAL at 10:36

## 2025-06-03 RX ADMIN — ONDANSETRON 4 MG: 2 INJECTION INTRAMUSCULAR; INTRAVENOUS at 14:03

## 2025-06-03 ASSESSMENT — ACTIVITIES OF DAILY LIVING (ADL)
ADLS_ACUITY_SCORE: 24
ADLS_ACUITY_SCORE: 21
ADLS_ACUITY_SCORE: 24
ADLS_ACUITY_SCORE: 21
ADLS_ACUITY_SCORE: 24
ADLS_ACUITY_SCORE: 24
ADLS_ACUITY_SCORE: 21
ADLS_ACUITY_SCORE: 21
ADLS_ACUITY_SCORE: 24
ADLS_ACUITY_SCORE: 21

## 2025-06-03 NOTE — ANESTHESIA PREPROCEDURE EVALUATION
Anesthesia Pre-Procedure Evaluation    Patient: Karan Anglin   MRN: [id number] : 1955          Procedure : Procedure(s):  Arthroplasty, hip, total, left         Past Medical History:   Diagnosis Date    Diabetes (H)     Hypertension     Peptic ulcer hemorrhage     Renal disease       Past Surgical History:   Procedure Laterality Date    GI SURGERY      peptic ulcers stapled    OPEN REDUCTION INTERNAL FIXATION HUMERUS PROXIMAL Left 5/15/2018    Procedure: OPEN REDUCTION INTERNAL FIXATION HUMERUS PROXIMAL;  Left Glenoid Open Reduction Internal Fixation;  Surgeon: Rosa Joe MD;  Location:  OR      No Known Allergies   Social History     Tobacco Use    Smoking status: Never    Smokeless tobacco: Never   Substance Use Topics    Alcohol use: Yes     Comment: once/ twice per week      Wt Readings from Last 1 Encounters:   25 99.8 kg (220 lb 1.6 oz)        Anesthesia Evaluation            ROS/MED HX  ENT/Pulmonary:       Neurologic:       Cardiovascular:     (+)  hypertension- -   -  - -                                      METS/Exercise Tolerance:     Hematologic: Comments: Lab Test        25                       1554          1458          0756          WBC          7.7          8.2          7.9           HGB          14.1         14.7         13.1*         MCV          86           84           89            PLT          243          276          258            Lab Test        25                       0959          1554          1105          1139          NA            --          141          136          139           POTASSIUM     --          4.5          4.5          4.9           CHLORIDE      --          104          102          105           CO2           --          24           23           23            BUN           --          15.1         20.4         20.9          CR            --           "1.17         1.35*        1.35*         ANIONGAP      --          13           11           11            CHAR           --          10.5*        10.3         10.0          GLC          142*         110*         143*         137*                Musculoskeletal:       GI/Hepatic:       Renal/Genitourinary:     (+) renal disease,             Endo:     (+)  type II DM,                    Psychiatric/Substance Use:       Infectious Disease:       Malignancy:       Other:              Physical Exam  Airway  Mallampati: II  TM distance: >3 FB  Neck ROM: full  Mouth opening: >= 4 cm    Cardiovascular - normal exam   Dental   (+) Minor Abnormalities - some fillings, tiny chips      Pulmonary - normal exam      Neurological - normal exam  He appears awake, alert and oriented x3.    Other Findings       OUTSIDE LABS:  CBC:   Lab Results   Component Value Date    WBC 7.7 05/13/2025    WBC 8.2 12/05/2019    HGB 14.1 05/13/2025    HGB 14.7 12/05/2019    HCT 42.7 05/13/2025    HCT 42.3 12/05/2019     05/13/2025     12/05/2019     BMP:   Lab Results   Component Value Date     05/13/2025     01/17/2025    POTASSIUM 4.5 05/13/2025    POTASSIUM 4.5 01/17/2025    CHLORIDE 104 05/13/2025    CHLORIDE 102 01/17/2025    CO2 24 05/13/2025    CO2 23 01/17/2025    BUN 15.1 05/13/2025    BUN 20.4 01/17/2025    CR 1.17 05/13/2025    CR 1.35 (H) 01/17/2025     (H) 06/03/2025     (H) 05/13/2025     COAGS: No results found for: \"PTT\", \"INR\", \"FIBR\"  POC: No results found for: \"BGM\", \"HCG\", \"HCGS\"  HEPATIC:   Lab Results   Component Value Date    ALBUMIN 4.4 07/16/2024    PROTTOTAL 7.0 07/16/2024    ALT 22 07/16/2024    AST 27 07/16/2024    ALKPHOS 89 07/16/2024    BILITOTAL 0.3 07/16/2024     OTHER:   Lab Results   Component Value Date    A1C 7.0 (H) 05/13/2025    CHAR 10.5 (H) 05/13/2025    PHOS 2.4 (L) 04/29/2019    TSH 0.87 01/17/2025       Anesthesia Plan    ASA Status:  2      NPO Status: NPO Appropriate " "  Anesthesia Type: General.  Induction: intravenous.  Maintenance: Balanced.   Techniques and Equipment:       - Monitoring Plan: standard ASA monitoring     Consents    Anesthesia Plan(s) and associated risks, benefits, and realistic alternatives discussed. Questions answered and patient/representative(s) expressed understanding.     - Discussed: anesthesiologist     - Discussed with:  Patient        - Pt is DNR/DNI Status: no DNR     Blood Consent:      - Discussed with: patient.     Postoperative Care    Pain management: plan for postoperative opioid use.     Comments:                   Selvin Londono MD    I have reviewed the pertinent notes and labs in the chart from the past 30 days and (re)examined the patient.  Any updates or changes from those notes are reflected in this note.    Clinically Significant Risk Factors Present on Admission                 # Drug Induced Platelet Defect: home medication list includes an antiplatelet medication   # Hypertension: Noted on problem list          # DMII: A1C = 7.0 % (Ref range: 0.0 - 5.6 %) within past 6 months    # Obesity: Estimated body mass index is 31.58 kg/m  as calculated from the following:    Height as of this encounter: 1.778 m (5' 10\").    Weight as of this encounter: 99.8 kg (220 lb 1.6 oz).                    "

## 2025-06-03 NOTE — ANESTHESIA PROCEDURE NOTES
Airway       Patient location during procedure: OR       Procedure Start/Stop Times: 6/3/2025 12:45 PM  Staff -        Performed By: CRNAIndications and Patient Condition       Indications for airway management: keyona-procedural       Induction type:intravenous       Mask difficulty assessment: 1 - vent by mask    Final Airway Details       Final airway type: endotracheal airway       Successful airway: ETT - single  Endotracheal Airway Details        ETT size (mm): 8.0       Cuffed: yes       Successful intubation technique: direct laryngoscopy       DL Blade Type: Oakley 2       Grade View of Cords: 1       Adjucts: stylet       Position: Right       Measured from: gums/teeth       Bite block used: None    Post intubation assessment        Placement verified by: capnometry and equal breath sounds        Number of attempts at approach: 1       Secured with: tape       Ease of procedure: easy       Dentition: Intact    Medication(s) Administered   Medication Administration Time: 6/3/2025 12:45 PM

## 2025-06-03 NOTE — ANESTHESIA POSTPROCEDURE EVALUATION
Patient: Karan Anglin    Procedure: Procedure(s):  Arthroplasty, hip, total, left       Anesthesia Type:  General    Note:  Disposition: Admission   Postop Pain Control: Uneventful            Sign Out: Well controlled pain   PONV: No   Neuro/Psych: Uneventful            Sign Out: Acceptable/Baseline neuro status   Airway/Respiratory: Uneventful            Sign Out: Acceptable/Baseline resp. status   CV/Hemodynamics: Uneventful            Sign Out: Acceptable CV status; No obvious hypovolemia; No obvious fluid overload   Other NRE: NONE   DID A NON-ROUTINE EVENT OCCUR? No           Last vitals:  Vitals Value Taken Time   /86 06/03/25 15:20   Temp 96.98  F (36.1  C) 06/03/25 15:21   Pulse 75 06/03/25 15:21   Resp 12 06/03/25 15:21   SpO2 100 % 06/03/25 15:21   Vitals shown include unfiled device data.    Electronically Signed By: Andrae Watson MD  Lanny 3, 2025  3:22 PM

## 2025-06-03 NOTE — OP NOTE
[facility]    Operative Note    Pre-operative diagnosis: 70-year-old male presenting with chronic left hip/groin pain most likely due to osteoarthritic changes of the femoral acetabular joint.   Post-operative diagnosis Same as pre-operative diagnosis    Procedure: Arthroplasty, hip, total, left, Left - Hip    Surgeon: Surgeons and Role:     * Karan Lynn MD - Primary     * Monica Haskins PA-RUBA - Assisting     * Jeffrey Santos - Resident    Anesthesia: Choice   Estimated Blood Loss: Less than 100 ml    Drains: None  Specimens: * No specimens in log *    Complications: None.    Implants:   Implant Name Type Inv. Item Serial No.  Lot No. LRB No. Used Action   IMP SHELL BIOM G7 ACETAB PPS SKAGGS HOLE 58MM SZ G [id number] - [id number] Total Joint Component/Insert IMP SHELL BIOM G7 ACETAB PPS SKAGGS HOLE 58MM SZ G [id number]  [employer] [id number] Left 1 Implanted   LINER ACTB G7 G 36MM LNGVT HIP STRL LUM LF - [id number] Metallic Hardware/Porter LINER ACTB G7 G 36MM LNGVT HIP STRL LUM LF  [employer] [id number] Left 1 Implanted   IMP STEM FEM BIOM TAPERLOC HI OFFSET TYPE 1 SZ14 [id number] - [id number] Total Joint Component/Insert IMP STEM FEM BIOM TAPERLOC HI OFFSET TYPE 1 SZ14 [id number]  [employer] [id number] Left 1 Implanted   HEAD FEM 0MM OFST 36MM HIP ACTB MDLR TY 1 BLX D G7 - [id number] Total Joint Component/Insert HEAD FEM 0MM OFST 36MM HIP ACTB MDLR TY 1 BLX D G7  [employer] [id number] Left 1 Implanted         The presence of DEJAN Salcedo was essential throughout this case for assistance with patient transfer to and from the operative bed, draping, irrigation, cautery usage, retraction, implant placement, cement removal, arthrotomy closure,  incisional closure, dressing placement.      COMPONENTS USED:  1. Biomet Taperloc Size 14, high Offset  2. Biomet G7 Acetabular Component Size 58,   3. Biomet ArCOM XL Polyethylene Liner neutral   4. Biolox Delta Ceramic  Head Size 36+0         FINDINGS: Cartilage loss, irregularity of the femoral head, diffuse cartilage loss, osteophyte formation of the native acetabulum.  Acetabular component well positioned and secure.  Femoral component well positioned.  Intraoperative stability with full extension and ER, flexion to 90 with IR to 80, and in the sleep position.  Appropriate limb length.      DESCRIPTION OF PROCEDURE: Patient was seen in the preoperative area where we again reviewed the risks, benefits, and alternatives to total hip arthroplasty.  Patient understands and agrees to the treatment plan as set forth. Informed written consent was obtained.  The operative left hip was marked with an indelible marker after patient confirmation of the operative site.  All the patient's questions were answered.  The patient was taken to the operating room and underwent induction of  General  anesthesia.  The patient was placed on the operating table in the lateral decubitus position with the operative site up.   An SCD was placed on the nonoperative leg.   Bony prominences were well padded and was secured to the table with the Wixon positioner. The patient was prepped and draped in the normal sterile fashion.         After the completion of a timeout procedure a posterior approach to the hip joint was performed making a incision over the greater trochanter and taking this down through the subcutaneous tissue. The gluteus reed and IT band were identified and then split in the direction of its fibers. A charnley retractor was placed and hemostasis was obtained.  After internal rotation of the femur, the piriformis tendon and external rotators were detached.  A posterior capsulotomy was performed.  The capsule and pirformis was tagged using a #1 Vicryl suture.  Care was taken to do incised the inferior and superior capsule to relieve tension and the limb was positioned in the sleep position and the hip was dislocated.  The soft tissues  under the greater trochanter were debrided, exposing the neck.  The femoral neck osteotomy was made in accordance with the preoperative plan, approximately 10 mm above the level of the lesser trochanter.  The femoral head was removed and noted to have diffuse, extensive cartilage loss.     The acetabulum was exposed with an anterior andrade's crook retractor and posteroinferior retractor as well as a self-retaining retractor.  The remaining acetabular labrum was removed.  The patient was noted to have anterior and inferior osteophytes.  The pulvinar was removed and the medial wall was identified.  We began reaming with care to maintain the anterior and posterior walls.  We began with a size 55 reamer and reamed to 57.  At this point the sclerotic bone was removed back to healthy bleeding cancelleous bone.  The 58 G7 cup was placed.  It had excellent initial stability.   Any prominent anterior and inferior osteophytes were debrided and carefully removed.  The acetabular component was irrigated and cleaned.  The definitive neutral liner was placed without complications.    Attention was turned to the femur, which was exposed in the standard fashion with a femoral elevator.  Care was taken to not damage the gluteus medius.  A box osteotome and rongeur was used to remove residual lateral neck.  The canal was identified and reamed with a Charnley Awl.  The femoral broaches was positioned in the appropriate anteversion, care was taken to broach the lateral cancellous bone.  We broached, starting with a size 4, up to size 14.  Care was taken to minimize risk of calcar fracture.  The final trial fit well.  A high offset neck was placed with a 36+0 head.  The hip was reduced.  The stability was examined and  was found to be secure and stable in full extension and external rotation of > 45 degrees as well as flexion of 90 degrees combined with internal rotation > 80 degrees.  Leg lengths were judged to be within 5 mm of  symmetry.     The hip was dislocated and the trial femoral component was removed.   The femur was exposed and the 14 stem with high offset was placed and noted to fit well.  The hip was trialed with various head lengths and the 36+0 head had the best fit. .  Again, the  stability was examined and  was found to be secure and stable in full extension and external rotation of > 45 degrees as well as flexion of 90 degrees combined with internal rotation > 80 degrees.  Leg lengths were judged to be within 5 mm of symmetry as evaluated by limb palpation and the markings on the greater trochanter. The hip was dislocated and the trial head was removed.  The trunion was washed and dried and the final 36+0 head was impacted into place.  The hip was reduced and again stable as listed above.      The hip was lavaged with dilute betaine irrigant followed by sterile saline.  Periarticular injection of ropivicaine, toradol, and epi was injected into the soft tissue.  The capsule was reattached to the greater trochanter using a 2 mm drill and suture passer.  The piriformis and external rotators were repaired to the medius tendon.  The IT and gluteal fascia was closed with #1 Vicryl figure of 8 interrupted sutures.  The deep dermal layer was closed with 0 Vicryl and 2-0 Vicryl. The skin was closed with 3-0 PDS, Steri-Strips and a sterile dressing consisting of alginate and Tegaderm. The patient was positioned supine and awoken from anesthesia.  The patient was transferred to the PACU in stable condition.      POSTOPERATIVE PLAN:  The patient will be admitted to the floor for pain control and monitoring.    Weight bearing: Weightbearing as tolerated.  Anticoagulation: Aspirin 162 mg daily for 4 weeks as DVT prophylaxis  Precautions: Posterior hip precautions  PT/OT  Antibiotics per protocol.  X-ray in PACU  Patient will discharge later to day when all requirements are met.  Alternatively patient will discharge on POD 1.  Clinic visit  2 weeks in 6 weeks.    Karan Lynn MD     Adult Reconstruction  [facility] Department of Orthopaedic Surgery  Pager [phone]

## 2025-06-03 NOTE — ANESTHESIA CARE TRANSFER NOTE
Patient: Karan Anglin    Procedure: Procedure(s):  Arthroplasty, hip, total, left       Diagnosis: Primary osteoarthritis of left hip [M16.12]  Diagnosis Additional Information: No value filed.    Anesthesia Type:   General     Note:    Oropharynx: oral airway in place  Level of Consciousness: drowsy  Oxygen Supplementation: face mask    Independent Airway: airway patency satisfactory and stable    Vital Signs Stable: post-procedure vital signs reviewed and stable  Report to RN Given: handoff report given  Patient transferred to: PACU    Handoff Report: Identifed the Patient, Identified the Reponsible Provider, Reviewed the pertinent medical history, Discussed the surgical course, Reviewed Intra-OP anesthesia mangement and issues during anesthesia, Set expectations for post-procedure period and Allowed opportunity for questions and acknowledgement of understanding  Vitals:  Vitals Value Taken Time   /76 06/03/25 14:23   Temp 96.8  F (36  C) 06/03/25 14:25   Pulse 77 06/03/25 14:25   Resp 14 06/03/25 14:25   SpO2 95 % 06/03/25 14:25   Vitals shown include unfiled device data.    Electronically Signed By: JEFFERSON Palomares CRNA  Lanny 3, 2025  2:27 PM

## 2025-06-03 NOTE — PROGRESS NOTES
06/03/25 1707   Appointment Info   Signing Clinician's Name / Credentials (PT) CLEM Israel   Student Supervision Therapy services provided with the co-signing licensed therapist guiding and directing the services, and providing the skilled judgement and assessment throughout the session   Rehab Comments (PT) L hip no IR/add/flex past 90   Living Environment   People in Home alone   Current Living Arrangements apartment   Home Accessibility no concerns   Number of Stairs, Within Home, Primary none   Transportation Anticipated family or friend will provide   Living Environment Comments Lives alone in apt. Tub shower, no rails or seat.   Self-Care   Usual Activity Tolerance good   Current Activity Tolerance moderate   Regular Exercise No   Equipment Currently Used at Home none   Fall history within last six months no   Activity/Exercise/Self-Care Comment IND at baseline, no AD use. Sister helping at d/c.   General Information   Onset of Illness/Injury or Date of Surgery 06/03/25   Referring Physician Karan Lynn MD   Patient/Family Therapy Goals Statement (PT) return home   Pertinent History of Current Problem (include personal factors and/or comorbidities that impact the POC) Pt is 69 yo male who underwent L DUSTIN on 6/3/2025.   Existing Precautions/Restrictions fall;no hip IR;no hip ADD past midline;90 degree hip flexion;no pivoting or twisting;weight bearing   Weight-Bearing Status - LLE weight-bearing as tolerated   Cognition   Affect/Mental Status (Cognition) WNL   Follows Commands (Cognition) WFL   Pain Assessment   Patient Currently in Pain Yes, see Vital Sign flowsheet   Integumentary/Edema   Integumentary/Edema Comments L hip dressing intact   Posture    Posture Forward head position;Protracted shoulders   Range of Motion (ROM)   Range of Motion ROM deficits secondary to surgical procedure   Strength (Manual Muscle Testing)   Strength (Manual Muscle Testing) Deficits observed during functional  mobility   Bed Mobility   Comment, (Bed Mobility) supine <> sit min A   Transfers   Comment, (Transfers) sit <> stand w/ FWW and CGA   Gait/Stairs (Locomotion)   Comment, (Gait/Stairs) amb w/ FWW and CGA   Balance   Balance Comments Impaired: required FWW and CGA for amb   Sensory Examination   Sensory Perception Comments Pt states minimal n/t in L thigh   Clinical Impression   Criteria for Skilled Therapeutic Intervention Yes, treatment indicated   PT Diagnosis (PT) impaired functional mobility   Influenced by the following impairments weakness, impaired ROM, impaired balance, pain   Functional limitations due to impairments Difficulty w/ bed mobility, transfers, amb   Clinical Presentation (PT Evaluation Complexity) stable   Clinical Presentation Rationale clinical judgement   Clinical Decision Making (Complexity) low complexity   Planned Therapy Interventions (PT) balance training;bed mobility training;gait training;patient/family education;home exercise program;stair training;ROM (range of motion);strengthening;transfer training;progressive activity/exercise;home program guidelines;risk factor education   Risk & Benefits of therapy have been explained evaluation/treatment results reviewed;care plan/treatment goals reviewed;risks/benefits reviewed;current/potential barriers reviewed;participants included;participants voiced agreement with care plan;patient;sibling   PT Total Evaluation Time   PT Eval, Low Complexity Minutes (14667) 8   Physical Therapy Goals   PT Frequency Daily   PT Predicted Duration/Target Date for Goal Attainment 06/04/25   PT Goals Bed Mobility;Transfers;Gait   PT: Bed Mobility Independent;Supine to/from sit;Within precautions   PT: Transfers Modified independent;Assistive device;Sit to/from stand;Within precautions   PT: Gait Modified independent;Assistive device;Within precautions;Greater than 200 feet   PT Discharge Planning   PT Plan Repeated STS, IND bed mobility, increase amb distance,  walker for d/c   PT Discharge Recommendation (DC Rec) other (see comments)  (defer to ortho)   PT Rationale for DC Rec Pt is currently below reported baseline. IND at baseline for amb and all ADLs, no AD use. Currently requires FWW and CGA for amb and min A for transfers, limited by weakness, impaired ROM, impaired balance, and pain. Anticipate w/ additional IP PT session, pt will be able to demonstrate safe mobility to meet goals and be safe for d/c home w/ assist from sister as needed. Pt will need walker for home   PT Brief overview of current status FWW Ax1   PT Total Distance Amb During Session (feet) 150   PT Equipment Needed at Discharge walker, standard   Physical Therapy Time and Intention   Total Session Time (sum of timed and untimed services) 8

## 2025-06-04 ENCOUNTER — TELEPHONE (OUTPATIENT)
Dept: ORTHOPEDICS | Facility: CLINIC | Age: 70
End: 2025-06-04

## 2025-06-04 ENCOUNTER — HOSPITAL ENCOUNTER (EMERGENCY)
Facility: CLINIC | Age: 70
Discharge: HOME | End: 2025-06-05
Attending: EMERGENCY MEDICINE
Payer: COMMERCIAL

## 2025-06-04 ENCOUNTER — NURSE TRIAGE (OUTPATIENT)
Dept: NURSING | Facility: CLINIC | Age: 70
End: 2025-06-04
Payer: COMMERCIAL

## 2025-06-04 VITALS
WEIGHT: 220.1 LBS | DIASTOLIC BLOOD PRESSURE: 83 MMHG | SYSTOLIC BLOOD PRESSURE: 127 MMHG | TEMPERATURE: 98.1 F | BODY MASS INDEX: 31.51 KG/M2 | RESPIRATION RATE: 16 BRPM | HEIGHT: 70 IN | OXYGEN SATURATION: 94 % | HEART RATE: 75 BPM

## 2025-06-04 DIAGNOSIS — G89.18 ACUTE POST-OPERATIVE PAIN: ICD-10-CM

## 2025-06-04 DIAGNOSIS — R50.9 FEVER, UNSPECIFIED FEVER CAUSE: ICD-10-CM

## 2025-06-04 LAB
ALBUMIN SERPL BCG-MCNC: 4.5 G/DL (ref 3.5–5.2)
ALP SERPL-CCNC: 107 U/L (ref 40–150)
ALT SERPL W P-5'-P-CCNC: 16 U/L (ref 0–70)
ANION GAP SERPL CALCULATED.3IONS-SCNC: 14 MMOL/L (ref 7–15)
AST SERPL W P-5'-P-CCNC: 32 U/L (ref 0–45)
BASOPHILS # BLD AUTO: 0 10E3/UL (ref 0–0.2)
BASOPHILS NFR BLD AUTO: 0 %
BILIRUB SERPL-MCNC: 0.5 MG/DL
BUN SERPL-MCNC: 18.2 MG/DL (ref 8–23)
CALCIUM SERPL-MCNC: 10 MG/DL (ref 8.8–10.4)
CHLORIDE SERPL-SCNC: 102 MMOL/L (ref 98–107)
CREAT SERPL-MCNC: 1.2 MG/DL (ref 0.67–1.17)
CREAT SERPL-MCNC: 1.3 MG/DL (ref 0.67–1.17)
EGFRCR SERPLBLD CKD-EPI 2021: 60 ML/MIN/1.73M2
EGFRCR SERPLBLD CKD-EPI 2021: 66 ML/MIN/1.73M2
EOSINOPHIL # BLD AUTO: 0 10E3/UL (ref 0–0.7)
EOSINOPHIL NFR BLD AUTO: 0 %
ERYTHROCYTE [DISTWIDTH] IN BLOOD BY AUTOMATED COUNT: 13 % (ref 10–15)
GLUCOSE BLDC GLUCOMTR-MCNC: 139 MG/DL (ref 70–99)
GLUCOSE SERPL-MCNC: 138 MG/DL (ref 70–99)
HCO3 SERPL-SCNC: 20 MMOL/L (ref 22–29)
HCT VFR BLD AUTO: 35.8 % (ref 40–53)
HGB BLD-MCNC: 11.9 G/DL (ref 13.3–17.7)
HGB BLD-MCNC: 12.3 G/DL (ref 13.3–17.7)
HOLD SPECIMEN: NORMAL
IMM GRANULOCYTES # BLD: 0.1 10E3/UL
IMM GRANULOCYTES NFR BLD: 1 %
LYMPHOCYTES # BLD AUTO: 2.3 10E3/UL (ref 0.8–5.3)
LYMPHOCYTES NFR BLD AUTO: 21 %
MCH RBC QN AUTO: 28.9 PG (ref 26.5–33)
MCHC RBC AUTO-ENTMCNC: 34.4 G/DL (ref 31.5–36.5)
MCV RBC AUTO: 84 FL (ref 78–100)
MCV RBC AUTO: 85 FL (ref 78–100)
MONOCYTES # BLD AUTO: 1 10E3/UL (ref 0–1.3)
MONOCYTES NFR BLD AUTO: 10 %
NEUTROPHILS # BLD AUTO: 7.3 10E3/UL (ref 1.6–8.3)
NEUTROPHILS NFR BLD AUTO: 69 %
NRBC # BLD AUTO: 0 10E3/UL
NRBC BLD AUTO-RTO: 0 /100
PLATELET # BLD AUTO: 225 10E3/UL (ref 150–450)
POTASSIUM SERPL-SCNC: 3.7 MMOL/L (ref 3.4–5.3)
PROT SERPL-MCNC: 7.4 G/DL (ref 6.4–8.3)
RBC # BLD AUTO: 4.25 10E6/UL (ref 4.4–5.9)
SODIUM SERPL-SCNC: 136 MMOL/L (ref 135–145)
WBC # BLD AUTO: 10.7 10E3/UL (ref 4–11)

## 2025-06-04 PROCEDURE — 86140 C-REACTIVE PROTEIN: CPT | Performed by: EMERGENCY MEDICINE

## 2025-06-04 PROCEDURE — 82040 ASSAY OF SERUM ALBUMIN: CPT | Performed by: EMERGENCY MEDICINE

## 2025-06-04 PROCEDURE — 97165 OT EVAL LOW COMPLEX 30 MIN: CPT | Mod: GO | Performed by: OCCUPATIONAL THERAPIST

## 2025-06-04 PROCEDURE — 85018 HEMOGLOBIN: CPT | Performed by: STUDENT IN AN ORGANIZED HEALTH CARE EDUCATION/TRAINING PROGRAM

## 2025-06-04 PROCEDURE — 250N000013 HC RX MED GY IP 250 OP 250 PS 637: Performed by: STUDENT IN AN ORGANIZED HEALTH CARE EDUCATION/TRAINING PROGRAM

## 2025-06-04 PROCEDURE — 82962 GLUCOSE BLOOD TEST: CPT

## 2025-06-04 PROCEDURE — 85018 HEMOGLOBIN: CPT | Performed by: EMERGENCY MEDICINE

## 2025-06-04 PROCEDURE — 97535 SELF CARE MNGMENT TRAINING: CPT | Mod: GO | Performed by: OCCUPATIONAL THERAPIST

## 2025-06-04 PROCEDURE — 250N000011 HC RX IP 250 OP 636: Mod: JZ | Performed by: STUDENT IN AN ORGANIZED HEALTH CARE EDUCATION/TRAINING PROGRAM

## 2025-06-04 PROCEDURE — 85004 AUTOMATED DIFF WBC COUNT: CPT | Performed by: EMERGENCY MEDICINE

## 2025-06-04 PROCEDURE — 82565 ASSAY OF CREATININE: CPT | Performed by: EMERGENCY MEDICINE

## 2025-06-04 PROCEDURE — 36415 COLL VENOUS BLD VENIPUNCTURE: CPT | Performed by: EMERGENCY MEDICINE

## 2025-06-04 PROCEDURE — 82565 ASSAY OF CREATININE: CPT | Performed by: STUDENT IN AN ORGANIZED HEALTH CARE EDUCATION/TRAINING PROGRAM

## 2025-06-04 PROCEDURE — 36415 COLL VENOUS BLD VENIPUNCTURE: CPT | Performed by: STUDENT IN AN ORGANIZED HEALTH CARE EDUCATION/TRAINING PROGRAM

## 2025-06-04 PROCEDURE — 97530 THERAPEUTIC ACTIVITIES: CPT | Mod: GP

## 2025-06-04 PROCEDURE — 97116 GAIT TRAINING THERAPY: CPT | Mod: GP

## 2025-06-04 RX ADMIN — ACETAMINOPHEN 975 MG: 325 TABLET, FILM COATED ORAL at 09:25

## 2025-06-04 RX ADMIN — LOSARTAN POTASSIUM 100 MG: 25 TABLET, FILM COATED ORAL at 09:25

## 2025-06-04 RX ADMIN — PANTOPRAZOLE SODIUM 40 MG: 40 TABLET, DELAYED RELEASE ORAL at 09:26

## 2025-06-04 RX ADMIN — AMLODIPINE BESYLATE 5 MG: 5 TABLET ORAL at 09:33

## 2025-06-04 RX ADMIN — ASPIRIN 81 MG: 81 TABLET, DELAYED RELEASE ORAL at 09:26

## 2025-06-04 RX ADMIN — ACETAMINOPHEN 975 MG: 325 TABLET, FILM COATED ORAL at 01:56

## 2025-06-04 RX ADMIN — CITALOPRAM HYDROBROMIDE 20 MG: 20 TABLET ORAL at 09:26

## 2025-06-04 RX ADMIN — EMPAGLIFLOZIN 10 MG: 10 TABLET, FILM COATED ORAL at 09:27

## 2025-06-04 RX ADMIN — CEFAZOLIN SODIUM 2 G: 2 SOLUTION INTRAVENOUS at 03:52

## 2025-06-04 RX ADMIN — ATENOLOL 50 MG: 25 TABLET ORAL at 09:26

## 2025-06-04 RX ADMIN — KETOROLAC TROMETHAMINE 15 MG: 15 INJECTION, SOLUTION INTRAMUSCULAR; INTRAVENOUS at 09:24

## 2025-06-04 RX ADMIN — KETOROLAC TROMETHAMINE 15 MG: 15 INJECTION, SOLUTION INTRAMUSCULAR; INTRAVENOUS at 01:57

## 2025-06-04 RX ADMIN — ATORVASTATIN CALCIUM 40 MG: 40 TABLET, FILM COATED ORAL at 09:25

## 2025-06-04 ASSESSMENT — ACTIVITIES OF DAILY LIVING (ADL)
ADLS_ACUITY_SCORE: 35
ADLS_ACUITY_SCORE: 24
ADLS_ACUITY_SCORE: 37
ADLS_ACUITY_SCORE: 35
ADLS_ACUITY_SCORE: 35
PREVIOUS_RESPONSIBILITIES: MEAL PREP;HOUSEKEEPING;SHOPPING;LAUNDRY;YARDWORK;MEDICATION MANAGEMENT;FINANCES;DRIVING
ADLS_ACUITY_SCORE: 24
ADLS_ACUITY_SCORE: 35
ADLS_ACUITY_SCORE: 37
ADLS_ACUITY_SCORE: 37

## 2025-06-04 ASSESSMENT — COLUMBIA-SUICIDE SEVERITY RATING SCALE - C-SSRS
1. IN THE PAST MONTH, HAVE YOU WISHED YOU WERE DEAD OR WISHED YOU COULD GO TO SLEEP AND NOT WAKE UP?: NO
6. HAVE YOU EVER DONE ANYTHING, STARTED TO DO ANYTHING, OR PREPARED TO DO ANYTHING TO END YOUR LIFE?: NO
2. HAVE YOU ACTUALLY HAD ANY THOUGHTS OF KILLING YOURSELF IN THE PAST MONTH?: NO

## 2025-06-04 NOTE — PLAN OF CARE
"Goal Outcome Evaluation:       /83 (BP Location: Right arm)   Pulse 75   Temp 98.1  F (36.7  C) (Oral)   Resp 16   Ht 1.778 m (5' 10\")   Wt 99.8 kg (220 lb 1.6 oz)   SpO2 94%   BMI 31.58 kg/m     Patient vital signs are at baseline: Yes  Patient able to ambulate as they were prior to admission or with assist devices provided by therapies during their stay:  Yes  Patient MUST void prior to discharge:  Yes  Patient able to tolerate oral intake:  Yes  Pain has adequate pain control using Oral analgesics:  Yes  Does patient have an identified :  Yes  Has goal D/C date and time been discussed with patient:  Yes, going home now. Discharge meds send with patient.    Problem: Delirium  Goal: Optimal Coping  Outcome: Met  Intervention: Optimize Psychosocial Adjustment to Delirium  Recent Flowsheet Documentation  Taken 6/4/2025 1004 by Kelsie Tim RN  Supportive Measures: active listening utilized  Goal: Improved Behavioral Control  Outcome: Met  Intervention: Minimize Safety Risk  Recent Flowsheet Documentation  Taken 6/4/2025 1004 by Kelsie Tim RN  Enhanced Safety Measures: family to remain at bedside  Goal: Improved Attention and Thought Clarity  Outcome: Met  Goal: Improved Sleep  Outcome: Met     Problem: Hip Arthroplasty  Goal: Optimal Coping  Outcome: Met  Intervention: Support Psychosocial Response to Surgery and Mobility Changes  Recent Flowsheet Documentation  Taken 6/4/2025 1004 by Kelsie Tim RN  Supportive Measures: active listening utilized  Goal: Absence of Bleeding  Outcome: Met  Goal: Effective Bowel Elimination  Outcome: Met  Goal: Fluid and Electrolyte Balance  Outcome: Met  Goal: Optimal Functional Ability  Outcome: Met  Intervention: Promote Optimal Functional Status  Recent Flowsheet Documentation  Taken 6/4/2025 1004 by Kelsie Tim RN  Assistive Device Utilized:   gait belt   walker  Activity Management:   ambulated to bathroom   back to bed  Taken " 6/4/2025 0925 by Kelsie Tim RN  Assistive Device Utilized:   gait belt   walker  Activity Management:   ambulated to bathroom   back to bed  Goal: Absence of Infection Signs and Symptoms  Outcome: Met  Goal: Intact Neurovascular Status  Outcome: Met  Intervention: Prevent or Manage Neurovascular Compromise  Recent Flowsheet Documentation  Taken 6/4/2025 1004 by Kelsie Tim RN  Compartment Syndrome Management: active flexion/extension encouraged  Goal: Anesthesia/Sedation Recovery  Outcome: Met  Intervention: Optimize Anesthesia Recovery  Recent Flowsheet Documentation  Taken 6/4/2025 1004 by Kelsie Tim RN  Safety Promotion/Fall Prevention:   activity supervised   clutter free environment maintained   lighting adjusted   mobility aid in reach   nonskid shoes/slippers when out of bed   room door open   safety round/check completed  Goal: Optimal Pain Control and Function  Outcome: Met  Intervention: Prevent or Manage Pain  Recent Flowsheet Documentation  Taken 6/4/2025 1004 by Kelsie Tim RN  Pain Management Interventions: medication offered but refused  Taken 6/4/2025 0925 by Kelsie Tim RN  Pain Management Interventions: medication offered but refused  Goal: Nausea and Vomiting Relief  Outcome: Met  Goal: Effective Urinary Elimination  Outcome: Met  Goal: Effective Oxygenation and Ventilation  Outcome: Met  Intervention: Optimize Oxygenation and Ventilation  Recent Flowsheet Documentation  Taken 6/4/2025 1004 by Kelsie Tim RN  Cough And Deep Breathing: done independently per patient  Activity Management:   ambulated to bathroom   back to bed  Head of Bed (HOB) Positioning: HOB at 20-30 degrees  Taken 6/4/2025 0925 by Kelsie Tim RN  Activity Management:   ambulated to bathroom   back to bed  Head of Bed (HOB) Positioning: HOB at 20-30 degrees     Problem: Adult Inpatient Plan of Care  Goal: Plan of Care Review  Description: The Plan of Care Review/Shift note  "should be completed every shift.  The Outcome Evaluation is a brief statement about your assessment that the patient is improving, declining, or no change.  This information will be displayed automatically on your shift  note.  Outcome: Met  Goal: Patient-Specific Goal (Individualized)  Description: You can add care plan individualizations to a care plan. Examples of Individualization might be:  \"Parent requests to be called daily at 9am for status\", \"I have a hard time hearing out of my right ear\", or \"Do not touch me to wake me up as it startles  me\".  Outcome: Met  Goal: Absence of Hospital-Acquired Illness or Injury  Outcome: Met  Intervention: Identify and Manage Fall Risk  Recent Flowsheet Documentation  Taken 6/4/2025 1004 by Kelsie Tim RN  Safety Promotion/Fall Prevention:   activity supervised   clutter free environment maintained   lighting adjusted   mobility aid in reach   nonskid shoes/slippers when out of bed   room door open   safety round/check completed  Intervention: Prevent Skin Injury  Recent Flowsheet Documentation  Taken 6/4/2025 1004 by Kelsie Tim RN  Body Position: position changed independently  Taken 6/4/2025 0925 by Kelsie Tim RN  Body Position: position changed independently  Intervention: Prevent and Manage VTE (Venous Thromboembolism) Risk  Recent Flowsheet Documentation  Taken 6/4/2025 1004 by Kelsie Tim RN  VTE Prevention/Management: SCDs off (sequential compression devices)  Goal: Optimal Comfort and Wellbeing  Outcome: Met  Intervention: Monitor Pain and Promote Comfort  Recent Flowsheet Documentation  Taken 6/4/2025 1004 by Kelsie Tim RN  Pain Management Interventions: medication offered but refused  Taken 6/4/2025 0925 by Kelsie Tim RN  Pain Management Interventions: medication offered but refused  Goal: Readiness for Transition of Care  Outcome: Met                      "

## 2025-06-04 NOTE — PLAN OF CARE
Physical Therapy Discharge Summary    Reason for therapy discharge:    All goals and outcomes met, no further needs identified.    Progress towards therapy goal(s). See goals on Care Plan in [facility] electronic health record for goal details.  Goals met    Therapy recommendation(s):    Defer to ortho

## 2025-06-04 NOTE — PLAN OF CARE
Occupational Therapy Discharge Summary    Reason for therapy discharge:    All goals and outcomes met, no further needs identified.    Progress towards therapy goal(s). See goals on Care Plan in [facility] electronic health record for goal details.  Goals met    Therapy recommendation(s):    Defer to ortho MD for further therapy recommendations.

## 2025-06-04 NOTE — PLAN OF CARE
Patient is A&Ox4. VSS on RA. Ambulated in hallway 120ft, SBA w/ RW and gait belt. Left hip pain managed w/ scheduled IV Toradol. 1L NC placed to maintain O2 sats >92%. CMS intact. Abduction pillow in place. Dressing CDI. LR infusing at 50ml/hr. IV ancef q8hr. Patient is voiding.     Goal Outcome Evaluation:      Plan of Care Reviewed With: patient    Overall Patient Progress: improving    Outcome Evaluation: Pain mgmt. Voiding. Ambulating    Problem: Hip Arthroplasty  Goal: Optimal Coping  6/4/2025 0701 by Whitley Arteaga RN  Outcome: Progressing  Goal: Absence of Bleeding  6/4/2025 0701 by Whitley Arteaga RN  Outcome: Progressing  Goal: Effective Bowel Elimination  6/4/2025 0701 by Whitley Arteaga RN  Outcome: Progressing  6/4/2025 0700 by Whitley Arteaga RN  Outcome: Progressing  Goal: Fluid and Electrolyte Balance  6/4/2025 0701 by Whitley Arteaga RN  Outcome: Progressing  Goal: Optimal Functional Ability  6/4/2025 0701 by Whitley Arteaga RN  Outcome: Progressing  Intervention: Promote Optimal Functional Status  Recent Flowsheet Documentation  Taken 6/3/2025 2140 by Whitley Arteaga RN  Assistive Device Utilized:   walker   gait belt  Activity Management: ambulated outside room  Taken 6/3/2025 1952 by Whitley Arteaga RN  Assistive Device Utilized:   walker   gait belt  Activity Management:   activity adjusted per tolerance   dorsiflexion/plantar flexion performed  Goal: Absence of Infection Signs and Symptoms  6/4/2025 0701 by Whitley Arteaga RN  Outcome: Progressing  Goal: Intact Neurovascular Status  6/4/2025 0701 by Whitley Arteaga RN  Outcome: Progressing  Goal: Anesthesia/Sedation Recovery  6/4/2025 0701 by Whitley Arteaga RN  Outcome: Progressing  Intervention: Optimize Anesthesia Recovery  Recent Flowsheet Documentation  Taken 6/3/2025 1952 by Whitley Arteaga RN  Safety Promotion/Fall Prevention:   safety round/check completed   activity supervised   patient and family education  Goal: Optimal  "Pain Control and Function  6/4/2025 0701 by Whitley Arteaga RN  Outcome: Progressing  Intervention: Prevent or Manage Pain  Recent Flowsheet Documentation  Taken 6/4/2025 0645 by Whitley Arteaga RN  Goal: Nausea and Vomiting Relief  6/4/2025 0701 by Whitley Arteaga RN  Outcome: Progressing  Goal: Effective Urinary Elimination  6/4/2025 0701 by Whitley Arteaga RN  Outcome: Progressing  6/4/2025 0700 by Whitley Arteaga RN  Outcome: Progressing  Goal: Effective Oxygenation and Ventilation  6/4/2025 0701 by Whitley Arteaga RN  Outcome: Progressing  Intervention: Optimize Oxygenation and Ventilation  Recent Flowsheet Documentation  Taken 6/3/2025 2140 by Whitley Arteaga RN  Activity Management: ambulated outside room  Taken 6/3/2025 1952 by Whitley Arteaga RN  Cough And Deep Breathing: done independently per patient  Activity Management:   activity adjusted per tolerance   dorsiflexion/plantar flexion performed  Head of Bed (HOB) Positioning: HOB at 60 degrees     Problem: Adult Inpatient Plan of Care  Goal: Plan of Care Review  Description: The Plan of Care Review/Shift note should be completed every shift.  The Outcome Evaluation is a brief statement about your assessment that the patient is improving, declining, or no change.  This information will be displayed automatically on your shift  note.  6/4/2025 0701 by Whitley Arteaga RN  Outcome: Progressing  Flowsheets (Taken 6/4/2025 0701)  Outcome Evaluation: Pain mgmt. Voiding. Ambulating  Plan of Care Reviewed With: patient  Overall Patient Progress: improving  6/4/2025 0700 by Whitley Arteaga RN  Outcome: Progressing  Goal: Patient-Specific Goal (Individualized)  Description: You can add care plan individualizations to a care plan. Examples of Individualization might be:  \"Parent requests to be called daily at 9am for status\", \"I have a hard time hearing out of my right ear\", or \"Do not touch me to wake me up as it startles  me\".  6/4/2025 0701 by Chandler, " Whitley CLAY RN  Outcome: Progressing  6/4/2025 0700 by Whitley Arteaga RN  Outcome: Progressing  Goal: Absence of Hospital-Acquired Illness or Injury  6/4/2025 0701 by Whitley Arteaga RN  Outcome: Progressing  6/4/2025 0700 by Whitley Arteaga RN  Outcome: Progressing  Intervention: Identify and Manage Fall Risk  Recent Flowsheet Documentation  Taken 6/3/2025 1952 by Whitley Arteaga RN  Safety Promotion/Fall Prevention:   safety round/check completed   activity supervised   patient and family education  Intervention: Prevent Skin Injury  Recent Flowsheet Documentation  Taken 6/3/2025 1952 by Whitley Arteaga RN  Body Position: position changed independently  Goal: Optimal Comfort and Wellbeing  6/4/2025 0701 by Whitley Arteaga RN  Outcome: Progressing  6/4/2025 0700 by Whitley Arteaga RN  Outcome: Progressing  Intervention: Monitor Pain and Promote Comfort  Recent Flowsheet Documentation  Taken 6/4/2025 0645 by Whitley Arteaga RN  Pain Management Interventions: medication offered but refused  Taken 6/3/2025 1952 by Whitley Arteaga RN  Pain Management Interventions: medication (see MAR)  Goal: Readiness for Transition of Care  6/4/2025 0701 by Whitley Arteaga RN  Outcome: Progressing  6/4/2025 0700 by Whitley Arteaga RN  Outcome: Progressing

## 2025-06-04 NOTE — DISCHARGE SUMMARY
[facility]  Discharge Summary            Interval History:     70 year old male admitted postoperatively for elective left total hip arthroplasty with Dr. Karan Lynn due to DJD unresponsive to non operative treatment.  There were no notable intraoperative complications.  Patient being discharged post op Day # 1.  Karan Anglin received skilled nursing, PT, OT, SW inquiry.  There was no hospitalist care during the stay.     Karan Anglin has progressed satisfactorily with ambulation and pain management and without medical complication.  Patient is confident in their discharge and has met goals with PT and OT. Pt lives at home and will be discharged to home based on home living conditions and level of support.  Karan Anglin leaves the hospital in stable condition with good chance for recovery.  Today: Karan reports he is overall doing well following his procedure.  He states that his pain is only 2/10.  He has been up and ambulating with physical therapy.  He has been eating and drinking regularly.  Voiding spontaneously.    Pain: Mild  Nausea: None  Light headedness : none  Chest pain: none  Shortness of breath: none              Assessment and Plan:     S/P left DUSTIN day # 1.  Final Diagnosis: Same  VSS, Hemodynamically asymptomatic, pain management adequate.    PLAN:  DVT prophylaxis with ASA 81 mg twice daily x 4 weeks.  Will resume 81 mg daily at 4-week postop homa, as patient has no history of VTE.  Pain management with Tylenol, oxycodone  Monitor blood glucose 2 times daily.  We discussed that BG levels may be elevated following procedure.  If they remain elevated, contact managing provider for possible medication dosage adjustment.  Bowel Regimen.  RICE  Assistive devices as determined by Physical therapy.  OP PT.  Patient instructions attached to discharge.      Discharge to home in stable  Follow up in clinic as previously scheduled, approx 10-14 days post op.    [facility]  [id number]  [place]  [address]  [phone] fax  [phone] for scheduling                      Physical Exam:   Patient Vitals for the past 12 hrs:   BP Temp Temp src Pulse Resp SpO2   06/04/25 0430 108/69 98.5  F (36.9  C) Oral 76 16 --   06/03/25 2343 104/69 98.5  F (36.9  C) Oral -- 16 92 %   06/03/25 2143 -- -- -- -- -- 93 %   06/03/25 2105 122/80 98.4  F (36.9  C) Oral 74 16 97 %     Hemoglobin   Date Value Ref Range Status   05/13/2025 14.1 13.3 - 17.7 g/dL Final   12/05/2019 14.7 13.3 - 17.7 g/dL Final   04/29/2019 13.1 (L) 13.3 - 17.7 g/dL Final       Patient is alert and oriented.  Vitals: stable  Neurovascular status: Grossly intact to light sensation bilateral lower extremities  Dressing: clean and dry  Able to perform SLR  PT/DP pulses: 2+  DF/PF strength: 5/5   Calves: soft and non tender        This note was created using dictation software and may contain errors.  Please contact the creator for any clarifications that are needed.     [facility] - Surgery    6/4/2025

## 2025-06-04 NOTE — TELEPHONE ENCOUNTER
Need more information on patient's job and what he is requesting as a return to work timeframe.     Call to patient. Left voicemail for patient to return call.     Kiersten Whitney, ATC

## 2025-06-04 NOTE — TELEPHONE ENCOUNTER
Reason for Call:  Form, our goal is to have forms completed with 7 days, however, some forms may require a visit or additional information.    Type of letter, form or note:  short term disability and name of company/ rep: [employer]    How was form received: form was faxed in    How will form be returned?:  fax to [employer] at fax #: [phone]    Has the patient signed a consent form for release of information (may be included with form)? NO    Form was started and placed in Forms [employer] Folder () for provider review/ completion at [place].

## 2025-06-04 NOTE — TELEPHONE ENCOUNTER
[facility] Call Center    Phone Message    May a detailed message be left on voicemail: no     Reason for Call: Other: Patient is returning call     Action Taken: Message routed to:  Other: Callit    Travel Screening: Not Applicable     Date of Service:

## 2025-06-04 NOTE — PROGRESS NOTES
06/04/25 1200   Appointment Info   Signing Clinician's Name / Credentials (OT) Missy Chavez OTRL   Quick Adds   Quick Adds Certification   Living Environment   People in Home alone   Current Living Arrangements apartment   Home Accessibility no concerns   Number of Stairs, Within Home, Primary none   Living Environment Comments Pt lives alone in a single level home with tub shower   Self-Care   Usual Activity Tolerance good   Current Activity Tolerance moderate   Regular Exercise No   Equipment Currently Used at Home none   Fall history within last six months no   Activity/Exercise/Self-Care Comment Pt reports independence with ADLs and mobility at baseline without AE. Pt sister to stay and assist as long as necessary   Instrumental Activities of Daily Living (IADL)   Previous Responsibilities meal prep;housekeeping;shopping;laundry;yardwork;medication management;finances;driving   General Information   Onset of Illness/Injury or Date of Surgery 06/03/25   Referring Physician Karan Lynn MD   Patient/Family Therapy Goal Statement (OT) Pt would like to retur nhome to prior level of funciton   Additional Occupational Profile Info/Pertinent History of Current Problem 70 year old male admitted postoperatively for elective left total hip arthroplasty with Dr. Karan Lynn due to DJD unresponsive to non operative treatment.  There were no notable intraoperative complications.  Patient being discharged post op Day # 1.  Karan Anglin received skilled nursing, PT, OT, SW inquiry.  There was no hospitalist care during the stay.   Existing Precautions/Restrictions fall;no hip IR;no hip ADD past midline;90 degree hip flexion;no pivoting or twisting;weight bearing   Left Lower Extremity (Weight-bearing Status) weight-bearing as tolerated (WBAT)   Cognitive Status Examination   Orientation Status orientation to person, place and time   Follows Commands WFL   Cognitive Status Comments Pt required repetition of  directions for understanding, otherwise appears WFL   Visual Perception   Visual Impairment/Limitations WFL   Sensory   Sensory Comments Pt reports intact   Pain Assessment   Patient Currently in Pain Yes, see Vital Sign flowsheet   Posture   Posture not impaired   Range of Motion Comprehensive   Comment, General Range of Motion LLE limited due to precautions and pain   Strength Comprehensive (MMT)   Comment, General Manual Muscle Testing (MMT) Assessment LLE limited due to pain   Bed Mobility   Comment (Bed Mobility) SBA   Transfers   Transfer Comments CGA   Balance   Balance Comments Impaired requiring walker   Activities of Daily Living   BADL Assessment/Intervention bathing;lower body dressing;toileting   Bathing Assessment/Intervention   Comment, (Bathing) Mod assist with tub transfer   Lower Body Dressing Assessment/Training   Comment, (Lower Body Dressing) Max assist with precautions   Toileting   Comment, (Toileting) CGA   Clinical Impression   Criteria for Skilled Therapeutic Interventions Met (OT) Yes, treatment indicated   OT Diagnosis Decline in ADL independence and safety   Influenced by the following impairments LTHA   OT Problem List-Impairments impacting ADL problems related to;activity tolerance impaired;mobility;range of motion (ROM);strength;pain;post-surgical precautions   Assessment of Occupational Performance 1-3 Performance Deficits   Identified Performance Deficits Impaired dressing bathing and toileting independence   Planned Therapy Interventions (OT) ADL retraining;progressive activity/exercise   Clinical Decision Making Complexity (OT) problem focused assessment/low complexity   Risk & Benefits of therapy have been explained evaluation/treatment results reviewed;care plan/treatment goals reviewed;current/potential barriers reviewed;risks/benefits reviewed;participants voiced agreement with care plan;participants included;patient;sibling   OT Total Evaluation Time   OT Eval, Low Complexity  Minutes (78996) 9   Therapy Certification   Start of Care Date 06/04/25   Certification date from 06/04/25   Certification date to 06/04/25   Medical Diagnosis LTHA   OT Goals   Therapy Frequency (OT) One time eval and treatment   OT Predicted Duration/Target Date for Goal Attainment 06/04/25   OT Goals Lower Body Dressing;Lower Body Bathing;Toilet Transfer/Toileting   OT: Lower Body Dressing Modified independent;using adaptive equipment;within precautions;including set-up/clothing retrieval;Goal Met   OT: Lower Body Bathing Modified independent;using adaptive equipment;with precautions;Goal Met   OT: Toilet Transfer/Toileting Modified independent;toilet transfer;cleaning and garment management;using adaptive equipment;within precautions;Goal Met   Self-Care/Home Management   Self-Care/Home Mgmt/ADL, Compensatory, Meal Prep Minutes (95531) 39   Symptoms Noted During/After Treatment (Meal Preparation/Planning Training) increased pain   Treatment Detail/Skilled Intervention Ot; PT agreeable to therapy. PT educated on precautions and how to maintain during transfers. Pt completed supine to sit with SBA and cues for precautions,. Pt completed sit to stand EOB with CGA adncues for hand placement on walker. Pt ambulated to bathroom, educated on LE positioning during toilet transfer to improve efficiency with task. PT completed with SBA after education. PT able to compelte posterior cares with SBA. Pt and caregiver educated on toilet riser that will fit his bathroom, where to obtain AE. Pt returned to EOB. PT educated on how to complete LE dressing with aE and precautions including a reacher, sock aid and long handled shoe horn. Pt donned underwear and pants with reacher and cues for technique. PT donned socks with sock aid and cues for technique. Pt completed sit to stand with SBA, able to pull up with SBA. PT able to don shoes with SBA and cues for technique. Pt educated on tub transfer bench transfer with tutorial. Pt  completed tub transfer bench transfer with simulated tub with SBA andcues for technique,. Pt had questions and completed x2 for accuracy. PT returned to bed, Educated on how to obtain tub transfer bench. Pt and caregiver report no further OT concerns at this time. Pt in bed with alarm on andcall light  upon OT departure   OT Discharge Planning   OT Plan DC   OT Discharge Recommendation (DC Rec) other (see comments)  (defer to ortho MD)   OT Rationale for DC Rec PAtient appears safe and able to discharge home with AE and intermittent support from sister with IADLS (pt sister able to stay with pt as long as needed). Defer to ortho MD for further therapy recommendations   OT Brief overview of current status SBA tub transfer bench transfer   OT Total Distance Amb During Session (feet) 40   OT Equipment Needed at Discharge hip kit;tub bench;raised toilet seat   [facility]                                                                                   OUTPATIENT OCCUPATIONAL THERAPY    PLAN OF TREATMENT FOR OUTPATIENT REHABILITATION   Patient's Last Name, First Name, Karan Rendon    YOB: 1955   Provider's Name   [facility]   Medical Record No.  [id number]     Onset Date: 06/03/25 Start of Care Date: (P) 06/04/25     Medical Diagnosis:  (P) LTHA               OT Diagnosis:  (P) Decline in ADL independence and safety Certification Dates:  From: (P) 06/04/25  To: (P) 06/04/25     See note for plan of treatment, functional goals, and certification details.    I CERTIFY THE NEED FOR THESE SERVICES FURNISHED UNDER        THIS PLAN OF TREATMENT AND WHILE UNDER MY CARE (Physician co-signature of this document indicates review and certification of the therapy plan).

## 2025-06-04 NOTE — PLAN OF CARE
Patient POD 0, A&Ox4 VSS on 2L O2 via NC. Capno in place. Pain managed with scheduled tyleonol, has PRNs available. Up with Ax1 + GB and walker. DTV. Straight cathed in PACU at 1500 for 700mL. Wedge in place. LR running at 50mL/hr. Daily BG checks    Goal Outcome Evaluation:      Plan of Care Reviewed With: patient    Overall Patient Progress: no changeOverall Patient Progress: no change    Outcome Evaluation: Up with Ax1 + GB and walker, 2L O2 via NC, capno, DTV      Problem: Delirium  Goal: Optimal Coping  Outcome: Not Progressing  Goal: Improved Behavioral Control  Outcome: Not Progressing  Goal: Improved Attention and Thought Clarity  Outcome: Not Progressing  Goal: Improved Sleep  Outcome: Not Progressing     Problem: Hip Arthroplasty  Goal: Optimal Coping  Outcome: Not Progressing  Goal: Absence of Bleeding  Outcome: Not Progressing  Goal: Effective Bowel Elimination  Outcome: Not Progressing  Goal: Fluid and Electrolyte Balance  Outcome: Not Progressing  Goal: Optimal Functional Ability  Outcome: Not Progressing  Intervention: Promote Optimal Functional Status  Recent Flowsheet Documentation  Taken 6/3/2025 1707 by Josefina Wiclox RN  Assistive Device Utilized:   walker   gait belt  Activity Management:   activity adjusted per tolerance   dorsiflexion/plantar flexion performed  Goal: Absence of Infection Signs and Symptoms  Outcome: Not Progressing  Goal: Intact Neurovascular Status  Outcome: Not Progressing  Goal: Anesthesia/Sedation Recovery  Outcome: Not Progressing  Intervention: Optimize Anesthesia Recovery  Recent Flowsheet Documentation  Taken 6/3/2025 1707 by Josefina Wilcox, RN  Safety Promotion/Fall Prevention:   safety round/check completed   activity supervised   patient and family education  Goal: Optimal Pain Control and Function  Outcome: Not Progressing  Intervention: Prevent or Manage Pain  Recent Flowsheet Documentation  Taken 6/3/2025 1707 by Josefina Wilcox, RN  Pain Management  "Interventions: medication (see MAR)  Goal: Nausea and Vomiting Relief  Outcome: Not Progressing  Goal: Effective Urinary Elimination  Outcome: Not Progressing  Goal: Effective Oxygenation and Ventilation  Outcome: Not Progressing  Intervention: Optimize Oxygenation and Ventilation  Recent Flowsheet Documentation  Taken 6/3/2025 1707 by Josefina Wilcox RN  Cough And Deep Breathing: done independently per patient  Activity Management:   activity adjusted per tolerance   dorsiflexion/plantar flexion performed  Head of Bed (HOB) Positioning: HOB at 60 degrees     Problem: Adult Inpatient Plan of Care  Goal: Plan of Care Review  Description: The Plan of Care Review/Shift note should be completed every shift.  The Outcome Evaluation is a brief statement about your assessment that the patient is improving, declining, or no change.  This information will be displayed automatically on your shift  note.  Outcome: Not Progressing  Flowsheets (Taken 6/3/2025 1934)  Outcome Evaluation: Up with Ax1 + GB and walker, 2L O2 via NC, capno, DTV  Plan of Care Reviewed With: patient  Overall Patient Progress: no change  Goal: Patient-Specific Goal (Individualized)  Description: You can add care plan individualizations to a care plan. Examples of Individualization might be:  \"Parent requests to be called daily at 9am for status\", \"I have a hard time hearing out of my right ear\", or \"Do not touch me to wake me up as it startles  me\".  Outcome: Not Progressing  Goal: Absence of Hospital-Acquired Illness or Injury  Outcome: Not Progressing  Intervention: Identify and Manage Fall Risk  Recent Flowsheet Documentation  Taken 6/3/2025 1707 by Josefina Wilcox RN  Safety Promotion/Fall Prevention:   safety round/check completed   activity supervised   patient and family education  Intervention: Prevent Skin Injury  Recent Flowsheet Documentation  Taken 6/3/2025 1707 by Josefina Wilcox, RN  Body Position: position changed independently  Goal: " Optimal Comfort and Wellbeing  Outcome: Not Progressing  Intervention: Monitor Pain and Promote Comfort  Recent Flowsheet Documentation  Taken 6/3/2025 1707 by Josefina Wilcox, RN  Pain Management Interventions: medication (see MAR)  Goal: Readiness for Transition of Care  Outcome: Not Progressing  Intervention: Mutually Develop Transition Plan  Recent Flowsheet Documentation  Taken 6/3/2025 1707 by Josefina Wilcox, RN  Equipment Currently Used at Home: none

## 2025-06-05 ENCOUNTER — TELEPHONE (OUTPATIENT)
Dept: ORTHOPEDICS | Facility: CLINIC | Age: 70
End: 2025-06-05
Payer: COMMERCIAL

## 2025-06-05 VITALS
HEART RATE: 97 BPM | RESPIRATION RATE: 18 BRPM | WEIGHT: 224.87 LBS | OXYGEN SATURATION: 96 % | DIASTOLIC BLOOD PRESSURE: 82 MMHG | SYSTOLIC BLOOD PRESSURE: 156 MMHG | BODY MASS INDEX: 32.27 KG/M2 | TEMPERATURE: 98.1 F

## 2025-06-05 LAB
ALBUMIN UR-MCNC: NEGATIVE MG/DL
APPEARANCE UR: CLEAR
BACTERIA SPEC CULT: NORMAL
BACTERIA SPEC CULT: NORMAL
BILIRUB UR QL STRIP: NEGATIVE
COLOR UR AUTO: ABNORMAL
CRP SERPL-MCNC: 69.6 MG/L
FLUAV RNA SPEC QL NAA+PROBE: NEGATIVE
FLUBV RNA RESP QL NAA+PROBE: NEGATIVE
GLUCOSE UR STRIP-MCNC: >=1000 MG/DL
HGB UR QL STRIP: ABNORMAL
HOLD SPECIMEN: NORMAL
HOLD SPECIMEN: NORMAL
KETONES UR STRIP-MCNC: ABNORMAL MG/DL
LEUKOCYTE ESTERASE UR QL STRIP: NEGATIVE
MUCOUS THREADS #/AREA URNS LPF: PRESENT /LPF
NITRATE UR QL: NEGATIVE
PH UR STRIP: 5 [PH] (ref 5–7)
RBC URINE: <1 /HPF
RSV RNA SPEC NAA+PROBE: NEGATIVE
SARS-COV-2 RNA RESP QL NAA+PROBE: NEGATIVE
SP GR UR STRIP: 1.01 (ref 1–1.03)
UROBILINOGEN UR STRIP-MCNC: NORMAL MG/DL
WBC URINE: 1 /HPF

## 2025-06-05 PROCEDURE — 250N000011 HC RX IP 250 OP 636: Mod: JZ | Performed by: EMERGENCY MEDICINE

## 2025-06-05 PROCEDURE — 36415 COLL VENOUS BLD VENIPUNCTURE: CPT | Performed by: EMERGENCY MEDICINE

## 2025-06-05 PROCEDURE — 258N000003 HC RX IP 258 OP 636: Performed by: EMERGENCY MEDICINE

## 2025-06-05 PROCEDURE — 87040 BLOOD CULTURE FOR BACTERIA: CPT | Performed by: EMERGENCY MEDICINE

## 2025-06-05 PROCEDURE — 250N000013 HC RX MED GY IP 250 OP 250 PS 637: Performed by: EMERGENCY MEDICINE

## 2025-06-05 PROCEDURE — 81001 URINALYSIS AUTO W/SCOPE: CPT | Performed by: EMERGENCY MEDICINE

## 2025-06-05 PROCEDURE — 87637 SARSCOV2&INF A&B&RSV AMP PRB: CPT | Performed by: EMERGENCY MEDICINE

## 2025-06-05 RX ORDER — ONDANSETRON 2 MG/ML
4 INJECTION INTRAMUSCULAR; INTRAVENOUS EVERY 30 MIN PRN
Status: DISCONTINUED | OUTPATIENT
Start: 2025-06-05 | End: 2025-06-05 | Stop reason: HOSPADM

## 2025-06-05 RX ORDER — OXYCODONE HYDROCHLORIDE 5 MG/1
5 TABLET ORAL ONCE
Refills: 0 | Status: COMPLETED | OUTPATIENT
Start: 2025-06-05 | End: 2025-06-05

## 2025-06-05 RX ADMIN — OXYCODONE HYDROCHLORIDE 5 MG: 5 TABLET ORAL at 02:00

## 2025-06-05 RX ADMIN — SODIUM CHLORIDE 1000 ML: 0.9 INJECTION, SOLUTION INTRAVENOUS at 02:00

## 2025-06-05 RX ADMIN — HYDROMORPHONE HYDROCHLORIDE 1 MG: 1 INJECTION, SOLUTION INTRAMUSCULAR; INTRAVENOUS; SUBCUTANEOUS at 00:20

## 2025-06-05 ASSESSMENT — ACTIVITIES OF DAILY LIVING (ADL)
ADLS_ACUITY_SCORE: 55

## 2025-06-05 NOTE — ED NOTES
[facility]  ED Nurse Handoff Report    ED Chief complaint: Post-op Problem and Fever  . ED Diagnosis:   Final diagnoses:   None       Allergies: No Known Allergies    Code Status: Full Code    Activity level - Baseline/Home:  independent.  Activity Level - Current:   assist of 2 and walker.   Lift room needed: No.   Bariatric: No   Needed: No   Isolation: No.   Infection: Not Applicable.     Respiratory status: Room air    Vital Signs (within 30 minutes):   Vitals:    06/04/25 2218   BP: (!) 156/82   Pulse: 97   Resp: 18   Temp: 99.1  F (37.3  C)   TempSrc: Oral   SpO2: 96%   Weight: 102 kg (224 lb 13.9 oz)       Cardiac Rhythm:  ,      Pain level:    Patient confused: No.   Patient Falls Risk: activity supervised.   Elimination Status: Unable to void     Patient Report - Initial Complaint: Pt post op left hip replacement.   Focused Assessment: Pt with left hip replacement with fever tonight     Abnormal Results:   Labs Ordered and Resulted from Time of ED Arrival to Time of ED Departure   COMPREHENSIVE METABOLIC PANEL - Abnormal       Result Value    Sodium 136      Potassium 3.7      Carbon Dioxide (CO2) 20 (*)     Anion Gap 14      Urea Nitrogen 18.2      Creatinine 1.20 (*)     GFR Estimate 66      Calcium 10.0      Chloride 102      Glucose 138 (*)     Alkaline Phosphatase 107      AST 32      ALT 16      Protein Total 7.4      Albumin 4.5      Bilirubin Total 0.5     CBC WITH PLATELETS AND DIFFERENTIAL - Abnormal    WBC Count 10.7      RBC Count 4.25 (*)     Hemoglobin 12.3 (*)     Hematocrit 35.8 (*)     MCV 84      MCH 28.9      MCHC 34.4      RDW 13.0      Platelet Count 225      % Neutrophils 69      % Lymphocytes 21      % Monocytes 10      % Eosinophils 0      % Basophils 0      % Immature Granulocytes 1      NRBCs per 100 WBC 0      Absolute Neutrophils 7.3      Absolute Lymphocytes 2.3      Absolute Monocytes 1.0      Absolute Eosinophils 0.0      Absolute Basophils  0.0      Absolute Immature Granulocytes 0.1      Absolute NRBCs 0.0     CRP INFLAMMATION   ROUTINE UA WITH MICROSCOPIC REFLEX TO CULTURE   INFLUENZA A/B, RSV AND SARS-COV2 PCR   BLOOD CULTURE   BLOOD CULTURE        XR Chest 2 Views    (Results Pending)       Treatments provided: Meds  Family Comments: Sister at bedside  OBS brochure/video discussed/provided to patient:  Yes  ED Medications:   Medications   ondansetron (ZOFRAN) injection 4 mg (has no administration in time range)   sodium chloride 0.9% BOLUS 1,000 mL (has no administration in time range)   HYDROmorphone (DILAUDID) injection 1 mg (1 mg Intravenous $Given 6/5/25 0020)       Drips infusing:  No  For the majority of the shift this patient was Green.   Interventions performed were N/A.    Sepsis treatment initiated: No    Cares/treatment/interventions/medications to be completed following ED care: See [facility]    ED Nurse Name: Adriana Elise RN  12:50 AM

## 2025-06-05 NOTE — TELEPHONE ENCOUNTER
"DOS 6-3-25 left total hip arthroplasty with Dr Lynn.  Patient had presented to ER yesterday 6-4-25 with concerns for fever at home and increased surgical site pain.  He was discharged yesterday, he had no fever in the ER and no new medications or treatments prescribed. Chest XR was normal. They offered to admit for pain management but patient declined.  Outreach call placed today 6-5-25 12:31 pm.  Patient spoke very little on phone, answered questions with 1 word.  When asked how he was feeling today states \"better\".  No fever? \"No\"  No concerning symptoms?\" No\"  No questions or concerns for the doctor? \"No\".  He would not elaborate further.  Advised him that we will inform his provider re: this call and if his symptoms or pain worsens to please let us know right away .  He states\"OK\".  Lorene Snell RN    "

## 2025-06-05 NOTE — ED TRIAGE NOTES
Patient states he had a hip replacement yesterday. Had a fever this evening and temp was 101.7 called the on call line and instructed to come to ED. ABCs intact. VSS.

## 2025-06-05 NOTE — TELEPHONE ENCOUNTER
Hip replacement surgery. Temp is 101.7.  His wife will bring him to the [facility] ER.    Nae Morgan RN  [facility]    Reason for Disposition   Fever > 100.4 F (38.0 C)    Additional Information   Negative: Sounds like a life-threatening emergency to the triager   Negative: Chest pain   Negative: Difficulty breathing   Negative: Acting confused (e.g., disoriented, slurred speech) or excessively sleepy   Negative: Post-Op tonsil and adenoid surgery, symptoms or questions about   Negative: Surgical incision symptoms and questions   Negative: [1] Pain or burning with passing urine (urination) AND [2] male   Negative: [1] Pain or burning with passing urine (urination) AND [2] female   Negative: Constipation   Negative: New or worsening leg (calf, thigh) pain   Negative: New or worsening leg swelling   Negative: Dizziness is severe, or persists > 24 hours after surgery   Negative: Pain, redness, swelling, or pus at IV Site   Negative: Symptoms arising from use of a urinary catheter (e.g., Coude, Santos)   Negative: Cast problems or questions   Negative: Medication question   Negative: [1] Widespread rash AND [2] bright red, sunburn-like   Negative: [1] SEVERE headache AND [2] after spinal (epidural) anesthesia   Negative: [1] Vomiting AND [2] persists > 4 hours   Negative: [1] Vomiting AND [2] abdomen looks much more swollen than usual   Negative: [1] Drinking very little AND [2] dehydration suspected (e.g., no urine > 12 hours, very dry mouth, very lightheaded)   Negative: Patient sounds very sick or weak to the triager   Negative: Sounds like a serious complication to the triager    Protocols used: Post-Op Symptoms and [id number]

## 2025-06-05 NOTE — ED PROVIDER NOTES
Emergency Department Note      History of Present Illness     Chief Complaint   Post-op Problem and Fever      HPI   Karan Anglin is a 70 year old male presents with concern about post-op problems and fever. Patient states he was discharged today and then at 1700 today woke up with a fever of 101 at home and greater surgical sight pain when standing (from a 4 to an 8). Notes that he took an oxycodone at 1700, and has been experiencing a non-productive cough, and nausea even during his hospital stay. Denies experiencing any vomiting, or diarrhea. No family members are ill. He states oxycodone has been helping with pain in the hospital and at home. He states that his nurse did look at his wound before he was discharged today.  His nurse did not feel there is any concerns.  He is not seeing any drainage or redness outside the bandage.  He has been moving somewhat around the house since he got home and does feel some increased pain with moving in his surgical site.  Denies any abdominal pain and diarrhea.  No sore throat congestion or any respiratory symptoms other than cough.    Independent Historian   None    Review of External Notes   none    Past Medical History     Medical History and Problem List   Essential hypertension  Hip pain  Chest pain  Acute upper GI bleed  Hearing loss  Migraine   Hyperlipidemia  ISAC  Nephrolithiasis      Medications   Amlodipine  Aspirin 81 mg  Atenolol   Atorvastatin      Surgical History   GI surgery  Open reduction internal fixation humerus proximal, left   Arthroplasty hip, left    Physical Exam     Patient Vitals for the past 24 hrs:   BP Temp Temp src Pulse Resp SpO2 Weight   06/04/25 2218 (!) 156/82 99.1  F (37.3  C) Oral 97 18 96 % 102 kg (224 lb 13.9 oz)     Physical Exam  Constitutional:       Appearance: He is well-developed.   HENT:      Right Ear: External ear normal.      Left Ear: External ear normal.      Mouth/Throat:      Mouth: Mucous membranes are moist.      Pharynx:  Oropharynx is clear. No oropharyngeal exudate or posterior oropharyngeal erythema.   Eyes:      General: No scleral icterus.     Extraocular Movements: Extraocular movements intact.      Conjunctiva/sclera: Conjunctivae normal.      Pupils: Pupils are equal, round, and reactive to light.   Cardiovascular:      Rate and Rhythm: Normal rate and regular rhythm.      Heart sounds: Normal heart sounds. No murmur heard.     No friction rub. No gallop.   Pulmonary:      Effort: Pulmonary effort is normal. No respiratory distress.      Breath sounds: Normal breath sounds. No stridor. No wheezing, rhonchi or rales.   Abdominal:      General: Bowel sounds are normal. There is no distension.      Palpations: Abdomen is soft. There is no mass.      Tenderness: There is no abdominal tenderness.   Musculoskeletal:         General: Tenderness present.      Cervical back: Normal range of motion and neck supple.      Comments: L lateral thigh surgical incision is clean, dry, intact, no erythema or signs of infection. No purulent drainage on exam. 2+ pulses in BLE.    Skin:     General: Skin is warm and dry.      Capillary Refill: Capillary refill takes less than 2 seconds.      Findings: No rash.   Neurological:      Mental Status: He is alert.           Diagnostics     Lab Results   Labs Ordered and Resulted from Time of ED Arrival to Time of ED Departure   COMPREHENSIVE METABOLIC PANEL - Abnormal       Result Value    Sodium 136      Potassium 3.7      Carbon Dioxide (CO2) 20 (*)     Anion Gap 14      Urea Nitrogen 18.2      Creatinine 1.20 (*)     GFR Estimate 66      Calcium 10.0      Chloride 102      Glucose 138 (*)     Alkaline Phosphatase 107      AST 32      ALT 16      Protein Total 7.4      Albumin 4.5      Bilirubin Total 0.5     CBC WITH PLATELETS AND DIFFERENTIAL - Abnormal    WBC Count 10.7      RBC Count 4.25 (*)     Hemoglobin 12.3 (*)     Hematocrit 35.8 (*)     MCV 84      MCH 28.9      MCHC 34.4      RDW 13.0       Platelet Count 225      % Neutrophils 69      % Lymphocytes 21      % Monocytes 10      % Eosinophils 0      % Basophils 0      % Immature Granulocytes 1      NRBCs per 100 WBC 0      Absolute Neutrophils 7.3      Absolute Lymphocytes 2.3      Absolute Monocytes 1.0      Absolute Eosinophils 0.0      Absolute Basophils 0.0      Absolute Immature Granulocytes 0.1      Absolute NRBCs 0.0     CRP INFLAMMATION - Abnormal    CRP Inflammation 69.60 (*)    ROUTINE UA WITH MICROSCOPIC REFLEX TO CULTURE - Abnormal    Color Urine Straw      Appearance Urine Clear      Glucose Urine >=1000 (*)     Bilirubin Urine Negative      Ketones Urine Trace (*)     Specific Gravity Urine 1.013      Blood Urine Trace (*)     pH Urine 5.0      Protein Albumin Urine Negative      Urobilinogen Urine Normal      Nitrite Urine Negative      Leukocyte Esterase Urine Negative      Mucus Urine Present (*)     RBC Urine <1      WBC Urine 1     INFLUENZA A/B, RSV AND SARS-COV2 PCR - Normal    Influenza A PCR Negative      Influenza B PCR Negative      RSV PCR Negative      SARS CoV2 PCR Negative     BLOOD CULTURE   BLOOD CULTURE       Imaging   XR Chest 2 Views   Final Result   IMPRESSION:       No focal airspace disease. No pleural effusion or pneumothorax.      The cardiomediastinal silhouette is unremarkable.      Mild multilevel degenerative changes of the spine.          Independent Interpretation   None    ED Course      Medications Administered   Medications   ondansetron (ZOFRAN) injection 4 mg (has no administration in time range)   sodium chloride 0.9% BOLUS 1,000 mL (has no administration in time range)   HYDROmorphone (DILAUDID) injection 1 mg (1 mg Intravenous $Given 6/5/25 0020)       Procedures   Procedures     Discussion of Management   Orthopedics,     ED Course   ED Course as of 06/05/25 0130 Wed Jun 04, 2025   2225 I obtained the history and evaluated the patient.        Additional Documentation  None    Medical  Decision Making / Diagnosis     CMS Diagnoses: None    MIPS   None           MDM   Karan Anglin is a 70 year old male who presents for concern of fever.  Patient was just discharged today after left DUSTIN.  He was afebrile on exam here.  There were no other signs of infection.  No signs UTI.  No acute pneumonia on chest x-ray.  Viral swabs are negative.  There is no GI symptoms such as diarrhea.  I discussed the findings with Dr. Gutierrez of orthopedic.  He states that it would be too soon for postop infection to happen.  There is no signs of wound infection therefore at this point he does not feel that patient will require admission or antibiotics.  I discussed the finding with the patient.  We discussed admission for pain management if he is having significant pain.  Patient decided that he would rather go home and keep an eye on his symptoms.  He does feel that the oxycodone was given here along with the Dilaudid seem to help with his pain.  We discussed close monitoring of temperature.  He needs to follow-up with his doctor in the next 2 days.  Dr. Gutierrez called back and stated that they do have a walk-in Ortho clinic that will be in the ED at this morning.  Patient can certainly be seen there if he feels that he needs an exam there.  I also pass this information on the patient.  If he does not fever at home, I will have him call his orthopedic office to get more direction on what he needs to do.  Blood cultures are pending and we will notify him if anything shows up there.  Patient does feel comfortable going home and following up.  Return precaution provided.    Disposition   The patient was discharged.     Diagnosis     ICD-10-CM    1. Fever, unspecified fever cause  R50.9       2. Acute post-operative pain  G89.18              Scribe Disclosure:  Pedro Luis THOMAS, am serving as a scribe at 1:31 AM on 6/5/2025 to document services personally performed by Dustin Hassan MD based on my observations and the  provider's statements to me.        Dustin Hassan MD  06/05/25 035

## 2025-06-07 ENCOUNTER — APPOINTMENT (OUTPATIENT)
Dept: GENERAL RADIOLOGY | Facility: CLINIC | Age: 70
End: 2025-06-07
Payer: COMMERCIAL

## 2025-06-07 ENCOUNTER — APPOINTMENT (OUTPATIENT)
Dept: ULTRASOUND IMAGING | Facility: CLINIC | Age: 70
End: 2025-06-07
Payer: COMMERCIAL

## 2025-06-07 ENCOUNTER — HOSPITAL ENCOUNTER (EMERGENCY)
Facility: CLINIC | Age: 70
Discharge: HOME | End: 2025-06-07
Payer: COMMERCIAL

## 2025-06-07 ENCOUNTER — TELEPHONE (OUTPATIENT)
Dept: ORTHOPEDICS | Facility: CLINIC | Age: 70
End: 2025-06-07

## 2025-06-07 ENCOUNTER — TELEPHONE (OUTPATIENT)
Dept: ORTHOPEDICS | Facility: CLINIC | Age: 70
End: 2025-06-07
Payer: COMMERCIAL

## 2025-06-07 VITALS
WEIGHT: 223.11 LBS | TEMPERATURE: 98.5 F | DIASTOLIC BLOOD PRESSURE: 85 MMHG | OXYGEN SATURATION: 96 % | HEIGHT: 72 IN | BODY MASS INDEX: 30.22 KG/M2 | SYSTOLIC BLOOD PRESSURE: 141 MMHG | RESPIRATION RATE: 18 BRPM | HEART RATE: 86 BPM

## 2025-06-07 DIAGNOSIS — R19.7 DIARRHEA: ICD-10-CM

## 2025-06-07 DIAGNOSIS — Z96.642 HISTORY OF TOTAL LEFT HIP ARTHROPLASTY: ICD-10-CM

## 2025-06-07 DIAGNOSIS — R11.0 NAUSEA: ICD-10-CM

## 2025-06-07 DIAGNOSIS — R50.9 FEVER: ICD-10-CM

## 2025-06-07 DIAGNOSIS — R79.82 ELEVATED C-REACTIVE PROTEIN (CRP): ICD-10-CM

## 2025-06-07 LAB
ALBUMIN SERPL BCG-MCNC: 3.7 G/DL (ref 3.5–5.2)
ALBUMIN UR-MCNC: 20 MG/DL
ALP SERPL-CCNC: 98 U/L (ref 40–150)
ALT SERPL W P-5'-P-CCNC: 23 U/L (ref 0–70)
ANION GAP SERPL CALCULATED.3IONS-SCNC: 13 MMOL/L (ref 7–15)
APPEARANCE UR: CLEAR
AST SERPL W P-5'-P-CCNC: 35 U/L (ref 0–45)
BASOPHILS # BLD AUTO: 0 10E3/UL (ref 0–0.2)
BASOPHILS NFR BLD AUTO: 0 %
BILIRUB SERPL-MCNC: 0.6 MG/DL
BILIRUB UR QL STRIP: NEGATIVE
BUN SERPL-MCNC: 21.2 MG/DL (ref 8–23)
CALCIUM SERPL-MCNC: 8.6 MG/DL (ref 8.8–10.4)
CHLORIDE SERPL-SCNC: 99 MMOL/L (ref 98–107)
COLOR UR AUTO: ABNORMAL
CREAT SERPL-MCNC: 1.07 MG/DL (ref 0.67–1.17)
CRP SERPL-MCNC: 90.1 MG/L
EGFRCR SERPLBLD CKD-EPI 2021: 76 ML/MIN/1.73M2
EOSINOPHIL # BLD AUTO: 0.1 10E3/UL (ref 0–0.7)
EOSINOPHIL NFR BLD AUTO: 1 %
ERYTHROCYTE [DISTWIDTH] IN BLOOD BY AUTOMATED COUNT: 12.4 % (ref 10–15)
GLUCOSE SERPL-MCNC: 126 MG/DL (ref 70–99)
GLUCOSE UR STRIP-MCNC: >=1000 MG/DL
HCO3 BLDV-SCNC: 23 MMOL/L (ref 21–28)
HCO3 SERPL-SCNC: 20 MMOL/L (ref 22–29)
HCT VFR BLD AUTO: 34 % (ref 40–53)
HGB BLD-MCNC: 11.5 G/DL (ref 13.3–17.7)
HGB UR QL STRIP: NEGATIVE
HOLD SPECIMEN: NORMAL
HOLD SPECIMEN: NORMAL
IMM GRANULOCYTES # BLD: 0 10E3/UL
IMM GRANULOCYTES NFR BLD: 1 %
KETONES UR STRIP-MCNC: NEGATIVE MG/DL
LACTATE BLD-SCNC: 1.1 MMOL/L (ref 0.7–2)
LEUKOCYTE ESTERASE UR QL STRIP: NEGATIVE
LIPASE SERPL-CCNC: 25 U/L (ref 13–60)
LYMPHOCYTES # BLD AUTO: 0.7 10E3/UL (ref 0.8–5.3)
LYMPHOCYTES NFR BLD AUTO: 12 %
MCH RBC QN AUTO: 28.8 PG (ref 26.5–33)
MCHC RBC AUTO-ENTMCNC: 33.8 G/DL (ref 31.5–36.5)
MCV RBC AUTO: 85 FL (ref 78–100)
MONOCYTES # BLD AUTO: 0.3 10E3/UL (ref 0–1.3)
MONOCYTES NFR BLD AUTO: 5 %
MUCOUS THREADS #/AREA URNS LPF: PRESENT /LPF
NEUTROPHILS # BLD AUTO: 4.9 10E3/UL (ref 1.6–8.3)
NEUTROPHILS NFR BLD AUTO: 82 %
NITRATE UR QL: NEGATIVE
NRBC # BLD AUTO: 0 10E3/UL
NRBC BLD AUTO-RTO: 0 /100
PCO2 BLDV: 38 MM HG (ref 40–50)
PH BLDV: 7.39 [PH] (ref 7.32–7.43)
PH UR STRIP: 6 [PH] (ref 5–7)
PLATELET # BLD AUTO: 279 10E3/UL (ref 150–450)
PO2 BLDV: 24 MM HG (ref 25–47)
POTASSIUM SERPL-SCNC: 3.6 MMOL/L (ref 3.4–5.3)
PROT SERPL-MCNC: 6.6 G/DL (ref 6.4–8.3)
RBC # BLD AUTO: 3.99 10E6/UL (ref 4.4–5.9)
RBC URINE: 1 /HPF
SAO2 % BLDV: 41 % (ref 70–75)
SODIUM SERPL-SCNC: 132 MMOL/L (ref 135–145)
SP GR UR STRIP: 1.02 (ref 1–1.03)
UROBILINOGEN UR STRIP-MCNC: NORMAL MG/DL
WBC # BLD AUTO: 6 10E3/UL (ref 4–11)
WBC URINE: 3 /HPF

## 2025-06-07 PROCEDURE — 250N000011 HC RX IP 250 OP 636

## 2025-06-07 PROCEDURE — 85025 COMPLETE CBC W/AUTO DIFF WBC: CPT

## 2025-06-07 PROCEDURE — 93005 ELECTROCARDIOGRAM TRACING: CPT

## 2025-06-07 PROCEDURE — 36415 COLL VENOUS BLD VENIPUNCTURE: CPT

## 2025-06-07 PROCEDURE — 83690 ASSAY OF LIPASE: CPT

## 2025-06-07 PROCEDURE — 81001 URINALYSIS AUTO W/SCOPE: CPT

## 2025-06-07 PROCEDURE — 258N000003 HC RX IP 258 OP 636

## 2025-06-07 PROCEDURE — 99285 EMERGENCY DEPT VISIT HI MDM: CPT | Mod: 25

## 2025-06-07 PROCEDURE — 86140 C-REACTIVE PROTEIN: CPT

## 2025-06-07 PROCEDURE — 87040 BLOOD CULTURE FOR BACTERIA: CPT

## 2025-06-07 PROCEDURE — 82803 BLOOD GASES ANY COMBINATION: CPT

## 2025-06-07 PROCEDURE — 96374 THER/PROPH/DIAG INJ IV PUSH: CPT

## 2025-06-07 PROCEDURE — 96361 HYDRATE IV INFUSION ADD-ON: CPT

## 2025-06-07 PROCEDURE — 73502 X-RAY EXAM HIP UNI 2-3 VIEWS: CPT

## 2025-06-07 PROCEDURE — 93970 EXTREMITY STUDY: CPT

## 2025-06-07 PROCEDURE — 84132 ASSAY OF SERUM POTASSIUM: CPT

## 2025-06-07 RX ORDER — ONDANSETRON 2 MG/ML
4 INJECTION INTRAMUSCULAR; INTRAVENOUS ONCE
Status: COMPLETED | OUTPATIENT
Start: 2025-06-07 | End: 2025-06-07

## 2025-06-07 RX ORDER — ONDANSETRON 4 MG/1
4 TABLET, ORALLY DISINTEGRATING ORAL EVERY 8 HOURS PRN
Qty: 10 TABLET | Refills: 0 | Status: SHIPPED | OUTPATIENT
Start: 2025-06-07

## 2025-06-07 RX ADMIN — ONDANSETRON 4 MG: 2 INJECTION, SOLUTION INTRAMUSCULAR; INTRAVENOUS at 13:44

## 2025-06-07 RX ADMIN — SODIUM CHLORIDE 1000 ML: 0.9 INJECTION, SOLUTION INTRAVENOUS at 13:44

## 2025-06-07 ASSESSMENT — COLUMBIA-SUICIDE SEVERITY RATING SCALE - C-SSRS
6. HAVE YOU EVER DONE ANYTHING, STARTED TO DO ANYTHING, OR PREPARED TO DO ANYTHING TO END YOUR LIFE?: NO
1. IN THE PAST MONTH, HAVE YOU WISHED YOU WERE DEAD OR WISHED YOU COULD GO TO SLEEP AND NOT WAKE UP?: NO
2. HAVE YOU ACTUALLY HAD ANY THOUGHTS OF KILLING YOURSELF IN THE PAST MONTH?: NO

## 2025-06-07 ASSESSMENT — ACTIVITIES OF DAILY LIVING (ADL)
ADLS_ACUITY_SCORE: 55

## 2025-06-07 NOTE — ED PROVIDER NOTES
Emergency Department Note      History of Present Illness     Chief Complaint   Fever      HPI   Karan Anglin is a 70 year old male with a history of type 2 diabetes, stage 3 CKD, hypertension, and hyperlipidemia who presents to the ED for evaluation of a fever. The patient states he has had nausea, fever, watery diarrhea, fatigue, lightheadedness, and generalized weakness for the past 4 days. Notes a maximum temperature of 102F, last measured this morning and treated with 1000 mg Tylenol. He was seen in urgent care the day of onset when workup was unremarkable. He was referred here today for further workup. He had a recent left DUSTIN that he feels is healing well. No increased pain, erythema, or swelling. Denies abdominal pain, chest pain, shortness of breath, headache, dizziness, sore throat, lower extremity edema, dysuria, hematuria, or recent travel. No history of smoking or DVT/PE.    Independent Historian   None    Review of External Notes   I reviewed ED visit from 6/4/25, patient had full sepsis work up that did not review etiology of fever    Past Medical History     Medical History and Problem List   Anemia  GI bleed  GERD  Migraine  HTN  Stage 3 CKD  Anxiety  HLD  Migraine   Nephrolithiasis  OA  Shoulder dislocation  Type 2 diabetes     Medications   Amlodipine  Aspirin 81 mg  Atenolol  Lipitor  Celexa  Jardiance  Nexium  Gabapentin  Losartan  Metformin  Oxycodone  Maxalt     Surgical History   DUSTIN (L)  Peptic ulcer repair  Proximal humerus ORIF (L)    Physical Exam     Patient Vitals for the past 24 hrs:   BP Temp Temp src Pulse Resp SpO2 Height Weight   06/07/25 1803 -- 98.5  F (36.9  C) Oral -- -- -- -- --   06/07/25 1715 -- -- -- -- -- 97 % -- --   06/07/25 1714 -- -- -- -- -- 97 % -- --   06/07/25 1713 -- -- -- -- -- 97 % -- --   06/07/25 1712 -- -- -- -- -- 99 % -- --   06/07/25 1710 (!) 145/86 -- -- 85 -- 96 % -- --   06/07/25 1550 -- -- -- 80 -- 95 % -- --   06/07/25 1549 -- -- -- 80 -- 96 % -- --    06/07/25 1548 -- -- -- 81 -- 97 % -- --   06/07/25 1545 -- -- -- 81 -- 95 % -- --   06/07/25 1540 -- -- -- 80 -- 96 % -- --   06/07/25 1535 -- -- -- 80 -- 95 % -- --   06/07/25 1530 -- -- -- 79 -- 96 % -- --   06/07/25 1525 -- -- -- 78 -- 95 % -- --   06/07/25 1520 -- -- -- 79 -- 96 % -- --   06/07/25 1515 -- -- -- 78 -- 96 % -- --   06/07/25 1500 (!) 142/85 -- -- -- -- -- -- --   06/07/25 1136 133/77 97.5  F (36.4  C) -- 91 18 98 % 1.829 m (6') 101.2 kg (223 lb 1.7 oz)     Physical Exam  BP (!) 145/86   Pulse 85   Temp 98.5  F (36.9  C) (Oral)   Resp 18   Ht 1.829 m (6')   Wt 101.2 kg (223 lb 1.7 oz)   SpO2 97%   BMI 30.26 kg/m     General: No acute distress. Accompanied by sister.  Head: Atraumatic.   EENT: Moist mucus membranes.   CV: Regular rate and rhythm.   Respiratory: Breathing comfortably on room air. Lungs clear to auscultation bilaterally without wheezes, rhonchi, or rales.  GI: Soft, non-distended. Non-tender abdomen. No rebound, rigidity, or guarding.   Msk: Extremities without tenderness to palpation or deformity.  No calf swelling or tenderness palpation bilaterally.  There is a well-healing surgical site on the left lateral hip without any surrounding erythema, edema, significant tenderness to palpation.  There is no fluctuance or induration.  Skin: Warm and dry. No rashes.  Neuro: Awake, alert, and conversant. No focal neurologic deficits.     Diagnostics     Lab Results   Labs Ordered and Resulted from Time of ED Arrival to Time of ED Departure   COMPREHENSIVE METABOLIC PANEL - Abnormal       Result Value    Sodium 132 (*)     Potassium 3.6      Carbon Dioxide (CO2) 20 (*)     Anion Gap 13      Urea Nitrogen 21.2      Creatinine 1.07      GFR Estimate 76      Calcium 8.6 (*)     Chloride 99      Glucose 126 (*)     Alkaline Phosphatase 98      AST 35      ALT 23      Protein Total 6.6      Albumin 3.7      Bilirubin Total 0.6     CRP INFLAMMATION - Abnormal    CRP Inflammation 90.10 (*)     ROUTINE UA WITH MICROSCOPIC REFLEX TO CULTURE - Abnormal    Color Urine Light Yellow      Appearance Urine Clear      Glucose Urine >=1000 (*)     Bilirubin Urine Negative      Ketones Urine Negative      Specific Gravity Urine 1.018      Blood Urine Negative      pH Urine 6.0      Protein Albumin Urine 20 (*)     Urobilinogen Urine Normal      Nitrite Urine Negative      Leukocyte Esterase Urine Negative      Mucus Urine Present (*)     RBC Urine 1      WBC Urine 3     CBC WITH PLATELETS AND DIFFERENTIAL - Abnormal    WBC Count 6.0      RBC Count 3.99 (*)     Hemoglobin 11.5 (*)     Hematocrit 34.0 (*)     MCV 85      MCH 28.8      MCHC 33.8      RDW 12.4      Platelet Count 279      % Neutrophils 82      % Lymphocytes 12      % Monocytes 5      % Eosinophils 1      % Basophils 0      % Immature Granulocytes 1      NRBCs per 100 WBC 0      Absolute Neutrophils 4.9      Absolute Lymphocytes 0.7 (*)     Absolute Monocytes 0.3      Absolute Eosinophils 0.1      Absolute Basophils 0.0      Absolute Immature Granulocytes 0.0      Absolute NRBCs 0.0     ISTAT GASES LACTATE VENOUS POCT - Abnormal    Lactic Acid POCT 1.1      Bicarbonate Venous POCT 23      O2 Sat, Venous POCT 41 (*)     pCO2 Venous POCT 38 (*)     pH Venous POCT 7.39      pO2 Venous POCT 24 (*)    LIPASE - Normal    Lipase 25     BLOOD CULTURE   BLOOD CULTURE     EKG:  ECG results from 06/07/25   EKG 12 lead     Value    Systolic Blood Pressure     Diastolic Blood Pressure     Ventricular Rate 80    Atrial Rate 80    ND Interval 232    QRS Duration 82        QTc 452    P Axis -2    R AXIS -18    T Axis 7    Interpretation ECG      Sinus rhythm with 1st degree A-V block  Otherwise normal ECG  No previous ECGs available         Imaging   US Lower Extremity Venous Duplex Bilateral   Final Result   IMPRESSION:   1.  No deep venous thrombosis in the bilateral lower extremities.      XR Pelvis w Hip Left 1 View   Final Result   IMPRESSION: Left total  hip arthroplasty. Normal joint alignment. No evidence of loosening. No visible periprosthetic fracture. No significant bone or joint abnormality elsewhere. Mild degenerative changes in the lower lumbar spine and right hip.        Independent Interpretation   X-ray of the left hip shows left total hip arthroplasty.    ED Course      Medications Administered   Medications   sodium chloride (PF) 0.9% PF flush 3 mL (has no administration in time range)   sodium chloride (PF) 0.9% PF flush 3 mL (has no administration in time range)   sodium chloride 0.9% BOLUS 1,000 mL (0 mLs Intravenous Stopped 6/7/25 1444)   ondansetron (ZOFRAN) injection 4 mg (4 mg Intravenous $Given 6/7/25 1344)       Procedures   Procedures     Discussion of Management   Orthopedics who agrees with plan of care    ED Course   ED Course as of 06/07/25 1804   Sat Jun 07, 2025   1250 I obtained history and performed a physical exam as noted above.    1727 Spoke with Dr. Simon Larsen, resident with Dr. Lynn, Orthopedic Surgeon who performed Karan's left DUSTIN, feels comfortable with patient discharging to home if negative US.        Additional Documentation  None    Medical Decision Making / Diagnosis     CMS Diagnoses: None    MIPS   None    MDM   Karan Anglin is a 70 year old male presenting today for evaluation of fevers at home.    On arrival, patient had normal vital signs, notably he is afebrile, though does report having Tylenol at 9 AM this morning.  Differential diagnosis for postoperative fever includes pneumonia, UTI, wound infection, venous thromboembolism, drug reaction, bacteremia.  He had a full workup 3 days ago, went to go see orthopedics today for follow-up and was referred here.  His only somatic symptoms are nausea and diarrhea.  He has no other physical concerns.    Workup today continues to be reassuring.  He has normal lactic acid level, CBC without leukocytosis.  Normal UA, grossly reassuring electrolytes.  CRP elevated at 90.   Though, this test is very nonspecific and is expected to be elevated in the postoperative state.  His abdominal exam is completely benign and I do not feel that a CT of the abdomen and pelvis would be of benefit currently.  X-ray of the left hip reassuring without any large fluid collection to suggest abscess and the wound is well-appearing as well without any signs of cellulitis or surrounding infection.  He is not reporting significant pain to the left hip as well either.  His biggest concern at this time is nausea and he responded well to Zofran.  He was able to eat and drink here without difficulty.  Ultrasound of bilateral lower extremities was obtained which was negative for DVT.  He has no chest concerns including chest pain, shortness of breath, cough.  Chest x-ray was not repeated today as he had one 3 days ago that was normal and I do not think that pneumonia is the culprit.  He has been having watery diarrhea for the last 24 hours.  Did order stool test, but he did not have an episode of diarrhea here.  He will be sent home with this and can return this at his leisure.  Blood cultures currently pending though blood cultures from 3 days ago were negative.  I touched base with his surgical team and they feel comfortable with plan for discharge to home with symptomatic treatment.  He is occasionally reporting subjective fevers and EKG is without any acute ischemic changes.    He had repeat temperature here just prior to discharge, greater than 8 hours after administration of Tylenol at which time I would expect fever to return to baseline.  He is afebrile.  I discussed with him that he may want to consider getting a new thermometer just to ensure that he is having accurate reads at home.  He will use Zofran at home for nausea, return with stool sample.  If he starts to have black or bloody stools, severe abdominal pain, redness to the infection site, chest pain, cough, difficulty breathing, he should return  for further evaluation.  I recommended he get his CRP rechecked.    Disposition   The patient was discharged.     Diagnosis     ICD-10-CM    1. Fever  R50.9       2. History of total left hip arthroplasty  Z96.642       3. Nausea  R11.0       4. Diarrhea  R19.7 Enteric Bacteria and Virus Panel by BEKAH Stool     C. difficile Toxin B PCR with reflex to C. difficile EIA           Discharge Medications   New Prescriptions    ONDANSETRON (ZOFRAN ODT) 4 MG ODT TAB    Take 1 tablet (4 mg) by mouth every 8 hours as needed for nausea.         Scribe Disclosure:  WILLIAM, Diana Petty, am serving as a scribe at 12:28 PM on 6/7/2025 to document services personally performed by Unique Killian PA-C based on my observations and the provider's statements to me.        Unique Killian PA-C  06/07/25 [id number]

## 2025-06-07 NOTE — ED TRIAGE NOTES
Patient presents to ED after being seen 3 days ago for a fever and nausea after a hip replacement. Had full sepsis work up with no significant finding. Patient was told it might be viral. No changes to his symptoms. ABCs intact

## 2025-06-07 NOTE — DISCHARGE INSTRUCTIONS
Zofran every 8 hours as needed for nausea    Tylenol 1 - 2 tablets every 6 hours for fevers, can also use ibuprofen for fevers too if needed    Normal to have several days of fevers, nausea and diarrhea    Closely monitor surgical site, return for signs of redness, foul smelling drainage    Return for abdominal pain, chest pain, difficulty breathing    Discharge Instructions  Fever    You have been seen today for a fever. Fever is a normal body reaction to illness or inflammation. Fever is a sign that your body is doing what it should to fight something off. Fever is not dangerous, but it can make you feel miserable, and you will probably feel better if you get your fever to go down. Most infections are caused by a virus, and antibiotics will not help; your provider will tell you whether antibiotics are needed in your case. At this time your provider does not find that your fever is a sign of anything dangerous or life-threatening.  However, sometimes the signs of serious illness do not show up right away so additional care may be necessary.    Generally, every Emergency Department visit should have a follow-up clinic visit with either a primary or a specialty clinic/provider. Please follow-up as instructed by your emergency provider today.    What can I do to help myself?  Fill any prescriptions the provider gave you and take them right away--especially antibiotics.  If you have a fever, get plenty of rest and drink lots of fluids, especially water.  What clothes or blankets you have on will not change your fever. Do what is comfortable for you.  Bathing or sponging in lukewarm water may help you feel better.  Tylenol  (acetaminophen), Motrin  (ibuprofen), or Advil  (ibuprofen) help bring fever down and may help you feel more comfortable. Be sure to read and follow the package directions, and ask your provider if you have questions.  Do not drink alcohol.    Return to the Emergency Department if:  Any of the  symptoms you have get much worse.  You seem very sick, like being too weak to get up.  You have any new symptoms, especially serious things like abdominal (belly) pain or chest pain.  You are short of breath.  You have a severe headache.  You are vomiting (throwing up) so much you cannot keep fluids or medicines down.  You have confusion or seem unusually drowsy.  You have a seizure.  You have anything else that worries you.  If you were given a prescription for medicine here today, be sure to read all of the information (including the package insert) that comes with your prescription.  This will include important information about the medicine, its side effects, and any warnings that you need to know about.  The pharmacist who fills the prescription can provide more information and answer questions you may have about the medicine.  If you have questions or concerns that the pharmacist cannot address, please call or return to the Emergency Department.   Remember that you can always come back to the Emergency Department if you are not able to see your regular provider in the amount of time listed above, if you get any new symptoms, or if there is anything that worries you.

## 2025-06-07 NOTE — TELEPHONE ENCOUNTER
General Call      Reason for Call:  Red flag triage needed     What are your questions or concerns:  Pt had surgery on Tuesday ( Hip replacement) and this morning 6/7/25 , pt is having severe diaherra and feeling very nausea. Pt has a temp of 100.5. Please call pt and advise     Date of last appointment with provider: 6/3/25    Could we send this information to you in [facility] or would you prefer to receive a phone call?:   Patient would prefer a phone call   Okay to leave a detailed message?: Yes at Other phone number:  [phone]

## 2025-06-07 NOTE — TELEPHONE ENCOUNTER
Patient presented to orthopedic walk in clinic today with his sister due to concerns of severe diarrhea, nausea, and fever. Patient states that he has not had a normal temperature since his surgery. He also states he feels weak.    Huddled with Mariangel Brantley PA-C. Recommend patient go to ED for further evaluation of symptoms and further work up including potential labs / tests to rule out possible infection or GI concerns. Will then be able to determine and provide best treatment options as we cannot do this type of full work up in the orthopedic walk in clinic. Explained that the ED will also be able to page the on-call orthopedic provider if there is concerns regarding left DUSTIN.    Patient and his sister verbalized understanding and were very appreciative of assistance.    Lisa Roque ATC

## 2025-06-09 LAB
ATRIAL RATE - MUSE: 80 BPM
DIASTOLIC BLOOD PRESSURE - MUSE: NORMAL MMHG
INTERPRETATION ECG - MUSE: NORMAL
P AXIS - MUSE: -2 DEGREES
PR INTERVAL - MUSE: 232 MS
QRS DURATION - MUSE: 82 MS
QT - MUSE: 392 MS
QTC - MUSE: 452 MS
R AXIS - MUSE: -18 DEGREES
SYSTOLIC BLOOD PRESSURE - MUSE: NORMAL MMHG
T AXIS - MUSE: 7 DEGREES
VENTRICULAR RATE- MUSE: 80 BPM

## 2025-06-09 NOTE — TELEPHONE ENCOUNTER
After patient called, patient presented to the orthopedic walk in clinic and was directed to the ED for further work up.  Patient discharged home from ED on 06/07 and asked to provide stool sample.  If he starts to have black or bloody stools, severe abdominal pain, redness to the infection site, chest pain, cough, difficulty breathing, he should return for further evaluation.     Shayna Torres RN on 6/9/2025 at 9:07 AM

## 2025-06-10 PROBLEM — M25.552 HIP PAIN, LEFT: Status: RESOLVED | Noted: 2025-01-07 | Resolved: 2025-06-10

## 2025-06-10 PROBLEM — S43.005A SHOULDER DISLOCATION, LEFT, INITIAL ENCOUNTER: Status: RESOLVED | Noted: 2018-06-08 | Resolved: 2025-06-10

## 2025-06-10 LAB
BACTERIA SPEC CULT: NO GROWTH
BACTERIA SPEC CULT: NO GROWTH

## 2025-06-12 LAB
BACTERIA SPEC CULT: NO GROWTH
BACTERIA SPEC CULT: NO GROWTH

## 2025-06-13 ASSESSMENT — ACTIVITIES OF DAILY LIVING (ADL)
LIGHT_TO_MODERATE_WORK: UNABLE TO DO
HOS_ADL_SCORE(%): 17.65
ADL_COUNT: 17
WALKING_DOWN_STEEP_HILLS: UNABLE TO DO
GOING DOWN 1 FLIGHT OF STAIRS: EXTREME DIFFICULTY
HOW_WOULD_YOU_RATE_YOUR_CURRENT_LEVEL_OF_FUNCTION_DURING_YOUR_SPORTS_RELATED_ACTIVITIES_FROM_0_TO_100_WITH_100_BEING_YOUR_LEVEL_OF_FUNCTION_PRIOR_TO_YOUR_HIP_PROBLEM_AND_0_BEING_THE_INABILITY_TO_PERFORM_ANY_OF_YOUR_USUAL_DAILY_ACTIVITIES?: 50
SPORTS_HIGHEST_POTENTIAL_SCORE: 36
WALKING_APPROXIMATELY_10_MINUTES: MODERATE DIFFICULTY
RECREATIONAL ACTIVITIES: UNABLE TO DO
STEPPING_UP_AND_DOWN_CURBS: MODERATE DIFFICULTY
GETTING_INTO_AND_OUT_OF_AN_AVERAGE_CAR: EXTREME DIFFICULTY
HEAVY_WORK: UNABLE TO DO
HOW_WOULD_YOU_RATE_YOUR_CURRENT_LEVEL_OF_FUNCTION_DURING_YOUR_USUAL_ACTIVITIES_OF_DAILY_LIVING_FROM_0_TO_100_WITH_100_BEING_YOUR_LEVEL_OF_FUNCTION_PRIOR_TO_YOUR_HIP_PROBLEM_AND_0_BEING_THE_INABILITY_TO_PERFORM_ANY_OF_YOUR_USUAL_DAILY_ACTIVITIES?: 70
HOW_WOULD_YOU_RATE_YOUR_CURRENT_LEVEL_OF_FUNCTION_DURING_YOUR_USUAL_ACTIVITIES_OF_DAILY_LIVING_FROM_0_TO_100_WITH_100_BEING_YOUR_LEVEL_OF_FUNCTION_PRIOR_TO_YOUR_HIP_PROBLEM_AND_0_BEING_THE_INABILITY_TO_PERFORM_ANY_OF_YOUR_USUAL_DAILY_ACTIVITIES?: 70
SPORTS_TOTAL_ITEM_SCORE: 0
ROLLING_OVER_IN_BED: UNABLE TO DO
HOS_ADL_ITEM_SCORE_TOTAL: 12
STEPPING UP AND DOWN CURBS: MODERATE DIFFICULTY
GOING UP 1 FLIGHT OF STAIRS: EXTREME DIFFICULTY
LIGHT_TO_MODERATE_WORK: UNABLE TO DO
STANDING FOR 15 MINUTES: MODERATE DIFFICULTY
TWISTING/PIVOTING_ON_INVOLVED_LEG: UNABLE TO DO
HOW_WOULD_YOU_RATE_YOUR_CURRENT_LEVEL_OF_FUNCTION?: ABNORMAL
ADL_TOTAL_ITEM_SCORE: 0
ROLLING OVER IN BED: UNABLE TO DO
WALKING_UP_STEEP_HILLS: UNABLE TO DO
GETTING_INTO_AND_OUT_OF_A_BATHTUB: UNABLE TO DO
SPORTS_SCORE(%): 0
ADL_SCORE(%): 0
WALKING_INITIALLY: EXTREME DIFFICULTY
WALKING_15_MINUTES_OR_GREATER: MODERATE DIFFICULTY
SITTING_FOR_15_MINUTES: MODERATE DIFFICULTY
HEAVY_WORK: UNABLE TO DO
PUTTING_ON_SOCKS_AND_SHOES: EXTREME DIFFICULTY
GETTING_INTO_AND_OUT_OF_A_BATHTUB: UNABLE TO DO
STANDING_FOR_15_MINUTES: MODERATE DIFFICULTY
WALKING_15_MINUTES_OR_GREATER: MODERATE DIFFICULTY
PUTTING ON SOCKS AND SHOES: EXTREME DIFFICULTY
TWISTING/PIVOTING ON INVOLVED LEG: UNABLE TO DO
WALKING_INITIALLY: EXTREME DIFFICULTY
WALKING_UP_STEEP_HILLS: UNABLE TO DO
DEEP SQUATTING: UNABLE TO DO
WALKING_DOWN_STEEP_HILLS: UNABLE TO DO
ADL_HIGHEST_POTENTIAL_SCORE: 68
PLEASE_INDICATE_YOR_PRIMARY_REASON_FOR_REFERRAL_TO_THERAPY:: HIP
STARTING_AND_STOPPING_QUICKLY: UNABLE TO DO
ABILITY_TO_PERFORM_ACTIVITY_WITH_YOUR_NORMAL_TECHNIQUE: EXTREME DIFFICULTY
WALKING_FOR_APPROXIMATELY_10_MINUTES: MODERATE DIFFICULTY
SITTING FOR 15 MINUTES: MODERATE DIFFICULTY
GOING_UP_1_FLIGHT_OF_STAIRS: EXTREME DIFFICULTY
GOING_DOWN_1_FLIGHT_OF_STAIRS: EXTREME DIFFICULTY
DEEP_SQUATTING: UNABLE TO DO
HOS_ADL_HIGHEST_POTENTIAL_SCORE: 68
GETTING INTO AND OUT OF AN AVERAGE CAR: EXTREME DIFFICULTY
SPORTS_COUNT: 9
RECREATIONAL_ACTIVITIES: UNABLE TO DO

## 2025-06-13 ASSESSMENT — HOOS JR
LYING IN BED (TURNING OVER, MAINTAINING HIP POSITION): MODERATE
BENDING TO THE FLOOR TO PICK UP OBJECT: EXTREME
RISING FROM SITTING: MODERATE
GOING UP OR DOWN STAIRS: SEVERE
WALKING ON UNEVEN SURFACE: SEVERE
SITTING: MODERATE
HOOS JR TOTAL INTERVAL SCORE: 39.9

## 2025-06-16 ENCOUNTER — THERAPY VISIT (OUTPATIENT)
Dept: PHYSICAL THERAPY | Facility: CLINIC | Age: 70
End: 2025-06-16
Payer: COMMERCIAL

## 2025-06-16 DIAGNOSIS — Z96.642 S/P TOTAL LEFT HIP ARTHROPLASTY: ICD-10-CM

## 2025-06-16 DIAGNOSIS — M16.12 PRIMARY OSTEOARTHRITIS OF LEFT HIP: ICD-10-CM

## 2025-06-16 PROCEDURE — 97110 THERAPEUTIC EXERCISES: CPT | Mod: GP | Performed by: PHYSICAL THERAPIST

## 2025-06-16 PROCEDURE — 97161 PT EVAL LOW COMPLEX 20 MIN: CPT | Mod: GP | Performed by: PHYSICAL THERAPIST

## 2025-06-16 NOTE — PROGRESS NOTES
PHYSICAL THERAPY EVALUATION  Type of Visit: Evaluation     DOI: 6/3/25  NAKITA:  hip pain, oa leading to left DUSTIN  Pt had fever x 2 and went to ED, labs looked good, feeling better currently and overall hip is feeling better day to day.  Concerned about leg length.  Pt reports general hip soreness and swelling 2-5/10.  Increased with prolonged postures, activity    Fall Risk Screen:  Have you fallen 2 or more times in the past year?: No  Have you fallen and had an injury in the past year?: No    Subjective         Presenting condition or subjective complaint: Left hip replacement  Date of onset: 06/03/25    Relevant medical history:     Dates & types of surgery: 6/3/2025 hip replacement    Prior diagnostic imaging/testing results: MRI; X-ray     Prior therapy history for the same diagnosis, illness or injury: Yes Jan 2025        Living Environment  Social support: Alone   Type of home: Apartment/condo   Stairs to enter the home: No       Ramp: No   Stairs inside the home: No       Help at home: None  Equipment owned: Walker     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  The following significant findings have been identified: Pain, Decreased ROM/flexibility, Decreased strength, Impaired balance, and Decreased activity tolerance. These impairments interfere with their ability to perform self care tasks, recreational activities, household mobility, and community mobility as  compared to previous level of function.     Clinical Decision Making (Complexity):  Clinical Presentation: Stable/Uncomplicated  Clinical Presentation Rationale: based on medical and personal factors listed in PT evaluation  Clinical Decision Making (Complexity): Low complexity    PLAN OF CARE  Treatment Interventions:  Interventions: Gait Training, Manual Therapy, Neuromuscular Re-education, Therapeutic Activity, Therapeutic Exercise    Long Term Goals     PT Goal 1  Goal Identifier: sitting  Goal Description: no sxs sitting 90 min  Rationale: to maximize safety and independence within the home;to maximize safety and independence within the community;to maximize safety and independence with performance of ADLs and functional tasks  Goal Progress: 10 min  Target Date: 09/08/25      Frequency of Treatment: 1 time a week  Duration of Treatment: 12 weeks    Recommended Referrals to Other Professionals: none  Education Assessment:   Learner/Method: Patient    Risks and benefits of evaluation/treatment have been explained.   Patient/Family/caregiver agrees with Plan of Care.     Evaluation Time:     PT Eval, Low Complexity Minutes (10343): 25       Signing Clinician: Chinmay Willingham, PT

## 2025-06-18 ENCOUNTER — OFFICE VISIT (OUTPATIENT)
Dept: ORTHOPEDICS | Facility: CLINIC | Age: 70
End: 2025-06-18
Payer: COMMERCIAL

## 2025-06-18 DIAGNOSIS — Z96.642 STATUS POST TOTAL REPLACEMENT OF LEFT HIP: Primary | ICD-10-CM

## 2025-06-18 PROCEDURE — 99024 POSTOP FOLLOW-UP VISIT: CPT | Performed by: PHYSICIAN ASSISTANT

## 2025-06-18 NOTE — PROGRESS NOTES
Chief Complaint: left hip check  Pre-operative diagnosis:         70-year-old male presenting with chronic left hip/groin pain most likely due to osteoarthritic changes of the femoral acetabular joint.   Post-operative diagnosis        Same as pre-operative diagnosis  6/3/25 Procedure:      Arthroplasty, hip, total, left, Left - Hip  Surgeon:         Surgeons and Role:     * Karan Lynn MD - Primary    HPI: Karan is a 70 year old man who is here 2 weeks s/p left DUSTIN.  Patient reports overall he is making good progress.  He does have some soreness, but mostly takes ibuprofen and Tylenol and occasional Oxy every couple days.  He still is taking the aspirin twice a day.  He reports no current stomach upset, although he was in the ER twice in the last 2 weeks with an elevated temperature and nausea.  He has not had a temperature in over a week.  He was staying with his sister who had a viral illness during that time.  He has just started PT.  He is using a wheeled walker for ambulation.  He is wondering about returning to work from home July 1.  No other concerns.    Physical Exam: 70 year old man who is alert and oriented and in no distress.  Patient has an antalgic gait with wheeled walker today.  His left hip incision is healing well with no erythema or drainage.  Mild swelling.  No calf tenderness.  Neurovascular intact distally.  He is able to perform a straight leg raise.    Imaging: We did review his post-op xrays of the AP pelvis and lateral of the left hip.  This shows a left total hip arthroplasty in acceptable alignment with no sign of fracture or lucency.    Impression: 70 year old man doing well 2 weeks s/p left DUSTIN    Plan: I extensively discussed my findings with the patient.  We discussed the intraoperative findings and today's findings.  Patient will continue to work with physical therapy on strengthening and gait training.  Posterior hip precautions were repeated.  Patient will continue his DVT  prophylaxis until 4 weeks postoperatively.  Suture tails were removed.  All questions were answered.  Patient understands and agrees to the treatment plan as set forth.  He can return to work on July 1 to 4 hour workdays only for 2 weeks and then progress back to full 8-hour days without restrictions.  We will follow-up with patient at the 6-week postoperative date with renewed radiographic imaging studies.

## 2025-06-18 NOTE — LETTER
6/18/2025      Karan Anglin  [address]  [facility] [address]      Dear Colleague,    Thank you for referring your patient, Karan Anglin, to the [facility] [place]. Please see a copy of my visit note below.    Chief Complaint: left hip check  Pre-operative diagnosis:         70-year-old male presenting with chronic left hip/groin pain most likely due to osteoarthritic changes of the femoral acetabular joint.   Post-operative diagnosis        Same as pre-operative diagnosis  6/3/25 Procedure:      Arthroplasty, hip, total, left, Left - Hip  Surgeon:         Surgeons and Role:     * Karan Lynn MD - Primary    HPI: Karan is a 70 year old man who is here 2 weeks s/p left DUSTIN.  Patient reports overall he is making good progress.  He does have some soreness, but mostly takes ibuprofen and Tylenol and occasional Oxy every couple days.  He still is taking the aspirin twice a day.  He reports no current stomach upset, although he was in the ER twice in the last 2 weeks with an elevated temperature and nausea.  He has not had a temperature in over a week.  He was staying with his sister who had a viral illness during that time.  He has just started PT.  He is using a wheeled walker for ambulation.  He is wondering about returning to work from home July 1.  No other concerns.    Physical Exam: 70 year old man who is alert and oriented and in no distress.  Patient has an antalgic gait with wheeled walker today.  His left hip incision is healing well with no erythema or drainage.  Mild swelling.  No calf tenderness.  Neurovascular intact distally.  He is able to perform a straight leg raise.    Imaging: We did review his post-op xrays of the AP pelvis and lateral of the left hip.  This shows a left total hip arthroplasty in acceptable alignment with no sign of fracture or lucency.    Impression: 70 year old man doing well 2 weeks s/p left DUSTIN    Plan: I extensively discussed my  findings with the patient.  We discussed the intraoperative findings and today's findings.  Patient will continue to work with physical therapy on strengthening and gait training.  Posterior hip precautions were repeated.  Patient will continue his DVT prophylaxis until 4 weeks postoperatively.  Suture tails were removed.  All questions were answered.  Patient understands and agrees to the treatment plan as set forth.  He can return to work on July 1 to 4 hour workdays only for 2 weeks and then progress back to full 8-hour days without restrictions.  We will follow-up with patient at the 6-week postoperative date with renewed radiographic imaging studies.     Again, thank you for allowing me to participate in the care of your patient.        Sincerely,        Shelia Neff PA-C    Electronically signed

## 2025-06-18 NOTE — LETTER
Return to Work  2025     Seen today: Yes    Patient:  Karan Anglin  :   1955  MRN:     [id number]  Physician: SHELIA NEFF    Karan Anglin may return to work on Date: 25.    The next clinic appointment is scheduled for 25.    Patient restrictions/limitations:  4 hour work days only for 2 weeks and then return to full work days starting 25 without restrictions.        Sincerely,      Shelia Neff PA-C

## 2025-06-23 ENCOUNTER — THERAPY VISIT (OUTPATIENT)
Dept: PHYSICAL THERAPY | Facility: CLINIC | Age: 70
End: 2025-06-23
Payer: COMMERCIAL

## 2025-06-23 DIAGNOSIS — Z96.642 S/P TOTAL LEFT HIP ARTHROPLASTY: Primary | ICD-10-CM

## 2025-06-23 PROCEDURE — 97110 THERAPEUTIC EXERCISES: CPT | Mod: GP | Performed by: PHYSICAL THERAPIST

## 2025-06-23 PROCEDURE — 97530 THERAPEUTIC ACTIVITIES: CPT | Mod: GP | Performed by: PHYSICAL THERAPIST

## 2025-06-30 ENCOUNTER — THERAPY VISIT (OUTPATIENT)
Dept: PHYSICAL THERAPY | Facility: CLINIC | Age: 70
End: 2025-06-30
Payer: COMMERCIAL

## 2025-06-30 DIAGNOSIS — Z96.642 S/P TOTAL LEFT HIP ARTHROPLASTY: Primary | ICD-10-CM

## 2025-06-30 PROCEDURE — 97112 NEUROMUSCULAR REEDUCATION: CPT | Mod: GP | Performed by: PHYSICAL THERAPIST

## 2025-06-30 PROCEDURE — 97530 THERAPEUTIC ACTIVITIES: CPT | Mod: GP | Performed by: PHYSICAL THERAPIST

## 2025-06-30 PROCEDURE — 97110 THERAPEUTIC EXERCISES: CPT | Mod: GP | Performed by: PHYSICAL THERAPIST

## 2025-07-02 DIAGNOSIS — Z96.642 STATUS POST TOTAL REPLACEMENT OF LEFT HIP: Primary | ICD-10-CM

## 2025-07-02 NOTE — PROGRESS NOTES
Chief Complaint:   Follow up, DOS 6/3/25 with Dr. Lynn    Procedures:  Left total hip arthroplasty    History:  Karan Anglin is a 70 year old     Attending PT at ***. Pain being managed with ***    Denies fevers, chills or sweats. Denies calf pain or tenderness, chest pain or shortness of breath.     Exam:    General: Awake, Alert, and oriented. Articulates and communicates with a normal affect     *** Lower Extremity:  Ambulating with ***  Incision is healing, well approximated without evidence of infection. There is no erythema, drainage, or wound dehiscence   Thigh and calf compartments are soft and compressible  Range of motion: tolerates gentle PROM of hip  Active hip flexion against gravity in tact  TA/Gsc/EHL/FHL with 5/5 strength  Distal pulses palpable, toes are warm and well perfused   Calf soft and nontender  Edema ***      Imaging:  Radiographs of the *** hip were ordered, performed and reviewed today including AP pelvis and cross table lateral views. These were independently reviewed by me and findings were discussed with the patient today. The imaging demonstrates ***.    Medications:     Current Outpatient Medications:     acetaminophen (TYLENOL) 325 MG tablet, Take 2 tablets (650 mg) by mouth every 4 hours as needed for other (mild pain)., Disp: 100 tablet, Rfl: 0    amLODIPine (NORVASC) 5 MG tablet, Take 1 tablet (5 mg) by mouth daily., Disp: 90 tablet, Rfl: 1    aspirin 81 MG EC tablet, Take 1 tablet (81 mg) by mouth 2 times daily., Disp: 60 tablet, Rfl: 0    atenolol (TENORMIN) 50 MG tablet, Take 1 tablet (50 mg) by mouth daily., Disp: 90 tablet, Rfl: 1    atorvastatin (LIPITOR) 40 MG tablet, Take 1 tablet (40 mg) by mouth daily., Disp: 90 tablet, Rfl: 3    blood glucose (NO BRAND SPECIFIED) lancets standard, Use to test blood sugar 4 times daily or as directed., Disp: 100 each, Rfl: 3    blood glucose (NO BRAND SPECIFIED) test strip, Use to test blood sugar 4 times daily or as directed.,  Disp: 100 strip, Rfl: 2    cholecalciferol (VITAMIN D3) 25 mcg (1000 units) capsule, Take 2 capsules (50 mcg) by mouth daily., Disp: 60 capsule, Rfl: 11    citalopram (CELEXA) 20 MG tablet, Take 1 tablet (20 mg) by mouth daily., Disp: 90 tablet, Rfl: 1    diphenhydrAMINE (BENADRYL) 25 MG tablet, Take 25 mg by mouth nightly as needed for itching or allergies, Disp: , Rfl:     empagliflozin (JARDIANCE) 10 MG TABS tablet, Take 1 tablet (10 mg) by mouth daily., Disp: 90 tablet, Rfl: 1    esomeprazole (NEXIUM) 20 MG DR capsule, Take 1 capsule (20 mg) by mouth daily, Disp: , Rfl:     gabapentin (NEURONTIN) 300 MG capsule, Take 300 mg by mouth 2 times daily., Disp: , Rfl:     gabapentin (NEURONTIN) 300 MG capsule, Take 600 mg by mouth at bedtime., Disp: , Rfl:     losartan (COZAAR) 100 MG tablet, Take 1 tablet (100 mg) by mouth daily., Disp: 90 tablet, Rfl: 1    metFORMIN (GLUCOPHAGE XR) 500 MG 24 hr tablet, Take 2 tablets (1,000 mg) by mouth daily (with dinner)., Disp: 180 tablet, Rfl: 3    ondansetron (ZOFRAN ODT) 4 MG ODT tab, Take 1 tablet (4 mg) by mouth every 8 hours as needed for nausea., Disp: 10 tablet, Rfl: 0    oxyCODONE (ROXICODONE) 5 MG tablet, Take 1-2 tablets (5-10 mg) by mouth every 4 hours as needed for moderate to severe pain., Disp: 26 tablet, Rfl: 0    polyethylene glycol (MIRALAX) 17 g packet, Take 17 g by mouth daily., Disp: 7 packet, Rfl: 0    rizatriptan (MAXALT) 5 MG tablet, Take 1 tablet (5 mg) by mouth at onset of headache for migraine. repeat after 2 hr if no relief; maximum: 30 mg/24 hours, Disp: 8 tablet, Rfl: 3    senna-docusate (SENOKOT-S/PERICOLACE) 8.6-50 MG tablet, Take 1-2 tablets by mouth 2 times daily. Take while on oral narcotics to prevent or treat constipation., Disp: 30 tablet, Rfl: 0    Assessment:  Doing well 2 weeks s/p *** total hip arthroplasty      Plan:   - Basic wound cares were performed today.  They were directed to refrain from submerging in a tub or a pool until  incision is a well-healed scar. No lotions or ointments on incision while healing. Directed to call with any drainage or redness about the incision area.   - Reviewed importance of maintaining posterior hip precautions including no hip flexion > 90 degrees, no adduction past midline, no internal rotation/pivoting.   - Encouraged consistent work with PT for gait training and mobilization  -Reviewed signs and symptoms of DVT/PE including calf pain or tenderness, chest pain or shortness of breath and directed them to report to call in to clinic or report to ED with any concerns or if they symptoms occur.   -DVT prophylaxis: ***  -Follow up: 6 weeks with . ***, No new x-rays unless concerns.     Maci Herrera PA-C   7/2/2025 11:20 AM  Orthopedic Surgery

## 2025-07-17 ASSESSMENT — HOOS JR
RISING FROM SITTING: MILD
HOOS JR TOTAL INTERVAL SCORE: 67.52
LYING IN BED (TURNING OVER, MAINTAINING HIP POSITION): MILD
GOING UP OR DOWN STAIRS: MILD
SITTING: MILD
BENDING TO THE FLOOR TO PICK UP OBJECT: MODERATE
WALKING ON UNEVEN SURFACE: MILD

## 2025-07-18 ENCOUNTER — ANCILLARY PROCEDURE (OUTPATIENT)
Dept: GENERAL RADIOLOGY | Facility: CLINIC | Age: 70
End: 2025-07-18
Attending: PHYSICIAN ASSISTANT
Payer: COMMERCIAL

## 2025-07-18 ENCOUNTER — OFFICE VISIT (OUTPATIENT)
Dept: ORTHOPEDICS | Facility: CLINIC | Age: 70
End: 2025-07-18
Payer: COMMERCIAL

## 2025-07-18 DIAGNOSIS — Z96.642 STATUS POST TOTAL REPLACEMENT OF LEFT HIP: ICD-10-CM

## 2025-07-18 DIAGNOSIS — Z96.642 S/P TOTAL LEFT HIP ARTHROPLASTY: Primary | ICD-10-CM

## 2025-07-18 PROCEDURE — 99024 POSTOP FOLLOW-UP VISIT: CPT | Performed by: PHYSICIAN ASSISTANT

## 2025-07-18 PROCEDURE — 73502 X-RAY EXAM HIP UNI 2-3 VIEWS: CPT | Mod: LT | Performed by: RADIOLOGY

## 2025-07-18 NOTE — NURSING NOTE
Reason For Visit:   Chief Complaint   Patient presents with    Surgical Followup     6 week 06/03/25 ARTHROPLASTY, HIP, TOTAL - Left with Dr. Lynn        There were no vitals taken for this visit.    Pain Assessment  Patient Currently in Pain: Yes (2/10)  Primary Pain Location: Hip (left)  Pain Descriptors: Dull, Aching    Ernestine Newell ATC

## 2025-07-18 NOTE — LETTER
7/18/2025      Karan Anglin  [address]  [facility] [address]      Dear Colleague,    Thank you for referring your patient, Karan Anglin, to the [facility] [place]. Please see a copy of my visit note below.    Chief Complaint:   Follow up, DOS 6/3/25 with Dr. Lynn    Procedures:  Left total hip arthroplasty    History:  Karan Anglin is a 70 year old weeks for left total hip arthroplasty.  All in all, symptoms are improving.  Pain is currently mild, 2 out of 10, dull and aching.  He is progressing his ambulation and no longer needs a cane.  He notes the incision area will sometimes appear irritated where the underwear wear line hit but no redness or drainage.  He has been abiding by posterior hip precautions.  He has been working with physical therapy at [facility].  No calf pain or tenderness.  No significant lower leg swelling.    Exam:    General: Awake, Alert, and oriented. Articulates and communicates with a normal affect     Left Lower Extremity:  Ambulating independently.  Gait is near normal pattern.  Incision is healing, well approximated without evidence of infection. There is a pinpoint scab at distal incision.  No surrounding induration or warmth.      Thigh and calf compartments are soft and compressible  Range of motion: tolerates gentle PROM of hip  Active hip flexion against gravity in tact  TA/Gsc/EHL/FHL with 5/5 strength  Distal pulses palpable, toes are warm and well perfused   Calf soft and nontender  Edema no lower leg edema.       Imaging:  Radiographs of the Left hip were ordered, performed and reviewed today including AP pelvis and cross table lateral views. These were independently reviewed by me and findings were discussed with the patient today. The imaging demonstrates left total hip arthroplasty implants well-positioned and aligned without evidence of hardware complication.    Medications:     Current Outpatient  Medications:      acetaminophen (TYLENOL) 325 MG tablet, Take 2 tablets (650 mg) by mouth every 4 hours as needed for other (mild pain)., Disp: 100 tablet, Rfl: 0     amLODIPine (NORVASC) 5 MG tablet, Take 1 tablet (5 mg) by mouth daily., Disp: 90 tablet, Rfl: 1     aspirin 81 MG EC tablet, Take 1 tablet (81 mg) by mouth 2 times daily., Disp: 60 tablet, Rfl: 0     atenolol (TENORMIN) 50 MG tablet, Take 1 tablet (50 mg) by mouth daily., Disp: 90 tablet, Rfl: 1     atorvastatin (LIPITOR) 40 MG tablet, Take 1 tablet (40 mg) by mouth daily., Disp: 90 tablet, Rfl: 3     blood glucose (NO BRAND SPECIFIED) lancets standard, Use to test blood sugar 4 times daily or as directed., Disp: 100 each, Rfl: 3     blood glucose (NO BRAND SPECIFIED) test strip, Use to test blood sugar 4 times daily or as directed., Disp: 100 strip, Rfl: 2     cholecalciferol (VITAMIN D3) 25 mcg (1000 units) capsule, Take 2 capsules (50 mcg) by mouth daily., Disp: 60 capsule, Rfl: 11     citalopram (CELEXA) 20 MG tablet, Take 1 tablet (20 mg) by mouth daily., Disp: 90 tablet, Rfl: 1     diphenhydrAMINE (BENADRYL) 25 MG tablet, Take 25 mg by mouth nightly as needed for itching or allergies, Disp: , Rfl:      empagliflozin (JARDIANCE) 10 MG TABS tablet, Take 1 tablet (10 mg) by mouth daily., Disp: 90 tablet, Rfl: 1     esomeprazole (NEXIUM) 20 MG DR capsule, Take 1 capsule (20 mg) by mouth daily, Disp: , Rfl:      gabapentin (NEURONTIN) 300 MG capsule, Take 300 mg by mouth 2 times daily., Disp: , Rfl:      gabapentin (NEURONTIN) 300 MG capsule, Take 600 mg by mouth at bedtime., Disp: , Rfl:      losartan (COZAAR) 100 MG tablet, Take 1 tablet (100 mg) by mouth daily., Disp: 90 tablet, Rfl: 1     metFORMIN (GLUCOPHAGE XR) 500 MG 24 hr tablet, Take 2 tablets (1,000 mg) by mouth daily (with dinner)., Disp: 180 tablet, Rfl: 3     ondansetron (ZOFRAN ODT) 4 MG ODT tab, Take 1 tablet (4 mg) by mouth every 8 hours as needed for nausea., Disp: 10 tablet, Rfl:  0     oxyCODONE (ROXICODONE) 5 MG tablet, Take 1-2 tablets (5-10 mg) by mouth every 4 hours as needed for moderate to severe pain., Disp: 26 tablet, Rfl: 0     polyethylene glycol (MIRALAX) 17 g packet, Take 17 g by mouth daily., Disp: 7 packet, Rfl: 0     rizatriptan (MAXALT) 5 MG tablet, Take 1 tablet (5 mg) by mouth at onset of headache for migraine. repeat after 2 hr if no relief; maximum: 30 mg/24 hours, Disp: 8 tablet, Rfl: 3     senna-docusate (SENOKOT-S/PERICOLACE) 8.6-50 MG tablet, Take 1-2 tablets by mouth 2 times daily. Take while on oral narcotics to prevent or treat constipation., Disp: 30 tablet, Rfl: 0    Assessment:  Doing well 6 weeks s/p Lef total hip arthroplasty      Plan:   - He was reassured that incision appears to be healing very well.  There is no localized swelling.  Only 1 pinpoint scab remaining.  We discussed there are sutures that are still absorbing underneath the skin.  He should refrain from submerging in a tub or pool.  Avoid clothing that rubs over the incision.  He was directed to call with any redness, localized swelling or drainage that occurs.  - Reviewed importance of maintaining posterior hip precautions including no hip flexion > 90 degrees, no adduction past midline, no internal rotation/pivoting.  We reviewed importance of maintaining these for 12 weeks from surgery.  Discussed lifelong risk of hip dislocation with deep forward flexion.  Recommended he wait for dental procedures until he is 6 months from surgery and need for prophylactic antibiotics with these procedures.  - As long as he is feeling that his symptoms and function are returning to desired level, he may follow-up at 1 year from surgery with Dr. Lynn but he notes that if at any point he has concerns he should come to clinic for evaluation.    Maci Herrera PA-C   Orthopedic Surgery    Again, thank you for allowing me to participate in the care of your patient.        Sincerely,        Maci  GINETTE Herrera    Electronically signed

## 2025-07-21 ENCOUNTER — MYC MEDICAL ADVICE (OUTPATIENT)
Dept: FAMILY MEDICINE | Facility: CLINIC | Age: 70
End: 2025-07-21
Payer: COMMERCIAL

## 2025-07-25 SDOH — HEALTH STABILITY: PHYSICAL HEALTH: ON AVERAGE, HOW MANY DAYS PER WEEK DO YOU ENGAGE IN MODERATE TO STRENUOUS EXERCISE (LIKE A BRISK WALK)?: 4 DAYS

## 2025-07-25 SDOH — HEALTH STABILITY: PHYSICAL HEALTH: ON AVERAGE, HOW MANY MINUTES DO YOU ENGAGE IN EXERCISE AT THIS LEVEL?: 30 MIN

## 2025-07-25 ASSESSMENT — SOCIAL DETERMINANTS OF HEALTH (SDOH): HOW OFTEN DO YOU GET TOGETHER WITH FRIENDS OR RELATIVES?: ONCE A WEEK

## 2025-07-30 ENCOUNTER — TELEPHONE (OUTPATIENT)
Dept: FAMILY MEDICINE | Facility: CLINIC | Age: 70
End: 2025-07-30

## 2025-07-30 ENCOUNTER — OFFICE VISIT (OUTPATIENT)
Dept: FAMILY MEDICINE | Facility: CLINIC | Age: 70
End: 2025-07-30
Payer: COMMERCIAL

## 2025-07-30 VITALS
WEIGHT: 221.5 LBS | HEIGHT: 70 IN | DIASTOLIC BLOOD PRESSURE: 87 MMHG | TEMPERATURE: 97.6 F | SYSTOLIC BLOOD PRESSURE: 136 MMHG | OXYGEN SATURATION: 98 % | HEART RATE: 68 BPM | BODY MASS INDEX: 31.71 KG/M2

## 2025-07-30 DIAGNOSIS — Z12.5 SCREENING FOR PROSTATE CANCER: ICD-10-CM

## 2025-07-30 DIAGNOSIS — N18.31 TYPE 2 DIABETES MELLITUS WITH STAGE 3A CHRONIC KIDNEY DISEASE, WITHOUT LONG-TERM CURRENT USE OF INSULIN (H): ICD-10-CM

## 2025-07-30 DIAGNOSIS — N18.31 STAGE 3A CHRONIC KIDNEY DISEASE (H): ICD-10-CM

## 2025-07-30 DIAGNOSIS — F41.1 GAD (GENERALIZED ANXIETY DISORDER): ICD-10-CM

## 2025-07-30 DIAGNOSIS — Z00.00 ROUTINE GENERAL MEDICAL EXAMINATION AT A HEALTH CARE FACILITY: Primary | ICD-10-CM

## 2025-07-30 DIAGNOSIS — G43.009 MIGRAINE WITHOUT AURA AND WITHOUT STATUS MIGRAINOSUS, NOT INTRACTABLE: ICD-10-CM

## 2025-07-30 DIAGNOSIS — I10 BENIGN ESSENTIAL HYPERTENSION: ICD-10-CM

## 2025-07-30 DIAGNOSIS — E78.5 HYPERLIPIDEMIA LDL GOAL <130: ICD-10-CM

## 2025-07-30 DIAGNOSIS — Z12.11 COLON CANCER SCREENING: ICD-10-CM

## 2025-07-30 DIAGNOSIS — R20.2 PARESTHESIA OF BOTH FEET: ICD-10-CM

## 2025-07-30 DIAGNOSIS — M16.12 PRIMARY OSTEOARTHRITIS OF LEFT HIP: ICD-10-CM

## 2025-07-30 DIAGNOSIS — E11.22 TYPE 2 DIABETES MELLITUS WITH STAGE 3A CHRONIC KIDNEY DISEASE, WITHOUT LONG-TERM CURRENT USE OF INSULIN (H): ICD-10-CM

## 2025-07-30 DIAGNOSIS — J30.2 SEASONAL ALLERGIC RHINITIS, UNSPECIFIED TRIGGER: ICD-10-CM

## 2025-07-30 PROCEDURE — G2211 COMPLEX E/M VISIT ADD ON: HCPCS | Performed by: NURSE PRACTITIONER

## 2025-07-30 PROCEDURE — 1126F AMNT PAIN NOTED NONE PRSNT: CPT | Performed by: NURSE PRACTITIONER

## 2025-07-30 PROCEDURE — 36415 COLL VENOUS BLD VENIPUNCTURE: CPT | Performed by: NURSE PRACTITIONER

## 2025-07-30 PROCEDURE — 80061 LIPID PANEL: CPT | Performed by: NURSE PRACTITIONER

## 2025-07-30 PROCEDURE — 82570 ASSAY OF URINE CREATININE: CPT | Performed by: NURSE PRACTITIONER

## 2025-07-30 PROCEDURE — G0103 PSA SCREENING: HCPCS | Performed by: NURSE PRACTITIONER

## 2025-07-30 PROCEDURE — 99214 OFFICE O/P EST MOD 30 MIN: CPT | Mod: 25 | Performed by: NURSE PRACTITIONER

## 2025-07-30 PROCEDURE — 91320 SARSCV2 VAC 30MCG TRS-SUC IM: CPT | Performed by: NURSE PRACTITIONER

## 2025-07-30 PROCEDURE — 3079F DIAST BP 80-89 MM HG: CPT | Performed by: NURSE PRACTITIONER

## 2025-07-30 PROCEDURE — 3075F SYST BP GE 130 - 139MM HG: CPT | Performed by: NURSE PRACTITIONER

## 2025-07-30 PROCEDURE — 84244 ASSAY OF RENIN: CPT | Mod: 90 | Performed by: NURSE PRACTITIONER

## 2025-07-30 PROCEDURE — 99397 PER PM REEVAL EST PAT 65+ YR: CPT | Mod: 25 | Performed by: NURSE PRACTITIONER

## 2025-07-30 PROCEDURE — 82043 UR ALBUMIN QUANTITATIVE: CPT | Performed by: NURSE PRACTITIONER

## 2025-07-30 PROCEDURE — 99000 SPECIMEN HANDLING OFFICE-LAB: CPT | Performed by: NURSE PRACTITIONER

## 2025-07-30 PROCEDURE — 90480 ADMN SARSCOV2 VAC 1/ONLY CMP: CPT | Performed by: NURSE PRACTITIONER

## 2025-07-30 PROCEDURE — 80048 BASIC METABOLIC PNL TOTAL CA: CPT | Performed by: NURSE PRACTITIONER

## 2025-07-30 RX ORDER — GABAPENTIN 300 MG/1
CAPSULE ORAL
Qty: 360 CAPSULE | Refills: 3 | Status: SHIPPED | OUTPATIENT
Start: 2025-07-30

## 2025-07-30 RX ORDER — LOSARTAN POTASSIUM 100 MG/1
100 TABLET ORAL DAILY
Qty: 90 TABLET | Refills: 1 | Status: SHIPPED | OUTPATIENT
Start: 2025-07-30

## 2025-07-30 RX ORDER — TIZANIDINE 2 MG/1
2-4 TABLET ORAL 3 TIMES DAILY PRN
Qty: 90 TABLET | Refills: 0 | Status: SHIPPED | OUTPATIENT
Start: 2025-07-30

## 2025-07-30 RX ORDER — ATENOLOL 50 MG/1
50 TABLET ORAL DAILY
Qty: 90 TABLET | Refills: 1 | Status: SHIPPED | OUTPATIENT
Start: 2025-07-30

## 2025-07-30 RX ORDER — CITALOPRAM HYDROBROMIDE 20 MG/1
20 TABLET ORAL DAILY
Qty: 90 TABLET | Refills: 1 | Status: SHIPPED | OUTPATIENT
Start: 2025-07-30

## 2025-07-30 RX ORDER — ASPIRIN 81 MG/1
81 TABLET ORAL DAILY
Qty: 90 TABLET | Refills: 3 | Status: SHIPPED | OUTPATIENT
Start: 2025-07-30

## 2025-07-30 ASSESSMENT — PAIN SCALES - GENERAL: PAINLEVEL_OUTOF10: NO PAIN (0)

## 2025-07-30 NOTE — PATIENT INSTRUCTIONS
"Advanced Care Directive resources  Consider making an advanced care directive - this is a good site for assistance and resources   https://www.lightthelegacy.org    Migraines -    - trial prescription rimegepant (Nurtec) 75 mg every other day for prevention. Important to monitor blood pressure with this, particularly in the first 1-2 qwwka   - trial tizanidine 2-4 mg muscle relaxant for rescue - can take this with maxalt   - trial riboflavin 400 mg over-the-counter daily for prevention   - schedule with neurology    Allergies  You can take any anti-histamine as long a it doesn't have \"D\" after it.  Look for loratidine, cetirizine or fenofexadine - doesn't matter which.  Also add the nasal steroid fluticasone.  I usually find that adding a steroid nasal spray helps a lot with all symptoms.  One trick with using these is to only insert the nozzle slightly and then tip to point toward your eye rather than straight back.  Do not take a deep breath with administration.  These measures keep it from going back into your throat where it doesn't work, can irritate your throat and tastes awful.     For skin - ideally use a lotion all over to help skin be less dry. An alternative is to use Aquaphor in spot areas.      Patient Education   Preventive Care Advice   This is general advice we often give to help people stay healthy. Your care team may have specific advice just for you. Please talk to your care team about your own preventive care needs.  Lifestyle  Exercise at least 150 minutes each week (30 minutes a day, 5 days a week).  Do muscle strengthening activities 2 days a week. These help control your weight and prevent disease.  No smoking.  Wear sunscreen to prevent skin cancer.  Take time with family and friends.  Have your home tested for radon every 2 to 5 years. Radon is a colorless, odorless gas that can harm your lungs. To learn more, go to www.health.state.[place] and search for \"Radon in Homes.\"  Keep guns unloaded " "and locked up in a safe place like a safe or gun vault, or, use a gun lock and hide the keys. Always lock away bullets separately. To learn more, visit dps.[place].gov and search for \"safe gun storage.\"  Nutrition  Eat 5 or more servings of fruits and vegetables each day.  Try wheat bread, brown rice and whole grain pasta (instead of white bread, rice, and pasta).  Get enough calcium and vitamin D. Check the label on foods and aim for 100% of the RDA (recommended daily allowance).  Regular exams  Have a dental exam and cleaning every 6 months.  Older adults: Ask your care team how often to have memory testing.  See your health care team every year to talk about:  Any changes in your health.  Any medicines your care team has prescribed.  Preventive care, family planning, and ways to prevent chronic diseases.  Shots (vaccines)   HPV shots (up to age 26), if you've never had them before.  Hepatitis B shots (up to age 59), if you've never had them before.  COVID-19 shot: Get this shot when it's due.  Flu shot: Get a flu shot every year.  Tetanus shot: Get a tetanus shot every 10 years.  Pneumococcal, hepatitis A, and RSV shots: Ask your care team if you need these based on your risk.  Shingles shot (for age 50 and up).  General health tests  Diabetes screening:  Starting at age 35, Get screened for diabetes at least every 3 years.  If you are younger than age 35, ask your care team if you should be screened for diabetes.  Cholesterol test: At age 39, start having a cholesterol test every 5 years, or more often if advised.  Bone density scan (DEXA): At age 50, ask your care team if you should have this scan for osteoporosis (brittle bones).  Hepatitis C: Get tested at least once in your life.  Abdominal aortic aneurysm screening: Talk to your doctor about having this screening if you:  Have ever smoked; and  Are biologically male; and  Are between the ages of 65 and 75.  STIs (sexually transmitted infections)  Before age 24: " Ask your care team if you should be screened for STIs.  After age 24: Get screened for STIs if you're at risk. You are at risk for STIs (including HIV) if:  You are sexually active with more than one person.  You don't use condoms every time.  You or a partner was diagnosed with a sexually transmitted infection.  If you are at risk for HIV, ask about PrEP medicine to prevent HIV.  Get tested for HIV at least once in your life, whether you are at risk for HIV or not.  Cancer screening tests  Cervical cancer screening: If you have a cervix, begin getting regular cervical cancer screening tests at age 21. Most people who have regular screenings with normal results can stop after age 65. Talk about this with your provider.  Breast cancer scan (mammogram): If you've ever had breasts, begin having regular mammograms starting at age 40. This is a scan to check for breast cancer.  Colon cancer screening: It is important to start screening for colon cancer at age 45.  Have a colonoscopy test every 10 years (or more often if you're at risk) Or, ask your provider about stool tests like a FIT test every year or Cologuard test every 3 years.  To learn more about your testing options, visit: www.[employer] [place] [email or web address].pdf.  For help making a decision, visit: [email or web address].  Prostate cancer screening test: If you have a prostate and are age 55 to 69, ask your provider if you would benefit from a yearly prostate cancer screening test.  Lung cancer screening: If you are a current or former smoker age 50 to 80, ask your care team if ongoing lung cancer screenings are right for you.    For informational purposes only. Not to replace the advice of your health care provider. Copyright   2023 [employer]. All rights reserved. Clinically reviewed by the [facility]. [employer] 984043 - REV 6/25.  Hearing Loss: Care Instructions  Overview     Hearing loss is a sudden or slow decrease in how well you  hear. It can range from slight to profound. Permanent hearing loss can occur with aging. It also can happen when you are exposed long-term to loud noise. Examples include listening to loud music, riding motorcycles, or being around other loud machines.  Hearing loss can affect your work and home life. It can make you feel lonely or depressed. You may feel that you have lost your independence. But hearing aids and other devices can help you hear better and feel connected to others.  Follow-up care is a key part of your treatment and safety. Be sure to make and go to all appointments, and call your doctor if you are having problems. It's also a good idea to know your test results and keep a list of the medicines you take.  How can you care for yourself at home?  Avoid loud noises whenever possible. This helps keep your hearing from getting worse.  Always wear hearing protection around loud noises.  Wear a hearing aid as directed.  A professional can help you pick a hearing aid that will work best for you.  You can also get hearing aids over the counter for mild to moderate hearing loss.  Have hearing tests as your doctor suggests. They can show whether your hearing has changed. Your hearing aid may need to be adjusted.  Use other devices as needed. These may include:  Telephone amplifiers and hearing aids that can connect to a television, stereo, radio, or microphone.  Devices that use lights or vibrations. These alert you to the doorbell, a ringing telephone, or a baby monitor.  Television closed-captioning. This shows the words at the bottom of the screen. Most new TVs can do this.  TTY (text telephone). This lets you type messages back and forth on the telephone instead of talking or listening. These devices are also called TDD. When messages are typed on the keyboard, they are sent over the phone line to a receiving TTY. The message is shown on a monitor.  Use text messaging, social media, and email if it is hard  "for you to communicate by telephone.  Try to learn a listening technique called speechreading. It is not lipreading. You pay attention to people's gestures, expressions, posture, and tone of voice. These clues can help you understand what a person is saying. Face the person you are talking to, and have them face you. Make sure the lighting is good. You need to see the other person's face clearly.  Think about counseling if you need help to adjust to your hearing loss.  When should you call for help?  Watch closely for changes in your health, and be sure to contact your doctor if:    You think your hearing is getting worse.     You have new symptoms, such as dizziness or nausea.   Where can you learn more?  Go to https://www.GetYourGuide.net/patiented  Enter R798 in the search box to learn more about \"Hearing Loss: Care Instructions.\"  Current as of: October 27, 2024  Content Version: 14.5    4761-7228 [employer].   Care instructions adapted under license by your healthcare professional. If you have questions about a medical condition or this instruction, always ask your healthcare professional. [employer] disclaims any warranty or liability for your use of this information.    Learning About Stress  What is stress?     Stress is your body's response to a hard situation. Your body can have a physical, emotional, or mental response. Stress is a fact of life for most people, and it affects everyone differently. What causes stress for you may not be stressful for someone else.  A lot of things can cause stress. You may feel stress when you go on a job interview, take a test, or run a race. This kind of short-term stress is normal and even useful. It can help you if you need to work hard or react quickly. For example, stress can help you finish an important job on time.  Long-term stress is caused by ongoing stressful situations or events. Examples of long-term stress include long-term health " problems, ongoing problems at work, or conflicts in your family. Long-term stress can harm your health.  How does stress affect your health?  When you are stressed, your body responds as though you are in danger. It makes hormones that speed up your heart, make you breathe faster, and give you a burst of energy. This is called the fight-or-flight stress response. If the stress is over quickly, your body goes back to normal and no harm is done.  But if stress happens too often or lasts too long, it can have bad effects. Long-term stress can make you more likely to get sick, and it can make symptoms of some diseases worse. If you tense up when you are stressed, you may develop neck, shoulder, or low back pain. Stress is linked to high blood pressure and heart disease.  Stress also harms your emotional health. It can make you kay, tense, or depressed. Your relationships may suffer, and you may not do well at work or school.  What can you do to manage stress?  You can try these things to help manage stress:   Do something active. Exercise or activity can help reduce stress. Walking is a great way to get started. Even everyday activities such as housecleaning or yard work can help.  Try yoga or lidya chi. These techniques combine exercise and meditation. You may need some training at first to learn them.  Do something you enjoy. For example, listen to music or go to a movie. Practice your hobby or do volunteer work.  Meditate. This can help you relax, because you are not worrying about what happened before or what may happen in the future.  Do guided imagery. Imagine yourself in any setting that helps you feel calm. You can use online videos, books, or a teacher to guide you.  Do breathing exercises. For example:  From a standing position, bend forward from the waist with your knees slightly bent. Let your arms dangle close to the floor.  Breathe in slowly and deeply as you return to a standing position. Roll up slowly  "and lift your head last.  Hold your breath for just a few seconds in the standing position.  Breathe out slowly and bend forward from the waist.  Let your feelings out. Talk, laugh, cry, and express anger when you need to. Talking with supportive friends or family, a counselor, or a yara leader about your feelings is a healthy way to relieve stress. Avoid discussing your feelings with people who make you feel worse.  Write. It may help to write about things that are bothering you. This helps you find out how much stress you feel and what is causing it. When you know this, you can find better ways to cope.  What can you do to prevent stress?  You might try some of these things to help prevent stress:  Manage your time. This helps you find time to do the things you want and need to do.  Get enough sleep. Your body recovers from the stresses of the day while you are sleeping.  Get support. Your family, friends, and community can make a difference in how you experience stress.  Limit your news feed. Avoid or limit time on social media or news that may make you feel stressed.  Do something active. Exercise or activity can help reduce stress. Walking is a great way to get started.  Where can you learn more?  Go to https://www.[employer].net/patiented  Enter N032 in the search box to learn more about \"Learning About Stress.\"  Current as of: October 24, 2024  Content Version: 14.5 2024-2025 [employer].   Care instructions adapted under license by your healthcare professional. If you have questions about a medical condition or this instruction, always ask your healthcare professional. [employer] disclaims any warranty or liability for your use of this information.    Learning About Sleeping Well  What does sleeping well mean?     Sleeping well means getting enough sleep to feel good and stay healthy. How much sleep is enough varies among people.  The number of hours you sleep and how you feel when " you wake up are both important. If you do not feel refreshed, you probably need more sleep. Another sign of not getting enough sleep is feeling tired during the day.  Experts recommend that adults get at least 7 or more hours of sleep per day. Children and older adults need more sleep.  Why is getting enough sleep important?  Getting enough quality sleep is a basic part of good health. When your sleep suffers, your physical health, mood, and your thoughts can suffer too. You may find yourself feeling more grumpy or stressed. Not getting enough sleep also can lead to serious problems, including injury, accidents, anxiety, and depression.  What might cause poor sleeping?  Many things can cause sleep problems, including:  Changes to your sleep schedule.  Stress. Stress can be caused by fear about a single event, such as giving a speech. Or you may have ongoing stress, such as worry about work or school.  Depression, anxiety, and other mental or emotional conditions.  Changes in your sleep habits or surroundings. This includes changes that happen where you sleep, such as noise, light, or sleeping in a different bed. It also includes changes in your sleep pattern, such as having jet lag or working a late shift.  Health problems, such as pain, breathing problems, and restless legs syndrome.  Lack of regular exercise.  Using alcohol, nicotine, or caffeine before bed.  How can you help yourself?  Here are some tips that may help you sleep more soundly and wake up feeling more refreshed.  Your sleeping area   Use your bedroom only for sleeping and sex. A bit of light reading may help you fall asleep. But if it doesn't, do your reading elsewhere in the house. Try not to use your TV, computer, smartphone, or tablet while you are in bed.  Be sure your bed is big enough to stretch out comfortably, especially if you have a sleep partner.  Keep your bedroom quiet, dark, and cool. Use curtains, blinds, or a sleep mask to block out  "light. To block out noise, use earplugs, soothing music, or a \"white noise\" machine.  Your evening and bedtime routine   Create a relaxing bedtime routine. You might want to take a warm shower or bath, or listen to soothing music.  Go to bed at the same time every night. And get up at the same time every morning, even if you feel tired.  What to avoid   Limit caffeine (coffee, tea, caffeinated sodas) during the day, and don't have any for at least 6 hours before bedtime.  Avoid drinking alcohol before bedtime. Alcohol can cause you to wake up more often during the night.  Try not to smoke or use tobacco, especially in the evening. Nicotine can keep you awake.  Limit naps during the day, especially close to bedtime.  Avoid lying in bed awake for too long. If you can't fall asleep or if you wake up in the middle of the night and can't get back to sleep within about 20 minutes, get out of bed and go to another room until you feel sleepy.  Avoid taking medicine right before bed that may keep you awake or make you feel hyper or energized. Your doctor can tell you if your medicine may do this and if you can take it earlier in the day.  If you can't sleep   Imagine yourself in a peaceful, pleasant scene. Focus on the details and feelings of being in a place that is relaxing.  Get up and do a quiet or boring activity until you feel sleepy.  Avoid drinking any liquids before going to bed to help prevent waking up often to use the bathroom.  Where can you learn more?  Go to https://www.[employer].net/patiented  Enter J942 in the search box to learn more about \"Learning About Sleeping Well.\"  Current as of: July 31, 2024  Content Version: 14.5 2024-2025 [employer].   Care instructions adapted under license by your healthcare professional. If you have questions about a medical condition or this instruction, always ask your healthcare professional. [employer] disclaims any warranty or liability for " your use of this information.

## 2025-07-30 NOTE — PROGRESS NOTES
Preventive Care Visit  [facility]  JEFFERSON Cook CNP, Nurse Practitioner - Family  Jul 30, 2025      Assessment & Plan     Routine general medical examination at a health care facility  Reviewed and updated health maintenance and recommendations   Due for colonoscopy  Due for eye exam for DM    Type 2 diabetes mellitus with stage 3a chronic kidney disease, without long-term current use of insulin (H)  Historically well controlled on metformin and SGLT2i  - Lipid panel reflex to direct LDL Non-fasting; Future  - Albumin Random Urine Quantitative with Creat Ratio; Future  - Basic metabolic panel  (Ca, Cl, CO2, Creat, Gluc, K, Na, BUN); Future  - aspirin 81 MG EC tablet; Take 1 tablet (81 mg) by mouth daily.  - empagliflozin (JARDIANCE) 10 MG TABS tablet; Take 1 tablet (10 mg) by mouth daily.  - Lipid panel reflex to direct LDL Non-fasting  - Albumin Random Urine Quantitative with Creat Ratio  - Basic metabolic panel  (Ca, Cl, CO2, Creat, Gluc, K, Na, BUN)    Benign essential hypertension  Well controlled, has had BPs in the 130's/80-90s and some higher and on three antihypertensives. Will check for primary hyperaldosteronism. Notable for regular use of sudafed. Discouraged use of decongestant in favor of antihistamine for HTN and CV health  - Basic metabolic panel  (Ca, Cl, CO2, Creat, Gluc, K, Na, BUN); Future  - aspirin 81 MG EC tablet; Take 1 tablet (81 mg) by mouth daily.  - losartan (COZAAR) 100 MG tablet; Take 1 tablet (100 mg) by mouth daily.  - atenolol (TENORMIN) 50 MG tablet; Take 1 tablet (50 mg) by mouth daily.  - Aldosterone; Future  - Renin activity; Future  - Aldosterone Renin Ratio; Future  - Potassium; Future  - Basic metabolic panel  (Ca, Cl, CO2, Creat, Gluc, K, Na, BUN)  - Aldosterone Renin Ratio    Hyperlipidemia LDL goal <130  Taking atorvastatin    Stage 3a chronic kidney disease (H)  Stable. On SGLT2i  - Albumin Random Urine Quantitative with Creat  "Ratio; Future  - Basic metabolic panel  (Ca, Cl, CO2, Creat, Gluc, K, Na, BUN); Future  - empagliflozin (JARDIANCE) 10 MG TABS tablet; Take 1 tablet (10 mg) by mouth daily.  - Albumin Random Urine Quantitative with Creat Ratio  - Basic metabolic panel  (Ca, Cl, CO2, Creat, Gluc, K, Na, BUN)    Migraine without aura and without status migrainosus, not intractable  Multiple prophylactic agent trial - see note below for list  Migraines increased in both frequency and intensity  Does get muscle tension and will try tizanidine for migraine as well.  Trial Nurtec and follow-up with neurology. Watch BP with starting Nurtec  - rimegepant (NURTEC) 75 MG ODT tablet; Place 1 tablet (75 mg) under the tongue every 48 hours.  - Adult Neurology  Referral; Future  - tiZANidine (ZANAFLEX) 2 MG tablet; Take 1-2 tablets (2-4 mg) by mouth 3 times daily as needed for muscle spasms.    Screening for prostate cancer  - PSA, screen; Future  - PSA, screen    Colon cancer screening  - Colonoscopy Screening  Referral; Future    Primary osteoarthritis of left hip  S/p hip replacement    ISAC (generalized anxiety disorder)  Stable - no changes  - citalopram (CELEXA) 20 MG tablet; Take 1 tablet (20 mg) by mouth daily.    Paresthesia of both feet  stable  - gabapentin (NEURONTIN) 300 MG capsule; Take 1 capsule (300 mg) by mouth 2 times daily AND 2 capsules (600 mg) at bedtime.    Seasonal allergic rhinitis, unspecified trigger  Encourage antihistamine and floanse and saline. No decongestants    Patient has been advised of split billing requirements and indicates understanding: Yes    BMI  Estimated body mass index is 31.89 kg/m  as calculated from the following:    Height as of this encounter: 1.775 m (5' 9.88\").    Weight as of this encounter: 100.5 kg (221 lb 8 oz).   Weight management plan: Discussed healthy diet and exercise guidelines    Counseling  Appropriate preventive services were addressed with this patient via " "screening, questionnaire, or discussion as appropriate for fall prevention, nutrition, physical activity, Tobacco-use cessation, social engagement, weight loss and cognition.  Checklist reviewing preventive services available has been given to the patient.  Reviewed patient's diet, addressing concerns and/or questions.   He is at risk for psychosocial distress and has been provided with information to reduce risk.   Discussed possible causes of fatigue. The patient was provided with written information regarding signs of hearing loss.   Reviewed preventive health counseling, as reflected in patient instructions    Follow-up  Return in about 5 months (around 12/15/2025) for Routine Visit.    The longitudinal plan of care for the diagnosis(es)/condition(s) as documented were addressed during this visit. Due to the added complexity in care, I will continue to support Karan in the subsequent management and with ongoing continuity of care.Review of prior external note(s) from - ED visits in June  Ordering of each unique test  Prescription drug management      Subjective   Karan is a 70 year old, presenting for the following:  Physical        7/30/2025     8:33 AM   Additional Questions   Roomed by Osmany SHANNON    HM -    Screenings - colon cancer screening due, PSA, HTN labs   Immunizations - had MMR booster 2013, consider RSV, covid and flu in the fall  Habits  -   Exercise - walking more now     Mental health/mindfulness - mood very dependent on situation, no deep depression or significant anxiety. Citalopram seems somewhat helpful and no known side effects. No therapy at the moment     Alcohol/cigarettes - minimal alcohol and never smoker   Sleep - \"ok\" - difficulty with work stress and habit of screens before bed, not aware of any snoring, no excessive daytime sleepiness, no waking from gasping. Hasn't had sleep study. STOP-BANG = 4 (age, male, HTN, neck circumference> 40)  Sexual health - not sexually active, " no  concerns    Diabetes/HTN/HLD - Blood pressures at home look similar of 130s/70-80s. Not checking glucoses often, but has seen numbers like 130.    TAKING SUDAFED for allergies    Hip replacement 6/3/25 - dull ache at times in the morning and one leg still feels longer. Next week is final physical therapy. Feels like he can be more active than before surgery, but still has some movement restrictions.    Migraines - have been bad since back to work. Having Last week had low level headache almost every day. Takes the rizatriptan in the AM and headaches returns by the evening. Migraines can be 4-5 per month and some months without any. Does get muscle tension in neck and shoulders concurrent with migraines. Weather variation and lack of sleep and alcohol provoke migraines. Last saw neurology at [facility] about 10 years ago.  Previous prophylaxis trials    - topiramate: was not very effective, caused parasthesias   - propranolol: no recollection   - venlafaxine: had not been helpful, lost insurance and had withdraw sx   - MAO inhibitor: got night terrors   - atenolol initially effective and continues on this, worries about heart pounding when misses this medication   - amitriptyline: thinks he had a trial of this many years, wasn't effective   - magnesium: was not helpful    Advance Care Planning    Discussed advance care planning with patient; informed AVS has link to [facility].        7/25/2025   General Health   How would you rate your overall physical health? (!) FAIR   Feel stress (tense, anxious, or unable to sleep) To some extent   (!) STRESS CONCERN      7/25/2025   Nutrition   Diet: Regular (no restrictions)         7/25/2025   Exercise   Days per week of moderate/strenous exercise 4 days   Average minutes spent exercising at this level 30 min         7/25/2025   Social Factors   Frequency of gathering with friends or relatives Once a week   Worry food won't last until get money to buy more No    Food not last or not have enough money for food? No   Do you have housing? (Housing is defined as stable permanent housing and does not include staying outside in a car, in a tent, in an abandoned building, in an overnight shelter, or couch-surfing.) Yes   Are you worried about losing your housing? No   Lack of transportation? No   Unable to get utilities (heat,electricity)? No         7/25/2025   Fall Risk   Fallen 2 or more times in the past year? No   Trouble with walking or balance? No          7/25/2025   Activities of Daily Living- Home Safety   Needs help with the following daily activites None of the above   Safety concerns in the home None of the above         7/25/2025   Dental   Dentist two times every year? Yes         7/25/2025   Hearing Screening   Hearing concerns? (!) I NEED TO ASK PEOPLE TO SPEAK UP OR REPEAT THEMSELVES.    (!) IT'S HARD TO FOLLOW A CONVERSATION IN A NOISY RESTAURANT OR CROWDED ROOM.   Would you like a referral for hearing testing? No       Multiple values from one day are sorted in reverse-chronological order         7/25/2025   Driving Risk Screening   Patient/family members have concerns about driving No         7/25/2025   General Alertness/Fatigue Screening   Have you been more tired than usual lately? (!) YES         7/25/2025   Urinary Incontinence Screening   Bothered by leaking urine in past 6 months No         Today's PHQ-2 Score:       7/30/2025     8:19 AM   PHQ-2 ( 1999 Pf[employer])   Q1: Little interest or pleasure in doing things 1   Q2: Feeling down, depressed or hopeless 0   PHQ-2 Score 1    Q1: Little interest or pleasure in doing things Several days   Q2: Feeling down, depressed or hopeless Not at all   PHQ-2 Score 1       Patient-reported           7/25/2025   Substance Use   Alcohol more than 3/day or more than 7/wk No   Do you have a current opioid prescription? No   How severe/bad is pain from 1 to 10? 3/10   Do you use any other substances recreationally? No      Social History     Tobacco Use    Smoking status: Never    Smokeless tobacco: Never   Vaping Use    Vaping status: Never Used   Substance Use Topics    Alcohol use: Yes     Comment: once/ twice per week    Drug use: No           7/25/2025   AAA Screening   Family history of Abdominal Aortic Aneurysm (AAA)? No   Last PSA:   PSA   Date Value Ref Range Status   02/27/2021 0.76 0 - 4 ug/L Final     Comment:     Assay Method:  Chemiluminescence using Siemens Vista analyzer     Prostate Specific Antigen Screen   Date Value Ref Range Status   07/30/2024 0.63 0.00 - 4.50 ng/mL Final   04/26/2022 0.70 0.00 - 4.00 ug/L Final     ASCVD Risk   The 10-year ASCVD risk score (Aristeo ALMEIDA, et al., 2019) is: 33.4%    Values used to calculate the score:      Age: 70 years      Sex: Male      Is Non- : No      Diabetic: Yes      Tobacco smoker: No      Systolic Blood Pressure: 136 mmHg      Is BP treated: Yes      HDL Cholesterol: 37 mg/dL      Total Cholesterol: 139 mg/dL          Reviewed and updated as needed this visit by Provider   Tobacco  Allergies  Meds  Problems  Med Hx  Surg Hx  Fam Hx  Soc   Hx Sexual Activity          Past Medical History:   Diagnosis Date    Diabetes (H)     Hypertension     Peptic ulcer hemorrhage 2013    Renal disease      Past Surgical History:   Procedure Laterality Date    ARTHROPLASTY HIP Left 6/3/2025    Procedure: Arthroplasty, hip, total, left;  Surgeon: Karan Lynn MD;  Location: [facility] OR    GI SURGERY  2013    peptic ulcers stapled    OPEN REDUCTION INTERNAL FIXATION HUMERUS PROXIMAL Left 5/15/2018    Procedure: OPEN REDUCTION INTERNAL FIXATION HUMERUS PROXIMAL;  Left Glenoid Open Reduction Internal Fixation;  Surgeon: Rosa Joe MD;  Location:  OR     Current providers sharing in care for this patient include:  Patient Care Team:  Romelia Judd APRN CNP as PCP - General (Nurse Practitioner - Family)  Tc Hester MD as  MD (Family Medicine - Sports Medicine)  Savannah Burrows PA-C as Physician Assistant (Physician Assistant)  Shahrzad Cash MD as MD (Urology)  Naima Elise, RN as Registered Nurse (Urology)  Romelia Judd APRN CNP as Referring Physician (Nurse Practitioner - Family)  Romelia Judd, JEFFERSON CNP as Assigned PCP  Nena Aquino [facility] as Pharmacist (Pharmacist)  Maria L Miller AuD as Audiologist (Audiology)  Josefina Cuevas PA-C as Physician Assistant (Otolaryngology)  Brandin Murguia DO as Assigned Musculoskeletal Provider  Romelia Judd APRN CNP as Assigned Pain Medication Provider    The following health maintenance items are reviewed in Epic and correct as of today:  Health Maintenance   Topic Date Due    EYE EXAM  Never done    RSV VACCINE (1 - Risk 60-74 years 1-dose series) Never done    COLORECTAL CANCER SCREENING  02/07/2020    COVID-19 VACCINE (10 - 2024-25 season) 06/02/2025    LIPID  07/16/2025    MICROALBUMIN  07/30/2025    DIABETIC FOOT EXAM  07/30/2025    INFLUENZA VACCINE (1) 09/01/2025    A1C  11/13/2025    ANNUAL REVIEW OF HM ORDERS  05/13/2026    BMP  06/07/2026    MEDICARE ANNUAL WELLNESS VISIT  07/30/2026    FALL RISK ASSESSMENT  07/30/2026    ADVANCE CARE PLANNING  05/13/2030    DTAP/TDAP/TD VACCINE (3 - Td or Tdap) 04/23/2034    PHQ-2 (once per calendar year)  Completed    PNEUMOCOCCAL VACCINE 50+ YEARS  Completed    URINALYSIS  Completed    HPV VACCINE (No Doses Required) Completed    ZOSTER VACCINE  Completed    MENINGITIS VACCINE  Aged Out    HEPATITIS C SCREENING  Discontinued    HEMOGLOBIN  Discontinued    HEPATITIS A VACCINE  Discontinued    HEPATITIS B VACCINE  Discontinued         Review of Systems  Constitutional, neuro, ENT, endocrine, pulmonary, cardiac, gastrointestinal, genitourinary, musculoskeletal, integument and psychiatric systems are negative, except as otherwise noted.     Objective    Exam  /87 (BP Location: Left arm, Patient  "Position: Sitting, Cuff Size: Adult Large)   Pulse 68   Temp 97.6  F (36.4  C) (Temporal)   Ht 1.775 m (5' 9.88\")   Wt 100.5 kg (221 lb 8 oz)   SpO2 98%   BMI 31.89 kg/m     Estimated body mass index is 31.89 kg/m  as calculated from the following:    Height as of this encounter: 1.775 m (5' 9.88\").    Weight as of this encounter: 100.5 kg (221 lb 8 oz).    Physical Exam  GENERAL: alert and no distress  EYES: Eyes grossly normal to inspection, PERRL and conjunctivae and sclerae normal  HENT: normal cephalic/atraumatic, ear canals and TM's normal, nose and mouth without ulcers or lesions, nasal mucosa edematous , oropharynx clear, and oral mucous membranes moist  NECK: no adenopathy, no asymmetry, masses, or scars  RESP: lungs clear to auscultation - no rales, rhonchi or wheezes  CV: regular rate and rhythm, normal S1 S2, no S3 or S4, no murmur, click or rub, no peripheral edema  ABDOMEN: soft, nontender, no hepatosplenomegaly, no masses and bowel sounds normal  MS: no gross musculoskeletal defects noted, no edema  SKIN: no suspicious lesions or rashes  NEURO: Normal strength and tone, mentation intact and speech normal  PSYCH: mentation appears normal, affect normal/bright        7/30/2025   Mini Cog   Clock Draw Score 2 Normal   3 Item Recall 3 objects recalled   Mini Cog Total Score 5             Signed Electronically by: JEFFERSON Cook CNP    "

## 2025-07-31 ENCOUNTER — PATIENT OUTREACH (OUTPATIENT)
Dept: CARE COORDINATION | Facility: CLINIC | Age: 70
End: 2025-07-31
Payer: COMMERCIAL

## 2025-07-31 LAB
ANION GAP SERPL CALCULATED.3IONS-SCNC: 12 MMOL/L (ref 7–15)
BUN SERPL-MCNC: 15 MG/DL (ref 8–23)
CALCIUM SERPL-MCNC: 10.4 MG/DL (ref 8.8–10.4)
CHLORIDE SERPL-SCNC: 102 MMOL/L (ref 98–107)
CHOLEST SERPL-MCNC: 151 MG/DL
CREAT SERPL-MCNC: 1.22 MG/DL (ref 0.67–1.17)
CREAT UR-MCNC: 29.4 MG/DL
EGFRCR SERPLBLD CKD-EPI 2021: 65 ML/MIN/1.73M2
FASTING STATUS PATIENT QL REPORTED: ABNORMAL
FASTING STATUS PATIENT QL REPORTED: ABNORMAL
GLUCOSE SERPL-MCNC: 150 MG/DL (ref 70–99)
HCO3 SERPL-SCNC: 25 MMOL/L (ref 22–29)
HDLC SERPL-MCNC: 41 MG/DL
LDLC SERPL CALC-MCNC: 77 MG/DL
MICROALBUMIN UR-MCNC: 62.7 MG/L
MICROALBUMIN/CREAT UR: 213.27 MG/G CR (ref 0–17)
NONHDLC SERPL-MCNC: 110 MG/DL
POTASSIUM SERPL-SCNC: 4.7 MMOL/L (ref 3.4–5.3)
PSA SERPL DL<=0.01 NG/ML-MCNC: 0.57 NG/ML (ref 0–4.5)
SODIUM SERPL-SCNC: 139 MMOL/L (ref 135–145)
TRIGL SERPL-MCNC: 163 MG/DL

## 2025-08-04 ENCOUNTER — PATIENT OUTREACH (OUTPATIENT)
Dept: CARE COORDINATION | Facility: CLINIC | Age: 70
End: 2025-08-04
Payer: COMMERCIAL

## 2025-08-04 LAB — ALDOST/RENIN PLAS-RTO: 17.8 {RATIO} (ref 0–25)

## 2025-08-05 ENCOUNTER — THERAPY VISIT (OUTPATIENT)
Dept: PHYSICAL THERAPY | Facility: CLINIC | Age: 70
End: 2025-08-05
Payer: COMMERCIAL

## 2025-08-05 ENCOUNTER — PATIENT OUTREACH (OUTPATIENT)
Dept: CARE COORDINATION | Facility: CLINIC | Age: 70
End: 2025-08-05

## 2025-08-05 DIAGNOSIS — Z96.642 S/P TOTAL LEFT HIP ARTHROPLASTY: Primary | ICD-10-CM

## 2025-08-05 PROCEDURE — 97530 THERAPEUTIC ACTIVITIES: CPT | Mod: GP | Performed by: PHYSICAL THERAPIST

## 2025-08-05 PROCEDURE — 97110 THERAPEUTIC EXERCISES: CPT | Mod: GP | Performed by: PHYSICAL THERAPIST

## 2025-08-07 ENCOUNTER — HOSPITAL ENCOUNTER (OUTPATIENT)
Facility: AMBULATORY SURGERY CENTER | Age: 70
End: 2025-08-07
Attending: INTERNAL MEDICINE
Payer: COMMERCIAL

## 2025-08-07 ENCOUNTER — TELEPHONE (OUTPATIENT)
Dept: GASTROENTEROLOGY | Facility: CLINIC | Age: 70
End: 2025-08-07
Payer: COMMERCIAL

## 2025-08-29 ENCOUNTER — TELEPHONE (OUTPATIENT)
Dept: GASTROENTEROLOGY | Facility: CLINIC | Age: 70
End: 2025-08-29
Payer: COMMERCIAL

## 2025-08-29 DIAGNOSIS — Z12.11 SCREEN FOR COLON CANCER: Primary | ICD-10-CM

## 2025-09-02 RX ORDER — BISACODYL 5 MG/1
TABLET, DELAYED RELEASE ORAL
Qty: 4 TABLET | Refills: 0 | Status: SHIPPED | OUTPATIENT
Start: 2025-09-02

## 2025-09-03 ENCOUNTER — TELEPHONE (OUTPATIENT)
Dept: GASTROENTEROLOGY | Facility: CLINIC | Age: 70
End: 2025-09-03
Payer: COMMERCIAL

## 2025-09-04 ENCOUNTER — THERAPY VISIT (OUTPATIENT)
Dept: PHYSICAL THERAPY | Facility: CLINIC | Age: 70
End: 2025-09-04
Payer: COMMERCIAL

## 2025-09-04 DIAGNOSIS — Z96.642 S/P TOTAL LEFT HIP ARTHROPLASTY: Primary | ICD-10-CM

## 2025-11-19 ENCOUNTER — PRE VISIT (OUTPATIENT)
Dept: NEUROLOGY | Facility: CLINIC | Age: 70
End: 2025-11-19

## (undated) DEVICE — DRAPE STERI U 1015

## (undated) DEVICE — Device

## (undated) DEVICE — SU FIBERWIRE 2 38"  AR-7200

## (undated) DEVICE — STRAP STIRRUP W/SLIP 30187-030

## (undated) DEVICE — LINEN DRAPE 54X72" 5467

## (undated) DEVICE — SPONGE LAP 18X18" X8435

## (undated) DEVICE — SU VICRYL 2-0 CT-1 27" UND J259H

## (undated) DEVICE — DRSG AQUACEL AG 3.5X6.0" HYDROFIBER 412010

## (undated) DEVICE — DRAPE U-POUCH 34X29" 1067

## (undated) DEVICE — SOL WATER IRRIG 1000ML BOTTLE 2F7114

## (undated) DEVICE — SUTURE VICRYL+ 0 CT-1 18" DYED VIO VCP740D

## (undated) DEVICE — SUTURE MONOCRYL+ 3-0 PS-1 27" UNDYED MCP936H

## (undated) DEVICE — DRSG TEGADERM 4X10" 1627

## (undated) DEVICE — NDL 22GA 1.5"

## (undated) DEVICE — DEVICE RETRIEVER HEWSON 71111579

## (undated) DEVICE — SU VICRYL+ 1 MO-4 18" DYED VCP702D

## (undated) DEVICE — GLOVE BIOGEL PI SZ 7.5 40875

## (undated) DEVICE — ESU GROUND PAD ADULT W/CORD E7507

## (undated) DEVICE — HOOD SURG T7PLUS PEEL AWAY FACE SHIELD STRL LF 0416-801-100

## (undated) DEVICE — GLOVE PROTEXIS BLUE W/NEU-THERA 8.5  2D73EB85

## (undated) DEVICE — GLOVE PROTEXIS BLUE W/NEU-THERA 8.0  2D73EB80

## (undated) DEVICE — LINEN HALF SHEET 5512

## (undated) DEVICE — PREP DURAPREP REMOVER 4OZ 8611

## (undated) DEVICE — SUCTION MANIFOLD NEPTUNE 2 SYS 4 PORT 0702-020-000

## (undated) DEVICE — BRUSH SURGICAL SCRUB W/4% CHG SOL 25ML 371073

## (undated) DEVICE — SOL NACL 0.9% IRRIG 1000ML BOTTLE 2F7124

## (undated) DEVICE — DRSG STERI STRIP 1/2X4" R1547

## (undated) DEVICE — LINEN ORTHO PACK 5446

## (undated) DEVICE — SOLUTION IV IRRIGATION 0.9% NACL 3L R8206

## (undated) DEVICE — IMM PILLOW ABDUCT HIP MED M60-025-M

## (undated) DEVICE — PACK SHOULDER ARTHROSCOPY CUSTOM ASC

## (undated) DEVICE — ESU ELEC NDL 1" E1552

## (undated) DEVICE — GLOVE PROTEXIS POWDER FREE SMT 7.0  2D72PT70X

## (undated) DEVICE — DEVICE PASSER LASSO 45DEG CVD RT QUICK PASS AR-6068-45R

## (undated) DEVICE — LINEN FULL SHEET 5511

## (undated) DEVICE — ESU CLEANER TIP 31142717

## (undated) DEVICE — SU ETHIBOND 0 CT-1 CR 8X18" CX21D

## (undated) DEVICE — DRAPE IOBAN INCISE 23X17" 6650EZ

## (undated) DEVICE — IMM KIT SHOULDER TMAX MASK FACE 7210559

## (undated) DEVICE — IMM KIT SHOULDER STABILIZATION 7210573

## (undated) DEVICE — SYR BULB IRRIG 50ML LATEX FREE 0035280

## (undated) DEVICE — GLOVE PROTEXIS POWDER FREE 8.5 ORTHO LIME 2D73ET85

## (undated) DEVICE — DRAPE CONVERTORS U-DRAPE 60X72" 8476

## (undated) DEVICE — KIT ARTHREX PUSHLOCK SHORT 2.9MM DISP AR-2923DS

## (undated) DEVICE — LINEN ORTHO ACL PACK 5447

## (undated) DEVICE — BLADE SAW SAGITTAL STRK 18X90X1.27MM HD SYS 6 6118-127-090

## (undated) DEVICE — BAG CLEAR TRASH 1.3M 39X33" P4040C

## (undated) DEVICE — DRAPE STERI TOWEL LG 1010

## (undated) DEVICE — SET HANDPIECE INTERPULSE W/COAXIAL FAN SPRAY TIP 0210118000

## (undated) DEVICE — DRSG SILVERCEL 4.25X4.25" 900404

## (undated) DEVICE — PREP DURAPREP 26ML APL 8630

## (undated) DEVICE — ESU PENCIL SMOKE EVAC W/ROCKER SWITCH 0703-047-000

## (undated) DEVICE — PREP CHLORAPREP 26ML TINTED HI-LITE ORANGE 930815

## (undated) DEVICE — PACK TOTAL HIP RIDGES LATEX PO15HIFSG

## (undated) RX ORDER — LIDOCAINE HYDROCHLORIDE 10 MG/ML
INJECTION, SOLUTION EPIDURAL; INFILTRATION; INTRACAUDAL; PERINEURAL
Status: DISPENSED
Start: 2024-10-25

## (undated) RX ORDER — DEXMEDETOMIDINE HYDROCHLORIDE 4 UG/ML
INJECTION, SOLUTION INTRAVENOUS
Status: DISPENSED
Start: 2018-05-15

## (undated) RX ORDER — DEXAMETHASONE SODIUM PHOSPHATE 4 MG/ML
INJECTION, SOLUTION INTRA-ARTICULAR; INTRALESIONAL; INTRAMUSCULAR; INTRAVENOUS; SOFT TISSUE
Status: DISPENSED
Start: 2025-06-03

## (undated) RX ORDER — FENTANYL CITRATE 50 UG/ML
INJECTION, SOLUTION INTRAMUSCULAR; INTRAVENOUS
Status: DISPENSED
Start: 2025-06-03

## (undated) RX ORDER — GABAPENTIN 300 MG/1
CAPSULE ORAL
Status: DISPENSED
Start: 2018-05-15

## (undated) RX ORDER — PROPOFOL 10 MG/ML
INJECTION, EMULSION INTRAVENOUS
Status: DISPENSED
Start: 2025-06-03

## (undated) RX ORDER — EPHEDRINE SULFATE 50 MG/ML
INJECTION, SOLUTION INTRAMUSCULAR; INTRAVENOUS; SUBCUTANEOUS
Status: DISPENSED
Start: 2018-05-15

## (undated) RX ORDER — HYDROMORPHONE HCL IN WATER/PF 6 MG/30 ML
PATIENT CONTROLLED ANALGESIA SYRINGE INTRAVENOUS
Status: DISPENSED
Start: 2025-06-03

## (undated) RX ORDER — FENTANYL CITRATE 50 UG/ML
INJECTION, SOLUTION INTRAMUSCULAR; INTRAVENOUS
Status: DISPENSED
Start: 2018-05-15

## (undated) RX ORDER — ONDANSETRON 2 MG/ML
INJECTION INTRAMUSCULAR; INTRAVENOUS
Status: DISPENSED
Start: 2025-06-03

## (undated) RX ORDER — LIDOCAINE HYDROCHLORIDE 10 MG/ML
INJECTION, SOLUTION EPIDURAL; INFILTRATION; INTRACAUDAL; PERINEURAL
Status: DISPENSED
Start: 2025-06-03

## (undated) RX ORDER — OXYCODONE HYDROCHLORIDE 5 MG/1
TABLET ORAL
Status: DISPENSED
Start: 2025-06-03

## (undated) RX ORDER — LIDOCAINE HYDROCHLORIDE 20 MG/ML
INJECTION, SOLUTION EPIDURAL; INFILTRATION; INTRACAUDAL; PERINEURAL
Status: DISPENSED
Start: 2018-05-15

## (undated) RX ORDER — TRIAMCINOLONE ACETONIDE 40 MG/ML
INJECTION, SUSPENSION INTRA-ARTICULAR; INTRAMUSCULAR
Status: DISPENSED
Start: 2024-10-25

## (undated) RX ORDER — ACETAMINOPHEN 325 MG/1
TABLET ORAL
Status: DISPENSED
Start: 2018-05-15

## (undated) RX ORDER — CEFAZOLIN SODIUM/WATER 2 G/20 ML
SYRINGE (ML) INTRAVENOUS
Status: DISPENSED
Start: 2025-06-03

## (undated) RX ORDER — PHENYLEPHRINE HCL IN 0.9% NACL 1 MG/10 ML
SYRINGE (ML) INTRAVENOUS
Status: DISPENSED
Start: 2018-05-15

## (undated) RX ORDER — TRANEXAMIC ACID 650 MG/1
TABLET ORAL
Status: DISPENSED
Start: 2025-06-03

## (undated) RX ORDER — FENTANYL CITRATE-0.9 % NACL/PF 10 MCG/ML
PLASTIC BAG, INJECTION (ML) INTRAVENOUS
Status: DISPENSED
Start: 2025-06-03

## (undated) RX ORDER — KETAMINE HYDROCHLORIDE 10 MG/ML
INJECTION INTRAMUSCULAR; INTRAVENOUS
Status: DISPENSED
Start: 2018-05-15

## (undated) RX ORDER — PROPOFOL 10 MG/ML
INJECTION, EMULSION INTRAVENOUS
Status: DISPENSED
Start: 2018-05-15